# Patient Record
Sex: FEMALE | Race: WHITE | NOT HISPANIC OR LATINO | Employment: OTHER | URBAN - METROPOLITAN AREA
[De-identification: names, ages, dates, MRNs, and addresses within clinical notes are randomized per-mention and may not be internally consistent; named-entity substitution may affect disease eponyms.]

---

## 2018-10-02 ENCOUNTER — APPOINTMENT (EMERGENCY)
Dept: RADIOLOGY | Facility: HOSPITAL | Age: 63
End: 2018-10-02
Payer: COMMERCIAL

## 2018-10-02 ENCOUNTER — HOSPITAL ENCOUNTER (EMERGENCY)
Facility: HOSPITAL | Age: 63
Discharge: HOME | End: 2018-10-02
Attending: EMERGENCY MEDICINE | Admitting: EMERGENCY MEDICINE
Payer: COMMERCIAL

## 2018-10-02 VITALS
SYSTOLIC BLOOD PRESSURE: 115 MMHG | WEIGHT: 155 LBS | OXYGEN SATURATION: 98 % | HEIGHT: 64 IN | RESPIRATION RATE: 18 BRPM | HEART RATE: 75 BPM | DIASTOLIC BLOOD PRESSURE: 72 MMHG | BODY MASS INDEX: 26.46 KG/M2 | TEMPERATURE: 97.9 F

## 2018-10-02 DIAGNOSIS — Z77.120 MOLD EXPOSURE: ICD-10-CM

## 2018-10-02 DIAGNOSIS — R06.00 DYSPNEA: Primary | ICD-10-CM

## 2018-10-02 LAB
ATRIAL RATE: 76 BPM
P AXIS: 62 DEGREES
PR INTERVAL: 144 MS
QRS AXIS: 75 DEGREES
QRSD INTERVAL: 62 MS
QT INTERVAL: 382 MS
QTC INTERVAL: 429 MS
T WAVE AXIS: 59 DEGREES
VENTRICULAR RATE: 76 BPM

## 2018-10-02 PROCEDURE — 93010 ELECTROCARDIOGRAM REPORT: CPT | Performed by: INTERNAL MEDICINE

## 2018-10-02 PROCEDURE — 94640 AIRWAY INHALATION TREATMENT: CPT

## 2018-10-02 PROCEDURE — 99285 EMERGENCY DEPT VISIT HI MDM: CPT

## 2018-10-02 PROCEDURE — 71046 X-RAY EXAM CHEST 2 VIEWS: CPT

## 2018-10-02 PROCEDURE — 93005 ELECTROCARDIOGRAM TRACING: CPT

## 2018-10-02 RX ORDER — AMITRIPTYLINE HYDROCHLORIDE 10 MG/1
10 TABLET, FILM COATED ORAL
COMMUNITY

## 2018-10-02 RX ORDER — DULOXETIN HYDROCHLORIDE 60 MG/1
60 CAPSULE, DELAYED RELEASE ORAL
COMMUNITY

## 2018-10-02 RX ORDER — IPRATROPIUM BROMIDE AND ALBUTEROL SULFATE 2.5; .5 MG/3ML; MG/3ML
3 SOLUTION RESPIRATORY (INHALATION)
Status: DISCONTINUED | OUTPATIENT
Start: 2018-10-02 | End: 2018-10-02 | Stop reason: HOSPADM

## 2018-10-02 RX ORDER — ATORVASTATIN CALCIUM 20 MG/1
20 TABLET, FILM COATED ORAL DAILY
COMMUNITY

## 2018-10-02 RX ADMIN — IPRATROPIUM BROMIDE AND ALBUTEROL SULFATE 3 ML: .5; 3 SOLUTION RESPIRATORY (INHALATION) at 16:44

## 2018-10-02 NOTE — DISCHARGE INSTRUCTIONS
Dyspnea   WHAT YOU NEED TO KNOW:   Dyspnea is breathing difficulty or discomfort  You may have labored, painful, or shallow breathing  You may feel breathless or short of breath  Dyspnea can occur during rest or with activity  You may have dyspnea for a short time, or it might become chronic  Dyspnea is often a symptom of a disease or condition  DISCHARGE INSTRUCTIONS:   Return to the emergency department if:   · Your signs and symptoms are the same or worse within 24 hours of treatment  · You have shaking chills or a fever over 102°F      · You have new pain, pressure, or tightness in your chest      · You have a new or worse cough or wheezing, or you cough up blood  · You feel like you cannot get enough air  · The skin over your ribs or on your neck sinks in when you breathe  · You have a severe headache with vomiting and abdominal pain  · You feel confused or dizzy  Contact your healthcare provider or specialist if:   · You have questions or concerns about your condition or care  Medicines:   · Medicines  may be used to treat the cause of your dyspnea  Medicines may reduce swelling in your airway or decrease extra fluid from around your heart or lungs  Other medicines may be used to decrease anxiety and help you feel calm and relaxed  · Take your medicine as directed  Contact your healthcare provider if you think your medicine is not helping or if you have side effects  Tell him or her if you are allergic to any medicine  Keep a list of the medicines, vitamins, and herbs you take  Include the amounts, and when and why you take them  Bring the list or the pill bottles to follow-up visits  Carry your medicine list with you in case of an emergency  Manage long-term dyspnea:   · Create an action plan  You and your healthcare provider can work together to create a plan for how to handle episodes of dyspnea   The plan can include daily activities, treatment changes, and what to do if you have severe breathing problems  · Lean forward on your elbows when you sit  This helps your lungs expand and may make it easier to breathe  · Use pursed-lip breathing any time you feel short of breath  Breathe in through your nose and then slowly breathe out through your mouth with your lips slightly puckered  It should take you twice as long to breathe out as it did to breathe in  · Do not smoke  Nicotine and other chemicals in cigarettes and cigars can cause lung damage and make it harder to breathe  Ask your healthcare provider for information if you currently smoke and need help to quit  E-cigarettes or smokeless tobacco still contain nicotine  Talk to your healthcare provider before you use these products  · Reach or maintain a healthy weight  Your healthcare provider can help you create a safe weight loss plan if you are overweight  · Exercise as directed  Exercise can help your lungs work more easily  Exercise can also help you lose weight if needed  Try to get at least 30 minutes of exercise most days of the week  Your healthcare provider can help you create an exercise plan that is safe for you  Follow up with your healthcare provider or specialist as directed:  Write down your questions so you remember to ask them during your visits  © 2017 [facility] Information is for End User's use only and may not be sold, redistributed or otherwise used for commercial purposes  All illustrations and images included in CareNotes® are the copyrighted property of [employer]  or Emil Franco  The above information is an  only  It is not intended as medical advice for individual conditions or treatments  Talk to your doctor, nurse or pharmacist before following any medical regimen to see if it is safe and effective for you

## 2018-10-02 NOTE — ED PROVIDER NOTES
History  Chief Complaint   Patient presents with    Shortness of Breath     Patient c/o feeling short of breath and multiple other complaints, including headache, sores in her mouth, chest congestion, after finding out her house has mold in it  CC dyspnea but pt reports her primary concern is mold exposure and oral ulcerations  Has been in house w mold x several months  Sores in mouth x 1 week  Improved w eating tums  Worsened with eating food  No difficulty swallowing  No voice change  No odynophagia  Dyspnea is stable, chronic, ongoing, likely related to continued smoking and COPD  No new or worsening sxs  No CP  No syncope or hemoptysis  No cough  No recent travel  Prior to Admission Medications   Prescriptions Last Dose Informant Patient Reported? Taking? DULoxetine (CYMBALTA) 60 mg delayed release capsule 10/1/2018 at Unknown time  Yes Yes   Sig: Take 60 mg by mouth daily at bedtime   amitriptyline (ELAVIL) 10 mg tablet 10/1/2018 at Unknown time  Yes Yes   Sig: Take 10 mg by mouth daily at bedtime   atorvastatin (LIPITOR) 20 mg tablet 10/1/2018 at Unknown time  Yes Yes   Sig: Take 20 mg by mouth daily      Facility-Administered Medications: None       Past Medical History:   Diagnosis Date    Fibromyalgia     Heart murmur     Lumbosacral disc herniation     RSD (reflex sympathetic dystrophy)        Past Surgical History:   Procedure Laterality Date    PARTIAL HYSTERECTOMY         History reviewed  No pertinent family history  I have reviewed and agree with the history as documented  Social History   Substance Use Topics    Smoking status: Current Every Day Smoker    Smokeless tobacco: Never Used    Alcohol use No        Review of Systems   Constitutional: Negative for chills, fatigue and fever  HENT: Positive for mouth sores and sore throat  Negative for drooling, facial swelling, trouble swallowing and voice change  Respiratory: Positive for shortness of breath   Negative for apnea, cough, choking, chest tightness, wheezing and stridor  Cardiovascular: Negative  Gastrointestinal: Negative  Endocrine: Negative  Genitourinary: Negative  Musculoskeletal: Negative  Neurological: Negative  Hematological: Negative  Physical Exam  Physical Exam   Constitutional: She is oriented to person, place, and time  She appears well-developed and well-nourished  No distress  HENT:   Head: Normocephalic and atraumatic  Right Ear: External ear normal    Left Ear: External ear normal    Mouth/Throat: Oropharynx is clear and moist  No oropharyngeal exudate  Eyes: Pupils are equal, round, and reactive to light  Conjunctivae and EOM are normal    Neck: Normal range of motion  Neck supple  No JVD present  Cardiovascular: Normal rate and regular rhythm  Pulmonary/Chest: Effort normal  No stridor  No respiratory distress  She has wheezes  She has no rales  She exhibits no tenderness  Abdominal: Soft  Musculoskeletal: Normal range of motion  She exhibits no edema, tenderness or deformity  Neurological: She is alert and oriented to person, place, and time  No cranial nerve deficit or sensory deficit  She exhibits normal muscle tone  Coordination normal    Skin: Skin is warm and dry  Capillary refill takes less than 2 seconds  She is not diaphoretic  Nursing note and vitals reviewed        Vital Signs  ED Triage Vitals   Temperature Pulse Respirations Blood Pressure SpO2   10/02/18 1543 10/02/18 1539 10/02/18 1539 10/02/18 1539 10/02/18 1539   97 9 °F (36 6 °C) 83 20 115/72 100 %      Temp Source Heart Rate Source Patient Position - Orthostatic VS BP Location FiO2 (%)   10/02/18 1543 10/02/18 1539 10/02/18 1539 10/02/18 1539 --   Oral Monitor Sitting Right arm       Pain Score       --                  Vitals:    10/02/18 1539 10/02/18 1645   BP: 115/72    Pulse: 83 75   Patient Position - Orthostatic VS: Sitting        Visual Acuity      ED Medications  Medications ipratropium-albuterol (DUO-NEB) 0 5-2 5 mg/3 mL inhalation solution 3 mL (3 mL Nebulization Given 10/2/18 1644)       Diagnostic Studies  Results Reviewed     None                 XR chest 2 views   ED Interpretation by Iris Poe MD (10/02 1651)   C/w underlying COPD, no acute process or PTX      Final Result by Cynthia Chilel MD (10/02 1656)      COPD  No acute cardiopulmonary disease  Workstation performed: [id number]                    Procedures  ECG 12 Lead Documentation  Date/Time: 10/2/2018 4:26 PM  Performed by: Jett Brantley  Authorized by: Jett Brantley     Indications / Diagnosis:  Dyspnea  ECG reviewed by me, the ED Provider: yes    Patient location:  ED  Previous ECG:     Previous ECG:  Unavailable  Interpretation:     Interpretation: normal    Rate:     ECG rate:  76    ECG rate assessment: normal    Rhythm:     Rhythm: sinus rhythm             Phone Contacts  ED Phone Contact    ED Course                               MDM  Number of Diagnoses or Management Options  Dyspnea:   Mold exposure:   Diagnosis management comments: Well appearing pt who speaks full sentences without any dyspnea or increased wob  Mild wheezing likely related to ongoing smoking, no rales  Clinically do not appreciate any significant acute cardiopulmonary process and feel d/c home is safe and appropriate          Amount and/or Complexity of Data Reviewed  Tests in the radiology section of CPT®: reviewed and ordered  Tests in the medicine section of CPT®: reviewed and ordered      CritCare Time    Disposition  Final diagnoses:   Dyspnea   Mold exposure     Time reflects when diagnosis was documented in both MDM as applicable and the Disposition within this note     Time User Action Codes Description Comment    10/2/2018  4:59 PM Tomma Moon Add [R06 00] Dyspnea     10/2/2018  4:59 PM Tomma Moon Add [P95 091] Alfredo 22 exposure       ED Disposition     ED Disposition Condition Comment    Discharge  Janis Snyder discharge to home/self care  Condition at discharge: Stable        Follow-up Information    None         Discharge Medication List as of 10/2/2018  5:01 PM      CONTINUE these medications which have NOT CHANGED    Details   amitriptyline (ELAVIL) 10 mg tablet Take 10 mg by mouth daily at bedtime, Historical Med      atorvastatin (LIPITOR) 20 mg tablet Take 20 mg by mouth daily, Historical Med      DULoxetine (CYMBALTA) 60 mg delayed release capsule Take 60 mg by mouth daily at bedtime, Historical Med           No discharge procedures on file      ED Provider  Electronically Signed by           Ever Roe MD  10/02/18 5821

## 2020-06-11 ENCOUNTER — HOSPITAL ENCOUNTER (EMERGENCY)
Facility: HOSPITAL | Age: 65
Discharge: HOME | End: 2020-06-11
Attending: EMERGENCY MEDICINE | Admitting: EMERGENCY MEDICINE
Payer: COMMERCIAL

## 2020-06-11 ENCOUNTER — APPOINTMENT (EMERGENCY)
Dept: CT IMAGING | Facility: HOSPITAL | Age: 65
End: 2020-06-11
Payer: COMMERCIAL

## 2020-06-11 VITALS
BODY MASS INDEX: 28.15 KG/M2 | HEART RATE: 78 BPM | OXYGEN SATURATION: 94 % | WEIGHT: 164.9 LBS | TEMPERATURE: 98.6 F | HEIGHT: 64 IN | DIASTOLIC BLOOD PRESSURE: 77 MMHG | SYSTOLIC BLOOD PRESSURE: 141 MMHG | RESPIRATION RATE: 18 BRPM

## 2020-06-11 DIAGNOSIS — K76.0 FATTY LIVER: ICD-10-CM

## 2020-06-11 DIAGNOSIS — R10.9 ABDOMINAL PAIN: Primary | ICD-10-CM

## 2020-06-11 LAB
ALBUMIN SERPL BCP-MCNC: 3.6 G/DL (ref 3.5–5)
ALP SERPL-CCNC: 75 U/L (ref 46–116)
ALT SERPL W P-5'-P-CCNC: 135 U/L (ref 12–78)
ANION GAP SERPL CALCULATED.3IONS-SCNC: 9 MMOL/L (ref 4–13)
AST SERPL W P-5'-P-CCNC: 68 U/L (ref 5–45)
BASOPHILS # BLD AUTO: 0.05 THOUSANDS/ΜL (ref 0–0.1)
BASOPHILS NFR BLD AUTO: 1 % (ref 0–1)
BILIRUB DIRECT SERPL-MCNC: 0.12 MG/DL (ref 0–0.2)
BILIRUB SERPL-MCNC: 0.3 MG/DL (ref 0.2–1)
BILIRUB UR QL STRIP: NEGATIVE
BUN SERPL-MCNC: 11 MG/DL (ref 5–25)
CALCIUM SERPL-MCNC: 8.8 MG/DL (ref 8.3–10.1)
CHLORIDE SERPL-SCNC: 105 MMOL/L (ref 100–108)
CLARITY UR: CLEAR
CO2 SERPL-SCNC: 25 MMOL/L (ref 21–32)
COLOR UR: YELLOW
CREAT SERPL-MCNC: 0.73 MG/DL (ref 0.6–1.3)
EOSINOPHIL # BLD AUTO: 0.14 THOUSAND/ΜL (ref 0–0.61)
EOSINOPHIL NFR BLD AUTO: 2 % (ref 0–6)
ERYTHROCYTE [DISTWIDTH] IN BLOOD BY AUTOMATED COUNT: 12.5 % (ref 11.6–15.1)
GFR SERPL CREATININE-BSD FRML MDRD: 88 ML/MIN/1.73SQ M
GLUCOSE SERPL-MCNC: 95 MG/DL (ref 65–140)
GLUCOSE UR STRIP-MCNC: NEGATIVE MG/DL
HCT VFR BLD AUTO: 41.1 % (ref 34.8–46.1)
HGB BLD-MCNC: 13.4 G/DL (ref 11.5–15.4)
HGB UR QL STRIP.AUTO: NEGATIVE
IMM GRANULOCYTES # BLD AUTO: 0.04 THOUSAND/UL (ref 0–0.2)
IMM GRANULOCYTES NFR BLD AUTO: 0 % (ref 0–2)
INR PPP: 1.01 (ref 0.84–1.19)
KETONES UR STRIP-MCNC: ABNORMAL MG/DL
LEUKOCYTE ESTERASE UR QL STRIP: NEGATIVE
LIPASE SERPL-CCNC: 123 U/L (ref 73–393)
LYMPHOCYTES # BLD AUTO: 2.38 THOUSANDS/ΜL (ref 0.6–4.47)
LYMPHOCYTES NFR BLD AUTO: 26 % (ref 14–44)
MCH RBC QN AUTO: 32.5 PG (ref 26.8–34.3)
MCHC RBC AUTO-ENTMCNC: 32.6 G/DL (ref 31.4–37.4)
MCV RBC AUTO: 100 FL (ref 82–98)
MONOCYTES # BLD AUTO: 0.57 THOUSAND/ΜL (ref 0.17–1.22)
MONOCYTES NFR BLD AUTO: 6 % (ref 4–12)
NEUTROPHILS # BLD AUTO: 6.05 THOUSANDS/ΜL (ref 1.85–7.62)
NEUTS SEG NFR BLD AUTO: 65 % (ref 43–75)
NITRITE UR QL STRIP: NEGATIVE
NRBC BLD AUTO-RTO: 0 /100 WBCS
PH UR STRIP.AUTO: 6 [PH]
PLATELET # BLD AUTO: 207 THOUSANDS/UL (ref 149–390)
PMV BLD AUTO: 10.6 FL (ref 8.9–12.7)
POTASSIUM SERPL-SCNC: 3.9 MMOL/L (ref 3.5–5.3)
PROT SERPL-MCNC: 7.2 G/DL (ref 6.4–8.2)
PROT UR STRIP-MCNC: NEGATIVE MG/DL
PROTHROMBIN TIME: 13.3 SECONDS (ref 11.6–14.5)
RBC # BLD AUTO: 4.12 MILLION/UL (ref 3.81–5.12)
SODIUM SERPL-SCNC: 139 MMOL/L (ref 136–145)
SP GR UR STRIP.AUTO: 1.02 (ref 1–1.03)
TROPONIN I SERPL-MCNC: <0.02 NG/ML
UROBILINOGEN UR QL STRIP.AUTO: 0.2 E.U./DL
WBC # BLD AUTO: 9.23 THOUSAND/UL (ref 4.31–10.16)

## 2020-06-11 PROCEDURE — 80048 BASIC METABOLIC PNL TOTAL CA: CPT | Performed by: EMERGENCY MEDICINE

## 2020-06-11 PROCEDURE — 84484 ASSAY OF TROPONIN QUANT: CPT | Performed by: EMERGENCY MEDICINE

## 2020-06-11 PROCEDURE — 74177 CT ABD & PELVIS W/CONTRAST: CPT

## 2020-06-11 PROCEDURE — 85610 PROTHROMBIN TIME: CPT | Performed by: EMERGENCY MEDICINE

## 2020-06-11 PROCEDURE — 80076 HEPATIC FUNCTION PANEL: CPT | Performed by: EMERGENCY MEDICINE

## 2020-06-11 PROCEDURE — 36415 COLL VENOUS BLD VENIPUNCTURE: CPT | Performed by: EMERGENCY MEDICINE

## 2020-06-11 PROCEDURE — 83690 ASSAY OF LIPASE: CPT | Performed by: EMERGENCY MEDICINE

## 2020-06-11 PROCEDURE — 99284 EMERGENCY DEPT VISIT MOD MDM: CPT

## 2020-06-11 PROCEDURE — 81003 URINALYSIS AUTO W/O SCOPE: CPT | Performed by: EMERGENCY MEDICINE

## 2020-06-11 PROCEDURE — 93005 ELECTROCARDIOGRAM TRACING: CPT

## 2020-06-11 PROCEDURE — 99284 EMERGENCY DEPT VISIT MOD MDM: CPT | Performed by: EMERGENCY MEDICINE

## 2020-06-11 PROCEDURE — 85025 COMPLETE CBC W/AUTO DIFF WBC: CPT | Performed by: EMERGENCY MEDICINE

## 2020-06-11 RX ADMIN — IOHEXOL 100 ML: 350 INJECTION, SOLUTION INTRAVENOUS at 22:07

## 2020-06-12 LAB
ATRIAL RATE: 92 BPM
P AXIS: 51 DEGREES
PR INTERVAL: 128 MS
QRS AXIS: 57 DEGREES
QRSD INTERVAL: 72 MS
QT INTERVAL: 346 MS
QTC INTERVAL: 427 MS
T WAVE AXIS: 53 DEGREES
VENTRICULAR RATE: 92 BPM

## 2020-06-12 PROCEDURE — 93010 ELECTROCARDIOGRAM REPORT: CPT | Performed by: INTERNAL MEDICINE

## 2022-11-10 ENCOUNTER — OFFICE VISIT (OUTPATIENT)
Dept: DERMATOLOGY | Facility: CLINIC | Age: 67
End: 2022-11-10

## 2022-11-10 VITALS — BODY MASS INDEX: 26.46 KG/M2 | HEIGHT: 64 IN | TEMPERATURE: 96.9 F | WEIGHT: 155 LBS

## 2022-11-10 DIAGNOSIS — L40.9 PSORIASIS: Primary | ICD-10-CM

## 2022-11-10 RX ORDER — AMITRIPTYLINE HYDROCHLORIDE 25 MG/1
25 TABLET, FILM COATED ORAL EVERY EVENING
COMMUNITY
Start: 2022-09-22

## 2022-11-10 RX ORDER — LORATADINE AND PSEUDOEPHEDRINE SULFATE 10; 240 MG/1; MG/1
1 TABLET, EXTENDED RELEASE ORAL DAILY
COMMUNITY
Start: 2022-10-24 | End: 2023-10-24

## 2022-11-10 RX ORDER — TRIAMCINOLONE ACETONIDE 1 MG/G
CREAM TOPICAL 2 TIMES DAILY
Qty: 453.6 G | Refills: 2 | Status: SHIPPED | OUTPATIENT
Start: 2022-11-10

## 2022-11-10 RX ORDER — TRIAMCINOLONE ACETONIDE 1 MG/G
1 CREAM TOPICAL 2 TIMES DAILY
COMMUNITY
Start: 2022-08-04 | End: 2023-08-04

## 2022-11-10 RX ORDER — FLUTICASONE PROPIONATE AND SALMETEROL 100; 50 UG/1; UG/1
1 POWDER RESPIRATORY (INHALATION) 2 TIMES DAILY
COMMUNITY

## 2022-11-10 RX ORDER — CALCIPOTRIENE 50 UG/G
CREAM TOPICAL 2 TIMES DAILY
Qty: 120 G | Refills: 2 | Status: SHIPPED | OUTPATIENT
Start: 2022-11-10

## 2022-11-10 NOTE — PATIENT INSTRUCTIONS
PSORIASIS    Assessment and Plan:  Based on a thorough discussion of this condition and the management approach to it (including a comprehensive discussion of the known risks, side effects and potential benefits of treatment), the patient (family) agrees to implement the following specific plan:  Discussed treatment with [facility]  Informed patient that this medication would require a prior authorization through her insurance  Also, that the major side effect is nausea  It was also discussed that this medication would be on going and there is not a definite time frame  of when you would be finished with this medication  We will order this medication and work on an authorization from [facility]  Triamcinolone 0 1% Cream: Apply topically twice a day to affected areas Monday through Fridays  Calcipotriene 0 005% Cream: Apply topically twice a day to affected areas on Saturday and Sunday only  Follow up in 5 months  Psoriasis is a chronic inflammatory condition that causes the body to make new skin cells in days rather than weeks  As these cells pile up on the surface of the skin, you may see thick, scaly patches of thickened skin  Psoriasis affects 2-4% of males and females  It can start at any age including childhood, with peaks of onset at 15-25 years and 50-60 years  It tends to persist lifelong, fluctuating in extent and severity  It is particularly common in Caucasians but may affect people of any race  About one-third of patients with psoriasis have family members with psoriasis  Psoriasis is multifactorial  It is classified as an immune-mediated inflammatory disease (IMID)  Genetic factors are important and influence the type of psoriasis and response to treatment  What are the signs and symptoms of psoriasis? There are many different types of psoriasis that each have present uniquely   The types of psoriasis include:    Plaque psoriasis: About 80% to 90% of people who have psoriasis develop this type  When plaque psoriasis appears, you may see:  Plaque psoriasis usually presents with symmetrically distributed, red, scaly plaques with well-defined edges  The scale is typically silvery white, except in skin folds where the plaques often appear shiny and they may have a moist peeling surface  The most common sites are scalp, elbows and knees, but any part of the skin can be involved  The plaques are usually very persistent without treatment  Itch is mostly mild but may be severe in some patients, leading to scratching and lichenification (thickened leathery skin with increased skin markings)  Painful skin cracks or fissures may occur  When psoriatic plaques clear up, they may leave brown or pale marks that can be expected to fade over several months  Guttate psoriasis: When someone gets this type of psoriasis, you often see tiny bumps appear on the skin quite suddenly  The bumps tend to cover much of the torso, legs, and arms  Sometimes, the bumps also develop on the face, scalp, and ears  No matter where they appear, the bumps tend to be:   Small and scaly  [place]-colored to pink  Temporary, clearing in a few weeks or months without treatment  When guttate psoriasis clears, it may never return  Why this happens is still a bit of a mystery  Guttate psoriasis tends to develop in children and young adults who've had an infection, such as strep throat  It's possible that when the infection clears so does guttate psoriasis  It's also possible to have:  Guttate psoriasis for life  See the guttate psoriasis clear and plaque psoriasis develop later in life  Plaque psoriasis when you develop guttate psoriasis  There's no way to predict what will happen after the first flare-up of guttate psoriasis clears  Inverse psoriasis: This type of psoriasis develops in areas where skin touches skin, such as the armpits, genitals, and crease of the buttocks   Where the inverse psoriasis appears, you're likely to notice:  Smooth, red patches of skin that look raw  Little, if any, silvery-white coating  Sore or painful skin  Other names for this type of psoriasis are intertriginous psoriasis or flexural psoriasis  Pustular psoriasis: This type of psoriasis causes pus-filled bumps that usually appear only on the feet and hands  While the pus-filled bumps may look like an infection, the skin is not infected  The bumps don't contain bacteria or anything else that could cause an infection  Where pustular psoriasis appears, you tend to notice:  Red, swollen skin that is dotted with pus-filled bumps  Extremely sore or painful skin  Brown dots (and sometimes scale) appear as the pus-filled bumps dry  Pustular psoriasis can make just about any activity that requires your hands or feet, such as typing or walking, unbearably painful  Pustular psoriasis (generalized): Serious and life-threatening, this rare type of psoriasis causes pus-filled bumps to develop on much of the skin  Also called von Zumbusch psoriasis, a flare-up causes this sequence of events:  Skin on most of the body suddenly turns dry, red, and tender  Within hours, pus-filled bumps cover most of the skin  Often within a day, the pus-filled bumps break open and pools of pus leak onto the skin  As the pus dries (usually within 24 to 48 hours), the skin dries out and peels (as shown in this picture)  When the dried skin peels off, you see a smooth, glazed surface  In a few days or weeks, you may see a new crop of pus-filled bumps covering most of the skin, as the cycle repeats itself  Anyone with pustular psoriasis also feels very sick, and may develop a fever, headache, muscle weakness, and other symptoms  Medical care is often necessary to save the person's life  Erythrodermic psoriasis: Serious and life-threatening, this type of psoriasis requires immediate medical care   When someone develops erythrodermic psoriasis, you may notice:  Skin on most of the body looks burnt  Chills, fever, and the person looks extremely ill  Muscle weakness, a rapid pulse, and severe itch  The person may also be unable to keep warm, so hypothermia can set in quickly  Most people who develop this type of psoriasis already have another type of psoriasis  Before developing erythrodermic psoriasis, they often notice that their psoriasis is worsening or not improving with treatment  If you notice either of these happening, see a board-certified dermatologist     Nails    Nail psoriasis: With any type of psoriasis, you may see changes to your fingernails or toenails  About half of the people who have plaque psoriasis see signs of psoriasis on their fingernails at some point2  When psoriasis affects the nails, you may notice:  Tiny dents in your nails (called “nail pits”)  White, yellow, or brown discoloration under one or more nails  Crumbling, rough nails  A nail lifting up so that it's no longer attached  Buildup of skin cells beneath one or more nails, which lifts up the nail  Treatment and proper nail care can help you control nail psoriasis  Psoriatic arthritis: If you have psoriasis, it's important to pay attention to your joints  Some people who have psoriasis develop a type of arthritis called psoriatic arthritis  This is more likely to occur if you have severe psoriasis  Most people notice psoriasis on their skin years before they develop psoriatic arthritis  It's also possible to get psoriatic arthritis before psoriasis, but this is less common  When psoriatic arthritis develops, the signs can be subtle  At first, you may notice:  A swollen and tender joint, especially in a finger or toe  Heel pain  Swelling on the back of your leg, just above your heel  Stiffness in the morning that fades during the day  Like psoriasis, psoriatic arthritis cannot be cured  Treatment can prevent psoriatic arthritis from worsening, which is important   Allowed to progress, psoriatic arthritis can become disabling  Diagnosis and treatment of psoriasis   Psoriasis is usually diagnosed by clinical features, and skin biopsy if necessary  It is important to decrease factors that aggravate psoriasis  These include treating streptococcal infections, minimizing skin injuries, avoiding sun exposure if it exacerbates psoriasis, smoking, alcohol usage, decreasing stress, and maintaining an optimal body weight  Certain medications such as lithium, beta blockers, antimalarials, and NSAIDs have also been implicated  Suddenly stopping oral steroids or strong topical steroids can cause rebound disease  There are many categories of psoriasis treatments available  Topical therapy  Mild psoriasis is generally treated with topical agents alone  Which treatment is selected may depend on body site, extent and severity of psoriasis  Emollients  Coal tar preparations  Dithranol  Salicylic acid  Vitamin D analogue (calcipotriol)  Topical corticosteroids  Calcineurin inhibitor (tacrolimus, pimecrolimus)  Phototherapy  Most psoriasis centres offer phototherapy with ultraviolet (UV) radiation, often in combination with topical or systemic agents  Types of phototherapy include  Narrowband UVB  Broadband UVB  Photochemotherapy (PUVA)  Targeted phototherapy  Systemic therapy  Moderate to severe psoriasis warrants treatment with a systemic agent and/or phototherapy  The most common treatments are:  Methotrexate  Ciclosporin  Acitretin  Other medicines occasionally used for psoriasis include:  Mycophenolate  Apremilast  Hydroxyurea  Azathioprine  6-mercaptopurine  Systemic corticosteroids are best avoided due to a risk of severe withdrawal flare of psoriasis and adverse effects  Biologics or targeted therapies are reserved for conventional treatment-resistant severe psoriasis, mainly because of expense, as side effects compare favorably with other systemic agents   These include:  Anti-tumour necrosis factor-alpha antagonists (anti-TNF?) infliximab, adalimumab and etanercept  The interleukin (IL)-12/23 antagonist ustekinumab  IL-17 antagonists such as secukinumab  Many other monoclonal antibodies are under investigation in the treatment of psoriasis

## 2022-11-10 NOTE — PROGRESS NOTES
[facility] Note     Patient Name: Thor Krabbe  Encounter Date: 11/10/2022     Have you been cared for by a [place] [id number] Dermatologist in the last 3 years and, if so, which description applies to you? NO  I am considered a "new" patient and must complete all patient intake questions  I am FEMALE/of child-bearing potential     REVIEW OF SYSTEMS:  Have you recently had or currently have any of the following? · Recent fever or chills? No  · Any non-healing wound? No  · Are you pregnant or planning to become pregnant? No  · Are you currently or planning to be nursing or breast feeding? No   PAST MEDICAL HISTORY:  Have you personally ever had or currently have any of the following? If "YES," then please provide more detail  · Skin cancer (such as Melanoma, Basal Cell Carcinoma, Squamous Cell Carcinoma? No  · Tuberculosis, HIV/AIDS, Hepatitis B or C: No  · Systemic Immunosuppression such as Diabetes, Biologic or Immunotherapy, Chemotherapy, Organ Transplantation, Bone Marrow Transplantation No  · Radiation Treatment No   FAMILY HISTORY:  Any "first degree relatives" (parent, brother, sister, or child) with the following? • Skin Cancer, Pancreatic or Other Cancer? YES, possibly skin cancer  PATIENT EXPERIENCE:    • Do you want the Dermatologist to perform a COMPLETE skin exam today including a clinical examination under the "bra and underwear" areas? Yes   • If necessary, do we have your permission to call and leave a detailed message on your Preferred Phone number that includes your specific medical information?   Yes      Allergies   Allergen Reactions   • Prednisone Palpitations      Current Outpatient Medications:   •  amitriptyline (ELAVIL) 25 mg tablet, Take 10 mg by mouth daily at bedtime, Disp: , Rfl:   •  atorvastatin (LIPITOR) 20 mg tablet, Take 20 mg by mouth daily, Disp: , Rfl:   •  DULoxetine (CYMBALTA) 30 mg delayed release capsule, Take 60 mg by mouth daily at bedtime, Disp: , Rfl:   Claritin D daily  Advair Inhaler          • Whom besides the patient is providing clinical information about today's encounter?   o NO ADDITIONAL HISTORIAN (patient alone provided history)    Physical Exam and Assessment/Plan by Diagnosis:    PSORIASIS    Physical Exam:  • Anatomic Location Affected:  Ears, hands, arms, legs  • Morphological Description:  Scaly pink plaques  • Severity: moderate  • Body Percent Affected: 6%  • Pertinent Positives: Patient reports knee joint pain  • Pertinent Negatives: Additional History of Present Condition:  Diagnosed about 45 years ago  Patient has tried several things in the past some being Methotrexate (which was helpful but raised her ), triamcinolone 0 1% cream, phototherapy  Patient had a skin biopsy at some point that was positive for Lupus but re biopsied which was negative  Patient has seen a rheumatologist which said she may be a candidate for [facility]  Currently on scalp, ears, arms, knees down  Patient is also diagnosed with Fibromyalgia, RSD, FRAX osteoporosis  Patient reports a high level of stress in her life  Assessment and Plan:  Based on a thorough discussion of this condition and the management approach to it (including a comprehensive discussion of the known risks, side effects and potential benefits of treatment), the patient (family) agrees to implement the following specific plan:  • Discussed treatment with [facility]  Informed patient that this medication would require a prior authorization through her insurance  Also, that the major side effect is nausea  It was also discussed that this medication would be on going and there is not a definite time frame  of when you would be finished with this medication  We will order this medication and work on an authorization from [employer]  • Triamcinolone 0 1% Cream: Apply topically twice a day to affected areas Monday through Fridays    • Calcipotriene 0 005% Cream: Apply topically twice a day to affected areas on Saturday and Sunday only  • Follow up in 5 months  Psoriasis is a chronic inflammatory condition that causes the body to make new skin cells in days rather than weeks  As these cells pile up on the surface of the skin, you may see thick, scaly patches of thickened skin  Psoriasis affects 2-4% of males and females  It can start at any age including childhood, with peaks of onset at 15-25 years and 50-60 years  It tends to persist lifelong, fluctuating in extent and severity  It is particularly common in Caucasians but may affect people of any race  About one-third of patients with psoriasis have family members with psoriasis  Psoriasis is multifactorial  It is classified as an immune-mediated inflammatory disease (IMID)  Genetic factors are important and influence the type of psoriasis and response to treatment  What are the signs and symptoms of psoriasis? There are many different types of psoriasis that each have present uniquely  The types of psoriasis include:    Plaque psoriasis: About 80% to 90% of people who have psoriasis develop this type  When plaque psoriasis appears, you may see:  Plaque psoriasis usually presents with symmetrically distributed, red, scaly plaques with well-defined edges  The scale is typically silvery white, except in skin folds where the plaques often appear shiny and they may have a moist peeling surface  The most common sites are scalp, elbows and knees, but any part of the skin can be involved  The plaques are usually very persistent without treatment  Itch is mostly mild but may be severe in some patients, leading to scratching and lichenification (thickened leathery skin with increased skin markings)  Painful skin cracks or fissures may occur  When psoriatic plaques clear up, they may leave brown or pale marks that can be expected to fade over several months      Guttate psoriasis: When someone gets this type of psoriasis, you often see tiny bumps appear on the skin quite suddenly  The bumps tend to cover much of the torso, legs, and arms  Sometimes, the bumps also develop on the face, scalp, and ears  No matter where they appear, the bumps tend to be:   • Small and scaly  • [place]-colored to pink  • Temporary, clearing in a few weeks or months without treatment  When guttate psoriasis clears, it may never return  Why this happens is still a bit of a mystery  Guttate psoriasis tends to develop in children and young adults who've had an infection, such as strep throat  It's possible that when the infection clears so does guttate psoriasis  It's also possible to have:  • Guttate psoriasis for life  • See the guttate psoriasis clear and plaque psoriasis develop later in life  • Plaque psoriasis when you develop guttate psoriasis  There's no way to predict what will happen after the first flare-up of guttate psoriasis clears  Inverse psoriasis: This type of psoriasis develops in areas where skin touches skin, such as the armpits, genitals, and crease of the buttocks  Where the inverse psoriasis appears, you're likely to notice:  • Smooth, red patches of skin that look raw  • Little, if any, silvery-white coating  • Sore or painful skin  Other names for this type of psoriasis are intertriginous psoriasis or flexural psoriasis  Pustular psoriasis: This type of psoriasis causes pus-filled bumps that usually appear only on the feet and hands  While the pus-filled bumps may look like an infection, the skin is not infected  The bumps don't contain bacteria or anything else that could cause an infection    Where pustular psoriasis appears, you tend to notice:  • Red, swollen skin that is dotted with pus-filled bumps  • Extremely sore or painful skin  • Brown dots (and sometimes scale) appear as the pus-filled bumps dry  Pustular psoriasis can make just about any activity that requires your hands or feet, such as typing or walking, unbearably painful  Pustular psoriasis (generalized): Serious and life-threatening, this rare type of psoriasis causes pus-filled bumps to develop on much of the skin  Also called von Zumbusch psoriasis, a flare-up causes this sequence of events:  1  Skin on most of the body suddenly turns dry, red, and tender  2  Within hours, pus-filled bumps cover most of the skin  3  Often within a day, the pus-filled bumps break open and pools of pus leak onto the skin  4  As the pus dries (usually within 24 to 48 hours), the skin dries out and peels (as shown in this picture)  5  When the dried skin peels off, you see a smooth, glazed surface  6  In a few days or weeks, you may see a new crop of pus-filled bumps covering most of the skin, as the cycle repeats itself  Anyone with pustular psoriasis also feels very sick, and may develop a fever, headache, muscle weakness, and other symptoms  Medical care is often necessary to save the person's life  Erythrodermic psoriasis: Serious and life-threatening, this type of psoriasis requires immediate medical care  When someone develops erythrodermic psoriasis, you may notice:  • Skin on most of the body looks burnt  • Chills, fever, and the person looks extremely ill  • Muscle weakness, a rapid pulse, and severe itch  The person may also be unable to keep warm, so hypothermia can set in quickly  Most people who develop this type of psoriasis already have another type of psoriasis  Before developing erythrodermic psoriasis, they often notice that their psoriasis is worsening or not improving with treatment  If you notice either of these happening, see a board-certified dermatologist     Nails    Nail psoriasis: With any type of psoriasis, you may see changes to your fingernails or toenails  About half of the people who have plaque psoriasis see signs of psoriasis on their fingernails at some point2    When psoriasis affects the nails, you may notice:  • Tiny dents in your nails (called “nail pits”)  • White, yellow, or brown discoloration under one or more nails  • Crumbling, rough nails  • A nail lifting up so that it's no longer attached  • Buildup of skin cells beneath one or more nails, which lifts up the nail  Treatment and proper nail care can help you control nail psoriasis  Psoriatic arthritis: If you have psoriasis, it's important to pay attention to your joints  Some people who have psoriasis develop a type of arthritis called psoriatic arthritis  This is more likely to occur if you have severe psoriasis  Most people notice psoriasis on their skin years before they develop psoriatic arthritis  It's also possible to get psoriatic arthritis before psoriasis, but this is less common  When psoriatic arthritis develops, the signs can be subtle  At first, you may notice:  • A swollen and tender joint, especially in a finger or toe  • Heel pain  • Swelling on the back of your leg, just above your heel  • Stiffness in the morning that fades during the day  Like psoriasis, psoriatic arthritis cannot be cured  Treatment can prevent psoriatic arthritis from worsening, which is important  Allowed to progress, psoriatic arthritis can become disabling  Diagnosis and treatment of psoriasis   Psoriasis is usually diagnosed by clinical features, and skin biopsy if necessary  It is important to decrease factors that aggravate psoriasis  These include treating streptococcal infections, minimizing skin injuries, avoiding sun exposure if it exacerbates psoriasis, smoking, alcohol usage, decreasing stress, and maintaining an optimal body weight  Certain medications such as lithium, beta blockers, antimalarials, and NSAIDs have also been implicated  Suddenly stopping oral steroids or strong topical steroids can cause rebound disease  There are many categories of psoriasis treatments available  Topical therapy  Mild psoriasis is generally treated with topical agents alone   Which treatment is selected may depend on body site, extent and severity of psoriasis  • Emollients  • Coal tar preparations  • Dithranol  • Salicylic acid  • Vitamin D analogue (calcipotriol)  • Topical corticosteroids  • Calcineurin inhibitor (tacrolimus, pimecrolimus)  Phototherapy  Most psoriasis centres offer phototherapy with ultraviolet (UV) radiation, often in combination with topical or systemic agents  Types of phototherapy include  • Narrowband UVB  • Broadband UVB  • Photochemotherapy (PUVA)  • Targeted phototherapy  Systemic therapy  Moderate to severe psoriasis warrants treatment with a systemic agent and/or phototherapy  The most common treatments are:  • Methotrexate  • Ciclosporin  • Acitretin  Other medicines occasionally used for psoriasis include:  • Mycophenolate  • Apremilast  • Hydroxyurea  • Azathioprine  • 6-mercaptopurine  Systemic corticosteroids are best avoided due to a risk of severe withdrawal flare of psoriasis and adverse effects  Biologics or targeted therapies are reserved for conventional treatment-resistant severe psoriasis, mainly because of expense, as side effects compare favorably with other systemic agents  These include:  • Anti-tumour necrosis factor-alpha antagonists (anti-TNF?) infliximab, adalimumab and etanercept  • The interleukin (IL)-12/23 antagonist ustekinumab  • IL-17 antagonists such as secukinumab  Many other monoclonal antibodies are under investigation in the treatment of psoriasis                        Scribe Attestation    I,:  Anthony Johnson am acting as a scribe while in the presence of the attending physician :       I,:  eN Bernstein MD personally performed the services described in this documentation    as scribed in my presence : Patient/Caregiver provided printed discharge information.

## 2022-11-10 NOTE — Clinical Note
Please do a PA for [facility]  Patient has tried Methotrexate, Triamcinolone, light therapy, Calcipotriene  Diagnosed with Psoriasis 45 years ago  Also diagnosed with RSD, Fibromyalgia    Thank you

## 2022-12-07 ENCOUNTER — TELEPHONE (OUTPATIENT)
Dept: DERMATOLOGY | Facility: CLINIC | Age: 67
End: 2022-12-07

## 2022-12-07 NOTE — TELEPHONE ENCOUNTER
Prior authorization forms were filled out for [facility] and faxed to insurance along with recent clinical notes and demographics and marked as urgent  Forms were scanned into patients chart

## 2023-06-13 ENCOUNTER — APPOINTMENT (EMERGENCY)
Dept: CT IMAGING | Facility: HOSPITAL | Age: 68
End: 2023-06-13
Payer: COMMERCIAL

## 2023-06-13 ENCOUNTER — HOSPITAL ENCOUNTER (EMERGENCY)
Facility: HOSPITAL | Age: 68
Discharge: HOME | End: 2023-06-13
Attending: EMERGENCY MEDICINE
Payer: COMMERCIAL

## 2023-06-13 VITALS
DIASTOLIC BLOOD PRESSURE: 79 MMHG | TEMPERATURE: 98.3 F | HEART RATE: 86 BPM | SYSTOLIC BLOOD PRESSURE: 137 MMHG | HEIGHT: 64 IN | BODY MASS INDEX: 25.78 KG/M2 | WEIGHT: 151 LBS | RESPIRATION RATE: 18 BRPM | OXYGEN SATURATION: 98 %

## 2023-06-13 DIAGNOSIS — R91.8 MASS OF MIDDLE LOBE OF RIGHT LUNG: Primary | ICD-10-CM

## 2023-06-13 LAB
2HR DELTA HS TROPONIN: 0 NG/L
ALBUMIN SERPL BCP-MCNC: 4.1 G/DL (ref 3.5–5)
ALP SERPL-CCNC: 63 U/L (ref 34–104)
ALT SERPL W P-5'-P-CCNC: 58 U/L (ref 7–52)
ANION GAP SERPL CALCULATED.3IONS-SCNC: 6 MMOL/L (ref 4–13)
AST SERPL W P-5'-P-CCNC: 34 U/L (ref 13–39)
ATRIAL RATE: 83 BPM
BASOPHILS # BLD AUTO: 0.08 THOUSANDS/ÂΜL (ref 0–0.1)
BASOPHILS NFR BLD AUTO: 1 % (ref 0–1)
BILIRUB SERPL-MCNC: 0.45 MG/DL (ref 0.2–1)
BUN SERPL-MCNC: 7 MG/DL (ref 5–25)
CALCIUM SERPL-MCNC: 9.3 MG/DL (ref 8.4–10.2)
CARDIAC TROPONIN I PNL SERPL HS: 3 NG/L
CARDIAC TROPONIN I PNL SERPL HS: 3 NG/L
CHLORIDE SERPL-SCNC: 103 MMOL/L (ref 96–108)
CO2 SERPL-SCNC: 27 MMOL/L (ref 21–32)
CREAT SERPL-MCNC: 0.51 MG/DL (ref 0.6–1.3)
EOSINOPHIL # BLD AUTO: 1.39 THOUSAND/ÂΜL (ref 0–0.61)
EOSINOPHIL NFR BLD AUTO: 11 % (ref 0–6)
ERYTHROCYTE [DISTWIDTH] IN BLOOD BY AUTOMATED COUNT: 12.6 % (ref 11.6–15.1)
GFR SERPL CREATININE-BSD FRML MDRD: 100 ML/MIN/1.73SQ M
GLUCOSE SERPL-MCNC: 109 MG/DL (ref 65–140)
HCT VFR BLD AUTO: 44.6 % (ref 34.8–46.1)
HGB BLD-MCNC: 14.8 G/DL (ref 11.5–15.4)
IMM GRANULOCYTES # BLD AUTO: 0.07 THOUSAND/UL (ref 0–0.2)
IMM GRANULOCYTES NFR BLD AUTO: 1 % (ref 0–2)
LYMPHOCYTES # BLD AUTO: 2.21 THOUSANDS/ÂΜL (ref 0.6–4.47)
LYMPHOCYTES NFR BLD AUTO: 17 % (ref 14–44)
MCH RBC QN AUTO: 32.5 PG (ref 26.8–34.3)
MCHC RBC AUTO-ENTMCNC: 33.2 G/DL (ref 31.4–37.4)
MCV RBC AUTO: 98 FL (ref 82–98)
MONOCYTES # BLD AUTO: 0.86 THOUSAND/ÂΜL (ref 0.17–1.22)
MONOCYTES NFR BLD AUTO: 7 % (ref 4–12)
NEUTROPHILS # BLD AUTO: 8.6 THOUSANDS/ÂΜL (ref 1.85–7.62)
NEUTS SEG NFR BLD AUTO: 63 % (ref 43–75)
NRBC BLD AUTO-RTO: 0 /100 WBCS
P AXIS: 53 DEGREES
PLATELET # BLD AUTO: 265 THOUSANDS/UL (ref 149–390)
PMV BLD AUTO: 9.6 FL (ref 8.9–12.7)
POTASSIUM SERPL-SCNC: 4.1 MMOL/L (ref 3.5–5.3)
PR INTERVAL: 140 MS
PROT SERPL-MCNC: 7.9 G/DL (ref 6.4–8.4)
QRS AXIS: 73 DEGREES
QRSD INTERVAL: 70 MS
QT INTERVAL: 374 MS
QTC INTERVAL: 439 MS
RBC # BLD AUTO: 4.56 MILLION/UL (ref 3.81–5.12)
SODIUM SERPL-SCNC: 136 MMOL/L (ref 135–147)
T WAVE AXIS: 60 DEGREES
VENTRICULAR RATE: 83 BPM
WBC # BLD AUTO: 13.21 THOUSAND/UL (ref 4.31–10.16)

## 2023-06-13 PROCEDURE — 85025 COMPLETE CBC W/AUTO DIFF WBC: CPT | Performed by: EMERGENCY MEDICINE

## 2023-06-13 PROCEDURE — 71275 CT ANGIOGRAPHY CHEST: CPT

## 2023-06-13 PROCEDURE — 93005 ELECTROCARDIOGRAM TRACING: CPT

## 2023-06-13 PROCEDURE — 84484 ASSAY OF TROPONIN QUANT: CPT | Performed by: EMERGENCY MEDICINE

## 2023-06-13 PROCEDURE — 36415 COLL VENOUS BLD VENIPUNCTURE: CPT

## 2023-06-13 PROCEDURE — 93010 ELECTROCARDIOGRAM REPORT: CPT | Performed by: INTERNAL MEDICINE

## 2023-06-13 PROCEDURE — 80053 COMPREHEN METABOLIC PANEL: CPT | Performed by: EMERGENCY MEDICINE

## 2023-06-13 RX ADMIN — IOHEXOL 90 ML: 350 INJECTION, SOLUTION INTRAVENOUS at 16:16

## 2023-06-13 NOTE — ED PROVIDER NOTES
"Pt Name: Pascual Ross  MRN: [id number]  [place] 1955  Age/Sex: 78 y o  female  Date of evaluation: 6/13/2023  PCP: Hellen Dahl, [address] [place] [address]    Chief Complaint   Patient presents with   • Back Pain     Pt c/o upper back pain and feeling sob today; has not been feeling for past few days with increased nasal and chest congestion  Pt was sent over from urgent care for abnormal chest XR   • Shortness of Breath         HPI and MDM    78 y o  female presenting with upper back pain and shortness of breath  For the last several weeks, patient stated she has been having worsening allergies especially last week when the air index quality was poor due to smoke  States she has had nasal and chest congestion, dry cough, postnasal drip  No fevers  No known sick contacts  Today went to urgent care, had a chest x-ray done that was concerning for right upper lobe opacity, therefore sent to the emergency department  She does smoke tobacco, states she has a history of COPD  Does not wear any oxygen at baseline  She also mentions rhinorrhea  Per my independent interpretation of EKG, normal sinus rhythm heart of 83, narrow QRS, normal axis, no was reassuring, no STEMI  ED Course as of 06/13/23 2147   Tue Jun 13, 2023   1624 I reviewed chest x-ray interpretation on care everywhere - \"Right upper lobe opacity measuring 7 5 cm  Differential diagnosis includes pneumonitis versus malignancy\"  I discussed CT scan results with the radiologist, they are reported as below  Blood work with minimal white blood cell and AST elevation, otherwise reassuring  Patient updated with results  She is not hypoxic, not in acute distress, airways intact, no hemoptysis  I provided stat referral to pulmonology and heme-onc  Also advised PCP follow-up  I discussed return precautions, I answered all of her questions, patient verbalized understanding        Medications   iohexol (OMNIPAQUE) 350 MG/ML injection " (SINGLE-DOSE) 90 mL (90 mL Intravenous Given 6/13/23 1616)         Past Medical and Surgical History    Past Medical History:   Diagnosis Date   • Fibromyalgia    • Heart murmur    • Lumbosacral disc herniation    • Psoriasis    • RSD (reflex sympathetic dystrophy)        Past Surgical History:   Procedure Laterality Date   • PARTIAL HYSTERECTOMY         History reviewed  No pertinent family history  Social History     Tobacco Use   • Smoking status: Every Day   • Smokeless tobacco: Never   Vaping Use   • Vaping Use: Never used   Substance Use Topics   • Alcohol use: No   • Drug use: No           Allergies    Allergies   Allergen Reactions   • Prednisone Palpitations       Home Medications    Prior to Admission medications    Medication Sig Start Date End Date Taking? Authorizing Provider   amitriptyline (ELAVIL) 10 mg tablet Take 10 mg by mouth daily at bedtime  Patient not taking: Reported on 11/10/2022    Historical Provider, MD   amitriptyline (ELAVIL) 25 mg tablet Take 25 mg by mouth every evening 9/22/22   Historical Provider, MD   Apremilast (Otezla) 10 & 20 & 30 MG TBPK Oral: Initial: 10 mg in the morning on day 1  Titrate upward by additional 10 mg per day on days 2 to 5 as follows: Day 2: 10 mg twice daily; Day 3: 10 mg in the morning and 20 mg in the evening; Day 4: 20 mg twice daily; Day 5: 20 mg in the morning and 30 mg in the evening   Maintenance dose: 30 mg twice daily starting on day 6  12/19/22   Monika Mclain MD   Apremilast Anahi Ortiz) 30 MG TABS Take 1 tablet by mouth 2 (two) times a day 12/19/22   Monika Mclain MD   atorvastatin (LIPITOR) 20 mg tablet Take 20 mg by mouth daily    Historical Provider, MD   calcipotriene (DOVONEX) 0 005 % cream Apply topically 2 (two) times a day To affected areas on Saturday and Sunday only 11/10/22   Monika Mclain MD   DULoxetine (CYMBALTA) 60 mg delayed release capsule Take 60 mg by mouth daily at bedtime    Historical Provider, MD   Fluticasone-Salmeterol (Advair) 100-50 mcg/dose inhaler Inhale 1 puff 2 (two) times a day Rinse mouth after use  Historical Provider, MD   loratadine-pseudoephedrine (Claritin-D 24 Hour)  mg per 24 hr tablet Take 1 tablet by mouth daily 10/24/22 10/24/23  Historical Provider, MD   triamcinolone (KENALOG) 0 1 % cream Apply 1 application topically 2 (two) times a day  Patient not taking: Reported on 11/10/2022 8/4/22 8/4/23  Historical Provider, MD   triamcinolone (KENALOG) 0 1 % cream Apply topically 2 (two) times a day Affected areas Monday through Friday only 11/10/22   Raad De La Cruz MD           Physical Exam      ED Triage Vitals [06/13/23 1436]   Temperature Pulse Respirations Blood Pressure SpO2   98 3 °F (36 8 °C) 98 18 161/74 96 %      Temp Source Heart Rate Source Patient Position - Orthostatic VS BP Location FiO2 (%)   Tympanic Monitor Sitting Left arm --      Pain Score       --               Physical Exam  Constitutional:       General: She is not in acute distress  Appearance: She is not ill-appearing  HENT:      Head: Normocephalic and atraumatic  Nose: Congestion present  Mouth/Throat:      Mouth: Mucous membranes are moist    Eyes:      Extraocular Movements: Extraocular movements intact  Pupils: Pupils are equal, round, and reactive to light  Cardiovascular:      Rate and Rhythm: Normal rate and regular rhythm  Pulmonary:      Effort: No respiratory distress  Breath sounds: Normal breath sounds  No wheezing  Abdominal:      General: There is no distension  Tenderness: There is no abdominal tenderness  Musculoskeletal:         General: No swelling or deformity  Normal range of motion  Cervical back: Normal range of motion and neck supple  Skin:     General: Skin is warm  Findings: No erythema  Neurological:      Mental Status: She is alert and oriented to person, place, and time  Mental status is at baseline                Diagnostic Results      Labs:    Results Reviewed     Procedure Component Value Units Date/Time    HS Troponin I 2hr [[id number]]  (Normal) Collected: 06/13/23 1737    Lab Status: Final result Specimen: Blood from Arm, Right Updated: 06/13/23 1807     hs TnI 2hr 3 ng/L      Delta 2hr hsTnI 0 ng/L     HS Troponin 0hr (reflex protocol) [[id number]]  (Normal) Collected: 06/13/23 1539    Lab Status: Final result Specimen: Blood from Arm, Left Updated: 06/13/23 1609     hs TnI 0hr 3 ng/L     Comprehensive metabolic panel [[id number]]  (Abnormal) Collected: 06/13/23 1539    Lab Status: Final result Specimen: Blood from Arm, Left Updated: 06/13/23 1600     Sodium 136 mmol/L      Potassium 4 1 mmol/L      Chloride 103 mmol/L      CO2 27 mmol/L      ANION GAP 6 mmol/L      BUN 7 mg/dL      Creatinine 0 51 mg/dL      Glucose 109 mg/dL      Calcium 9 3 mg/dL      AST 34 U/L      ALT 58 U/L      Alkaline Phosphatase 63 U/L      Total Protein 7 9 g/dL      Albumin 4 1 g/dL      Total Bilirubin 0 45 mg/dL      eGFR 100 ml/min/1 73sq m     Narrative:      [place] guidelines for Chronic Kidney Disease (CKD):   •  Stage 1 with normal or high GFR (GFR > 90 mL/min/1 73 square meters)  •  Stage 2 Mild CKD (GFR = 60-89 mL/min/1 73 square meters)  •  Stage 3A Moderate CKD (GFR = 45-59 mL/min/1 73 square meters)  •  Stage 3B Moderate CKD (GFR = 30-44 mL/min/1 73 square meters)  •  Stage 4 Severe CKD (GFR = 15-29 mL/min/1 73 square meters)  •  Stage 5 End Stage CKD (GFR <15 mL/min/1 73 square meters)  Note: GFR calculation is accurate only with a steady state creatinine    CBC and differential [[id number]]  (Abnormal) Collected: 06/13/23 1539    Lab Status: Final result Specimen: Blood from Arm, Left Updated: 06/13/23 1544     WBC 13 21 Thousand/uL      RBC 4 56 Million/uL      Hemoglobin 14 8 g/dL      Hematocrit 44 6 %      MCV 98 fL      MCH 32 5 pg      MCHC 33 2 g/dL      RDW 12 6 %      MPV 9 6 fL      Platelets 710 Thousands/uL      nRBC 0 /100 WBCs Neutrophils Relative 63 %      Immat GRANS % 1 %      Lymphocytes Relative 17 %      Monocytes Relative 7 %      Eosinophils Relative 11 %      Basophils Relative 1 %      Neutrophils Absolute 8 60 Thousands/µL      Immature Grans Absolute 0 07 Thousand/uL      Lymphocytes Absolute 2 21 Thousands/µL      Monocytes Absolute 0 86 Thousand/µL      Eosinophils Absolute 1 39 Thousand/µL      Basophils Absolute 0 08 Thousands/µL           All labs reviewed and utilized in the medical decision making process    Radiology:    CTA ED chest PE study   Final Result      No pulmonary embolism  6 x 7 x 5 cm mass in the middle lobe extending centrally to the right hilum, and abuts the adjacent pleura along the fissures and laterally, possibly pleural invasion  Findings are highly suspicious for bronchogenic malignancy  Mildly enlarged right hilar and mediastinal lymph nodes, suspicious for malignant michela involvement  Recommend further evaluation with nonemergent outpatient FDG PET CT for staging  I personally discussed this study with Alia Gardner on 6/13/2023 5:14 PM             Workstation performed: [id number]             All radiology studies independently viewed by me and interpreted by the radiologist     Procedure    Procedures        FINAL IMPRESSION    Final diagnoses: Mass of middle lobe of right lung         DISPOSITION    Time reflects when diagnosis was documented in both MDM as applicable and the Disposition within this note     Time User Action Codes Description Comment    6/13/2023  5:48 PM Silvia Olivarez Add [R91 8] Mass of middle lobe of right lung       ED Disposition     ED Disposition   Discharge    Condition   Stable    Date/Time   Tue Jun 13, 2023  5:51 PM    Comment   Radha Cross discharge to home/self care                 Follow-up Information     Follow up With Specialties Details Why Contact Info Additional [facility] Call in 1 day  [address] [facility] [address] [address] [phone] [facility] [place] [facility] [place], [address], [facility] [place], [place], [address] [facility] Pulmonology Call in 1 day  [facility] [address]  Denis Dickson [place], [place], [address]            PATIENT REFERRED TO:    [facility] [place] [facility] [place]  [address] [facility] [address] [phone]  Call in 1 day      [address]  [facility] [address] [phone]  Call in 1 day        DISCHARGE MEDICATIONS:    Discharge Medication List as of 6/13/2023  5:51 PM      CONTINUE these medications which have NOT CHANGED    Details   !! amitriptyline (ELAVIL) 10 mg tablet Take 10 mg by mouth daily at bedtime, Historical Med      !! amitriptyline (ELAVIL) 25 mg tablet Take 25 mg by mouth every evening, Starting Thu 9/22/2022, Historical Med      Apremilast (Otezla) 10 & 20 & 30 MG TBPK Oral: Initial: 10 mg in the morning on day 1  Titrate upward by additional 10 mg per day on days 2 to 5 as follows: Day 2: 10 mg twice daily; Day 3: 10 mg in the morning and 20 mg in the evening; Day 4: 20 mg twice daily; Day 5: 20 mg in the morning and  30 mg in the evening   Maintenance dose: 30 mg twice daily starting on day 6 , Normal      Apremilast (Otezla) 30 MG TABS Take 1 tablet by mouth 2 (two) times a day, Starting Mon 12/19/2022, Normal      atorvastatin (LIPITOR) 20 mg tablet Take 20 mg by mouth daily, Historical Med      calcipotriene (DOVONEX) 0 005 % cream Apply topically 2 (two) times a day To affected areas on Saturday and Sunday only, Starting Thu 11/10/2022, Normal      DULoxetine (CYMBALTA) 60 mg delayed release capsule Take 60 mg by mouth daily at bedtime, Historical Med      Fluticasone-Salmeterol (Advair) 100-50 mcg/dose inhaler Inhale 1 puff 2 (two) times a day Rinse mouth after use , Historical Med      loratadine-pseudoephedrine (Claritin-D 24 Hour)  mg per 24 hr tablet Take 1 tablet by mouth daily, Starting Mon 10/24/2022, Until Tue 10/24/2023, Historical Med      !! triamcinolone (KENALOG) 0 1 % cream Apply 1 application topically 2 (two) times a day, Starting Thu 8/4/2022, Until Fri 8/4/2023, Historical Med      !! triamcinolone (KENALOG) 0 1 % cream Apply topically 2 (two) times a day Affected areas Monday through Friday only, Starting Thu 11/10/2022, Normal       !! - Potential duplicate medications found  Please discuss with provider  Chelita Carbajal DO        This note was partially completed using voice recognition technology, and was scanned for gross errors; however some errors may still exist  Please contact the author with any questions or requests for clarification        Chelita Carbajal DO  06/13/23 5254

## 2023-06-14 ENCOUNTER — TELEPHONE (OUTPATIENT)
Dept: HEMATOLOGY ONCOLOGY | Facility: CLINIC | Age: 68
End: 2023-06-14

## 2023-06-14 NOTE — TELEPHONE ENCOUNTER
I called Marielos Coker in response to a referral that was received for patient to establish care with Medical Oncology  Outreach was made to schedule a consultation     I left a voicemail explaining the reason for my call and advised patient to call [facility] at [phone]  Another attempt will be made to contact patient

## 2023-06-14 NOTE — TELEPHONE ENCOUNTER
Patient returned call and RN directed patient to [facility] number ([phone]) to schedule consultation OV   Patient will call Thursday 6/15 AM

## 2023-06-20 ENCOUNTER — TELEPHONE (OUTPATIENT)
Dept: HEMATOLOGY ONCOLOGY | Facility: CLINIC | Age: 68
End: 2023-06-20

## 2023-06-20 NOTE — TELEPHONE ENCOUNTER
Appointment Schedule   Who are you speaking with? Patient   If it is not the patient, are they listed on an active communication consent form? N/A   Which provider is the appointment scheduled with? Dr Jessica Kennedy   At which location is the appointment scheduled for? [facility]   When is the appointment scheduled? Please list date and time 06/23/23 9:30AM   What is the reason for this appointment? Consult   Did patient voice understanding of the details of this appointment? Yes   Was the no show policy reviewed with patient?  Yes

## 2023-06-21 ENCOUNTER — CONSULT (OUTPATIENT)
Age: 68
End: 2023-06-21
Payer: COMMERCIAL

## 2023-06-21 VITALS
HEART RATE: 87 BPM | DIASTOLIC BLOOD PRESSURE: 70 MMHG | OXYGEN SATURATION: 97 % | BODY MASS INDEX: 25.54 KG/M2 | HEIGHT: 64 IN | TEMPERATURE: 97.9 F | WEIGHT: 149.6 LBS | SYSTOLIC BLOOD PRESSURE: 114 MMHG

## 2023-06-21 DIAGNOSIS — J43.9 PULMONARY EMPHYSEMA, UNSPECIFIED EMPHYSEMA TYPE (HCC): ICD-10-CM

## 2023-06-21 DIAGNOSIS — F17.200 CURRENT EVERY DAY SMOKER: ICD-10-CM

## 2023-06-21 DIAGNOSIS — R91.8 MASS OF MIDDLE LOBE OF RIGHT LUNG: Primary | ICD-10-CM

## 2023-06-21 PROCEDURE — 99204 OFFICE O/P NEW MOD 45 MIN: CPT | Performed by: INTERNAL MEDICINE

## 2023-06-21 RX ORDER — PROPRANOLOL HYDROCHLORIDE 20 MG/5ML
SOLUTION ORAL
COMMUNITY
Start: 2023-03-15

## 2023-06-21 RX ORDER — DULOXETIN HYDROCHLORIDE 30 MG/1
CAPSULE, DELAYED RELEASE ORAL
COMMUNITY
Start: 2021-10-15

## 2023-06-21 NOTE — PROGRESS NOTES
"Assessment/Plan:     Diagnoses and all orders for this visit:    Mass of middle lobe of right lung  -     Ambulatory Referral to Pulmonology  -     NM PET CT skull base to mid thigh; Future  -     Complete PFT with post bronchodilator; Future    Pulmonary emphysema, unspecified emphysema type ([facility] [address] )    Current every day smoker    Other orders  -     DULoxetine (CYMBALTA) 30 mg delayed release capsule  -     propranolol (INDERAL) 20 mg/5 mL solution          Plan for follow up:  Reviewed CT of the chest report and images with the patient with a right middle lobe lung mass 6 x 7 cm, and the mass extending into the right hilum  Also with mediastinal lymphadenopathy right hilar lymph node and subcarinal lymph node and a right lower paratracheal precarinal lymph node  Findings are highly suspicious for malignancy  Discussed with the patient regarding PET scan and a complete PFT attempt and  Also for an tissue biopsy with EBUS was discussed  Also message has been sent for scheduling for EBUS  She is currently on Advair for the past few months that she was placed for the cough she states its been working discussed with the patient to continue with the Advair 1 puff twice daily she does not have a rescue MDI currently she states  Smoking cessation trying to cut down on her own  We will follow-up on 4  weeks with the above testing results   No follow-ups on file  All questions are answered to the patient's satisfaction and understanding  She verbalizes understanding  She is encouraged to call with any further questions or concerns  Portions of the record may have been created with voice recognition software  Occasional wrong word or \"sound a like\" substitutions may have occurred due to the inherent limitations of voice recognition software  Read the chart carefully and recognize, using context, where substitutions have occurred  a    Electronically Signed by Greg Regalado, " MD    ______________________________________________________________________    Chief Complaint:   Chief Complaint   Patient presents with   • Cough   • Abnormal Imaging Result        Patient ID: Levada Dancer is a 78 y o  y o  female has a past medical history of Fibromyalgia, Heart murmur, Lumbosacral disc herniation, Psoriasis, and RSD (reflex sympathetic dystrophy)  6/21/2023  Patient presents today for initial visit  Levada Dancer is a very pleasant 60-year-old lady with approximately 47-pack-year smoking history still actively smoking about a pack a day trying to cut down on her own  Here for evaluation for an abnormal imaging  She has history of hypertension, and history of seasonal allergies and she is currently pretty functional, no limitation of her daily current activities  She is currently not working taking care of her relative who is currently sick, 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 with no history of any exposure to chemicals or fumes  States in the past 6 months she has had some mild cough she thought was possibly secondary to the allergies no significant chest pain no fevers she states  With the recent [place] fog that happened in the first week of June she had some sinus issues congestion postnasal drip and cough for which she went into the urgent care chest x-ray was done and sent into  further evaluation with a CAT scan  She was given antibiotics and, and currently she states her symptoms of congestion have completely subsided she does not have any cough currently even the fatigue has currently subsided she states  Here for follow-up with the abnormal imaging  Family history of mother with pulmonary sarcoidosis      Occupational/Exposure history: currently not working   Travel history: none  Review of Systems   Constitutional: Negative  HENT: Negative  Eyes: Negative  Respiratory: Negative  Cardiovascular: Negative  Gastrointestinal: Negative  Endocrine: Negative  Genitourinary: Negative  Musculoskeletal: Negative  Allergic/Immunologic: Negative  Neurological: Negative  Hematological: Negative  Psychiatric/Behavioral: Negative  Social history: She reports that she has been smoking cigarettes  She started smoking about 47 years ago  She has a 47 00 pack-year smoking history  She has never used smokeless tobacco  She reports that she does not drink alcohol and does not use drugs  Past surgical history:   Past Surgical History:   Procedure Laterality Date   • PARTIAL HYSTERECTOMY       Family history: History reviewed  No pertinent family history  Immunization History   Administered Date(s) Administered   • COVID-19 PFIZER VACCINE 0 3 ML IM 04/27/2021, 05/18/2021     Current Outpatient Medications   Medication Sig Dispense Refill   • amitriptyline (ELAVIL) 25 mg tablet Take 25 mg by mouth every evening     • atorvastatin (LIPITOR) 20 mg tablet Take 20 mg by mouth daily     • calcipotriene (DOVONEX) 0 005 % cream Apply topically 2 (two) times a day To affected areas on Saturday and Sunday only 120 g 2   • DULoxetine (CYMBALTA) 30 mg delayed release capsule      • Fluticasone-Salmeterol (Advair) 100-50 mcg/dose inhaler Inhale 1 puff 2 (two) times a day Rinse mouth after use  • propranolol (INDERAL) 20 mg/5 mL solution      • triamcinolone (KENALOG) 0 1 % cream Apply topically 2 (two) times a day Affected areas Monday through Friday only 453 6 g 2   • amitriptyline (ELAVIL) 10 mg tablet Take 10 mg by mouth daily at bedtime (Patient not taking: Reported on 11/10/2022)     • Apremilast (Otezla) 10 & 20 & 30 MG TBPK Oral: Initial: 10 mg in the morning on day 1  Titrate upward by additional 10 mg per day on days 2 to 5 as follows: Day 2: 10 mg twice daily; Day 3: 10 mg in the morning and 20 mg in the evening; Day 4: 20 mg twice daily; Day 5: 20 mg in the morning and 30 mg in the evening   Maintenance dose: 30 mg twice daily starting on day 6  55 each 0 "  • Apremilast (Otezla) 30 MG TABS Take 1 tablet by mouth 2 (two) times a day 60 tablet 11   • DULoxetine (CYMBALTA) 60 mg delayed release capsule Take 60 mg by mouth daily at bedtime     • loratadine-pseudoephedrine (Claritin-D 24 Hour)  mg per 24 hr tablet Take 1 tablet by mouth daily     • triamcinolone (KENALOG) 0 1 % cream Apply 1 application topically 2 (two) times a day (Patient not taking: Reported on 11/10/2022)       No current facility-administered medications for this visit  Allergies: Prednisone    Objective:  Vitals:    06/21/23 0800   BP: 114/70   Pulse: 87   Temp: 97 9 °F (36 6 °C)   SpO2: 97%   Weight: 67 9 kg (149 lb 9 6 oz)   Height: 5' 4\" (1 626 m)   Oxygen Therapy  SpO2: 97 %    Wt Readings from Last 3 Encounters:   06/21/23 67 9 kg (149 lb 9 6 oz)   06/13/23 68 5 kg (151 lb)   11/10/22 70 3 kg (155 lb)     Body mass index is 25 68 kg/m²  Physical Exam  Vitals and nursing note reviewed  Constitutional:       Appearance: She is well-developed  HENT:      Head: Normocephalic and atraumatic  Eyes:      Conjunctiva/sclera: Conjunctivae normal       Pupils: Pupils are equal, round, and reactive to light  Neck:      Thyroid: No thyromegaly  Vascular: No JVD  Cardiovascular:      Rate and Rhythm: Normal rate and regular rhythm  Heart sounds: Normal heart sounds  No murmur heard  No friction rub  No gallop  Pulmonary:      Effort: Pulmonary effort is normal  No respiratory distress  Breath sounds: Normal breath sounds  No wheezing or rales  Chest:      Chest wall: No tenderness  Musculoskeletal:         General: No tenderness or deformity  Normal range of motion  Cervical back: Normal range of motion and neck supple  Lymphadenopathy:      Cervical: No cervical adenopathy  Skin:     General: Skin is warm and dry  Neurological:      Mental Status: She is alert and oriented to person, place, and time             Diagnostics:  I have personally " reviewed pertinent films in PACS  ESS: Total score: 3  CTA ED chest PE study    Result Date: 6/13/2023  Narrative: CTA - CHEST WITH IV CONTRAST - PULMONARY ANGIOGRAM INDICATION:   RUL opacity on cxr  COMPARISON: Chest radiograph from 10/2/2018  Abdominal CT from 6/11/2020 TECHNIQUE: CTA examination of the chest was performed using angiographic technique according to a protocol specifically tailored to evaluate for pulmonary embolism  Multiplanar 2D reformatted images were created from the source data  In addition, coronal 3D MIP postprocessing was performed on the acquisition scanner  Radiation dose length product (DLP) for this visit:  250 mGy-cm   This examination, like all CT scans performed in the [facility], was performed utilizing techniques to minimize radiation dose exposure, including the use of iterative reconstruction and automated exposure control  IV Contrast:  90 mL of iohexol (OMNIPAQUE) FINDINGS: PULMONARY ARTERIAL TREE:  No pulmonary embolus is seen  LUNGS: 6 x 7 x 5 cm (AP, TRV, CC) mass in the middle lobe that abuts and splays the major and minor fissures (sagittal image 134; series 7, image 110)  The mass extends centrally into the right hilum  Additionally, the mass extends peripherally and abuts  the right lateral pleura  Mild upper lung zone predominant emphysema  Biapical pleural-parenchymal scarring  Punctate calcified granulomas are present  PLEURA: Minimal pleural thickening adjacent to the right middle lobe mass as described  HEART/GREAT VESSELS: Normal heart size  Mild coronary artery calcification  No thoracic aortic aneurysm  MEDIASTINUM AND ARDEN: 1 0 x 1 4 cm right hilar lymph node (series 2, image 89)  1 3 x 2 0 cm subcarinal lymph node (series 2, image 93)  1 2 x 1 9 cm right lower paratracheal/precarinal lymph node (series 2, 80)  The remainder of the mediastinal lymph nodes measure less than 1 cm in short axis and are nonspecific   CHEST WALL AND LOWER NECK: Unremarkable  VISUALIZED STRUCTURES IN THE UPPER ABDOMEN: Diffuse hepatic steatosis  Atherosclerotic changes of the aorta and branching vessels  OSSEOUS STRUCTURES:  No acute fracture or destructive osseous lesion  Impression: No pulmonary embolism  6 x 7 x 5 cm mass in the middle lobe extending centrally to the right hilum, and abuts the adjacent pleura along the fissures and laterally, possibly pleural invasion  Findings are highly suspicious for bronchogenic malignancy  Mildly enlarged right hilar and mediastinal lymph nodes, suspicious for malignant michela involvement  Recommend further evaluation with nonemergent outpatient FDG PET CT for staging  I personally discussed this study with Genaro Garay on 6/13/2023 5:14 PM  Workstation performed: [id number]     XR CHEST 2 VIEWS (PA AND LAT)    Result Date: 6/13/2023  Narrative: History: Cough  COMPARISON: 5/15/2018  Frontal and lateral views of the chest  No pleural effusion  No evidence of pneumothorax there is a right upper lobe opacity measuring 7 5 cm  Impression: IMPRESSION: Right upper lobe opacity measuring 7 5 cm  Differential diagnosis includes pneumonitis versus malignancy  Clinical correlation follow-up to resolution is recommended   [id number]

## 2023-06-21 NOTE — H&P (VIEW-ONLY)
Assessment/Plan:     Diagnoses and all orders for this visit:    Mass of middle lobe of right lung  -     Ambulatory Referral to Pulmonology  -     NM PET CT skull base to mid thigh; Future  -     Complete PFT with post bronchodilator; Future    Pulmonary emphysema, unspecified emphysema type ([address])    Current every day smoker    Other orders  -     DULoxetine (CYMBALTA) 30 mg delayed release capsule  -     propranolol (INDERAL) 20 mg/5 mL solution          Plan for follow up:  Reviewed CT of the chest report and images with the patient with a right middle lobe lung mass 6 x 7 cm, and the mass extending into the right hilum. Also with mediastinal lymphadenopathy right hilar lymph node and subcarinal lymph node and a right lower paratracheal precarinal lymph node. Findings are highly suspicious for malignancy  Discussed with the patient regarding PET scan and a complete PFT attempt and  Also for an tissue biopsy with EBUS was discussed  Also message has been sent for scheduling for EBUS  She is currently on Advair for the past few months that she was placed for the cough she states its been working discussed with the patient to continue with the Advair 1 puff twice daily she does not have a rescue MDI currently she states. Smoking cessation trying to cut down on her own  We will follow-up on 4  weeks with the above testing results   No follow-ups on file. All questions are answered to the patient's satisfaction and understanding. She verbalizes understanding. She is encouraged to call with any further questions or concerns. Portions of the record may have been created with voice recognition software. Occasional wrong word or "sound a like" substitutions may have occurred due to the inherent limitations of voice recognition software. Read the chart carefully and recognize, using context, where substitutions have occurred. a    Electronically Signed by Elliott Gallegos MD    ______________________________________________________________________    Chief Complaint:   Chief Complaint   Patient presents with   • Cough   • Abnormal Imaging Result        Patient ID: Yandel Fung is a 78 y.o. y.o. female has a past medical history of Fibromyalgia, Heart murmur, Lumbosacral disc herniation, Psoriasis, and RSD (reflex sympathetic dystrophy). 6/21/2023  Patient presents today for initial visit. Yandel Fung is a very pleasant 60-year-old lady with approximately 47-pack-year smoking history still actively smoking about a pack a day trying to cut down on her own. Here for evaluation for an abnormal imaging. She has history of hypertension, and history of seasonal allergies and she is currently pretty functional, no limitation of her daily current activities. 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 States in the past 6 months she has had some mild cough she thought was possibly secondary to the allergies no significant chest pain no fevers she states. With the recent [place] fog that happened in the first week of June she had some sinus issues congestion postnasal drip and cough for which she went into the urgent care chest x-ray was done and sent into  further evaluation with a CAT scan. She was given antibiotics and, and currently she states her symptoms of congestion have completely subsided she does not have any cough currently even the fatigue has currently subsided she states. Here for follow-up with the abnormal imaging. Family history of mother with pulmonary sarcoidosis      Occupational/Exposure history: currently not working   Travel history: none  Review of Systems   Constitutional: Negative. HENT: Negative. Eyes: Negative. Respiratory: Negative. Cardiovascular: Negative. Gastrointestinal: Negative. Endocrine: Negative. Genitourinary: Negative. Musculoskeletal: Negative. Allergic/Immunologic: Negative. Neurological: Negative. Hematological: Negative. Psychiatric/Behavioral: Negative. Social history: She reports that she has been smoking cigarettes. She started smoking about 47 years ago. She has a 47.00 pack-year smoking history. She has never used smokeless tobacco. She reports that she does not drink alcohol and does not use drugs. Past surgical history:   Past Surgical History:   Procedure Laterality Date   • PARTIAL HYSTERECTOMY       Family history: History reviewed. No pertinent family history. Immunization History   Administered Date(s) Administered   • COVID-19 PFIZER VACCINE 0.3 ML IM 04/27/2021, 05/18/2021     Current Outpatient Medications   Medication Sig Dispense Refill   • amitriptyline (ELAVIL) 25 mg tablet Take 25 mg by mouth every evening     • atorvastatin (LIPITOR) 20 mg tablet Take 20 mg by mouth daily     • calcipotriene (DOVONEX) 0.005 % cream Apply topically 2 (two) times a day To affected areas on Saturday and Sunday only 120 g 2   • DULoxetine (CYMBALTA) 30 mg delayed release capsule      • Fluticasone-Salmeterol (Advair) 100-50 mcg/dose inhaler Inhale 1 puff 2 (two) times a day Rinse mouth after use. • propranolol (INDERAL) 20 mg/5 mL solution      • triamcinolone (KENALOG) 0.1 % cream Apply topically 2 (two) times a day Affected areas Monday through Friday only 453.6 g 2   • amitriptyline (ELAVIL) 10 mg tablet Take 10 mg by mouth daily at bedtime (Patient not taking: Reported on 11/10/2022)     • Apremilast (Otezla) 10 & 20 & 30 MG TBPK Oral: Initial: 10 mg in the morning on day 1. Titrate upward by additional 10 mg per day on days 2 to 5 as follows: Day 2: 10 mg twice daily; Day 3: 10 mg in the morning and 20 mg in the evening; Day 4: 20 mg twice daily; Day 5: 20 mg in the morning and 30 mg in the evening.  Maintenance dose: 30 mg twice daily starting on day 6. 55 each 0 • Apremilast (Otezla) 30 MG TABS Take 1 tablet by mouth 2 (two) times a day 60 tablet 11   • DULoxetine (CYMBALTA) 60 mg delayed release capsule Take 60 mg by mouth daily at bedtime     • loratadine-pseudoephedrine (Claritin-D 24 Hour)  mg per 24 hr tablet Take 1 tablet by mouth daily     • triamcinolone (KENALOG) 0.1 % cream Apply 1 application topically 2 (two) times a day (Patient not taking: Reported on 11/10/2022)       No current facility-administered medications for this visit. Allergies: Prednisone    Objective:  Vitals:    06/21/23 0800   BP: 114/70   Pulse: 87   Temp: 97.9 °F (36.6 °C)   SpO2: 97%   Weight: 67.9 kg (149 lb 9.6 oz)   Height: 5' 4" (1.626 m)   Oxygen Therapy  SpO2: 97 %  . Wt Readings from Last 3 Encounters:   06/21/23 67.9 kg (149 lb 9.6 oz)   06/13/23 68.5 kg (151 lb)   11/10/22 70.3 kg (155 lb)     Body mass index is 25.68 kg/m². Physical Exam  Vitals and nursing note reviewed. Constitutional:       Appearance: She is well-developed. HENT:      Head: Normocephalic and atraumatic. Eyes:      Conjunctiva/sclera: Conjunctivae normal.      Pupils: Pupils are equal, round, and reactive to light. Neck:      Thyroid: No thyromegaly. Vascular: No JVD. Cardiovascular:      Rate and Rhythm: Normal rate and regular rhythm. Heart sounds: Normal heart sounds. No murmur heard. No friction rub. No gallop. Pulmonary:      Effort: Pulmonary effort is normal. No respiratory distress. Breath sounds: Normal breath sounds. No wheezing or rales. Chest:      Chest wall: No tenderness. Musculoskeletal:         General: No tenderness or deformity. Normal range of motion. Cervical back: Normal range of motion and neck supple. Lymphadenopathy:      Cervical: No cervical adenopathy. Skin:     General: Skin is warm and dry. Neurological:      Mental Status: She is alert and oriented to person, place, and time.            Diagnostics:  I have personally reviewed pertinent films in PACS  ESS: Total score: 3  CTA ED chest PE study    Result Date: 6/13/2023  Narrative: CTA - CHEST WITH IV CONTRAST - PULMONARY ANGIOGRAM INDICATION:   RUL opacity on cxr. COMPARISON: Chest radiograph from 10/2/2018. Abdominal CT from 6/11/2020 TECHNIQUE: CTA examination of the chest was performed using angiographic technique according to a protocol specifically tailored to evaluate for pulmonary embolism. Multiplanar 2D reformatted images were created from the source data. In addition, coronal 3D MIP postprocessing was performed on the acquisition scanner. Radiation dose length product (DLP) for this visit:  250 mGy-cm . This examination, like all CT scans performed in the [facility], was performed utilizing techniques to minimize radiation dose exposure, including the use of iterative reconstruction and automated exposure control. IV Contrast:  90 mL of iohexol (OMNIPAQUE) FINDINGS: PULMONARY ARTERIAL TREE:  No pulmonary embolus is seen. LUNGS: 6 x 7 x 5 cm (AP, TRV, CC) mass in the middle lobe that abuts and splays the major and minor fissures (sagittal image 134; series 7, image 110). The mass extends centrally into the right hilum. Additionally, the mass extends peripherally and abuts  the right lateral pleura. Mild upper lung zone predominant emphysema. Biapical pleural-parenchymal scarring. Punctate calcified granulomas are present. PLEURA: Minimal pleural thickening adjacent to the right middle lobe mass as described. HEART/GREAT VESSELS: Normal heart size. Mild coronary artery calcification. No thoracic aortic aneurysm. MEDIASTINUM AND ARDEN: 1.0 x 1.4 cm right hilar lymph node (series 2, image 89). 1.3 x 2.0 cm subcarinal lymph node (series 2, image 93). 1.2 x 1.9 cm right lower paratracheal/precarinal lymph node (series 2, 80). The remainder of the mediastinal lymph nodes measure less than 1 cm in short axis and are nonspecific.  CHEST WALL AND LOWER NECK: Unremarkable. VISUALIZED STRUCTURES IN THE UPPER ABDOMEN: Diffuse hepatic steatosis. Atherosclerotic changes of the aorta and branching vessels. OSSEOUS STRUCTURES:  No acute fracture or destructive osseous lesion. Impression: No pulmonary embolism. 6 x 7 x 5 cm mass in the middle lobe extending centrally to the right hilum, and abuts the adjacent pleura along the fissures and laterally, possibly pleural invasion. Findings are highly suspicious for bronchogenic malignancy. Mildly enlarged right hilar and mediastinal lymph nodes, suspicious for malignant michela involvement. Recommend further evaluation with nonemergent outpatient FDG PET CT for staging. I personally discussed this study with Alexsander Romero on 6/13/2023 5:14 PM. Workstation performed: [id number]     XR CHEST 2 VIEWS (PA AND LAT)    Result Date: 6/13/2023  Narrative: History: Cough. COMPARISON: 5/15/2018. Frontal and lateral views of the chest. No pleural effusion. No evidence of pneumothorax there is a right upper lobe opacity measuring 7.5 cm. Impression: IMPRESSION: Right upper lobe opacity measuring 7.5 cm. Differential diagnosis includes pneumonitis versus malignancy. Clinical correlation follow-up to resolution is recommended.  [id number]

## 2023-06-22 ENCOUNTER — TELEPHONE (OUTPATIENT)
Dept: PULMONOLOGY | Facility: CLINIC | Age: 68
End: 2023-06-22

## 2023-06-22 NOTE — TELEPHONE ENCOUNTER
----- Message from Jose Rubio MD sent at 6/21/2023  3:44 PM EDT -----  No need to wait for the PET scan ,   Thanks  James Funes   ----- Message -----  From: Sylvie Tse  Sent: 6/21/2023   2:36 PM EDT  To: Jose Rubio MD; Galilea Chow MD    Sure I do see the patient has a PET scan scheduled for July 12   Do you want it before or after the PET>   ----- Message -----  From: Jose Rubio MD  Sent: 6/21/2023   5:78 AM EDT  To: Sylvie Tse; MD Bradley Dorado u please schedule this patient for EBUS , at the earliest next available ,   I reviewed the images and discussed with Dr Josephine Barrientos in the office today  I am cc him as well   Thanks  James Funes

## 2023-06-22 NOTE — TELEPHONE ENCOUNTER
Dr Mindi Lombard will be performing a EBUS on Julius Powellne on 07/05/2023     LOCATION: [facility]    ANTICOAGULATION INSTRUCTIONS: none    OTHER MEDICATION INSTRUCTIONS: none    LABS: (CBC/PT/PTT in last 90 days): 06/13/2023 CBC no recent PT/PTT  (If no, labs ordered / to be done at least one day prior to procedure)    LAST OFFICE NOTE/H&P within 30 days of procedure: 06/21/2023    INSTRUCTIONS GIVEN: spoke to patient about procedure  She is to be NPO after mid-night the night prior and will need a ride home  She will get a call the night before going over the arrival time  I have mailed the patient instructions home as well       Rupert Garces

## 2023-06-23 ENCOUNTER — PATIENT OUTREACH (OUTPATIENT)
Dept: OTHER | Facility: CLINIC | Age: 68
End: 2023-06-23

## 2023-06-23 ENCOUNTER — OFFICE VISIT (OUTPATIENT)
Dept: CARDIAC SURGERY | Facility: CLINIC | Age: 68
End: 2023-06-23
Payer: COMMERCIAL

## 2023-06-23 ENCOUNTER — DOCUMENTATION (OUTPATIENT)
Dept: HEMATOLOGY ONCOLOGY | Facility: CLINIC | Age: 68
End: 2023-06-23

## 2023-06-23 ENCOUNTER — DOCUMENTATION (OUTPATIENT)
Dept: CARDIAC SURGERY | Facility: CLINIC | Age: 68
End: 2023-06-23

## 2023-06-23 VITALS
OXYGEN SATURATION: 98 % | WEIGHT: 149.47 LBS | TEMPERATURE: 97.7 F | HEART RATE: 96 BPM | RESPIRATION RATE: 16 BRPM | SYSTOLIC BLOOD PRESSURE: 140 MMHG | HEIGHT: 64 IN | BODY MASS INDEX: 25.52 KG/M2 | DIASTOLIC BLOOD PRESSURE: 78 MMHG

## 2023-06-23 DIAGNOSIS — R91.8 MASS OF MIDDLE LOBE OF RIGHT LUNG: ICD-10-CM

## 2023-06-23 DIAGNOSIS — Z72.0 TOBACCO ABUSE: Primary | ICD-10-CM

## 2023-06-23 DIAGNOSIS — J43.9 PULMONARY EMPHYSEMA, UNSPECIFIED EMPHYSEMA TYPE (HCC): ICD-10-CM

## 2023-06-23 DIAGNOSIS — R59.0 MEDIASTINAL ADENOPATHY: ICD-10-CM

## 2023-06-23 PROCEDURE — 99205 OFFICE O/P NEW HI 60 MIN: CPT | Performed by: THORACIC SURGERY (CARDIOTHORACIC VASCULAR SURGERY)

## 2023-06-23 RX ORDER — TOBRAMYCIN 3 MG/ML
1-2 SOLUTION/ DROPS OPHTHALMIC 3 TIMES DAILY
COMMUNITY
Start: 2023-06-13 | End: 2023-06-23

## 2023-06-23 RX ORDER — TOBRAMYCIN 3 MG/ML
SOLUTION/ DROPS OPHTHALMIC
COMMUNITY
Start: 2023-06-14

## 2023-06-23 RX ORDER — SACCHAROMYCES BOULARDII 250 MG
250 CAPSULE ORAL 2 TIMES DAILY
COMMUNITY

## 2023-06-23 NOTE — PROGRESS NOTES
I received a text from Rachel Dennis that he can move patient's PET/CT up to 6/29/23 @ 11:30  I notified patient on appointment time, place and location

## 2023-06-23 NOTE — PROGRESS NOTES
Thoracic Consult  Assessment/Plan:    Mass of middle lobe of right lung  Ms Alfaro has a 6 x 7 x 5cm right middle lobe lung mass with mediastinal and hilar adenopathy  Her imaging was reviewed personally by myself in PACS  This mass is concerning for malignancy, but tissue sampling would be required for definitive diagnosis  She has been evaluated by pulmonology and is scheduled for an EBUS on 7/5/2023 for tissue sampling as well as PET-CT for staging on 7/12/2023, medical oncology consult on 7/12/2023 and PFTs on 6/30/2023  She should proceed with these plans, but will adjust so that she has PET-CT is completed prior to her visit  We will discuss her case on our multidisciplinary tumor board after her EBUS and PET-CT are complete for coordination of treatment plan  She has been reducing her smoking and is interested in quitting, we will place a referral to the smoking cessation program  We will not schedule follow-up with our office this time as she has scheduled appointment with medical oncology to review her pathology and PET-CT results  We are happy to see her again should she need further tissue sampling or if there are any other concerns  All questions were answered and she is in agreement with this plan  Diagnoses and all orders for this visit:    Tobacco abuse  -     Ambulatory Referral to Smoking Cessation Program; Future    Pulmonary emphysema, unspecified emphysema type ([address] )    Mass of middle lobe of right lung    Mediastinal adenopathy    Other orders  -     saccharomyces boulardii (FLORASTOR) 250 mg capsule; Take 250 mg by mouth 2 (two) times a day  -     tobramycin (TOBREX) 0 3 % SOLN; INSTILL ONE TO TWO DROPS IN EACH EYE THREE TIMES A DAY FOR 10 DAYS  -     tobramycin (TOBREX) 0 3 % SOLN; Apply 1-2 drops to eye Three times a day          Thoracic History   Diagnosis: Lung mass    Procedures/Surgeries:    Pathology:    Adjuvant Therapy:       Subjective:    Patient ID: Héctor Elizondo is a 78 y o  female  ECOG 0    HPI   Ms  Zhanna Valencia is a 78year old female with a PMH current tobacoo abuse >1pk/day but recent decreased to 3/4pk/day, about 50pk  yr history, reflex sympathetic dystrophy, HLD, psoriasis who presents for evaluation of a lung mass with mediastinal and hilar adenopathy  CTA chest 6/13/2023 shows a 6 x 7 x 5cm mass in the middle lobe that abuts and splays the major and minor dissur, extends centrally into the right hilum and extends peipherally and bauts the right lateral pleura  A 1 0 x 1 4 cm right hilar lymph node, 1 3 x 2 cm subcarinal lymph node, 1 2 x 1 9cm right loer paratracheal /precarinal lymph node  She was evaluated by pulmonology on 6/22/2023 and has EBUS scheduled 7/5/2023  PFTs scheduled on 6/30/2023  PET-CT scheduled 7/12/2023     On discussion she has had a hacking cough for the last few weeks with productive mucous, some blood-tinged sputum, no shortness of breath, no unintentional weight loss, she has afternoon chills no fevers       The following portions of the patient's history were reviewed and updated as appropriate: allergies, current medications, past family history, past medical history, past social history, past surgical history and problem list     Past Medical History:   Diagnosis Date   • Fibromyalgia    • Heart murmur    • Lumbosacral disc herniation    • Psoriasis    • Psoriasis    • RSD (reflex sympathetic dystrophy)       Past Surgical History:   Procedure Laterality Date   • PARTIAL HYSTERECTOMY        Family History   Problem Relation Age of Onset   • Sarcoidosis Mother       Social History     Socioeconomic History   • Marital status:      Spouse name: Not on file   • Number of children: Not on file   • Years of education: Not on file   • Highest education level: Not on file   Occupational History   • Not on file   Tobacco Use   • Smoking status: Every Day     Packs/day: 1 00     Years: 47 00     Total pack years: 47 00     Types: Cigarettes     Start date: 1976   • Smokeless tobacco: Never   Vaping Use   • Vaping Use: Never used   Substance and Sexual Activity   • Alcohol use: No   • Drug use: No   • Sexual activity: Not on file   Other Topics Concern   • Not on file   Social History Narrative   • Not on file     Social Determinants of Health     Financial Resource Strain: Not on file   Food Insecurity: Not on file   Transportation Needs: Not on file   Physical Activity: Not on file   Stress: Not on file   Social Connections: Not on file   Intimate Partner Violence: Not on file   Housing Stability: Not on file      Review of Systems   Constitutional: Negative for chills, diaphoresis and unexpected weight change  HENT: Negative  Eyes: Negative  Respiratory: Positive for cough  Negative for shortness of breath and wheezing  Cardiovascular: Negative for chest pain and leg swelling  Gastrointestinal: Negative for abdominal pain, diarrhea and nausea  Endocrine: Negative  Genitourinary: Negative  Musculoskeletal: Negative  Skin: Negative  Allergic/Immunologic: Negative  Neurological: Negative  Hematological: Negative for adenopathy  Does not bruise/bleed easily  Psychiatric/Behavioral: Negative  All other systems reviewed and are negative  Objective:   Physical Exam  Vitals reviewed  Constitutional:       General: She is not in acute distress  Appearance: Normal appearance  She is not ill-appearing  HENT:      Head: Normocephalic  Nose: Nose normal       Mouth/Throat:      Mouth: Mucous membranes are moist    Eyes:      Pupils: Pupils are equal, round, and reactive to light  Cardiovascular:      Rate and Rhythm: Normal rate and regular rhythm  Heart sounds: Murmur heard  Pulmonary:      Effort: Pulmonary effort is normal       Breath sounds: Normal breath sounds  No wheezing, rhonchi or rales  Abdominal:      Palpations: Abdomen is soft  Musculoskeletal:         General: No swelling   Normal range "of motion  Lymphadenopathy:      Cervical: No cervical adenopathy  Skin:     General: Skin is warm  Neurological:      General: No focal deficit present  Mental Status: She is alert and oriented to person, place, and time  Psychiatric:         Mood and Affect: Mood normal      /78 (BP Location: Left arm, Patient Position: Sitting, Cuff Size: Standard)   Pulse 96   Temp 97 7 °F (36 5 °C) (Temporal)   Resp 16   Ht 5' 4\" (1 626 m)   Wt 67 8 kg (149 lb 7 6 oz)   SpO2 98%   BMI 25 66 kg/m²     No Chest XR results available for this patient  No CT Chest results available for this patient  No CT Chest,Abdomen,Pelvis results available for this patient  No NM PET CT results available for this patient  No Barium Swallow results available for this patient      "

## 2023-06-23 NOTE — PROGRESS NOTES
In-basket message received from Melody Cockayne, RN to add patient to thoracic [facility] on 7/10/2023  Chart reviewed and prep started  Pending Path from 7/5 and PET CT from 6/29  I will follow up on results

## 2023-06-23 NOTE — ASSESSMENT & PLAN NOTE
Ms Georgi Hicks has a 6 x 7 x 5cm right middle lobe lung mass with mediastinal and hilar adenopathy  Her imaging was reviewed personally by myself in PACS  This mass is concerning for malignancy, but tissue sampling would be required for definitive diagnosis  She has been evaluated by pulmonology and is scheduled for an EBUS on 7/5/2023 for tissue sampling as well as PET-CT for staging on 7/12/2023, medical oncology consult on 7/12/2023 and PFTs on 6/30/2023  She should proceed with these plans, but will adjust so that she has PET-CT is completed prior to her visit  We will discuss her case on our multidisciplinary tumor board after her EBUS and PET-CT are complete for coordination of treatment plan  She has been reducing her smoking and is interested in quitting, we will place a referral to the smoking cessation program  We will not schedule follow-up with our office this time as she has scheduled appointment with medical oncology to review her pathology and PET-CT results  We are happy to see her again should she need further tissue sampling or if there are any other concerns  All questions were answered and she is in agreement with this plan

## 2023-06-23 NOTE — PROGRESS NOTES
I met with Steven Jacobson at her visit with Dr Donn Rivera today  I introduced myself and explained my role to her  Questions answered, support provided  Will help facilitate sooner PET/CT appointment so the results will be available for medical oncology appointment

## 2023-06-28 ENCOUNTER — PATIENT OUTREACH (OUTPATIENT)
Dept: OTHER | Facility: CLINIC | Age: 68
End: 2023-06-28

## 2023-06-29 ENCOUNTER — HOSPITAL ENCOUNTER (OUTPATIENT)
Dept: RADIOLOGY | Age: 68
Discharge: HOME | End: 2023-06-29
Payer: COMMERCIAL

## 2023-06-29 DIAGNOSIS — D38.1 NEOPLASM OF UNCERTAIN BEHAVIOR OF RIGHT MIDDLE LOBE OF LUNG: ICD-10-CM

## 2023-06-29 DIAGNOSIS — R91.8 MASS OF MIDDLE LOBE OF RIGHT LUNG: ICD-10-CM

## 2023-06-29 LAB — GLUCOSE SERPL-MCNC: 136 MG/DL (ref 65–140)

## 2023-06-29 PROCEDURE — 82948 REAGENT STRIP/BLOOD GLUCOSE: CPT

## 2023-06-30 ENCOUNTER — HOSPITAL ENCOUNTER (OUTPATIENT)
Dept: PULMONOLOGY | Facility: HOSPITAL | Age: 68
End: 2023-06-30
Attending: INTERNAL MEDICINE
Payer: COMMERCIAL

## 2023-06-30 DIAGNOSIS — R91.8 MASS OF MIDDLE LOBE OF RIGHT LUNG: ICD-10-CM

## 2023-06-30 PROCEDURE — 94060 EVALUATION OF WHEEZING: CPT | Performed by: INTERNAL MEDICINE

## 2023-06-30 PROCEDURE — 94726 PLETHYSMOGRAPHY LUNG VOLUMES: CPT | Performed by: INTERNAL MEDICINE

## 2023-06-30 PROCEDURE — 94726 PLETHYSMOGRAPHY LUNG VOLUMES: CPT

## 2023-06-30 PROCEDURE — 94729 DIFFUSING CAPACITY: CPT | Performed by: INTERNAL MEDICINE

## 2023-06-30 PROCEDURE — 94760 N-INVAS EAR/PLS OXIMETRY 1: CPT

## 2023-06-30 PROCEDURE — 94729 DIFFUSING CAPACITY: CPT

## 2023-06-30 PROCEDURE — 94060 EVALUATION OF WHEEZING: CPT

## 2023-06-30 RX ORDER — ALBUTEROL SULFATE 2.5 MG/3ML
2.5 SOLUTION RESPIRATORY (INHALATION) ONCE
Status: COMPLETED | OUTPATIENT
Start: 2023-06-30 | End: 2023-06-30

## 2023-06-30 RX ADMIN — ALBUTEROL SULFATE 2.5 MG: 2.5 SOLUTION RESPIRATORY (INHALATION) at 14:03

## 2023-07-03 ENCOUNTER — HOSPITAL ENCOUNTER (OUTPATIENT)
Dept: RADIOLOGY | Age: 68
Discharge: HOME | End: 2023-07-03
Payer: COMMERCIAL

## 2023-07-03 LAB — GLUCOSE SERPL-MCNC: 109 MG/DL (ref 65–140)

## 2023-07-03 PROCEDURE — A9552 F18 FDG: HCPCS

## 2023-07-03 PROCEDURE — 78815 PET IMAGE W/CT SKULL-THIGH: CPT

## 2023-07-03 PROCEDURE — 82948 REAGENT STRIP/BLOOD GLUCOSE: CPT

## 2023-07-03 PROCEDURE — G1004 CDSM NDSC: HCPCS

## 2023-07-03 NOTE — LETTER
[place] [address] [address]  [address] [address]      July 6, 2023    MRN: [id number]     Phone: [phone]     Dear Ms. Abdiaziz Valdivia recently had a(n) Nuclear Medicine performed on 7/3/2023 at  [facility] that was requested by Jason Hodge MD. The study was reviewed by a radiologist, which is a physician who specializes in medical imaging. The radiologist issued a report describing his or her findings. In that report there was a finding that the radiologist felt warranted further discussion with your health care provider and that discussion would be beneficial to you. The results were sent to Jason Hodge MD on 07/03/2023  4:41 PM. We recommend that you contact Jason Hodge MD at [phone] or set up an appointment to discuss the results of the imaging test. If you have already heard from Jason Hodge MD regarding the results of your study, you can disregard this letter. This letter is not meant to alarm you, but intended to encourage you to follow-up on your results with the provider that sent you for the imaging study. In addition, we have enclosed answers to frequently asked questions by other patients who have also received a letter to review results with their health care provider (see page two). Thank you for choosing [facility] for your medical imaging needs. FREQUENTLY ASKED QUESTIONS    Why am I receiving this letter? [facility] requires us to notify patients who have findings on imaging exams that may require more testing or follow-up with a health professional within the next 3 months. How serious is the finding on the imaging test?  This letter is sent to all patients who may need follow-up or more testing within the next 3 months. Receiving this letter does not necessarily mean you have a life-threatening imaging finding or disease. Recommendations in the radiologist’s imaging report are general in nature and it is up to your healthcare provider to say whether those recommendations make sense for your situation. You are strongly encouraged to talk to your health care provider about the results and ask whether additional steps need to be taken. Where can I get a copy of the final report for my recent radiology exam?  To get a full copy of the report you can access your records online at [email or web address] or please contact [facility] Medical Records Department at [phone] Monday through Friday between 8 am and 6 pm.         What do I need to do now? Please contact your health care provider who requested the imaging study to discuss what further actions (if any) are needed. You may have already heard from (your ordering provider) in regard to this test in which case you can disregard this letter. NOTICE IN ACCORDANCE WITH THE [place] STATE “PATIENT TEST RESULT INFORMATION ACT OF 2018”    You are receiving this notice as a result of a determination by your diagnostic imaging service that further discussions of your test results are warranted and would be beneficial to you. The complete results of your test or tests have been or will be sent to the health care practitioner that ordered the test or tests. It is recommended that you contact your health care practitioner to discuss your results as soon as possible.

## 2023-07-05 ENCOUNTER — DOCUMENTATION (OUTPATIENT)
Dept: HEMATOLOGY ONCOLOGY | Facility: CLINIC | Age: 68
End: 2023-07-05

## 2023-07-05 ENCOUNTER — ANESTHESIA EVENT (OUTPATIENT)
Dept: GASTROENTEROLOGY | Facility: HOSPITAL | Age: 68
End: 2023-07-05

## 2023-07-05 ENCOUNTER — ANESTHESIA (OUTPATIENT)
Dept: GASTROENTEROLOGY | Facility: HOSPITAL | Age: 68
End: 2023-07-05

## 2023-07-05 ENCOUNTER — HOSPITAL ENCOUNTER (OUTPATIENT)
Dept: GASTROENTEROLOGY | Facility: HOSPITAL | Age: 68
Setting detail: OUTPATIENT SURGERY
Discharge: HOME | End: 2023-07-05
Attending: INTERNAL MEDICINE
Payer: COMMERCIAL

## 2023-07-05 VITALS
SYSTOLIC BLOOD PRESSURE: 92 MMHG | BODY MASS INDEX: 25.44 KG/M2 | OXYGEN SATURATION: 95 % | WEIGHT: 149 LBS | DIASTOLIC BLOOD PRESSURE: 54 MMHG | HEART RATE: 83 BPM | RESPIRATION RATE: 14 BRPM | TEMPERATURE: 97 F | HEIGHT: 64 IN

## 2023-07-05 DIAGNOSIS — R91.8 MASS OF MIDDLE LOBE OF RIGHT LUNG: ICD-10-CM

## 2023-07-05 PROCEDURE — 88185 FLOWCYTOMETRY/TC ADD-ON: CPT

## 2023-07-05 PROCEDURE — 88173 CYTOPATH EVAL FNA REPORT: CPT | Performed by: STUDENT IN AN ORGANIZED HEALTH CARE EDUCATION/TRAINING PROGRAM

## 2023-07-05 PROCEDURE — 88341 IMHCHEM/IMCYTCHM EA ADD ANTB: CPT | Performed by: PATHOLOGY

## 2023-07-05 PROCEDURE — 88305 TISSUE EXAM BY PATHOLOGIST: CPT | Performed by: STUDENT IN AN ORGANIZED HEALTH CARE EDUCATION/TRAINING PROGRAM

## 2023-07-05 PROCEDURE — 88305 TISSUE EXAM BY PATHOLOGIST: CPT | Performed by: PATHOLOGY

## 2023-07-05 PROCEDURE — 88112 CYTOPATH CELL ENHANCE TECH: CPT | Performed by: PATHOLOGY

## 2023-07-05 PROCEDURE — 31625 BRONCHOSCOPY W/BIOPSY(S): CPT | Performed by: INTERNAL MEDICINE

## 2023-07-05 PROCEDURE — 31623 DX BRONCHOSCOPE/BRUSH: CPT | Performed by: INTERNAL MEDICINE

## 2023-07-05 PROCEDURE — 88184 FLOWCYTOMETRY/ TC 1 MARKER: CPT | Performed by: INTERNAL MEDICINE

## 2023-07-05 PROCEDURE — 88172 CYTP DX EVAL FNA 1ST EA SITE: CPT | Performed by: STUDENT IN AN ORGANIZED HEALTH CARE EDUCATION/TRAINING PROGRAM

## 2023-07-05 PROCEDURE — 31652 BRONCH EBUS SAMPLNG 1/2 NODE: CPT | Performed by: INTERNAL MEDICINE

## 2023-07-05 PROCEDURE — 88342 IMHCHEM/IMCYTCHM 1ST ANTB: CPT | Performed by: PATHOLOGY

## 2023-07-05 RX ORDER — MIDAZOLAM HYDROCHLORIDE 2 MG/2ML
INJECTION, SOLUTION INTRAMUSCULAR; INTRAVENOUS AS NEEDED
Status: DISCONTINUED | OUTPATIENT
Start: 2023-07-05 | End: 2023-07-05

## 2023-07-05 RX ORDER — FENTANYL CITRATE 50 UG/ML
INJECTION, SOLUTION INTRAMUSCULAR; INTRAVENOUS AS NEEDED
Status: DISCONTINUED | OUTPATIENT
Start: 2023-07-05 | End: 2023-07-05

## 2023-07-05 RX ORDER — PROPOFOL 10 MG/ML
INJECTION, EMULSION INTRAVENOUS CONTINUOUS PRN
Status: DISCONTINUED | OUTPATIENT
Start: 2023-07-05 | End: 2023-07-05

## 2023-07-05 RX ORDER — ALBUTEROL SULFATE 2.5 MG/3ML
SOLUTION RESPIRATORY (INHALATION) AS NEEDED
Status: COMPLETED | OUTPATIENT
Start: 2023-07-05 | End: 2023-07-05

## 2023-07-05 RX ORDER — KETAMINE HCL IN NACL, ISO-OSM 100MG/10ML
SYRINGE (ML) INJECTION AS NEEDED
Status: DISCONTINUED | OUTPATIENT
Start: 2023-07-05 | End: 2023-07-05

## 2023-07-05 RX ORDER — SUCCINYLCHOLINE/SOD CL,ISO/PF 100 MG/5ML
SYRINGE (ML) INTRAVENOUS AS NEEDED
Status: DISCONTINUED | OUTPATIENT
Start: 2023-07-05 | End: 2023-07-05

## 2023-07-05 RX ORDER — LIDOCAINE HYDROCHLORIDE 20 MG/ML
INJECTION, SOLUTION EPIDURAL; INFILTRATION; INTRACAUDAL; PERINEURAL AS NEEDED
Status: DISCONTINUED | OUTPATIENT
Start: 2023-07-05 | End: 2023-07-05

## 2023-07-05 RX ORDER — ONDANSETRON 2 MG/ML
INJECTION INTRAMUSCULAR; INTRAVENOUS AS NEEDED
Status: DISCONTINUED | OUTPATIENT
Start: 2023-07-05 | End: 2023-07-05

## 2023-07-05 RX ORDER — SODIUM CHLORIDE, SODIUM LACTATE, POTASSIUM CHLORIDE, CALCIUM CHLORIDE 600; 310; 30; 20 MG/100ML; MG/100ML; MG/100ML; MG/100ML
INJECTION, SOLUTION INTRAVENOUS CONTINUOUS PRN
Status: DISCONTINUED | OUTPATIENT
Start: 2023-07-05 | End: 2023-07-05

## 2023-07-05 RX ORDER — PROPOFOL 10 MG/ML
INJECTION, EMULSION INTRAVENOUS AS NEEDED
Status: DISCONTINUED | OUTPATIENT
Start: 2023-07-05 | End: 2023-07-05

## 2023-07-05 RX ORDER — FENTANYL CITRATE/PF 50 MCG/ML
50 SYRINGE (ML) INJECTION
Status: DISCONTINUED | OUTPATIENT
Start: 2023-07-05 | End: 2023-07-05 | Stop reason: HOSPADM

## 2023-07-05 RX ORDER — ROCURONIUM BROMIDE 10 MG/ML
INJECTION, SOLUTION INTRAVENOUS AS NEEDED
Status: DISCONTINUED | OUTPATIENT
Start: 2023-07-05 | End: 2023-07-05

## 2023-07-05 RX ADMIN — ROCURONIUM BROMIDE 20 MG: 10 INJECTION, SOLUTION INTRAVENOUS at 10:38

## 2023-07-05 RX ADMIN — ROCURONIUM BROMIDE 20 MG: 10 INJECTION, SOLUTION INTRAVENOUS at 11:14

## 2023-07-05 RX ADMIN — PROPOFOL 100 MG: 10 INJECTION, EMULSION INTRAVENOUS at 10:30

## 2023-07-05 RX ADMIN — LIDOCAINE HYDROCHLORIDE 100 MG: 20 INJECTION, SOLUTION EPIDURAL; INFILTRATION; INTRACAUDAL at 10:12

## 2023-07-05 RX ADMIN — SUGAMMADEX 200 MG: 100 INJECTION, SOLUTION INTRAVENOUS at 11:29

## 2023-07-05 RX ADMIN — Medication 20 MG: at 10:12

## 2023-07-05 RX ADMIN — MIDAZOLAM 2 MG: 1 INJECTION INTRAMUSCULAR; INTRAVENOUS at 10:07

## 2023-07-05 RX ADMIN — SODIUM CHLORIDE, SODIUM LACTATE, POTASSIUM CHLORIDE, AND CALCIUM CHLORIDE: .6; .31; .03; .02 INJECTION, SOLUTION INTRAVENOUS at 10:15

## 2023-07-05 RX ADMIN — ONDANSETRON 4 MG: 2 INJECTION INTRAMUSCULAR; INTRAVENOUS at 10:21

## 2023-07-05 RX ADMIN — SODIUM CHLORIDE, SODIUM LACTATE, POTASSIUM CHLORIDE, AND CALCIUM CHLORIDE: .6; .31; .03; .02 INJECTION, SOLUTION INTRAVENOUS at 10:02

## 2023-07-05 RX ADMIN — PROPOFOL 120 MCG/KG/MIN: 10 INJECTION, EMULSION INTRAVENOUS at 10:13

## 2023-07-05 RX ADMIN — ALBUTEROL SULFATE 2.5 MG: 2.5 SOLUTION RESPIRATORY (INHALATION) at 10:29

## 2023-07-05 RX ADMIN — PHENYLEPHRINE HYDROCHLORIDE 50 MCG/MIN: 10 INJECTION INTRAVENOUS at 10:47

## 2023-07-05 RX ADMIN — Medication 30 MG: at 10:30

## 2023-07-05 RX ADMIN — FENTANYL CITRATE 75 MCG: 50 INJECTION INTRAMUSCULAR; INTRAVENOUS at 10:30

## 2023-07-05 RX ADMIN — Medication 100 MG: at 10:30

## 2023-07-05 RX ADMIN — PROPOFOL 150 MG: 10 INJECTION, EMULSION INTRAVENOUS at 10:12

## 2023-07-05 RX ADMIN — FENTANYL CITRATE 25 MCG: 50 INJECTION INTRAMUSCULAR; INTRAVENOUS at 10:12

## 2023-07-05 NOTE — ANESTHESIA PREPROCEDURE EVALUATION
Procedure:  ENDOBRONCHIAL ULTRASOUND (EBUS)    Relevant Problems   PULMONARY   (+) Pulmonary emphysema, unspecified emphysema type (HCC)      Other   (+) Mediastinal adenopathy        Physical Exam    Airway    Mallampati score: III  TM Distance: >3 FB  Neck ROM: full     Dental   lower dentures,     Cardiovascular  Cardiovascular exam normal    Pulmonary  Pulmonary exam normal     Other Findings    Emphysema appears compensated  Has been feeling more tired lately but otherwise no changes in health  Long smoking history, currently 3/4 pack/day    Interpretation:     Patient had a full lung function testing with spirometry lung volumes and DLCO. Patient gave a good effort. Results made data standards for acceptability and repeatability. The flow-volume curve is normal  Spirometry demonstrates mild obstructive airflow limitation with no appreciable response to the bronchodilator  The lung volumes are mildly decreased  The DLCO is moderately decreased. Normal airway resistance        Maldonado Mendez MD   [facility]                     This result has an attachment that is not available. •  Left Ventricle: Systolic function is normal with an ejection fraction   of 60-65%. There is grade I (mild) diastolic dysfunction. •  Aortic Valve: The aortic valve is trileaflet. There is mild sclerosis. Anesthesia Plan  ASA Score- 3     Anesthesia Type- general with ASA Monitors. Additional Monitors:   Airway Plan: LMA. Plan Factors-Exercise tolerance (METS): >4 METS. Chart reviewed. EKG reviewed. Existing labs reviewed. Patient summary reviewed. Patient is a current smoker. Patient smoked on day of surgery. Induction- intravenous. Postoperative Plan- Plan for postoperative opioid use. Informed Consent- Anesthetic plan and risks discussed with patient. I personally reviewed this patient with the CRNA.  Discussed and agreed on the Anesthesia Plan with the CRNA. Cesia Mark

## 2023-07-05 NOTE — ANESTHESIA POSTPROCEDURE EVALUATION
Post-Op Assessment Note    CV Status:  Stable  Pain Score: 0    Pain management: adequate     Mental Status:  Alert and awake   Hydration Status:  Euvolemic   PONV Controlled:  Controlled   Airway Patency:  Patent      Post Op Vitals Reviewed: Yes      Staff: Anesthesiologist, CRNA         No notable events documented.     /53 (07/05/23 1143)    Temp     Pulse (!) 106 (07/05/23 1143)   Resp 12 (07/05/23 1143)    SpO2 91 % (07/05/23 1143)

## 2023-07-05 NOTE — PROGRESS NOTES
Chart reviewed in preparation of Thoracic Tumor Conference presentation by Dr. Vicki Chávez on 7/10/23. She presents Urgent Care and eventually to ED  with shortness of breath, right sided back pain, and mild hemoptysis. CTA Chest 6 x 7 x 5 cm mass in the middle lobe extending centrally to the right hilum. 7/3/23 PET CT shows large FDG avid right mid lung mass, SUV max of 25. Mass measures on the order of 7-8 cm in size based on CT. Mass extends to the right hilar region and abuts the lateral pleural margin. There is central photopenia suggesting necrosis. Mild FDG uptake in mediastinal lymph nodes. Cluster of small precarinal lymph nodes on the right demonstrates SUV max of 3.4. Subcarinal lymph node demonstrates SUV max of 3.8. She underwent and EBUS on 7/5/23.

## 2023-07-05 NOTE — INTERVAL H&P NOTE
H&P reviewed. After examining the patient I find no changes in the patients condition since the H&P had been written. Vitals:    23 0839   BP: 149/70   Pulse: 96   Resp: 16   Temp: (!) 97.2 °F (36.2 °C)   SpO2: 97%     GEN WDWN woman in bed, conversant, nontoxic  HEENT MMM, no thrush, no oral lesions  Neck No accessory muscle use, JVP not elevated  CV reg, single s1/2, no m/r  Pulm clear, no wheeze, no rales, no rhonchi  ABD +BS soft NTND, no rebound  EXT warm, brisk cap refill, no edema    CT/PET 7/3 - images personally reviewed - FDG avid RML lung mass with FDG avid 4R and level 7LNs.       Assessment  RML mass with mediastinal adenopathy c/f malignancy  Mild COPD  Nicotine Dependence    PLAN  Bronchoscopy with EBUS guided tissue sampling, if MARIA ELENA negative plan biopsies of RML mass  Informed consent obtained, discussed risks, complications and alternatives, all questions answered  Needs tobacco cessation  Continue Gabo Gonzalez, DO

## 2023-07-06 ENCOUNTER — TELEPHONE (OUTPATIENT)
Dept: PULMONOLOGY | Facility: CLINIC | Age: 68
End: 2023-07-06

## 2023-07-06 NOTE — TELEPHONE ENCOUNTER
I called and spoke to the patient, she just again states she thinks it might be just muscle soreness, discussed given the procedure with multiple passes risk for pneumothorax as well as mediastinitis after the procedure she states she does not have anybody to take her to the ER currently if there is any worsening symptoms she would go into the ER she states.

## 2023-07-06 NOTE — TELEPHONE ENCOUNTER
Pt calling in C/O pain from shoulders down and it gets worse when she coughs. She feels it is muscular and she wants to know if she should be concerned. She stated this all started after ebus yesterday.

## 2023-07-06 NOTE — TELEPHONE ENCOUNTER
Spoke with patient. She is having soreness around her ribs which is worse with coughing. No shortness of breath, no fevers. Discussed with her going to the ER to be sure no pneumothorax, etc. She does not wish to go to the ER, will keep an eye on symptoms. I told her if any shortness of breath, fever or increased pain to go to the ER.

## 2023-07-07 PROCEDURE — 88172 CYTP DX EVAL FNA 1ST EA SITE: CPT | Performed by: STUDENT IN AN ORGANIZED HEALTH CARE EDUCATION/TRAINING PROGRAM

## 2023-07-07 PROCEDURE — 88342 IMHCHEM/IMCYTCHM 1ST ANTB: CPT | Performed by: PATHOLOGY

## 2023-07-07 PROCEDURE — 88305 TISSUE EXAM BY PATHOLOGIST: CPT | Performed by: STUDENT IN AN ORGANIZED HEALTH CARE EDUCATION/TRAINING PROGRAM

## 2023-07-07 PROCEDURE — 88341 IMHCHEM/IMCYTCHM EA ADD ANTB: CPT | Performed by: PATHOLOGY

## 2023-07-07 PROCEDURE — 88305 TISSUE EXAM BY PATHOLOGIST: CPT | Performed by: PATHOLOGY

## 2023-07-07 PROCEDURE — 88173 CYTOPATH EVAL FNA REPORT: CPT | Performed by: STUDENT IN AN ORGANIZED HEALTH CARE EDUCATION/TRAINING PROGRAM

## 2023-07-10 ENCOUNTER — DOCUMENTATION (OUTPATIENT)
Dept: HEMATOLOGY ONCOLOGY | Facility: CLINIC | Age: 68
End: 2023-07-10

## 2023-07-10 LAB — SCAN RESULT: NORMAL

## 2023-07-10 NOTE — PROGRESS NOTES
THORACIC ONCOLOGY MULTIDISCIPLINARY CASE REVIEW    DATE:  7/10/2023    PRESENTING DOCTOR:  Allie Lees PA-C    DIAGNOSIS:  Right Lung Mass  STAGING: N/A    Unknown Leeann is a 78 y.o. female who was presented at the Thoracic Oncology Multidisciplinary Tumor Conference today. She presents Urgent Care and eventually to ED  with shortness of breath, right sided back pain, and mild hemoptysis. CTA Chest 6 x 7 x 5 cm mass in the middle lobe extending centrally to the right hilum. 7/3/23 PET CT shows large FDG avid right mid lung mass, SUV max of 25. Mass measures on the order of 7-8 cm in size based on CT. Mass extends to the right hilar region and abuts the lateral pleural margin. There is central photopenia suggesting necrosis. Mild FDG uptake in mediastinal lymph nodes. Cluster of small precarinal lymph nodes on the right demonstrates SUV max of 3.4. Subcarinal lymph node demonstrates SUV max of 3.8. She underwent and EBUS on 7/5/23 pathology reveals portions of bronchial wall with mild chronic inflammation. No dysplasia or neoplasia identified.       PHYSICIAN RECOMMENDED PLAN: Navigational bronchoscopy with EBUS. Imaging reviewed:   7/3/23 PET CT- Large FDG avid right mid lung mass, SUV max of 25. Mass measures on the order of 7-8 cm in size based on CT. Mass extends to the right hilar region and abuts the lateral pleural margin. There is central photopenia suggesting necrosis. Mild FDG uptake in mediastinal lymph nodes. Cluster of small precarinal lymph nodes on the right demonstrates SUV max of 3.4. Subcarinal lymph node demonstrates SUV max of 3.8. Mild patchy radiotracer uptake at the head of the pancreas. There does appear to be a cystic lesion at the pancreatic head/neck with adjacent coarse calcification. Cystic focus measures up to 2.1 cm, new from prior CT.   Questionable small focus of FDG uptake in the left lobe of the liver, metastasis is not excluded.      6/13/23 CTA ed chest pe study Pathology reviewed:   7/5/23 -  Lung, right middle lobe, biopsy:     - Portions of bronchial wall with mild chronic inflammation.     - No dysplasia or neoplasia identified. Lymph node Subcarinal level 7, Lymph node, 4R - Negative for malignancy. PFT's reviewed: No    Future imaging: No    Referrals: None    Clinical Trials Reviewed:  No       Team agreed to plan. NCCN guidelines were readily available for review at this discussion    The final treatment plan will be left to the discretion of the patient and the treating physician. DISCLAIMERS:  TO THE TREATING PHYSICIAN:  This conference is a meeting of clinicians from various specialty areas who evaluate and discuss patients for whom a multidisciplinary treatment approach is being considered. Please note that the above opinion was a consensus of the conference attendees and is intended only to assist in quality care of the discussed patient. The responsibility for follow up on the input given during the conference, along with any final decisions regarding plan of care, is that of the patient and the patient's provider. Accordingly, appointments have only been recommended based on this information and have NOT been scheduled unless otherwise noted. TO THE PATIENT:  This summary is a brief record of major aspects of your cancer treatment. You may choose to share a copy with any of your doctors or nurses. However, this is not a detailed or comprehensive record of your care.

## 2023-07-11 ENCOUNTER — TELEPHONE (OUTPATIENT)
Dept: HEMATOLOGY ONCOLOGY | Facility: CLINIC | Age: 68
End: 2023-07-11

## 2023-07-11 ENCOUNTER — PREP FOR PROCEDURE (OUTPATIENT)
Dept: PULMONOLOGY | Facility: CLINIC | Age: 68
End: 2023-07-11

## 2023-07-11 ENCOUNTER — TELEPHONE (OUTPATIENT)
Dept: PULMONOLOGY | Facility: CLINIC | Age: 68
End: 2023-07-11

## 2023-07-11 DIAGNOSIS — R91.8 MASS OF MIDDLE LOBE OF RIGHT LUNG: Primary | ICD-10-CM

## 2023-07-11 PROCEDURE — 88305 TISSUE EXAM BY PATHOLOGIST: CPT | Performed by: PATHOLOGY

## 2023-07-11 PROCEDURE — 88112 CYTOPATH CELL ENHANCE TECH: CPT | Performed by: PATHOLOGY

## 2023-07-11 RX ORDER — ALBUTEROL SULFATE 2.5 MG/3ML
2.5 SOLUTION RESPIRATORY (INHALATION) ONCE
OUTPATIENT
Start: 2023-07-11

## 2023-07-11 NOTE — TELEPHONE ENCOUNTER
Appointment Change  Cancel, Reschedule, Change to Virtual      Who are you speaking with? Patient   If it is not the patient, are they listed on an active communication consent form? N/A   Which provider is the appointment scheduled with? Espinoza Nelson PA-C   When is the appointment scheduled? Please list date and time  7/12/23 11am   At which location is the appointment scheduled to take place? [facility]   Was the appointment rescheduled or changed from an in person visit to a virtual visit? If so, please list the details of the change. 7/31/23 1120 [address]   What is the reason for the appointment change? Pt is having procedure 7/24; pt was told by Dr. Molina Safe to reschedule   Was [facility] transport scheduled for this visit? N/A   Does [facility] transport need to be scheduled for the new visit (if applicable) N/A   Does the patient need an infusion appointment rescheduled? N/A   Does the patient have an infusion appointment scheduled? If so, when? No   Is the patient undergoing chemotherapy? N/A   Was the no-show policy reviewed for appointments being changed with less then 24 hours of notice?  N/A

## 2023-07-12 NOTE — TELEPHONE ENCOUNTER
Dr. Donn Ponce will be performing a Nicolas Bronch  on Gia Garcia on 07/24/2023     LOCATION: [facility]    ANTICOAGULATION INSTRUCTIONS: none    OTHER MEDICATION INSTRUCTIONS: none    LABS: (CBC/PT/PTT in last 90 days):cbc 06/13/2023 no recent pt/ptt     (If no, labs ordered / to be done at least one day prior to procedure)    LAST OFFICE NOTE/H&P within 30 days of procedure: 06/21/2023    INSTRUCTIONS GIVEN: Patient is aware of the procedure for 07/24. She was advised to be NPO after mid-night the night prior and will need a ride home. Patient will get a call the night before going over the arrival time.       Moira Matute

## 2023-07-22 NOTE — PROGRESS NOTES
Hematology/Oncology Outpatient Office Note    Date of Service: 2023    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 Barriers to accomplishing this: peripheral neuropathy    Patient's Capacity to Self Care:  Patient is able to self care      ASSESSMENT & PLAN      Diagnosis ICD-10-CM Associated Orders   1. Mass of middle lobe of right lung  R91.8 Ambulatory Referral to Hematology / Oncology            This is a 80 y.o. c PMHx notable for chest palpitation s/p propranolol, Heart murmur, fibromyalgia, psoriasis, RSD, peripheral neuropathy being seen in consultation for RML NSCLC    Tumor discussion on 2023 with recommendation for neoadjuvant systemic therapy followed by potential surgery    Discussion of decision making  • Oncology history updated, accordingly, during this visit  • Goals of care/patient communication  o I discussed with the patient the clinical course leading up to their cancer diagnosis. I reviewed relevant office notes, imaging reports and pathology result as well.  o I told the patient that this is a case of potentially curable disease and what this means. We discussed that the goal of anti-cancer therapy is to provide best quality of life, extend overall survival, and progression free survival as shown in clinical trials.  We also discussed that there might be a point when the cancer will no longer respond to this anti-neoplastic therapy.   o I explained the risks/benefits of the proposed cancer therapy: Nivo+ Cisplatin-alimta and after discussion including understanding risks of possible life-threatening complications and therapy-related malignancy development, informed consent for blood products and treatment has been signed and obtained. • TNM/Staging At Diagnosis  o [id number]  Cancer Staging   IIIA  • Disease Features/Tumor Markers/Genetics  o Tumor Marker: n/a  o Notable Path Features:   o 7/24/2023 Bronch: Lung, right middle lobe, biopsy:  Non-small cell carcinoma consistent with a pulmonary adenocarcinoma  • Treatment:Cisplatin 75mg/m2, Alimta 500mg/m2, Nivo 360mg   • Other Supportive care:   • Treatment Team Members  o Surgeon: Dr. Eileen Montoya: Dr. Zarina Foley  • Labs  • Diagnostics  7/3/2023 PET/CT:  Large FDG avid right lung mass in keeping with malignancy (SUV max of 25. Mass measures on the order of 7-8 cm). Mild FDG uptake in scattered mediastinal lymph nodes which may represent metastasis. 1.3 cm subcarinal LN. Mild patchy radiotracer uptake at the head of the pancreas. There does appear to be a cystic lesion at the pancreatic head/neck with adjacent coarse calcification. Cystic focus measures up to 2.1 cm, new from prior CT. An underlying pancreatic lesion   should be excluded. Recommend further characterization with dedicated MRI of the pancreas with and without IV contrast for further evaluation. Questionable small focus of FDG uptake in the left lobe of the liver, metastasis is not excluded. This could be also assessed with MRI  7/26/2023 MRI Brain w/wo c: Normal examination    Discussion of decision making    I personally reviewed the following lab results, the image studies, pathology, other specialty/physicians consult notes and recommendations, and outside medical records. I had a lengthy discussion with the patient and shared the work-up findings. We discussed the diagnosis and management plan as below.  I spent 62 minutes reviewing the records (labs, clinician notes, outside records, medical history, ordering medicine/tests/procedures, interpreting the imaging/labs previously done) and coordination of care as well as direct time with the patient today, of which greater than 50% of the time was spent in counseling and coordination of care with the patient/family. · Plan/Labs  · Zofran 8mg q8 prn nausea/vomiting to be sent home  · IR referral port  · F/u thoracic oncology  · Cisplatin 75mg/m2, Alimta 500mg/m2, Nivo 360mg x 3 cycles, Vit B12 supportive Care IM plan  · Restaging PET/CT after C2 to be ordered to take place after C3  · EMLA  · [facility] NGS will be followed up on which was sent off of the 7/24/2023 RML lung biopsy  · NM Bone scan 8/8/2023 to work-up shoulder pain and rule out mets  Nicotine cessation counseling provided      Follow Up: q3 weeks while on systemic therapy    All questions were answered to the patient's satisfaction during this encounter. The patient knows the contact information for our office and knows to reach out for any relevant concerns related to this encounter. They are to call for any temperature 100.4 or higher, new symptoms including but not restricted to shaking chills, decreased appetite, nausea, vomiting, diarrhea, increased fatigue, shortness of breath or chest pain, confusion, and not feeling the strength to come to the clinic. For all other listed problems and medical diagnosis in their chart - they are managed by PCP and/or other specialists, which the patient acknowledges. Thank you very much for your consultation and making us a part of this patient's care. We are continuing to follow closely with you. Please do not hesitate to reach out to me with any additional questions or concerns. Home Leal MD  Hematology & Medical Oncology Staff Physician             Disclaimer: This document was prepared using [employer] technology.  If a word or phrase is confusing, or does not make sense, this is likely due to recognition error which was not discovered during this clinician's review. If you believe an error has occurred, please contact me through [address] [facility] line for jamie? cation. ONCOLOGY HISTORY OF PRESENT ILLNESS        Oncology History    No history exists. 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 I have also reviewed allergies and medications for this patient. Review of Systems    Baseline weight: 150-160 lbs    Denies F/C, N/V, weight loss, SOB, CP, LH, HA, gen weakness, melena, hematuria, hematochezia, falls, diarrhea, or constipation       A 10-point review of system was performed, pertinent positive and negative were detailed as above. Otherwise, the 10-point review of system was negative.       Past Medical History:   Diagnosis Date   • Fibromyalgia    • Heart murmur    • Lumbosacral disc herniation    • Psoriasis    • Psoriasis    • RSD (reflex sympathetic dystrophy)        Past Surgical History:   Procedure Laterality Date   • PARTIAL HYSTERECTOMY         Family History   Problem Relation Age of Onset   • Sarcoidosis Mother        Social History     Socioeconomic History   • Marital status:      Spouse name: Not on file   • Number of children: Not on file   • Years of education: Not on file   • Highest education level: Not on file   Occupational History   • Not on file   Tobacco Use   • Smoking status: Every Day     Packs/day: 1.00     Years: 47.00     Total pack years: 47.00     Types: Cigarettes Start date: 1976   • Smokeless tobacco: Never   Vaping Use   • Vaping Use: Never used   Substance and Sexual Activity   • Alcohol use: No   • Drug use: No   • Sexual activity: Not on file   Other Topics Concern   • Not on file   Social History Narrative   • Not on file     Social Determinants of Health     Financial Resource Strain: Not on file   Food Insecurity: Not on file   Transportation Needs: Not on file   Physical Activity: Not on file   Stress: Not on file   Social Connections: Not on file   Intimate Partner Violence: Not on file   Housing Stability: Not on file       Allergies   Allergen Reactions   • Prednisone Palpitations       Current Outpatient Medications   Medication Sig Dispense Refill   • amitriptyline (ELAVIL) 25 mg tablet Take 25 mg by mouth every evening     • atorvastatin (LIPITOR) 20 mg tablet Take 20 mg by mouth daily     • calcipotriene (DOVONEX) 0.005 % cream Apply topically 2 (two) times a day To affected areas on Saturday and Sunday only 120 g 2   • DULoxetine (CYMBALTA) 30 mg delayed release capsule      • Fluticasone-Salmeterol (Advair) 100-50 mcg/dose inhaler Inhale 1 puff 2 (two) times a day Rinse mouth after use. • propranolol (INDERAL) 20 mg/5 mL solution      • triamcinolone (KENALOG) 0.1 % cream Apply topically 2 (two) times a day Affected areas Monday through Friday only 453.6 g 2   • Apremilast (Otezla) 10 & 20 & 30 MG TBPK Oral: Initial: 10 mg in the morning on day 1. Titrate upward by additional 10 mg per day on days 2 to 5 as follows: Day 2: 10 mg twice daily; Day 3: 10 mg in the morning and 20 mg in the evening; Day 4: 20 mg twice daily; Day 5: 20 mg in the morning and 30 mg in the evening. Maintenance dose: 30 mg twice daily starting on day 6.  (Patient not taking: Reported on 7/31/2023) 55 each 0   • loratadine-pseudoephedrine (Claritin-D 24 Hour)  mg per 24 hr tablet Take 1 tablet by mouth daily (Patient not taking: Reported on 7/31/2023)     • saccharomyces boulardii (FLORASTOR) 250 mg capsule Take 250 mg by mouth 2 (two) times a day (Patient not taking: Reported on 7/31/2023)     • tobramycin (TOBREX) 0.3 % SOLN INSTILL ONE TO TWO DROPS IN [facility] EYE THREE TIMES A DAY FOR 10 DAYS (Patient not taking: Reported on 7/31/2023)       No current facility-administered medications for this visit. (Not in a hospital admission)      Objective:     24 Hour Vitals Assessment:     Vitals:    07/31/23 1112   BP: 126/70   Pulse: 97   Resp: 18   Temp: 97.8 °F (36.6 °C)   SpO2: 97%       PHYSICIAN EXAM:    General: Appearance: alert, cooperative, no distress. HEENT: Normocephalic, atraumatic. No scleral icterus. conjunctivae clear. EOMI. Chest: No tenderness to palpation. No open wound noted. Lungs: Diminished b/l, otherwise clear to auscultation bilaterally, Respirations unlabored. Cardiac: Regular rate, +S1and S2  Abdomen: Soft, non-tender, non-distended. Bowel sounds are normal.   Extremities:  No edema, cyanosis, clubbing. Inferior to R glenoid (TTP)  Skin: Skin color, turgor are normal. No rashes. Lymphatics: no palpable supra-cervical, axillary, or inguinal adenopathy  Neurologic: Awake, Alert, and oriented, no gross focal deficits noted b/l. DATA REVIEW:    Pathology Result:    Final Diagnosis   Date Value Ref Range Status   07/24/2023   Final    A.-B. Lung, Right Middle Lobe Bronchial Brushing (Thin-prep and smear preparations):   Conclusive evidence of malignancy. 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 Satisfactory for evaluation. C.-D.  Lung, Right Middle Lobe Bronchoalveolar Lavage (Thin-prep and cell block preparations):   Conclusive evidence of malignancy. 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 Satisfactory for evaluation. E.-F.  Lymph Node, level 7 (Thin-prep and smear preparations): Atypical cellular changes seen. Rare atypical cells. Lymphocytes.   Few bronchial cells and pulmonary macrophages. Satisfactory for evaluation. G.-H. Lymph Node, 10R (Thin-prep and smear preparations):   Negative for malignancy. Lymphocytes. Aggregates of neutrophils. Satisfactory for evaluation. 07/24/2023   Final    A.-B. Lung, Right Middle Lobe Bronchial Brushing (Thin-prep and smear preparations):   Conclusive evidence of malignancy. 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 Satisfactory for evaluation. C.-D.  Lung, Right Middle Lobe Bronchoalveolar Lavage (Thin-prep and cell block preparations):   Conclusive evidence of malignancy. 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 Satisfactory for evaluation. E.-F.  Lymph Node, level 7 (Thin-prep and smear preparations): Atypical cellular changes seen. Rare atypical cells. Lymphocytes. Few bronchial cells and pulmonary macrophages. Satisfactory for evaluation. G.-H. Lymph Node, 10R (Thin-prep and smear preparations):   Negative for malignancy. Lymphocytes. Aggregates of neutrophils. Satisfactory for evaluation. 07/24/2023   Final    A. 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 07/05/2023   Final    A. Lung, right middle lobe, biopsy:     - Portions of bronchial wall with mild chronic inflammation.     - No dysplasia or neoplasia identified. 07/05/2023   Final    A-B-C. Lymph Node, Subcarinal level 7 (ThinPrep, smear and cell block preparations)  Negative for malignancy, see note  Benign bronchial cells. Abundant lymphocytes and macrophages. Satisfactory for evaluation. D-E-F. Lymph Node, 4R (ThinPrep, smear and cell block preparations)  Negative for malignancy. Benign bronchial cells. Lymphocytes and macrophages. Satisfactory for evaluation. 07/05/2023   Final    A-B.  Lung, Right Middle Lobe Bronchial Washing (ThinPrep and cell block preparations):  Negative for malignancy. Scant benign bronchial cells. Neutrophils, lymphocytes and pulmonary macrophages. Satisfactory for evaluation. D. Lung, Right Middle Lobe Bronchial Brushing, :  Atypical cellular changes seen. Rare atypical epithelial cells in a background of benign bronchial cells. Satisfactory for evaluation. Image Results:   Image result are reviewed and documented in Hematology/Oncology history    XR chest portable  Narrative: CHEST    INDICATION:   s/p isaac bronch. COMPARISON: CT thorax and PET/CT June 2023, July 2023    EXAM PERFORMED/VIEWS:  XR CHEST PORTABLE    FINDINGS:    Heart size is normal.  Right hilar adenopathy is suspected. 6 x 7 cm right middle lobe mass is seen extending to the right hilum, highly suspicious for neoplasm. No pneumothorax or pleural effusion. Osseous structures appear within normal limits for patient age. Impression: 7 cm right middle lobe mass highly suspicious for neoplasm. Workstation performed: [id number]  XR chest 1 view  Narrative: C-ARM -    INDICATION: R91.8: Other nonspecific abnormal finding of lung field. Procedure guidance. COMPARISON: Chest x-ray October 2018, CT thorax June 2023    TECHNIQUE:    FLUOROSCOPY TIME:   149.1 seconds    4 FLUOROSCOPIC IMAGES    FINDINGS:    Fluoroscopic guidance provided for navigational bronchoscopy procedure guidance. Osseous and soft tissue detail limited by technique. Impression: Fluoroscopic guidance provided for procedure guidance. Please refer to the separate procedure notes for additional details. Workstation performed: [id number]  MRI brain w wo contrast  Narrative: MRI BRAIN WITH AND WITHOUT CONTRAST    INDICATION: C34.91: Malignant neoplasm of unspecified part of right bronchus or lung. COMPARISON:  None. TECHNIQUE:  Multiplanar, multisequence imaging of the brain was performed before and after gadolinium administration.     IV Contrast:  7 mL of Gadobutrol injection (SINGLE-DOSE)    IMAGE QUALITY:   Diagnostic. FINDINGS:    BRAIN PARENCHYMA:  There is no discrete mass, mass effect or midline shift. There is no intracranial hemorrhage. Normal posterior fossa. Diffusion imaging is unremarkable. There are no white matter changes in the cerebral hemispheres. Postcontrast imaging of the brain demonstrates no abnormal enhancement. VENTRICLES:  Normal for the patient's age. SELLA AND PITUITARY GLAND:  Normal.    ORBITS:  Normal.    PARANASAL SINUSES:  Normal.    VASCULATURE:  Evaluation of the major intracranial vasculature demonstrates appropriate flow voids. CALVARIUM AND SKULL BASE:  Normal.    EXTRACRANIAL SOFT TISSUES:  Normal.  Impression: Normal examination. Workstation performed: [id number]      LABS:  Lab data are reviewed and documented in HemOnc history. Lab Results   Component Value Date    HGB 14.8 06/13/2023    HCT 44.6 06/13/2023    MCV 98 06/13/2023     06/13/2023    WBC 13.21 (H) 06/13/2023    NRBC 0 06/13/2023     Lab Results   Component Value Date    K 4.1 06/13/2023     06/13/2023    CO2 27 06/13/2023    BUN 7 06/13/2023    CREATININE 0.51 (L) 06/13/2023    CALCIUM 9.3 06/13/2023    AST 34 06/13/2023    ALT 58 (H) 06/13/2023    ALKPHOS 63 06/13/2023    EGFR 100 06/13/2023       No results found for: "IRON", "TIBC", "FERRITIN"    No results found for: "Eliud Learn"    No results for input(s): "WBC", "CREAT", "PLT" in the last 72 hours.     By:  Dedrick Escobedo MD, 7/31/2023, 11:56 AM

## 2023-07-22 NOTE — H&P (VIEW-ONLY)
Hematology/Oncology Outpatient Office Note    Date of Service: 2023    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 Barriers to accomplishing this: peripheral neuropathy    Patient's Capacity to Self Care:  Patient is able to self care      ASSESSMENT & PLAN      Diagnosis ICD-10-CM Associated Orders   1. Mass of middle lobe of right lung  R91.8 Ambulatory Referral to Hematology / Oncology            This is a 80 y.o. c PMHx notable for chest palpitation s/p propranolol, Heart murmur, fibromyalgia, psoriasis, RSD, peripheral neuropathy being seen in consultation for RML NSCLC    Tumor discussion on 2023 with recommendation for neoadjuvant systemic therapy followed by potential surgery    Discussion of decision making  • Oncology history updated, accordingly, during this visit  • Goals of care/patient communication  o I discussed with the patient the clinical course leading up to their cancer diagnosis. I reviewed relevant office notes, imaging reports and pathology result as well.  o I told the patient that this is a case of potentially curable disease and what this means. We discussed that the goal of anti-cancer therapy is to provide best quality of life, extend overall survival, and progression free survival as shown in clinical trials.  We also discussed that there might be a point when the cancer will no longer respond to this anti-neoplastic therapy.   o I explained the risks/benefits of the proposed cancer therapy: Nivo+ Cisplatin-alimta and after discussion including understanding risks of possible life-threatening complications and therapy-related malignancy development, informed consent for blood products and treatment has been signed and obtained. • TNM/Staging At Diagnosis  o [id number]  Cancer Staging   IIIA  • Disease Features/Tumor Markers/Genetics  o Tumor Marker: n/a  o Notable Path Features:   o 7/24/2023 Bronch: Lung, right middle lobe, biopsy:  Non-small cell carcinoma consistent with a pulmonary adenocarcinoma  • Treatment:Cisplatin 75mg/m2, Alimta 500mg/m2, Nivo 360mg   • Other Supportive care:   • Treatment Team Members  o Surgeon: Dr. Miesha Frank: Dr. Lima Zaldivar  • Labs  • Diagnostics  7/3/2023 PET/CT:  Large FDG avid right lung mass in keeping with malignancy (SUV max of 25. Mass measures on the order of 7-8 cm). Mild FDG uptake in scattered mediastinal lymph nodes which may represent metastasis. 1.3 cm subcarinal LN. Mild patchy radiotracer uptake at the head of the pancreas. There does appear to be a cystic lesion at the pancreatic head/neck with adjacent coarse calcification. Cystic focus measures up to 2.1 cm, new from prior CT. An underlying pancreatic lesion   should be excluded. Recommend further characterization with dedicated MRI of the pancreas with and without IV contrast for further evaluation. Questionable small focus of FDG uptake in the left lobe of the liver, metastasis is not excluded. This could be also assessed with MRI  7/26/2023 MRI Brain w/wo c: Normal examination    Discussion of decision making    I personally reviewed the following lab results, the image studies, pathology, other specialty/physicians consult notes and recommendations, and outside medical records. I had a lengthy discussion with the patient and shared the work-up findings. We discussed the diagnosis and management plan as below.  I spent 62 minutes reviewing the records (labs, clinician notes, outside records, medical history, ordering medicine/tests/procedures, interpreting the imaging/labs previously done) and coordination of care as well as direct time with the patient today, of which greater than 50% of the time was spent in counseling and coordination of care with the patient/family. · Plan/Labs  · Zofran 8mg q8 prn nausea/vomiting to be sent home  · IR referral port  · F/u thoracic oncology  · Cisplatin 75mg/m2, Alimta 500mg/m2, Nivo 360mg x 3 cycles, Vit B12 supportive Care IM plan  · Restaging PET/CT after C2 to be ordered to take place after C3  · EMLA  · [facility] NGS will be followed up on which was sent off of the 7/24/2023 RML lung biopsy  · NM Bone scan 8/8/2023 to work-up shoulder pain and rule out mets  Nicotine cessation counseling provided      Follow Up: q3 weeks while on systemic therapy    All questions were answered to the patient's satisfaction during this encounter. The patient knows the contact information for our office and knows to reach out for any relevant concerns related to this encounter. They are to call for any temperature 100.4 or higher, new symptoms including but not restricted to shaking chills, decreased appetite, nausea, vomiting, diarrhea, increased fatigue, shortness of breath or chest pain, confusion, and not feeling the strength to come to the clinic. For all other listed problems and medical diagnosis in their chart - they are managed by PCP and/or other specialists, which the patient acknowledges. Thank you very much for your consultation and making us a part of this patient's care. We are continuing to follow closely with you. Please do not hesitate to reach out to me with any additional questions or concerns. Home Rouse MD  Hematology & Medical Oncology Staff Physician             Disclaimer: This document was prepared using [employer] technology.  If a word or phrase is confusing, or does not make sense, this is likely due to recognition error which was not discovered during this clinician's review. If you believe an error has occurred, please contact me through [address] [facility] line for jamie? cation. ONCOLOGY HISTORY OF PRESENT ILLNESS        Oncology History    No history exists. 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 I have also reviewed allergies and medications for this patient. Review of Systems    Baseline weight: 150-160 lbs    Denies F/C, N/V, weight loss, SOB, CP, LH, HA, gen weakness, melena, hematuria, hematochezia, falls, diarrhea, or constipation       A 10-point review of system was performed, pertinent positive and negative were detailed as above. Otherwise, the 10-point review of system was negative.       Past Medical History:   Diagnosis Date   • Fibromyalgia    • Heart murmur    • Lumbosacral disc herniation    • Psoriasis    • Psoriasis    • RSD (reflex sympathetic dystrophy)        Past Surgical History:   Procedure Laterality Date   • PARTIAL HYSTERECTOMY         Family History   Problem Relation Age of Onset   • Sarcoidosis Mother        Social History     Socioeconomic History   • Marital status:      Spouse name: Not on file   • Number of children: Not on file   • Years of education: Not on file   • Highest education level: Not on file   Occupational History   • Not on file   Tobacco Use   • Smoking status: Every Day     Packs/day: 1.00     Years: 47.00     Total pack years: 47.00     Types: Cigarettes Start date: 1976   • Smokeless tobacco: Never   Vaping Use   • Vaping Use: Never used   Substance and Sexual Activity   • Alcohol use: No   • Drug use: No   • Sexual activity: Not on file   Other Topics Concern   • Not on file   Social History Narrative   • Not on file     Social Determinants of Health     Financial Resource Strain: Not on file   Food Insecurity: Not on file   Transportation Needs: Not on file   Physical Activity: Not on file   Stress: Not on file   Social Connections: Not on file   Intimate Partner Violence: Not on file   Housing Stability: Not on file       Allergies   Allergen Reactions   • Prednisone Palpitations       Current Outpatient Medications   Medication Sig Dispense Refill   • amitriptyline (ELAVIL) 25 mg tablet Take 25 mg by mouth every evening     • atorvastatin (LIPITOR) 20 mg tablet Take 20 mg by mouth daily     • calcipotriene (DOVONEX) 0.005 % cream Apply topically 2 (two) times a day To affected areas on Saturday and Sunday only 120 g 2   • DULoxetine (CYMBALTA) 30 mg delayed release capsule      • Fluticasone-Salmeterol (Advair) 100-50 mcg/dose inhaler Inhale 1 puff 2 (two) times a day Rinse mouth after use. • propranolol (INDERAL) 20 mg/5 mL solution      • triamcinolone (KENALOG) 0.1 % cream Apply topically 2 (two) times a day Affected areas Monday through Friday only 453.6 g 2   • Apremilast (Otezla) 10 & 20 & 30 MG TBPK Oral: Initial: 10 mg in the morning on day 1. Titrate upward by additional 10 mg per day on days 2 to 5 as follows: Day 2: 10 mg twice daily; Day 3: 10 mg in the morning and 20 mg in the evening; Day 4: 20 mg twice daily; Day 5: 20 mg in the morning and 30 mg in the evening. Maintenance dose: 30 mg twice daily starting on day 6.  (Patient not taking: Reported on 7/31/2023) 55 each 0   • loratadine-pseudoephedrine (Claritin-D 24 Hour)  mg per 24 hr tablet Take 1 tablet by mouth daily (Patient not taking: Reported on 7/31/2023)     • saccharomyces boulardii (FLORASTOR) 250 mg capsule Take 250 mg by mouth 2 (two) times a day (Patient not taking: Reported on 7/31/2023)     • tobramycin (TOBREX) 0.3 % SOLN INSTILL ONE TO TWO DROPS IN [facility] EYE THREE TIMES A DAY FOR 10 DAYS (Patient not taking: Reported on 7/31/2023)       No current facility-administered medications for this visit. (Not in a hospital admission)      Objective:     24 Hour Vitals Assessment:     Vitals:    07/31/23 1112   BP: 126/70   Pulse: 97   Resp: 18   Temp: 97.8 °F (36.6 °C)   SpO2: 97%       PHYSICIAN EXAM:    General: Appearance: alert, cooperative, no distress. HEENT: Normocephalic, atraumatic. No scleral icterus. conjunctivae clear. EOMI. Chest: No tenderness to palpation. No open wound noted. Lungs: Diminished b/l, otherwise clear to auscultation bilaterally, Respirations unlabored. Cardiac: Regular rate, +S1and S2  Abdomen: Soft, non-tender, non-distended. Bowel sounds are normal.   Extremities:  No edema, cyanosis, clubbing. Inferior to R glenoid (TTP)  Skin: Skin color, turgor are normal. No rashes. Lymphatics: no palpable supra-cervical, axillary, or inguinal adenopathy  Neurologic: Awake, Alert, and oriented, no gross focal deficits noted b/l. DATA REVIEW:    Pathology Result:    Final Diagnosis   Date Value Ref Range Status   07/24/2023   Final    A.-B. Lung, Right Middle Lobe Bronchial Brushing (Thin-prep and smear preparations):   Conclusive evidence of malignancy. 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 Satisfactory for evaluation. C.-D.  Lung, Right Middle Lobe Bronchoalveolar Lavage (Thin-prep and cell block preparations):   Conclusive evidence of malignancy. 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 Satisfactory for evaluation. E.-F.  Lymph Node, level 7 (Thin-prep and smear preparations): Atypical cellular changes seen. Rare atypical cells. Lymphocytes.   Few bronchial cells and pulmonary macrophages. Satisfactory for evaluation. G.-H. Lymph Node, 10R (Thin-prep and smear preparations):   Negative for malignancy. Lymphocytes. Aggregates of neutrophils. Satisfactory for evaluation. 07/24/2023   Final    A.-B. Lung, Right Middle Lobe Bronchial Brushing (Thin-prep and smear preparations):   Conclusive evidence of malignancy. 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 Satisfactory for evaluation. C.-D.  Lung, Right Middle Lobe Bronchoalveolar Lavage (Thin-prep and cell block preparations):   Conclusive evidence of malignancy. 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 Satisfactory for evaluation. E.-F.  Lymph Node, level 7 (Thin-prep and smear preparations): Atypical cellular changes seen. Rare atypical cells. Lymphocytes. Few bronchial cells and pulmonary macrophages. Satisfactory for evaluation. G.-H. Lymph Node, 10R (Thin-prep and smear preparations):   Negative for malignancy. Lymphocytes. Aggregates of neutrophils. Satisfactory for evaluation. 07/24/2023   Final    A. 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 07/05/2023   Final    A. Lung, right middle lobe, biopsy:     - Portions of bronchial wall with mild chronic inflammation.     - No dysplasia or neoplasia identified. 07/05/2023   Final    A-B-C. Lymph Node, Subcarinal level 7 (ThinPrep, smear and cell block preparations)  Negative for malignancy, see note  Benign bronchial cells. Abundant lymphocytes and macrophages. Satisfactory for evaluation. D-E-F. Lymph Node, 4R (ThinPrep, smear and cell block preparations)  Negative for malignancy. Benign bronchial cells. Lymphocytes and macrophages. Satisfactory for evaluation. 07/05/2023   Final    A-B.  Lung, Right Middle Lobe Bronchial Washing (ThinPrep and cell block preparations):  Negative for malignancy. Scant benign bronchial cells. Neutrophils, lymphocytes and pulmonary macrophages. Satisfactory for evaluation. D. Lung, Right Middle Lobe Bronchial Brushing, :  Atypical cellular changes seen. Rare atypical epithelial cells in a background of benign bronchial cells. Satisfactory for evaluation. Image Results:   Image result are reviewed and documented in Hematology/Oncology history    XR chest portable  Narrative: CHEST    INDICATION:   s/p isaac bronch. COMPARISON: CT thorax and PET/CT June 2023, July 2023    EXAM PERFORMED/VIEWS:  XR CHEST PORTABLE    FINDINGS:    Heart size is normal.  Right hilar adenopathy is suspected. 6 x 7 cm right middle lobe mass is seen extending to the right hilum, highly suspicious for neoplasm. No pneumothorax or pleural effusion. Osseous structures appear within normal limits for patient age. Impression: 7 cm right middle lobe mass highly suspicious for neoplasm. Workstation performed: [id number]  XR chest 1 view  Narrative: C-ARM -    INDICATION: R91.8: Other nonspecific abnormal finding of lung field. Procedure guidance. COMPARISON: Chest x-ray October 2018, CT thorax June 2023    TECHNIQUE:    FLUOROSCOPY TIME:   149.1 seconds    4 FLUOROSCOPIC IMAGES    FINDINGS:    Fluoroscopic guidance provided for navigational bronchoscopy procedure guidance. Osseous and soft tissue detail limited by technique. Impression: Fluoroscopic guidance provided for procedure guidance. Please refer to the separate procedure notes for additional details. Workstation performed: [id number]  MRI brain w wo contrast  Narrative: MRI BRAIN WITH AND WITHOUT CONTRAST    INDICATION: C34.91: Malignant neoplasm of unspecified part of right bronchus or lung. COMPARISON:  None. TECHNIQUE:  Multiplanar, multisequence imaging of the brain was performed before and after gadolinium administration.     IV Contrast:  7 mL of Gadobutrol injection (SINGLE-DOSE)    IMAGE QUALITY:   Diagnostic. FINDINGS:    BRAIN PARENCHYMA:  There is no discrete mass, mass effect or midline shift. There is no intracranial hemorrhage. Normal posterior fossa. Diffusion imaging is unremarkable. There are no white matter changes in the cerebral hemispheres. Postcontrast imaging of the brain demonstrates no abnormal enhancement. VENTRICLES:  Normal for the patient's age. SELLA AND PITUITARY GLAND:  Normal.    ORBITS:  Normal.    PARANASAL SINUSES:  Normal.    VASCULATURE:  Evaluation of the major intracranial vasculature demonstrates appropriate flow voids. CALVARIUM AND SKULL BASE:  Normal.    EXTRACRANIAL SOFT TISSUES:  Normal.  Impression: Normal examination. Workstation performed: [id number]      LABS:  Lab data are reviewed and documented in HemOnc history. Lab Results   Component Value Date    HGB 14.8 06/13/2023    HCT 44.6 06/13/2023    MCV 98 06/13/2023     06/13/2023    WBC 13.21 (H) 06/13/2023    NRBC 0 06/13/2023     Lab Results   Component Value Date    K 4.1 06/13/2023     06/13/2023    CO2 27 06/13/2023    BUN 7 06/13/2023    CREATININE 0.51 (L) 06/13/2023    CALCIUM 9.3 06/13/2023    AST 34 06/13/2023    ALT 58 (H) 06/13/2023    ALKPHOS 63 06/13/2023    EGFR 100 06/13/2023       No results found for: "IRON", "TIBC", "FERRITIN"    No results found for: "Eliud Learn"    No results for input(s): "WBC", "CREAT", "PLT" in the last 72 hours.     By:  Dedrick Escobedo MD, 7/31/2023, 11:56 AM

## 2023-07-24 ENCOUNTER — ANESTHESIA EVENT (OUTPATIENT)
Dept: PERIOP | Facility: HOSPITAL | Age: 68
End: 2023-07-24

## 2023-07-24 ENCOUNTER — HOSPITAL ENCOUNTER (OUTPATIENT)
Dept: RADIOLOGY | Facility: HOSPITAL | Age: 68
Discharge: HOME | End: 2023-07-24
Attending: INTERNAL MEDICINE
Payer: COMMERCIAL

## 2023-07-24 ENCOUNTER — HOSPITAL ENCOUNTER (OUTPATIENT)
Dept: PERIOP | Facility: HOSPITAL | Age: 68
Setting detail: OUTPATIENT SURGERY
Discharge: HOME | End: 2023-07-24
Attending: INTERNAL MEDICINE
Payer: COMMERCIAL

## 2023-07-24 ENCOUNTER — HOSPITAL ENCOUNTER (OUTPATIENT)
Dept: RADIOLOGY | Facility: HOSPITAL | Age: 68
Setting detail: OUTPATIENT SURGERY
Discharge: HOME | End: 2023-07-24
Payer: COMMERCIAL

## 2023-07-24 ENCOUNTER — ANESTHESIA (OUTPATIENT)
Dept: PERIOP | Facility: HOSPITAL | Age: 68
End: 2023-07-24

## 2023-07-24 VITALS
DIASTOLIC BLOOD PRESSURE: 51 MMHG | SYSTOLIC BLOOD PRESSURE: 103 MMHG | HEIGHT: 64 IN | OXYGEN SATURATION: 95 % | TEMPERATURE: 97 F | BODY MASS INDEX: 25.1 KG/M2 | HEART RATE: 80 BPM | WEIGHT: 147 LBS | RESPIRATION RATE: 18 BRPM

## 2023-07-24 DIAGNOSIS — R91.8 MASS OF MIDDLE LOBE OF RIGHT LUNG: ICD-10-CM

## 2023-07-24 PROCEDURE — 88305 TISSUE EXAM BY PATHOLOGIST: CPT | Performed by: PATHOLOGY

## 2023-07-24 PROCEDURE — 88172 CYTP DX EVAL FNA 1ST EA SITE: CPT | Performed by: PATHOLOGY

## 2023-07-24 PROCEDURE — 88342 IMHCHEM/IMCYTCHM 1ST ANTB: CPT | Performed by: PATHOLOGY

## 2023-07-24 PROCEDURE — 31623 DX BRONCHOSCOPE/BRUSH: CPT | Performed by: INTERNAL MEDICINE

## 2023-07-24 PROCEDURE — 88333 PATH CONSLTJ SURG CYTO XM 1: CPT | Performed by: PATHOLOGY

## 2023-07-24 PROCEDURE — 71045 X-RAY EXAM CHEST 1 VIEW: CPT

## 2023-07-24 PROCEDURE — 31652 BRONCH EBUS SAMPLNG 1/2 NODE: CPT | Performed by: INTERNAL MEDICINE

## 2023-07-24 PROCEDURE — 31628 BRONCHOSCOPY/LUNG BX EACH: CPT | Performed by: INTERNAL MEDICINE

## 2023-07-24 PROCEDURE — 88112 CYTOPATH CELL ENHANCE TECH: CPT | Performed by: PATHOLOGY

## 2023-07-24 PROCEDURE — 31624 DX BRONCHOSCOPE/LAVAGE: CPT | Performed by: INTERNAL MEDICINE

## 2023-07-24 PROCEDURE — 31627 NAVIGATIONAL BRONCHOSCOPY: CPT | Performed by: INTERNAL MEDICINE

## 2023-07-24 PROCEDURE — 88173 CYTOPATH EVAL FNA REPORT: CPT | Performed by: PATHOLOGY

## 2023-07-24 PROCEDURE — 88341 IMHCHEM/IMCYTCHM EA ADD ANTB: CPT | Performed by: PATHOLOGY

## 2023-07-24 RX ORDER — HYDROMORPHONE HCL IN WATER/PF 6 MG/30 ML
0.2 PATIENT CONTROLLED ANALGESIA SYRINGE INTRAVENOUS
Status: DISCONTINUED | OUTPATIENT
Start: 2023-07-24 | End: 2023-07-24 | Stop reason: HOSPADM

## 2023-07-24 RX ORDER — SODIUM CHLORIDE, SODIUM LACTATE, POTASSIUM CHLORIDE, CALCIUM CHLORIDE 600; 310; 30; 20 MG/100ML; MG/100ML; MG/100ML; MG/100ML
INJECTION, SOLUTION INTRAVENOUS CONTINUOUS PRN
Status: DISCONTINUED | OUTPATIENT
Start: 2023-07-24 | End: 2023-07-24

## 2023-07-24 RX ORDER — LIDOCAINE HYDROCHLORIDE 20 MG/ML
INJECTION, SOLUTION EPIDURAL; INFILTRATION; INTRACAUDAL; PERINEURAL AS NEEDED
Status: DISCONTINUED | OUTPATIENT
Start: 2023-07-24 | End: 2023-07-24

## 2023-07-24 RX ORDER — MIDAZOLAM HYDROCHLORIDE 2 MG/2ML
INJECTION, SOLUTION INTRAMUSCULAR; INTRAVENOUS AS NEEDED
Status: DISCONTINUED | OUTPATIENT
Start: 2023-07-24 | End: 2023-07-24

## 2023-07-24 RX ORDER — PROPOFOL 10 MG/ML
INJECTION, EMULSION INTRAVENOUS AS NEEDED
Status: DISCONTINUED | OUTPATIENT
Start: 2023-07-24 | End: 2023-07-24

## 2023-07-24 RX ORDER — ROCURONIUM BROMIDE 10 MG/ML
INJECTION, SOLUTION INTRAVENOUS AS NEEDED
Status: DISCONTINUED | OUTPATIENT
Start: 2023-07-24 | End: 2023-07-24

## 2023-07-24 RX ORDER — ONDANSETRON 2 MG/ML
INJECTION INTRAMUSCULAR; INTRAVENOUS AS NEEDED
Status: DISCONTINUED | OUTPATIENT
Start: 2023-07-24 | End: 2023-07-24

## 2023-07-24 RX ORDER — DEXAMETHASONE SODIUM PHOSPHATE 10 MG/ML
INJECTION, SOLUTION INTRAMUSCULAR; INTRAVENOUS AS NEEDED
Status: DISCONTINUED | OUTPATIENT
Start: 2023-07-24 | End: 2023-07-24

## 2023-07-24 RX ORDER — ALBUTEROL SULFATE 2.5 MG/3ML
2.5 SOLUTION RESPIRATORY (INHALATION) ONCE
Status: COMPLETED | OUTPATIENT
Start: 2023-07-24 | End: 2023-07-24

## 2023-07-24 RX ORDER — PROPOFOL 10 MG/ML
INJECTION, EMULSION INTRAVENOUS CONTINUOUS PRN
Status: DISCONTINUED | OUTPATIENT
Start: 2023-07-24 | End: 2023-07-24

## 2023-07-24 RX ORDER — FENTANYL CITRATE 50 UG/ML
INJECTION, SOLUTION INTRAMUSCULAR; INTRAVENOUS AS NEEDED
Status: DISCONTINUED | OUTPATIENT
Start: 2023-07-24 | End: 2023-07-24

## 2023-07-24 RX ORDER — FENTANYL CITRATE/PF 50 MCG/ML
25 SYRINGE (ML) INJECTION
Status: DISCONTINUED | OUTPATIENT
Start: 2023-07-24 | End: 2023-07-24 | Stop reason: HOSPADM

## 2023-07-24 RX ORDER — ONDANSETRON 2 MG/ML
4 INJECTION INTRAMUSCULAR; INTRAVENOUS ONCE AS NEEDED
Status: DISCONTINUED | OUTPATIENT
Start: 2023-07-24 | End: 2023-07-24 | Stop reason: HOSPADM

## 2023-07-24 RX ORDER — SODIUM CHLORIDE, SODIUM LACTATE, POTASSIUM CHLORIDE, CALCIUM CHLORIDE 600; 310; 30; 20 MG/100ML; MG/100ML; MG/100ML; MG/100ML
50 INJECTION, SOLUTION INTRAVENOUS CONTINUOUS
Status: DISCONTINUED | OUTPATIENT
Start: 2023-07-24 | End: 2023-07-28 | Stop reason: HOSPADM

## 2023-07-24 RX ADMIN — FENTANYL CITRATE 50 MCG: 50 INJECTION INTRAMUSCULAR; INTRAVENOUS at 07:43

## 2023-07-24 RX ADMIN — ALBUTEROL SULFATE 2.5 MG: 2.5 SOLUTION RESPIRATORY (INHALATION) at 06:36

## 2023-07-24 RX ADMIN — PROPOFOL 140 MG: 10 INJECTION, EMULSION INTRAVENOUS at 07:45

## 2023-07-24 RX ADMIN — ROCURONIUM BROMIDE 50 MG: 10 INJECTION, SOLUTION INTRAVENOUS at 07:46

## 2023-07-24 RX ADMIN — ROCURONIUM BROMIDE 10 MG: 10 INJECTION, SOLUTION INTRAVENOUS at 08:54

## 2023-07-24 RX ADMIN — SUGAMMADEX 200 MG: 100 INJECTION, SOLUTION INTRAVENOUS at 09:15

## 2023-07-24 RX ADMIN — FENTANYL CITRATE 50 MCG: 50 INJECTION INTRAMUSCULAR; INTRAVENOUS at 08:36

## 2023-07-24 RX ADMIN — PROPOFOL 100 MCG/KG/MIN: 10 INJECTION, EMULSION INTRAVENOUS at 07:49

## 2023-07-24 RX ADMIN — LIDOCAINE HYDROCHLORIDE 100 MG: 20 INJECTION, SOLUTION EPIDURAL; INFILTRATION; INTRACAUDAL; PERINEURAL at 07:45

## 2023-07-24 RX ADMIN — ROCURONIUM BROMIDE 10 MG: 10 INJECTION, SOLUTION INTRAVENOUS at 08:21

## 2023-07-24 RX ADMIN — SUGAMMADEX 100 MG: 100 INJECTION, SOLUTION INTRAVENOUS at 09:17

## 2023-07-24 RX ADMIN — MIDAZOLAM 2 MG: 1 INJECTION INTRAMUSCULAR; INTRAVENOUS at 07:30

## 2023-07-24 RX ADMIN — SODIUM CHLORIDE, SODIUM LACTATE, POTASSIUM CHLORIDE, AND CALCIUM CHLORIDE: .6; .31; .03; .02 INJECTION, SOLUTION INTRAVENOUS at 07:05

## 2023-07-24 RX ADMIN — SODIUM CHLORIDE, SODIUM LACTATE, POTASSIUM CHLORIDE, AND CALCIUM CHLORIDE: .6; .31; .03; .02 INJECTION, SOLUTION INTRAVENOUS at 09:27

## 2023-07-24 RX ADMIN — DEXAMETHASONE SODIUM PHOSPHATE 10 MG: 10 INJECTION, SOLUTION INTRAMUSCULAR; INTRAVENOUS at 07:56

## 2023-07-24 RX ADMIN — ONDANSETRON 4 MG: 2 INJECTION INTRAMUSCULAR; INTRAVENOUS at 08:30

## 2023-07-24 NOTE — H&P
H&P Exam - Timmothy Baumgarten 80 y.o. female MRN: [id number]    Unit/Bed#:  Encounter: [id number]      Assessment/Plan     Assessment:  Right middle lobe lung mass with mediastinal adenopathy    Plan:  Proceed with bronchoscopy for airway survey, navigational robotic- assisted bronchoscopy with fine-needle aspiration, biopsy, brushing and bronchoalveolar lavage and endobronchial ultrasound. Patient understands the risks and benefits. Specifically, we discussed there is a 3% risk of pneumothorax and 1% risk of significant bleeding related to the procedure. Additional potential risks include COPD exacerbation, atelectasis and pneumonia. They understand that diagnostic yield is generally 80-85%. In the event of a nondiagnostic procedure, patient understands additional testing and follow-up may be necessary. Patient verbalizes understanding and consent has been signed. We will plan to perform postprocedure chest x-ray prior to discharge. History of Present Illness   HPI:  Timmothy Baumgarten is a 80 y.o. female who presents with right middle lobe lung mass. Patient has COPD and longstanding smoking history. Presents with persistent cough and episodes of hemoptysis. Underwent endobronchial ultrasound which was negative for malignancy. There was some concern for abnormality in the right middle lobe mucosa, however brushing and washings were negative for malignancy. She presents today for additional tissue sampling. She denies shortness of breath. She reports last seeing blood mixed in with her sputum approximately 2 days ago. Her weight is overall stable. She complains of abdominal bloating.     PCP: Lencho Cuevas DO    Review of Systems otherwise negative    Historical Information   Past Medical History:   Diagnosis Date   • Fibromyalgia    • Heart murmur    • Lumbosacral disc herniation    • Psoriasis    • Psoriasis    • RSD (reflex sympathetic dystrophy)      Past Surgical History:   Procedure Laterality Date • PARTIAL HYSTERECTOMY       Social History   Social History     Substance and Sexual Activity   Alcohol Use No     Social History     Substance and Sexual Activity   Drug Use No     Social History     Tobacco Use   Smoking Status Every Day   • Packs/day: 1.00   • Years: 47.00   • Total pack years: 47.00   • Types: Cigarettes   • Start date: 1976   Smokeless Tobacco Never     E-Cigarette/Vaping   • E-Cigarette Use Never User       E-Cigarette/Vaping Substances     Family History: non-contributory    Meds/Allergies   all medications and allergies reviewed  Allergies   Allergen Reactions   • Prednisone Palpitations       Objective   Vitals: Blood pressure 135/76, pulse 82, temperature (!) 97.4 °F (36.3 °C), temperature source Temporal, resp. rate 20, height 5' 4" (1.626 m), weight 66.7 kg (147 lb), SpO2 96 %. No intake or output data in the 24 hours ending 07/24/23 0715    Invasive Devices     None                 Physical Exam  Constitutional:       General: She is not in acute distress. HENT:      Head: Normocephalic. Mouth/Throat:      Pharynx: No oropharyngeal exudate. Eyes:      General: No scleral icterus. Neck:      Vascular: No JVD. Cardiovascular:      Rate and Rhythm: Normal rate and regular rhythm. Pulmonary:      Breath sounds: No wheezing, rhonchi or rales. Comments: Decreased on the right  Abdominal:      General: There is distension. Palpations: Abdomen is soft. Tenderness: There is no abdominal tenderness. Musculoskeletal:         General: No swelling. Cervical back: Neck supple. Lymphadenopathy:      Cervical: No cervical adenopathy. Skin:     General: Skin is warm and dry. Neurological:      Mental Status: She is alert and oriented to person, place, and time. Psychiatric:         Mood and Affect: Mood normal.       Lab Results: I have personally reviewed pertinent reports. Imaging: I have personally reviewed pertinent reports.    and I have personally reviewed pertinent films in PACS  EKG, Pathology, and Other Studies: I have personally reviewed pertinent reports.

## 2023-07-24 NOTE — ANESTHESIA POSTPROCEDURE EVALUATION
Post-Op Assessment Note    CV Status:  Stable    Pain management: adequate     Mental Status:  Alert and awake   Hydration Status:  Euvolemic   PONV Controlled:  Controlled   Airway Patency:  Patent      Post Op Vitals Reviewed: Yes      Staff: CRNA         No notable events documented.     BP   92/67   Temp 96.8   Pulse 88   Resp 17   SpO2 98

## 2023-07-25 ENCOUNTER — PATIENT OUTREACH (OUTPATIENT)
Dept: HEMATOLOGY ONCOLOGY | Facility: CLINIC | Age: 68
End: 2023-07-25

## 2023-07-25 ENCOUNTER — DOCUMENTATION (OUTPATIENT)
Dept: HEMATOLOGY ONCOLOGY | Facility: CLINIC | Age: 68
End: 2023-07-25

## 2023-07-25 DIAGNOSIS — C34.91 NSCLC OF RIGHT LUNG (HCC): Primary | ICD-10-CM

## 2023-07-25 NOTE — PROGRESS NOTES
Called patient for initial outreach from nurse navigator. Introduced myself and my role. Reviewed upcoming appointments (MRI brain and Med/Onc). Assessed barriers of care. Patient reports pain in right shoulder blade which varies. States she has had this pain for a few months, it is how she discovered the mass in her lung. She does not like to take pain medication, she just uses heating pad. Report cough with hemoptysis since Navigation Bronchoscopy yesterday, not as bad as it had been. States she is feeling much better than she did after EBUS. She reports she has a decreased appetite which she has had for years. But she was able to eat yesterday and today. She believes it is due to stress. 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 Her sister has Schizophrenic effective disorder. She has been taking care of her sister for the past 20 years. Patient has very supportive daughter and a grandson who is a nurse. Patient reports smoking history of 1 ppd for past 40 years. She has a referral to smoking cessation which was placed the end of June and did receive a phone call but did not know who was calling therefore she did not  the call. When she listen to the message she realized it was someone from Smoking cessation, there was only one session left for July. She will call to schedule something for August. States she is trying to decrease smoking but it is difficult. Patient does drive and denies any transportation issues will notify ONN if this changes. OSW referral placed. Information provided on [address] along with my contact information and OCC information via [employer] message. Patient is aware she can reach out with any questions. General assessment complete. Patient appreciative of call.

## 2023-07-25 NOTE — PROGRESS NOTES
Teressa returned my call. We were able to schedule pt tomorrow 7/26/23 at [facility] at [address]. I have notified RU Hoang, she will call and notify pt. To add, message sent to Thoracic Tumor Board to have her presented hopefully on 7/31. Otherwise, I will outreach pt post Med/onc.

## 2023-07-25 NOTE — PROGRESS NOTES
In-basket message received from Dr. Deneen Sheridan to add patient to thoracic [address] 7/31/2023. Chart reviewed and prep started. Pending pathology results from 7/24. I will follow up.

## 2023-07-25 NOTE — PROGRESS NOTES
Received staff message from Dr Jesse Seay post Nicolas brouch to place orders( Med/Onc and MRI Brain). Orders were placed. Pt already has a scheduled med/onc appt with Dr Erna Pepper on 7/31. MRI of brain was placed as well and was able to call into central scheduling. Pt lives near [address] [place] [address]. The idea is to have her schedule at [facility]. Per [address] [facility] is booked for the week and no one at [address] [place] [address] picks up call to attempt to schedule MRI sometime this week. I have given my contact to f/u with me once they return [facility] calls.

## 2023-07-26 ENCOUNTER — HOSPITAL ENCOUNTER (OUTPATIENT)
Dept: MRI IMAGING | Facility: HOSPITAL | Age: 68
Discharge: HOME | End: 2023-07-26
Attending: INTERNAL MEDICINE
Payer: COMMERCIAL

## 2023-07-26 DIAGNOSIS — C34.91 NSCLC OF RIGHT LUNG (HCC): ICD-10-CM

## 2023-07-26 PROCEDURE — A9585 GADOBUTROL INJECTION: HCPCS | Performed by: INTERNAL MEDICINE

## 2023-07-26 PROCEDURE — 70553 MRI BRAIN STEM W/O & W/DYE: CPT

## 2023-07-26 PROCEDURE — G1004 CDSM NDSC: HCPCS

## 2023-07-26 RX ADMIN — GADOBUTROL 7 ML: 604.72 INJECTION INTRAVENOUS at 09:00

## 2023-07-27 ENCOUNTER — PATIENT OUTREACH (OUTPATIENT)
Dept: CASE MANAGEMENT | Facility: HOSPITAL | Age: 68
End: 2023-07-27

## 2023-07-27 PROCEDURE — 88112 CYTOPATH CELL ENHANCE TECH: CPT | Performed by: PATHOLOGY

## 2023-07-27 PROCEDURE — 88333 PATH CONSLTJ SURG CYTO XM 1: CPT | Performed by: PATHOLOGY

## 2023-07-27 PROCEDURE — 88305 TISSUE EXAM BY PATHOLOGIST: CPT | Performed by: PATHOLOGY

## 2023-07-27 PROCEDURE — 88173 CYTOPATH EVAL FNA REPORT: CPT | Performed by: PATHOLOGY

## 2023-07-27 PROCEDURE — 88341 IMHCHEM/IMCYTCHM EA ADD ANTB: CPT | Performed by: PATHOLOGY

## 2023-07-27 PROCEDURE — 88342 IMHCHEM/IMCYTCHM 1ST ANTB: CPT | Performed by: PATHOLOGY

## 2023-07-27 PROCEDURE — 88172 CYTP DX EVAL FNA 1ST EA SITE: CPT | Performed by: PATHOLOGY

## 2023-07-31 ENCOUNTER — DOCUMENTATION (OUTPATIENT)
Dept: HEMATOLOGY ONCOLOGY | Facility: CLINIC | Age: 68
End: 2023-07-31

## 2023-07-31 ENCOUNTER — CONSULT (OUTPATIENT)
Dept: HEMATOLOGY ONCOLOGY | Facility: CLINIC | Age: 68
End: 2023-07-31
Payer: COMMERCIAL

## 2023-07-31 VITALS
TEMPERATURE: 97.8 F | HEART RATE: 97 BPM | RESPIRATION RATE: 18 BRPM | DIASTOLIC BLOOD PRESSURE: 70 MMHG | OXYGEN SATURATION: 97 % | SYSTOLIC BLOOD PRESSURE: 126 MMHG | WEIGHT: 149 LBS | BODY MASS INDEX: 25.44 KG/M2 | HEIGHT: 64 IN

## 2023-07-31 DIAGNOSIS — R91.8 MASS OF MIDDLE LOBE OF RIGHT LUNG: Primary | ICD-10-CM

## 2023-07-31 DIAGNOSIS — C34.91 ADENOCARCINOMA OF RIGHT LUNG (HCC): ICD-10-CM

## 2023-07-31 DIAGNOSIS — S49.91XA INJURY OF RIGHT GLENOID LABRUM: ICD-10-CM

## 2023-07-31 PROCEDURE — 99205 OFFICE O/P NEW HI 60 MIN: CPT | Performed by: INTERNAL MEDICINE

## 2023-07-31 RX ORDER — FOLIC ACID 1 MG/1
1 TABLET ORAL DAILY
Qty: 90 TABLET | Refills: 2 | Status: SHIPPED | OUTPATIENT
Start: 2023-07-31

## 2023-07-31 RX ORDER — SODIUM CHLORIDE 9 MG/ML
20 INJECTION, SOLUTION INTRAVENOUS ONCE
Status: CANCELLED | OUTPATIENT
Start: 2023-08-17

## 2023-07-31 RX ORDER — CYANOCOBALAMIN 1000 UG/ML
1000 INJECTION, SOLUTION INTRAMUSCULAR; SUBCUTANEOUS ONCE
Status: CANCELLED | OUTPATIENT
Start: 2023-08-10 | End: 2023-08-03

## 2023-07-31 NOTE — PROGRESS NOTES
Per Dr. Cherylene Shutter patient to be scheduled for Cisplatin, Nivolumab and Pemetrexed. RN will call patient on 8/1/23.

## 2023-07-31 NOTE — PROGRESS NOTES
THORACIC ONCOLOGY MULTIDISCIPLINARY CASE REVIEW    DATE:  2023    PRESENTING DOCTOR:  Dr. Orellana Console      DIAGNOSIS:  NSCLC~adenocarcinoma    STAGING:  Stage IIIA    Unknown Leeann is a 80 y.o. female who was presented at the Thoracic Oncology Multidisciplinary Tumor Conference today. She presented to Urgent Care and eventually to ED  with shortness of breath, right sided back pain, and mild hemoptysis. CTA Chest 6 x 7 x 5 cm mass in the middle lobe extending centrally to the right hilum.  7/3/23 PET/CT demonstrated a FDG avid right mid lung mass. Mild FDG uptake in mediastinal lymph nodes. Cluster of small precarinal lymph nodes on the right demonstrates SUV max of 3.4. Subcarinal lymph node demonstrates SUV max of 3.8. She underwent and EBUS on 23 with pathology revealing portions of bronchial wall with mild chronic inflammation. No dysplasia or neoplasia identified. She then underwent navigational bronchoscopy on 23 with pathology positive for NSCLC~adenocarcinoma. PHYSICIAN RECOMMENDED PLAN:  Patient is a potential surgical candidate. Induction chemotherapy followed by re-imaging. RT not needed at this time. Imaging reviewed:  7/3/23 PET/CT-Large FDG avid right lung mass in keeping with malignancy. Mild FDG uptake in scattered mediastinal lymph nodes which may represent metastasis. Mild patchy radiotracer uptake at the head of the pancreas. Questionable small focus of FDG uptake in the left lobe of the liver. Mildly increased overall osseous uptake. 23 CTA ed chest PE     Pathology reviewed:   23 RML-non-small cell carcinoma consistent with a pulmonary adenocarcinoma. Level 7 LN negative for malignancy. PFT's reviewed:  No     Future imagin23 whole body bone scan     Referrals:  None needed at this time  8/10/23 follow-up with Dr. Roxanne Muniz       Team agreed to plan.   NCCN guidelines were readily available for review at this discussion    The final treatment plan will be left to the discretion of the patient and the treating physician. DISCLAIMERS:  TO THE TREATING PHYSICIAN:  This conference is a meeting of clinicians from various specialty areas who evaluate and discuss patients for whom a multidisciplinary treatment approach is being considered. Please note that the above opinion was a consensus of the conference attendees and is intended only to assist in quality care of the discussed patient. The responsibility for follow up on the input given during the conference, along with any final decisions regarding plan of care, is that of the patient and the patient's provider. Accordingly, appointments have only been recommended based on this information and have NOT been scheduled unless otherwise noted. TO THE PATIENT:  This summary is a brief record of major aspects of your cancer treatment. You may choose to share a copy with any of your doctors or nurses. However, this is not a detailed or comprehensive record of your care.

## 2023-07-31 NOTE — PROGRESS NOTES
Reviewed [facility] [employer] printouts for Carboplatin and paclitaxel. Patient and patient grandson stated will review others at home. Reviewed office handouts with RN Teams number and [phone] number. Reviewed the two treatment plans Dr. Florencio Mcgregor is recommending depending on surgery. Patient and patient grandsonEloy Best appreciative of visit. Consent obtained on 7/31/23. Copy given to patient and uploaded into patient chart on 7/31/23.

## 2023-08-01 ENCOUNTER — TELEPHONE (OUTPATIENT)
Dept: HEMATOLOGY ONCOLOGY | Facility: CLINIC | Age: 68
End: 2023-08-01

## 2023-08-01 NOTE — TELEPHONE ENCOUNTER
Spoke to to Cecilia Arzate from [employer], waiting for approval for port to be placed by Miky Ayala then will call patient with appointment detailed. Per Jerzy Conrad patient can start treatment without port. Cecilia Arzate from IR will call patient with date and time of port.

## 2023-08-01 NOTE — TELEPHONE ENCOUNTER
While we try to accommodate patient requests, our priority is to schedule treatment according to Doctor's orders and site availability. Does the Provider use the intake sheet or checkout note? What would be a preferred day of the week that would work best for your infusion appointment? Any day   Do you prefer mornings or afternoons for your appointments? Mornings   Are there any days or dates that do not work for your schedule, including any upcoming vacations? N/A  We are going to try our best to schedule you at the infusion center closest to your home. In the event that we are unable to what would be your next preferred infusion site or sites? 1. [facility] infusion   2. [facility] infusion     Do you have transportation to take you to all of your appointments?  Yes  Would you like the infusion center to draw labs from your port? (disregard if patient doesn't have a port or need labs for infusion appointment) Yes

## 2023-08-01 NOTE — TELEPHONE ENCOUNTER
Please schedule patient for treatment. Patient prefers [facility]. Call out to patient and reviewed [employer] printouts for Opdivo, Alimita, and Cisplatin. Reviewed possible side effects and administration recommendations. Reviewed lab recommendations. Reviewed folic acid and vitamin b12 recommendations. Patient stated she will have Port placed, but can continue treatment without the port. Reviewed office handouts with RN teams number. Offered Social work referral to assist with resources. Patient declined at this time.

## 2023-08-01 NOTE — PROGRESS NOTES
Hematology/Oncology Outpatient Office Note    Date of Service: 8/10/2023    [facility] [place]  [phone]    Reason for Consultation:   No chief complaint on file. Cancer Stage at diagnosis: IIIA    Referral Physician: No ref. 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 Barriers to accomplishing this: peripheral neuropathy    Patient's Capacity to Self Care:  Patient is able to self care      ASSESSMENT & PLAN      Diagnosis ICD-10-CM Associated Orders   1. Adenocarcinoma of right lung [facility])  C34.91             This is a 80 y.o. c PMHx notable for chest palpitation s/p propranolol, Heart murmur, fibromyalgia, psoriasis, RSD, peripheral neuropathy being seen in consultation for RML NSCLC    Tumor discussion on 2023 with recommendation for neoadjuvant systemic therapy followed by potential surgery    Discussion of decision making  • Oncology history updated, accordingly, during this visit  • Goals of care/patient communication  o I discussed with the patient the clinical course leading up to their cancer diagnosis. I reviewed relevant office notes, imaging reports and pathology result as well.  o I told the patient that this is a case of potentially curable disease and what this means. We discussed that the goal of anti-cancer therapy is to provide best quality of life, extend overall survival, and progression free survival as shown in clinical trials.  We also discussed that there might be a point when the cancer will no longer respond to this anti-neoplastic therapy.   o I explained the risks/benefits of the proposed cancer therapy: Nivo+ Cisplatin-alimta and after discussion including understanding risks of possible life-threatening complications and therapy-related malignancy development, informed consent for blood products and treatment has been signed and obtained. • TNM/Staging At Diagnosis  o [id number]  Cancer Staging   IIIA  • Disease Features/Tumor Markers/Genetics  o Tumor Marker: n/a  o Notable Path Features:   o 7/24/2023 Bronch: Lung, right middle lobe, biopsy:  Non-small cell carcinoma consistent with a pulmonary adenocarcinoma  • Treatment:Cisplatin 75mg/m2, Alimta 500mg/m2, Nivo 360mg   • Other Supportive care: Zofran EMLA  • Treatment Team Members  o Surgeon: Dr. Fernandez Nap: Dr. Nadia Diaz  • Labs  • Diagnostics  7/3/2023 PET/CT:  Large FDG avid right lung mass in keeping with malignancy (SUV max of 25. Mass measures on the order of 7-8 cm). Mild FDG uptake in scattered mediastinal lymph nodes which may represent metastasis. 1.3 cm subcarinal LN. Mild patchy radiotracer uptake at the head of the pancreas. There does appear to be a cystic lesion at the pancreatic head/neck with adjacent coarse calcification. Cystic focus measures up to 2.1 cm, new from prior CT. An underlying pancreatic lesion   should be excluded. Recommend further characterization with dedicated MRI of the pancreas with and without IV contrast for further evaluation. Questionable small focus of FDG uptake in the left lobe of the liver, metastasis is not excluded. This could be also assessed with MRI  7/26/2023 MRI Brain w/wo c: Normal examination  8/8/2023: NM Bone scan: No scintigraphic evidence of osseous metastasis, with particular attention to the right shoulder. Degenerative changes as described, including facet arthropathy in the right posterior cervical spine. Discussion of decision making    I personally reviewed the following lab results, the image studies, pathology, other specialty/physicians consult notes and recommendations, and outside medical records.  I had a lengthy discussion with the patient and shared the work-up findings. We discussed the diagnosis and management plan as below. I spent 41  minutes reviewing the records (labs, clinician notes, outside records, medical history, ordering medicine/tests/procedures, interpreting the imaging/labs previously done) and coordination of care as well as direct time with the patient today, of which greater than 50% of the time was spent in counseling and coordination of care with the patient/family. · Plan/Labs  · Zofran 8mg q8 prn nausea/vomiting to be sent home  · F/u thoracic oncology  · C1 today of Cisplatin 75mg/m2, Alimta 500mg/m2, Nivo 360mg x 3 cycles, Vit B12 supportive Care IM plan, delay until end of this month due to dental surgery needed for protruded bone/nerve   · Restaging PET/CT after C2 to be ordered to take place after C3  · CARIS NGS will be followed up on which was sent off of the 7/24/2023 [facility] lung biopsy        Follow Up: q3 weeks while on systemic therapy    All questions were answered to the patient's satisfaction during this encounter. The patient knows the contact information for our office and knows to reach out for any relevant concerns related to this encounter. They are to call for any temperature 100.4 or higher, new symptoms including but not restricted to shaking chills, decreased appetite, nausea, vomiting, diarrhea, increased fatigue, shortness of breath or chest pain, confusion, and not feeling the strength to come to the clinic. For all other listed problems and medical diagnosis in their chart - they are managed by PCP and/or other specialists, which the patient acknowledges. Thank you very much for your consultation and making us a part of this patient's care. We are continuing to follow closely with you. Please do not hesitate to reach out to me with any additional questions or concerns. Home Raines MD  Hematology & Medical Oncology Staff Physician Disclaimer: This document was prepared using [employer] technology. If a word or phrase is confusing, or does not make sense, this is likely due to recognition error which was not discovered during this clinician's review. If you believe an error has occurred, please contact me through [facility] line for jamie? cation.       ONCOLOGY HISTORY OF PRESENT ILLNESS        Oncology History   Adenocarcinoma of right lung ([address])   6/23/2023 Initial Diagnosis    Adenocarcinoma of right lung ([address])     7/24/2023 -  Cancer Staged    Staging form: Lung, AJCC 8th Edition  - Clinical stage from 7/24/2023: Stage IIIA (cT4, cN1, cM0) - Signed by Anayeli Doty MD on 7/31/2023  Stage prefix: Initial diagnosis       8/31/2023 -  Chemotherapy    cyanocobalamin, 1,000 mcg, Intramuscular, Once, 1 of 1 cycle  alteplase (CATHFLO), 2 mg, Intracatheter, Every 1 Minute as needed, 0 of 3 cycles  CISplatin (PLATINOL) with mannitol IVPB, 75 mg/m2 = 129.8 mg (100 % of original dose 75 mg/m2), Intravenous, Once, 0 of 3 cycles  Dose modification: 50 mg/m2 (original dose 75 mg/m2, Cycle 1, Reason: Anticipated Tolerance), 75 mg/m2 (original dose 75 mg/m2, Cycle 1, Reason: Dose modified as per discussion with consulting physician)  fosaprepitant (EMEND) IVPB, 150 mg, Intravenous, Once, 0 of 3 cycles  nivolumab (OPDIVO) IVPB, 360 mg, Intravenous, Once, 0 of 3 cycles  pemetrexed (ALIMTA) chemo infusion, 500 mg/m2 = 865 mg, Intravenous, Once, 0 of 3 cycles           SUBJECTIVE  (INTERVAL HISTORY)      Clotting History None   Bleeding History None   Cancer History RML NSCLC   Family Cancer History Sister (cancer)   H/O Blood/Plt Transfusion None   Tobacco/etoh/drug abuse 1 PPD x 40 years, trying to quit, no etoh abuse or rec drug use           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 I have also reviewed allergies and medications for this patient. Review of Systems    Baseline weight: 150-160 lbs    Denies F/C, N/V, weight loss, SOB, CP, LH, HA, gen weakness, melena, hematuria, hematochezia, falls, diarrhea, or constipation       A 10-point review of system was performed, pertinent positive and negative were detailed as above. Otherwise, the 10-point review of system was negative.       Past Medical History:   Diagnosis Date   • Fibromyalgia    • Heart murmur    • Lumbosacral disc herniation    • Psoriasis    • Psoriasis    • RSD (reflex sympathetic dystrophy)        Past Surgical History:   Procedure Laterality Date   • IR PORT PLACEMENT  8/4/2023   • PARTIAL HYSTERECTOMY         Family History   Problem Relation Age of Onset   • Sarcoidosis Mother        Social History     Socioeconomic History   • Marital status:      Spouse name: Not on file   • Number of children: Not on file   • Years of education: Not on file   • Highest education level: Not on file   Occupational History   • Not on file   Tobacco Use   • Smoking status: Every Day     Packs/day: 1.00     Years: 47.00     Total pack years: 47.00     Types: Cigarettes     Start date: 1976   • Smokeless tobacco: Never   Vaping Use   • Vaping Use: Never used   Substance and Sexual Activity   • Alcohol use: Yes     Comment: daily   • Drug use: No   • Sexual activity: Not on file   Other Topics Concern   • Not on file   Social History Narrative   • Not on file     Social Determinants of Health     Financial Resource Strain: Not on file   Food Insecurity: Not on file   Transportation Needs: Not on file   Physical Activity: Not on file   Stress: Not on file   Social Connections: Not on file   Intimate Partner Violence: Not on file   Housing Stability: Not on file       Allergies   Allergen Reactions   • Prednisone Palpitations       Current Outpatient Medications   Medication Sig Dispense Refill   • atorvastatin (LIPITOR) 20 mg tablet Take 20 mg by mouth daily     • calcipotriene (DOVONEX) 0.005 % cream Apply topically 2 (two) times a day To affected areas on Saturday and Sunday only 120 g 2   • DULoxetine (CYMBALTA) 30 mg delayed release capsule      • Fluticasone-Salmeterol (Advair) 100-50 mcg/dose inhaler Inhale 1 puff 2 (two) times a day Rinse mouth after use. • folic acid ( Folic Acid) 1 mg tablet Take 1 tablet (1 mg total) by mouth daily 90 tablet 2   • lidocaine-prilocaine (EMLA) cream Apply topically as needed for mild pain 30 g 0   • ondansetron (ZOFRAN) 8 mg tablet Take 1 tablet (8 mg total) by mouth every 8 (eight) hours as needed for nausea or vomiting 20 tablet 0   • propranolol (INDERAL) 20 mg/5 mL solution      • triamcinolone (KENALOG) 0.1 % cream Apply topically 2 (two) times a day Affected areas Monday through Friday only 453.6 g 2   • saccharomyces boulardii (FLORASTOR) 250 mg capsule Take 250 mg by mouth 2 (two) times a day (Patient not taking: Reported on 7/31/2023)       No current facility-administered medications for this visit. (Not in a hospital admission)      Objective:     24 Hour Vitals Assessment:     Vitals:    08/10/23 1326   BP: 136/84   Pulse: 89   Resp: 16   Temp: 98 °F (36.7 °C)   SpO2: 97%       PHYSICIAN EXAM:    General: Appearance: alert, cooperative, no distress. HEENT: Normocephalic, atraumatic. No scleral icterus. conjunctivae clear. EOMI. Chest: No tenderness to palpation. No open wound noted. Lungs: Diminished b/l, otherwise clear to auscultation bilaterally, Respirations unlabored. Cardiac: Regular rate, +S1and S2  Abdomen: Soft, non-tender, non-distended. Bowel sounds are normal.   Extremities:  No edema, cyanosis, clubbing. Inferior to R glenoid (TTP)  Skin: Skin color, turgor are normal. No rashes. Lymphatics: no palpable supra-cervical, axillary, or inguinal adenopathy  Neurologic: Awake, Alert, and oriented, no gross focal deficits noted b/l. DATA REVIEW:    Pathology Result:    Final Diagnosis   Date Value Ref Range Status   07/24/2023   Final    A.-B. Lung, Right Middle Lobe Bronchial Brushing (Thin-prep and smear preparations):   Conclusive evidence of malignancy. 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 Satisfactory for evaluation. C.-D.  Lung, Right Middle Lobe Bronchoalveolar Lavage (Thin-prep and cell block preparations):   Conclusive evidence of malignancy. 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 Satisfactory for evaluation. E.-F.  Lymph Node, level 7 (Thin-prep and smear preparations): Atypical cellular changes seen. Rare atypical cells. Lymphocytes. Few bronchial cells and pulmonary macrophages. Satisfactory for evaluation. G.-H. Lymph Node, 10R (Thin-prep and smear preparations):   Negative for malignancy. Lymphocytes. Aggregates of neutrophils. Satisfactory for evaluation. 07/24/2023   Final    A.-B. Lung, Right Middle Lobe Bronchial Brushing (Thin-prep and smear preparations):   Conclusive evidence of malignancy. 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 Satisfactory for evaluation. C.-D.  Lung, Right Middle Lobe Bronchoalveolar Lavage (Thin-prep and cell block preparations):   Conclusive evidence of malignancy. 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 Satisfactory for evaluation. E.-F.  Lymph Node, level 7 (Thin-prep and smear preparations): Atypical cellular changes seen. Rare atypical cells. Lymphocytes. Few bronchial cells and pulmonary macrophages. Satisfactory for evaluation. G.-H. Lymph Node, 10R (Thin-prep and smear preparations):   Negative for malignancy. Lymphocytes. Aggregates of neutrophils. Satisfactory for evaluation. 07/24/2023   Final    A.   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 07/05/2023   Final    A. Lung, right middle lobe, biopsy:     - Portions of bronchial wall with mild chronic inflammation.     - No dysplasia or neoplasia identified. 07/05/2023   Final    A-B-C. Lymph Node, Subcarinal level 7 (ThinPrep, smear and cell block preparations)  Negative for malignancy, see note  Benign bronchial cells. Abundant lymphocytes and macrophages. Satisfactory for evaluation. D-E-F. Lymph Node, 4R (ThinPrep, smear and cell block preparations)  Negative for malignancy. Benign bronchial cells. Lymphocytes and macrophages. Satisfactory for evaluation. 07/05/2023   Final    A-B. Lung, Right Middle Lobe Bronchial Washing (ThinPrep and cell block preparations):  Negative for malignancy. Scant benign bronchial cells. Neutrophils, lymphocytes and pulmonary macrophages. Satisfactory for evaluation. D. Lung, Right Middle Lobe Bronchial Brushing, :  Atypical cellular changes seen. Rare atypical epithelial cells in a background of benign bronchial cells. Satisfactory for evaluation. Image Results:   Image result are reviewed and documented in Hematology/Oncology history    NM bone scan whole body  Narrative: BONE SCAN  WHOLE BODY    INDICATION: C34.91: Malignant neoplasm of unspecified part of right bronchus or lung  S49. 91XA: Unspecified injury of right shoulder and upper arm, initial encounter. Right shoulder blade pain for 2 months    PREVIOUS FILM CORRELATION:    PET/CT July 3, 2023, MRI brain July 26, 2023, CT of the chest June 13, 2023    TECHNIQUE:   This study was performed following the intravenous administration of 23.7 mCi Tc-99m labeled MDP. Delayed, anterior and posterior whole body images were acquired, 2-3 hours after radiopharmaceutical administration.     FINDINGS:    Increased uptake corresponding to the right posterior cervical spine, compatible with focal degenerative facet arthropathy, which also demonstrated increased uptake on the recent PET/CT. Mild degenerative changes are also present within the shoulders,   knees, and ankles. There is no scintigraphic evidence of osseous metastasis. Linear uptake in the lateral aspect of both tibias may represent hypertrophic osteoarthropathy. Impression: 1. No scintigraphic evidence of osseous metastasis, with particular attention to the right shoulder. 2. Degenerative changes as described, including facet arthropathy in the right posterior cervical spine. 3. Linear uptake along the lateral aspect of both tibias may represent hypertrophic osteoarthropathy. Workstation performed: [id number]      LABS:  Lab data are reviewed and documented in HemOnc history. Lab Results   Component Value Date    HGB 14.8 06/13/2023    HCT 44.6 06/13/2023    MCV 98 06/13/2023     06/13/2023    WBC 13.21 (H) 06/13/2023    NRBC 0 06/13/2023     Lab Results   Component Value Date    K 4.1 06/13/2023     06/13/2023    CO2 27 06/13/2023    BUN 7 06/13/2023    CREATININE 0.51 (L) 06/13/2023    CALCIUM 9.3 06/13/2023    AST 34 06/13/2023    ALT 58 (H) 06/13/2023    ALKPHOS 63 06/13/2023    EGFR 100 06/13/2023       No results found for: "IRON", "TIBC", "FERRITIN"    No results found for: "[place]"    No results for input(s): "WBC", "CREAT", "PLT" in the last 72 hours.     By:  Carmen Cuevas MD, 8/10/2023, 1:40 PM

## 2023-08-01 NOTE — TELEPHONE ENCOUNTER
Patient called me back while I was on the phone with IR, called patient back no answer lvm to return my call.

## 2023-08-02 ENCOUNTER — TELEPHONE (OUTPATIENT)
Dept: RADIOLOGY | Facility: HOSPITAL | Age: 68
End: 2023-08-02

## 2023-08-02 RX ORDER — CEFAZOLIN SODIUM 1 G/50ML
1000 SOLUTION INTRAVENOUS ONCE
Status: CANCELLED | OUTPATIENT
Start: 2023-08-02 | End: 2023-08-02

## 2023-08-02 RX ORDER — SODIUM CHLORIDE 9 MG/ML
75 INJECTION, SOLUTION INTRAVENOUS CONTINUOUS
Status: CANCELLED | OUTPATIENT
Start: 2023-08-02

## 2023-08-02 NOTE — PRE-PROCEDURE INSTRUCTIONS
Pre-procedure Instructions for Interventional Radiology  [address] [place] [address], [address] [place] [address]  Interventional Radiology [phone]    You are scheduled for a/an [facility] Placement. On Friday 8/4/23. Your arrival time is 0730. Our Interventional Radiology nurse will notify you a few days before your procedure with the exact arrival time and location to arrive. To prepare for your procedure:  1. Please arrange for someone to drive you home after the procedure and stay with you until the next morning if you are instructed to do so. This is typically for patients receiving some type of sedative or anesthetic for the procedure. 2. DO NOT EAT OR DRINK ANYTHING after midnight on the evening before your procedure including candy & gum.  3. ONLY SIPS OF WATER with your medications are allowed on the morning of your procedure. 4. TAKE ALL OF YOUR REGULAR MEDICATIONS THE MORNING OF YOUR PROCEDURE with sips of water! We may call you to stop some of your blood sugar, blood pressure and blood thinning medications depending on the procedure. Please take all of these medications unless we instruct you to stop them. 5. If you have an allergy to x-ray dye, please contact Interventional Radiology for an x-ray dye preparation which usually consists of an oral steroid and Benadryl. The day of your procedure:  1. Bring a list of the medications you take at home. 2. Bring medications you take for breathing problems (such as inhalers), medications for chest pain, or both. 3. Bring a case for your glasses or contacts. 4. Bring you insurance card and a form of photo ID.  5. Please leave all valuables such as credit cards and jewelry at home. 6. Report to the registration desk in the main lobby at the [address] [place] [address] Way. The  will direct you to the waiting room. 7. While your procedure is being performed, your family may wait in the waiting room.  If they need to leave, they may provide a number to be called following the procedure. 8. Be prepared to stay overnight just in case. Sometimes procedures will indicate the need for further observation or treatment. 9. If you are scheduled for a follow-up visit with the Interventional Radiologist after your procedure, you will be called with a date and time.     Special Instructions (Medications to alter or stop taking before your procedure etc.):

## 2023-08-03 ENCOUNTER — PATIENT OUTREACH (OUTPATIENT)
Dept: HEMATOLOGY ONCOLOGY | Facility: CLINIC | Age: 68
End: 2023-08-03

## 2023-08-03 DIAGNOSIS — R11.2 CHEMOTHERAPY INDUCED NAUSEA AND VOMITING: ICD-10-CM

## 2023-08-03 DIAGNOSIS — T45.1X5A CHEMOTHERAPY INDUCED NAUSEA AND VOMITING: ICD-10-CM

## 2023-08-03 DIAGNOSIS — Z95.828 PORT-A-CATH IN PLACE: Primary | ICD-10-CM

## 2023-08-03 RX ORDER — LIDOCAINE AND PRILOCAINE 25; 25 MG/G; MG/G
CREAM TOPICAL AS NEEDED
Qty: 30 G | Refills: 0 | Status: SHIPPED | OUTPATIENT
Start: 2023-08-03

## 2023-08-03 RX ORDER — ONDANSETRON HYDROCHLORIDE 8 MG/1
8 TABLET, FILM COATED ORAL EVERY 8 HOURS PRN
Qty: 20 TABLET | Refills: 0 | Status: SHIPPED | OUTPATIENT
Start: 2023-08-03

## 2023-08-03 NOTE — PROGRESS NOTES
Initial Outreach Attempt x1: Attempted to reach patient today to introduce myself, re-assess for any barriers to care and offer any supportive services needed. Left VM in detail to give me a callback at my direct number [phone]. Otherwise, I will re-attempt to outreach at a later time/ date.

## 2023-08-03 NOTE — TELEPHONE ENCOUNTER
"David Boudreaux, this is Rocco [place]. My birthday is [birth year]. Just calling you about something. The man patrica spoke to yesterday mentioned concerning the port. He had said that I should ask for a numbing ointment. And so I'm calling you to see what that is and maybe if I'm going to need it, can [facility], that would be great. So anyway, my phone is [phone]. Thank you. Carlyle."      Returned call to patient.  Patient asking for EMLA cream to be sent to her pharmacy

## 2023-08-04 ENCOUNTER — HOSPITAL ENCOUNTER (OUTPATIENT)
Dept: NON INVASIVE DIAGNOSTICS | Facility: HOSPITAL | Age: 68
Discharge: HOME | End: 2023-08-04
Attending: INTERNAL MEDICINE
Payer: COMMERCIAL

## 2023-08-04 VITALS
TEMPERATURE: 97.2 F | SYSTOLIC BLOOD PRESSURE: 121 MMHG | BODY MASS INDEX: 25.18 KG/M2 | DIASTOLIC BLOOD PRESSURE: 56 MMHG | WEIGHT: 147.49 LBS | HEIGHT: 64 IN | HEART RATE: 88 BPM | RESPIRATION RATE: 29 BRPM | OXYGEN SATURATION: 95 %

## 2023-08-04 DIAGNOSIS — C34.91 ADENOCARCINOMA OF RIGHT LUNG (HCC): ICD-10-CM

## 2023-08-04 PROCEDURE — 77001 FLUOROGUIDE FOR VEIN DEVICE: CPT

## 2023-08-04 PROCEDURE — 99153 MOD SED SAME PHYS/QHP EA: CPT

## 2023-08-04 PROCEDURE — 36561 INSERT TUNNELED CV CATH: CPT

## 2023-08-04 PROCEDURE — 99152 MOD SED SAME PHYS/QHP 5/>YRS: CPT

## 2023-08-04 PROCEDURE — 36561 INSERT TUNNELED CV CATH: CPT | Performed by: RADIOLOGY

## 2023-08-04 PROCEDURE — 76937 US GUIDE VASCULAR ACCESS: CPT

## 2023-08-04 PROCEDURE — C1788 PORT, INDWELLING, IMP: HCPCS

## 2023-08-04 PROCEDURE — 77001 FLUOROGUIDE FOR VEIN DEVICE: CPT | Performed by: RADIOLOGY

## 2023-08-04 PROCEDURE — 76937 US GUIDE VASCULAR ACCESS: CPT | Performed by: RADIOLOGY

## 2023-08-04 PROCEDURE — 99152 MOD SED SAME PHYS/QHP 5/>YRS: CPT | Performed by: RADIOLOGY

## 2023-08-04 RX ORDER — FENTANYL CITRATE 50 UG/ML
INJECTION, SOLUTION INTRAMUSCULAR; INTRAVENOUS AS NEEDED
Status: DISCONTINUED | OUTPATIENT
Start: 2023-08-04 | End: 2023-08-05 | Stop reason: HOSPADM

## 2023-08-04 RX ORDER — CEFAZOLIN SODIUM 1 G/50ML
1000 SOLUTION INTRAVENOUS ONCE
Status: COMPLETED | OUTPATIENT
Start: 2023-08-04 | End: 2023-08-04

## 2023-08-04 RX ORDER — MIDAZOLAM HYDROCHLORIDE 2 MG/2ML
INJECTION, SOLUTION INTRAMUSCULAR; INTRAVENOUS AS NEEDED
Status: DISCONTINUED | OUTPATIENT
Start: 2023-08-04 | End: 2023-08-05 | Stop reason: HOSPADM

## 2023-08-04 RX ORDER — SODIUM CHLORIDE 9 MG/ML
75 INJECTION, SOLUTION INTRAVENOUS CONTINUOUS
Status: DISCONTINUED | OUTPATIENT
Start: 2023-08-04 | End: 2023-08-05 | Stop reason: HOSPADM

## 2023-08-04 RX ADMIN — CEFAZOLIN SODIUM 1000 MG: 1 SOLUTION INTRAVENOUS at 08:27

## 2023-08-04 RX ADMIN — FENTANYL CITRATE 25 MCG: 50 INJECTION, SOLUTION INTRAMUSCULAR; INTRAVENOUS at 09:25

## 2023-08-04 RX ADMIN — FENTANYL CITRATE 50 MCG: 50 INJECTION, SOLUTION INTRAMUSCULAR; INTRAVENOUS at 09:14

## 2023-08-04 RX ADMIN — Medication 3 ML: at 09:15

## 2023-08-04 RX ADMIN — FENTANYL CITRATE 25 MCG: 50 INJECTION, SOLUTION INTRAMUSCULAR; INTRAVENOUS at 09:19

## 2023-08-04 RX ADMIN — MIDAZOLAM HYDROCHLORIDE 0.5 MG: 1 INJECTION, SOLUTION INTRAMUSCULAR; INTRAVENOUS at 09:25

## 2023-08-04 RX ADMIN — MIDAZOLAM HYDROCHLORIDE 0.5 MG: 1 INJECTION, SOLUTION INTRAMUSCULAR; INTRAVENOUS at 09:19

## 2023-08-04 RX ADMIN — MIDAZOLAM HYDROCHLORIDE 1 MG: 1 INJECTION, SOLUTION INTRAMUSCULAR; INTRAVENOUS at 09:14

## 2023-08-04 NOTE — INTERVAL H&P NOTE
Patient arrived to IR for port placement    The procedure and risks were discussed with the patient. All questions were answered. Informed consent was obtained. H & P reviewed after examining the patient and I find no changes in the patient condition since the H & P has been written. /56   Pulse 90   Temp (!) 97.2 °F (36.2 °C) (Temporal)   Resp 16   Ht 5' 4" (1.626 m)   Wt 66.9 kg (147 lb 7.8 oz)   SpO2 97%   BMI 25.32 kg/m²     Patient re-evaluated.  Accept as history and physical.    Mayuri Yoo, DO/August 4, 2023/8:44 AM

## 2023-08-08 ENCOUNTER — HOSPITAL ENCOUNTER (OUTPATIENT)
Dept: NUCLEAR MEDICINE | Facility: HOSPITAL | Age: 68
Discharge: HOME | End: 2023-08-08
Attending: INTERNAL MEDICINE
Payer: COMMERCIAL

## 2023-08-08 ENCOUNTER — TELEPHONE (OUTPATIENT)
Dept: HEMATOLOGY ONCOLOGY | Facility: CLINIC | Age: 68
End: 2023-08-08

## 2023-08-08 ENCOUNTER — TELEPHONE (OUTPATIENT)
Dept: INFUSION CENTER | Facility: CLINIC | Age: 68
End: 2023-08-08

## 2023-08-08 DIAGNOSIS — C34.91 ADENOCARCINOMA OF RIGHT LUNG (HCC): ICD-10-CM

## 2023-08-08 DIAGNOSIS — S49.91XA INJURY OF RIGHT GLENOID LABRUM: ICD-10-CM

## 2023-08-08 DIAGNOSIS — C34.91 ADENOCARCINOMA OF RIGHT LUNG (HCC): Primary | ICD-10-CM

## 2023-08-08 PROBLEM — Z95.828 PORT-A-CATH IN PLACE: Status: ACTIVE | Noted: 2023-08-08

## 2023-08-08 PROCEDURE — A9503 TC99M MEDRONATE: HCPCS

## 2023-08-08 PROCEDURE — G1004 CDSM NDSC: HCPCS

## 2023-08-08 PROCEDURE — 78306 BONE IMAGING WHOLE BODY: CPT

## 2023-08-08 NOTE — TELEPHONE ENCOUNTER
Please defer treatment by 2 weeks from 8/10/23 due to dental procedure needed. * please update patient tomorrow 8/9/23. *    Good morning. My name is Rolando Mcmanus. I'm calling from [facility] in reference to patient Sheridan Espinoza. If you could give us a call back, we just need some information and to know that it's OK that we perform a procedure on the patient. She is scheduled this coming Friday. If you can give us a call back [phone]. Thank you    Call out to dentist, reviewed Dr. Bruce Chi is recommending that antibiotic management is through their office and that patient treatment to be deferred 2 weeks. Patient is recommended to have a follow up appointment with dentist prior to any start of treatment. Call out to patient and reviewed above. Fax number given to Rolando Mcmanus at dentist.    Patient aware.

## 2023-08-08 NOTE — TELEPHONE ENCOUNTER
LVM new patient phone call made. Reviewed appointment date and time. Call back number given for further questions.

## 2023-08-08 NOTE — TELEPHONE ENCOUNTER
Yvette Kaur, this is Natalie Shanks. My phone number is [phone]. I'm calling about two things. One is that today I go get the bone scan done and I do have the numbing ointment. The man I spoke to last week told me Put that on beforehand and then add a piece of Saran wrap on top of it. I just want to verify that with you. Maybe I misunderstood. And the other thing is that I have to get a little bit of dental work done. And I my dentist last night we talked and he wanted to talk to the doctor to make sure it was OK because of my circumstances right now. So I can give him, I guess I can give him the main number. Or should I give him your number? I don't know. I'm in and out today. Today's the day of the bone scan. So when you a minute, if you have a minute, soon. If not, I can probably the dentist, their phone number for at the [facility]. I don't know what else to do. Alright, hopefully I'll talk to you soon. Alright. Thank you. Carlyle Frias- dentist. Patient stated she will give Dentist number.

## 2023-08-09 ENCOUNTER — PATIENT OUTREACH (OUTPATIENT)
Dept: CASE MANAGEMENT | Facility: HOSPITAL | Age: 68
End: 2023-08-09

## 2023-08-09 NOTE — PROGRESS NOTES
Biopsychosocial and Barriers Assessment    Cancer Diagnosis: Adenocarcinoma  Home/Cell Phone: [phone]  Emergency Contact: Hi Medel (daughter)  Marital Status:    Interpretation concerns, speaks another language, preferred language: English  Cultural concerns: none reported   Ability to read or write:     Caregiver/Support: Daughter- Hi Medel and two grandchildren. Grandson lives in [place] and Granddaughter lives in [address] [facility] and daughter. Son recently moved out of the home, doesn't have himself together so isn't much of a support  Child/Elder care: Patient cares for her older sister, Yuni Cavanaughant who has been dx with Schizoaffective d/o. Housing: owns home  Home Setup: 1-level  Lives With: sister  Daily Living Activities: independent   Durable Medical Equipment: none  Ambulation: independent     Preferred Pharmacy: [facility]  High co-pays with insurance: no difficulty    High co-pays with medication coverage: no difficulty   No medication coverage: no difficulty     Primary Care Provider: Dr. Divya Sanchez  Hx of [address]: none  Hx of Short term rehab:  none  Mental Health Hx: none  Substance Abuse Hx: Patient reported that she does smoke and she drinks 1 bottle of wine a night. Patient reported that she started drinking about 3.5 years ago to help her cope with sister. Patient isn't involved in counseling but has thought about it. Patient verbalizes understanding that using cigarettes and wine for coping isn't good for her. Employment: hasn't been working since she was injured.  Status/Location: none  Ability to pay bills: denies difficulty   POA/LW/AD: Does not have completed however was open to W sending her two copies (1 for her and 1 for sister) of [address] Representative and Advanced Medical Directive Declaration  Transportation Plan/Concerns: denies current difficulties stating that she has been able to work it out but it's difficult because her grandchildren work. LSW placed application for [employer] in mail for her to complete and bring to upcoming appointments for submission      What do you know about your Cancer Diagnosis    What has your doctor told you about your cancer diagnosis: Patient reported that she had a bone scan yesterday and is waiting for results    What has your doctor told you about your cancer treatment: Patient reported that her treatment had to be pushed back due to dental work that needs to be completed. What specific concerns do you have about your diagnosis and treatment:  Patient is anxious to get started with her treatment because she doesn't want it to continue to grow. Have you been made aware of any hair loss associated with treatment:    Additional Comments:  LSW completed assessment and DT. Patient self-rated 7/10. Patient identified the following concerns; pain in shoulder (uses heating pad), disruption of sleep (due to pain in shoulder and site of port), substance use (drinks 1 bottle wine a night and smokes), changes in eating (patient reported that she eats because she is hungry but doesn't enjoy, mostly because of mouth pain), worry/anxiety, sadness/depression, loss of interest or enjoyment (she is a creative person, sketching, painting, wood working), fear, loneliness (moved out to area shortly before [facility] and never developed new [place] of friend), anger, relationship concerns (caretaker for sister which is a stress. She recently asked sister in [place] to take her sister that lives with her so she could have a break to process her own situation and her sister started screaming at her), and stressed regarding caring for her sister. Patient reported that due to her sister's schizoaffective d/o, she has various triggers which makes it difficult to care for her. Her sister does see a psychiatrist in Dr. Viktor Rodríguez office and she recently re-engaged her in counseling.  [facility] provided [place], [place], [facility] [address] to see what other services her sister maybe eligible for so that it can possibly offset patient's stress. Patient was sent information for [address] for support groups but also for groups with a creative focus. Patient was encouraged to bring her sketch book/music/adult coloring pages/yarn to [facility] center so she can work on things while she is there. 2 copies of . Luke's ACP document mailed. [facility] for [facility] sent    Patient was encouraged to reach out for assistance if needed. LSW will transfer case to OSW in [place] for follow up.

## 2023-08-10 ENCOUNTER — HOSPITAL ENCOUNTER (OUTPATIENT)
Dept: INFUSION CENTER | Facility: CLINIC | Age: 68
Discharge: HOME | End: 2023-08-10

## 2023-08-10 ENCOUNTER — OFFICE VISIT (OUTPATIENT)
Dept: HEMATOLOGY ONCOLOGY | Facility: CLINIC | Age: 68
End: 2023-08-10
Payer: COMMERCIAL

## 2023-08-10 ENCOUNTER — TELEPHONE (OUTPATIENT)
Dept: HEMATOLOGY ONCOLOGY | Facility: CLINIC | Age: 68
End: 2023-08-10

## 2023-08-10 VITALS
WEIGHT: 147 LBS | TEMPERATURE: 98 F | OXYGEN SATURATION: 97 % | HEART RATE: 89 BPM | RESPIRATION RATE: 16 BRPM | SYSTOLIC BLOOD PRESSURE: 136 MMHG | BODY MASS INDEX: 25.1 KG/M2 | DIASTOLIC BLOOD PRESSURE: 84 MMHG | HEIGHT: 64 IN

## 2023-08-10 DIAGNOSIS — C34.91 ADENOCARCINOMA OF RIGHT LUNG (HCC): Primary | Chronic | ICD-10-CM

## 2023-08-10 PROCEDURE — 99215 OFFICE O/P EST HI 40 MIN: CPT | Performed by: INTERNAL MEDICINE

## 2023-08-14 ENCOUNTER — TELEPHONE (OUTPATIENT)
Dept: HEMATOLOGY ONCOLOGY | Facility: CLINIC | Age: 68
End: 2023-08-14

## 2023-08-17 RX ORDER — CYANOCOBALAMIN 1000 UG/ML
1000 INJECTION, SOLUTION INTRAMUSCULAR; SUBCUTANEOUS ONCE
Status: CANCELLED | OUTPATIENT
Start: 2023-08-24 | End: 2023-08-24

## 2023-08-18 ENCOUNTER — LAB REQUISITION (OUTPATIENT)
Dept: LAB | Facility: HOSPITAL | Age: 68
End: 2023-08-18

## 2023-08-18 DIAGNOSIS — C34.91 MALIGNANT NEOPLASM OF UNSPECIFIED PART OF RIGHT BRONCHUS OR LUNG (HCC): ICD-10-CM

## 2023-08-18 LAB — SCAN RESULT: NORMAL

## 2023-08-23 ENCOUNTER — PATIENT OUTREACH (OUTPATIENT)
Dept: HEMATOLOGY ONCOLOGY | Facility: CLINIC | Age: 68
End: 2023-08-23

## 2023-08-23 NOTE — PROGRESS NOTES
Patient calling regarding results new results in chart - Caris testing. Advised I would have Rush Chris from office give her a call regarding these results. Patient states she has been trying to get up and move around more. Has been working on art projects. We discussed upcoming appointments. What to expect on first day of chemotherapy. Reviewed Cancer Support Community and programs they offer. ONN provided emotional support. Discuss bills and that she was charged over $500 when she went to an office visit. Advised I would send message to Financial advocacy team to reach out to see if they can assist as she also received letter stating caris testing was not covered by insurance. Patient appreciative of call. Advised I will follow up week of 9/5/23, verbalized understanding. Routing to Patricia Marrero RN to call patient regarding Caris results.

## 2023-08-23 NOTE — PROGRESS NOTES
Call out to patient and reviewed that yes, Oscaris results were obtained. Per Dr. Tamanna Michaels treatment will be the same. Patient expressed some increase in fatigue, patient having some right sided back pain recommended to reach out to pcp. Reviewed that some of the symptoms can be cancer related.

## 2023-08-24 ENCOUNTER — HOSPITAL ENCOUNTER (OUTPATIENT)
Dept: INFUSION CENTER | Facility: CLINIC | Age: 68
Discharge: HOME | End: 2023-08-24
Payer: COMMERCIAL

## 2023-08-24 DIAGNOSIS — C34.91 ADENOCARCINOMA OF RIGHT LUNG (HCC): Primary | ICD-10-CM

## 2023-08-24 PROCEDURE — 96372 THER/PROPH/DIAG INJ SC/IM: CPT

## 2023-08-24 RX ORDER — CYANOCOBALAMIN 1000 UG/ML
1000 INJECTION, SOLUTION INTRAMUSCULAR; SUBCUTANEOUS ONCE
Status: COMPLETED | OUTPATIENT
Start: 2023-08-24 | End: 2023-08-24

## 2023-08-24 RX ADMIN — CYANOCOBALAMIN 1000 MCG: 1000 INJECTION, SOLUTION INTRAMUSCULAR; SUBCUTANEOUS at 09:52

## 2023-08-24 NOTE — PROGRESS NOTES
Pt presents for B12 injection. Pt offers no complaints. Injection placed in right arm, tolerated well. AVS declined. Next appointment reviewed.

## 2023-08-25 DIAGNOSIS — C34.91 ADENOCARCINOMA OF RIGHT LUNG (HCC): Primary | ICD-10-CM

## 2023-08-25 RX ORDER — SODIUM CHLORIDE 9 MG/ML
20 INJECTION, SOLUTION INTRAVENOUS ONCE
Status: CANCELLED | OUTPATIENT
Start: 2023-08-31

## 2023-08-30 ENCOUNTER — HOSPITAL ENCOUNTER (OUTPATIENT)
Dept: INFUSION CENTER | Facility: CLINIC | Age: 68
Discharge: HOME | End: 2023-08-30
Payer: COMMERCIAL

## 2023-08-30 DIAGNOSIS — C34.91 ADENOCARCINOMA OF RIGHT LUNG (HCC): Primary | ICD-10-CM

## 2023-08-30 DIAGNOSIS — Z95.828 PORT-A-CATH IN PLACE: ICD-10-CM

## 2023-08-30 LAB
ALBUMIN SERPL BCP-MCNC: 3.7 G/DL (ref 3.5–5)
ALP SERPL-CCNC: 78 U/L (ref 34–104)
ALT SERPL W P-5'-P-CCNC: 39 U/L (ref 7–52)
ANION GAP SERPL CALCULATED.3IONS-SCNC: 5 MMOL/L
AST SERPL W P-5'-P-CCNC: 23 U/L (ref 13–39)
BASOPHILS # BLD AUTO: 0.1 THOUSANDS/ÂΜL (ref 0–0.1)
BASOPHILS NFR BLD AUTO: 1 % (ref 0–1)
BILIRUB SERPL-MCNC: 0.46 MG/DL (ref 0.2–1)
BUN SERPL-MCNC: 7 MG/DL (ref 5–25)
CALCIUM SERPL-MCNC: 9.1 MG/DL (ref 8.4–10.2)
CHLORIDE SERPL-SCNC: 104 MMOL/L (ref 96–108)
CO2 SERPL-SCNC: 27 MMOL/L (ref 21–32)
CREAT SERPL-MCNC: 0.58 MG/DL (ref 0.6–1.3)
EOSINOPHIL # BLD AUTO: 1.54 THOUSAND/ÂΜL (ref 0–0.61)
EOSINOPHIL NFR BLD AUTO: 11 % (ref 0–6)
ERYTHROCYTE [DISTWIDTH] IN BLOOD BY AUTOMATED COUNT: 12.4 % (ref 11.6–15.1)
GFR SERPL CREATININE-BSD FRML MDRD: 95 ML/MIN/1.73SQ M
GLUCOSE SERPL-MCNC: 143 MG/DL (ref 65–140)
HCT VFR BLD AUTO: 40.5 % (ref 34.8–46.1)
HGB BLD-MCNC: 13.2 G/DL (ref 11.5–15.4)
IMM GRANULOCYTES # BLD AUTO: 0.08 THOUSAND/UL (ref 0–0.2)
IMM GRANULOCYTES NFR BLD AUTO: 1 % (ref 0–2)
LYMPHOCYTES # BLD AUTO: 1.81 THOUSANDS/ÂΜL (ref 0.6–4.47)
LYMPHOCYTES NFR BLD AUTO: 13 % (ref 14–44)
MAGNESIUM SERPL-MCNC: 2 MG/DL (ref 1.9–2.7)
MCH RBC QN AUTO: 31.4 PG (ref 26.8–34.3)
MCHC RBC AUTO-ENTMCNC: 32.6 G/DL (ref 31.4–37.4)
MCV RBC AUTO: 96 FL (ref 82–98)
MONOCYTES # BLD AUTO: 1.04 THOUSAND/ÂΜL (ref 0.17–1.22)
MONOCYTES NFR BLD AUTO: 7 % (ref 4–12)
NEUTROPHILS # BLD AUTO: 9.59 THOUSANDS/ÂΜL (ref 1.85–7.62)
NEUTS SEG NFR BLD AUTO: 67 % (ref 43–75)
NRBC BLD AUTO-RTO: 0 /100 WBCS
PLATELET # BLD AUTO: 312 THOUSANDS/UL (ref 149–390)
PMV BLD AUTO: 9.9 FL (ref 8.9–12.7)
POTASSIUM SERPL-SCNC: 4.1 MMOL/L (ref 3.5–5.3)
PROT SERPL-MCNC: 7.5 G/DL (ref 6.4–8.4)
RBC # BLD AUTO: 4.21 MILLION/UL (ref 3.81–5.12)
SODIUM SERPL-SCNC: 136 MMOL/L (ref 135–147)
TSH SERPL DL<=0.05 MIU/L-ACNC: 1.58 UIU/ML (ref 0.45–4.5)
WBC # BLD AUTO: 14.16 THOUSAND/UL (ref 4.31–10.16)

## 2023-08-30 PROCEDURE — 84443 ASSAY THYROID STIM HORMONE: CPT | Performed by: INTERNAL MEDICINE

## 2023-08-30 PROCEDURE — 85025 COMPLETE CBC W/AUTO DIFF WBC: CPT | Performed by: INTERNAL MEDICINE

## 2023-08-30 PROCEDURE — 83735 ASSAY OF MAGNESIUM: CPT | Performed by: INTERNAL MEDICINE

## 2023-08-30 PROCEDURE — 80053 COMPREHEN METABOLIC PANEL: CPT | Performed by: INTERNAL MEDICINE

## 2023-08-30 NOTE — PROGRESS NOTES
Pt to clinic for port flush and lab draw via port, offers no complaints today, tolerated procedure without complications, aware of next appointment, port de-accessed, avs declined.

## 2023-08-31 ENCOUNTER — HOSPITAL ENCOUNTER (OUTPATIENT)
Dept: INFUSION CENTER | Facility: CLINIC | Age: 68
Discharge: HOME | End: 2023-08-31
Payer: COMMERCIAL

## 2023-08-31 VITALS
HEIGHT: 64 IN | SYSTOLIC BLOOD PRESSURE: 132 MMHG | RESPIRATION RATE: 17 BRPM | TEMPERATURE: 97.2 F | DIASTOLIC BLOOD PRESSURE: 65 MMHG | BODY MASS INDEX: 25.47 KG/M2 | HEART RATE: 81 BPM | WEIGHT: 149.2 LBS

## 2023-08-31 DIAGNOSIS — C34.91 ADENOCARCINOMA OF RIGHT LUNG (HCC): Primary | ICD-10-CM

## 2023-08-31 PROCEDURE — 96411 CHEMO IV PUSH ADDL DRUG: CPT

## 2023-08-31 PROCEDURE — 96415 CHEMO IV INFUSION ADDL HR: CPT

## 2023-08-31 PROCEDURE — 96367 TX/PROPH/DG ADDL SEQ IV INF: CPT

## 2023-08-31 PROCEDURE — 96417 CHEMO IV INFUS EACH ADDL SEQ: CPT

## 2023-08-31 PROCEDURE — 96413 CHEMO IV INFUSION 1 HR: CPT

## 2023-08-31 PROCEDURE — 96361 HYDRATE IV INFUSION ADD-ON: CPT

## 2023-08-31 RX ORDER — SODIUM CHLORIDE 9 MG/ML
20 INJECTION, SOLUTION INTRAVENOUS ONCE
Status: COMPLETED | OUTPATIENT
Start: 2023-08-31 | End: 2023-08-31

## 2023-08-31 RX ADMIN — FOSAPREPITANT 150 MG: 150 INJECTION, POWDER, LYOPHILIZED, FOR SOLUTION INTRAVENOUS at 09:44

## 2023-08-31 RX ADMIN — SODIUM CHLORIDE 1000 ML: 0.9 INJECTION, SOLUTION INTRAVENOUS at 13:57

## 2023-08-31 RX ADMIN — SODIUM CHLORIDE 20 ML/HR: 0.9 INJECTION, SOLUTION INTRAVENOUS at 08:50

## 2023-08-31 RX ADMIN — PEMETREXED DISODIUM 865 MG: 500 INJECTION, POWDER, LYOPHILIZED, FOR SOLUTION INTRAVENOUS at 11:41

## 2023-08-31 RX ADMIN — SODIUM CHLORIDE 360 MG: 9 INJECTION, SOLUTION INTRAVENOUS at 10:49

## 2023-08-31 RX ADMIN — CISPLATIN 129.8 MG: 1 INJECTION, SOLUTION INTRAVENOUS at 11:56

## 2023-08-31 RX ADMIN — DEXAMETHASONE SODIUM PHOSPHATE: 10 INJECTION, SOLUTION INTRAMUSCULAR; INTRAVENOUS at 09:22

## 2023-08-31 RX ADMIN — SODIUM CHLORIDE 1000 ML: 0.9 INJECTION, SOLUTION INTRAVENOUS at 08:51

## 2023-08-31 NOTE — PROGRESS NOTES
Pt to clinic for chemotherapy. Pt offers no complaints today, denies abx use and denies s/s of infection. Infection center department and medication information given and reviewed with pt. Tolerated infusions without complications. Pt aware of next appointment. Pt port accessed, flushed, and de-accessed with positive blood returned. AVS was offered, pt declined. Pt ambulated out of clinic safely.

## 2023-09-07 ENCOUNTER — PATIENT OUTREACH (OUTPATIENT)
Dept: CASE MANAGEMENT | Facility: HOSPITAL | Age: 68
End: 2023-09-07

## 2023-09-07 ENCOUNTER — PATIENT OUTREACH (OUTPATIENT)
Dept: HEMATOLOGY ONCOLOGY | Facility: CLINIC | Age: 68
End: 2023-09-07

## 2023-09-07 ENCOUNTER — TELEPHONE (OUTPATIENT)
Dept: SURGICAL ONCOLOGY | Facility: CLINIC | Age: 68
End: 2023-09-07

## 2023-09-07 DIAGNOSIS — T45.1X5A CHEMOTHERAPY INDUCED NAUSEA AND VOMITING: Primary | ICD-10-CM

## 2023-09-07 DIAGNOSIS — R11.2 CHEMOTHERAPY INDUCED NAUSEA AND VOMITING: Primary | ICD-10-CM

## 2023-09-07 DIAGNOSIS — C34.91 ADENOCARCINOMA OF RIGHT LUNG (HCC): Chronic | ICD-10-CM

## 2023-09-07 DIAGNOSIS — C34.91 NSCLC OF RIGHT LUNG (HCC): Primary | ICD-10-CM

## 2023-09-07 RX ORDER — OLANZAPINE 5 MG/1
5 TABLET ORAL
Qty: 7 TABLET | Refills: 0 | Status: SHIPPED | OUTPATIENT
Start: 2023-09-07 | End: 2023-09-14

## 2023-09-07 RX ORDER — ONDANSETRON 8 MG/1
8 TABLET, ORALLY DISINTEGRATING ORAL EVERY 8 HOURS PRN
Qty: 20 TABLET | Refills: 1 | Status: SHIPPED | OUTPATIENT
Start: 2023-09-07

## 2023-09-07 NOTE — TELEPHONE ENCOUNTER
Oncology Finance Advocacy Intake and Intervention  Oncology Finance Counselor/Advocate placed call to patient. This writer informed patient that this writer is here to assist patient with billing questions, financial assistance, payment/payment plans, quotes, copayment assistance, insurance optimization, and insurance navigation. This writer conducted a thorough benefit review of copayment, deductible, and out of pocket cost. This information is documented below and has been reviewed with patient. Copayment:  Deductible:  Out of Pocket Cost:$5,500.00 Remaining $4,680.00  Insurance optimization (Limited benefit vs self-pay):N/A  Patient assistance status:Patient outreach  Free Drug Applications:NO  Interventions:  Received patient calls message in Epic regarding the patient concerns on bills she received from [facility]. I placed a call to the patient and introduced myself and I saw she had a balance from dates of service 07/05/23 Ultrasound, 08/04/23 Port Placement, and 08/08/23 Nuclear Medicine totaling $390.00 . She was advised to only pay her co-pay by the staff and that a financial group will reach out to her for the rest so she thought she was okay bills wise. I suggested that a request will be sent to The [address] [facility] Counselors to send her an application for possible assistance. I will send an e mail out today to put that in the mail to her. That should come within 7-10 business days and their contact number is [phone] if she has any questions on filling out the application. If the patient has any other questions or concerns that I can address my contact information will be here on MyChart. Adding myself to the Care Team.         Information above was review thoroughly with patient and patient was advise of possible assistance programs/interventions. If any question arise patient can contact this writer at below information. This information was given to patient at time of contact. Selma Shepard  Phone:[phone]  Email: [email or web address]

## 2023-09-07 NOTE — PROGRESS NOTES
MSW s/w pt by phone this morning to introduce myself and see how she is doing after her first chemo. DT and assessment were previously done by [employer]. Pt tells me that she has had a long hard week since having her first chemo and describes severe nausea and fatigue as her worst symptoms. She says that she feels like she lost a full day to just sleeping in bed because she was that exhausted. She notes that the smell of most foods makes her stomach turn and there are a few things that she can eat without difficulty, but they are few and far between. Pt cares for her sister Alicia Lawton, who has mental illness, and pt's son has moved in to stay with them and help care for them both at this point because pt was unable to do so. She says that this arrangement is working out well for the time being. She talked about ways to help her sister become more independent around the house, and shared that she does take the [place] pony out to do short errands sometimes, depending on the day that she is having. She says that she otherwise spends her day reading or watching TV in the evenings. Pt spoke at length about her family, she shared that they have had several deaths in the recent past, including 3 people in 3 months last year. She has a sister in [place] who is also going through cancer treatment, and they speak about twice a week which she finds helpful and supportive in her own cancer journey. Pt has a sister in [place], however they are not speaking after pt asked her to take Alicia Lawton while she was going through chemo and her sister refused. She feels that this was a slap in the face and she is unsure that their relationship will ever recover. She does have a brother in [place] as well who is involved and supportive with them both, however he is obviously not local to help with the day to day.   They lost another brother last year and this led pt to track down their mother, who left them as young children, to settle his estate. She found that she had passed away in 2002 but had moved to [place], remarried, and had other children. Pt notes that she has been thinking more and more what will happen to Kesha Costa if she is no longer able to care for her or if she passes away. She says that she is having these conversations with her brother and sister, and Kesha Costa moving there is a possibility. It is very likely though that they would have to put her into a correction, and pt is hoping to avoid that due to past experiences that were unfortunate. These conversations are ongoing and I encouraged her to keep talking to them about a plan and get it in writing once they are in agreement, so that there are no issues with guardianship or placement in the future. At this time, pt seems to benefit from the time spent talking and is in agreement with me checking in with her periodically. I will let her care team know about her side effect struggles and see if any changes can be made to help her through her next treatment. She was very appreciative of the time spent talking and denies any needs at this time.

## 2023-09-07 NOTE — TELEPHONE ENCOUNTER
Call out to patient and reviewed Dr. Abdulkadir Canas recommendations. Patient stated having some constipation, patient is trying diet recommendations. Patient has been taking zofran with no relief. Reviewed recommendations of new medications as well.

## 2023-09-07 NOTE — TELEPHONE ENCOUNTER
Let's change Zofran to ODT 8mg version   Zyprexa 5mg HS on chemo week   Fatigue is to be expected on this chemo

## 2023-09-11 NOTE — PROGRESS NOTES
Telemedicine consent    Patient: Tanvir Crow  Provider: Claudean Holding, MD  Provider located at [address] [place] [phone]    The patient was identified by name and date of birth. Tanvir Crow was informed that this is a telemedicine visit and that the visit is being conducted through Telephone. My office door was closed. No one else was in the room. She acknowledged consent and understanding of privacy and security of the video platform. The patient has agreed to participate and understands they can discontinue the visit at any time. Patient is aware this is a billable service. Hematology/Oncology Outpatient Office Note    Date of Service: 2023    [facility]AT[facility]  [phone]    Reason for Consultation:   No chief complaint on file. Cancer Stage at diagnosis: IIIA    Referral Physician: No ref. 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 Barriers to accomplishing this: peripheral neuropathy    Patient's Capacity to Self Care:  Patient is able to self care      ASSESSMENT & PLAN      Diagnosis ICD-10-CM Associated Orders   1.  Adenocarcinoma of right lung [facility])  C34.91             This is a 80 y.o. c PMHx notable for chest palpitation s/p propranolol, Heart murmur, fibromyalgia, psoriasis, RSD, peripheral neuropathy being seen in consultation for RML NSCLC    Tumor discussion on 2023 with recommendation for neoadjuvant systemic therapy followed by potential surgery    Discussion of decision making  • Oncology history updated, accordingly, during this visit  • Goals of care/patient communication  o I discussed with the patient the clinical course leading up to their cancer diagnosis. I reviewed relevant office notes, imaging reports and pathology result as well.  o I told the patient that this is a case of potentially curable disease and what this means. We discussed that the goal of anti-cancer therapy is to provide best quality of life, extend overall survival, and progression free survival as shown in clinical trials. We also discussed that there might be a point when the cancer will no longer respond to this anti-neoplastic therapy.   o I explained the risks/benefits of the proposed cancer therapy: Nivo+ Cisplatin-alimta and after discussion including understanding risks of possible life-threatening complications and therapy-related malignancy development, informed consent for blood products and treatment has been signed and obtained. • TNM/Staging At Diagnosis  o [id number]  Cancer Staging   IIIA  • Disease Features/Tumor Markers/Genetics  o Tumor Marker: n/a  o Notable Path Features:   o 7/24/2023 Bronch: Lung, right middle lobe, biopsy:  Non-small cell carcinoma consistent with a pulmonary adenocarcinoma  o CARIS: PDL1 TPS 98%, KRAS G12C mutated (sotorasib)  • Treatment:Cisplatin 75mg/m2, Alimta 500mg/m2, Nivo 360mg   • Other Supportive care: Zofran EMLA, Zyprexa  • Treatment Team Members  o Surgeon: Dr. Briggs Liter: Dr. Ham Signs  • Labs  • Diagnostics  7/3/2023 PET/CT:  Large FDG avid right lung mass in keeping with malignancy (SUV max of 25. Mass measures on the order of 7-8 cm). Mild FDG uptake in scattered mediastinal lymph nodes which may represent metastasis. 1.3 cm subcarinal LN. Mild patchy radiotracer uptake at the head of the pancreas. There does appear to be a cystic lesion at the pancreatic head/neck with adjacent coarse calcification. Cystic focus measures up to 2.1 cm, new from prior CT.  An underlying pancreatic lesion   should be excluded. Recommend further characterization with dedicated MRI of the pancreas with and without IV contrast for further evaluation. Questionable small focus of FDG uptake in the left lobe of the liver, metastasis is not excluded. This could be also assessed with MRI  7/26/2023 MRI Brain w/wo c: Normal examination  8/8/2023: NM Bone scan: No scintigraphic evidence of osseous metastasis, with particular attention to the right shoulder. Degenerative changes as described, including facet arthropathy in the right posterior cervical spine. Discussion of decision making    I personally reviewed the following lab results, the image studies, pathology, other specialty/physicians consult notes and recommendations, and outside medical records. I had a lengthy discussion with the patient and shared the work-up findings. We discussed the diagnosis and management plan as below. I spent 41 minutes reviewing the records (labs, clinician notes, outside records, medical history, ordering medicine/tests/procedures, interpreting the imaging/labs previously done) and coordination of care as well as direct time with the patient today, of which greater than 50% of the time was spent in counseling and coordination of care with the patient/family. · Plan/Labs  · F/u thoracic oncology  · Zyprexa 5mg HS bedtime for nausea on the week of treatment which is helping a lot  · C2 today of Cisplatin 75mg/m2, Alimta 500mg/m2, Nivo 360mg x 3 cycles, Vit B12 supportive Care IM plan, delay until end of this month due to dental surgery needed for protruded bone/nerve   · Restaging PET/CT after C2 to be ordered to take place after C3          Follow Up: q3 weeks while on systemic therapy scheduled thru 10/12/2023    All questions were answered to the patient's satisfaction during this encounter. The patient knows the contact information for our office and knows to reach out for any relevant concerns related to this encounter.  They are to call for any temperature 100.4 or higher, new symptoms including but not restricted to shaking chills, decreased appetite, nausea, vomiting, diarrhea, increased fatigue, shortness of breath or chest pain, confusion, and not feeling the strength to come to the clinic. For all other listed problems and medical diagnosis in their chart - they are managed by PCP and/or other specialists, which the patient acknowledges. Thank you very much for your consultation and making us a part of this patient's care. We are continuing to follow closely with you. Please do not hesitate to reach out to me with any additional questions or concerns. Home Funez MD  Hematology & Medical Oncology Staff Physician             Disclaimer: This document was prepared using [employer] technology. If a word or phrase is confusing, or does not make sense, this is likely due to recognition error which was not discovered during this clinician's review. If you believe an error has occurred, please contact me through [facility] line for jamie? cation.       ONCOLOGY HISTORY OF PRESENT ILLNESS        Oncology History   Adenocarcinoma of right lung ([address])   6/23/2023 Initial Diagnosis    Adenocarcinoma of right lung ([address])     7/24/2023 -  Cancer Staged    Staging form: Lung, AJCC 8th Edition  - Clinical stage from 7/24/2023: Stage IIIA (cT4, cN1, cM0) - Signed by Pako Cleveland MD on 7/31/2023  Stage prefix: Initial diagnosis       8/31/2023 -  Chemotherapy    cyanocobalamin, 1,000 mcg, Intramuscular, Once, 1 of 1 cycle  Administration: 1,000 mcg (8/24/2023)  alteplase (CATHFLO), 2 mg, Intracatheter, Every 1 Minute as needed, 2 of 3 cycles  CISplatin (PLATINOL) with mannitol IVPB, 75 mg/m2 = 129.8 mg (100 % of original dose 75 mg/m2), Intravenous, Once, 2 of 3 cycles  Dose modification: 50 mg/m2 (original dose 75 mg/m2, Cycle 1, Reason: Anticipated Tolerance), 75 mg/m2 (original dose 75 mg/m2, Cycle 1, Reason: Dose modified as per discussion with consulting physician)  Administration: 129.8 mg (8/31/2023)  fosaprepitant (EMEND) IVPB, 150 mg, Intravenous, Once, 2 of 3 cycles  Administration: 150 mg (8/31/2023)  nivolumab (OPDIVO) IVPB, 360 mg, Intravenous, Once, 2 of 3 cycles  Administration: 360 mg (8/31/2023)  pemetrexed (ALIMTA) chemo infusion, 500 mg/m2 = 865 mg, Intravenous, Once, 2 of 3 cycles  Administration: 865 mg (8/31/2023)           SUBJECTIVE  (INTERVAL HISTORY)      Clotting History None   Bleeding History None   Cancer History RML NSCLC   Family Cancer History Sister (cancer)   H/O Blood/Plt Transfusion None   Tobacco/etoh/drug abuse 1 PPD x 40 years, trying to quit, no etoh abuse or rec drug use           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 She hasn't needed Zofran during those days. I have reviewed the relevant past medical, surgical, social and family history. I have also reviewed allergies and medications for this patient. Review of Systems    Baseline weight: 150-160 lbs    Denies F/C, N/V, weight loss, SOB, CP, LH, HA, gen weakness, melena, hematuria, hematochezia, falls, diarrhea, or constipation       A 10-point review of system was performed, pertinent positive and negative were detailed as above. Otherwise, the 10-point review of system was negative.       Past Medical History:   Diagnosis Date   • Fibromyalgia    • Heart murmur    • Lumbosacral disc herniation    • Lung cancer (HCC)    • Psoriasis    • Psoriasis    • RSD (reflex sympathetic dystrophy)        Past Surgical History:   Procedure Laterality Date   • IR PORT PLACEMENT  8/4/2023   • PARTIAL HYSTERECTOMY         Family History   Problem Relation Age of Onset   • Sarcoidosis Mother        Social History     Socioeconomic History   • Marital status:      Spouse name: Not on file   • Number of children: Not on file   • Years of education: Not on file   • Highest education level: Not on file   Occupational History   • Not on file   Tobacco Use   • Smoking status: Every Day     Packs/day: 0.50     Years: 47.00     Total pack years: 23.50     Types: Cigarettes     Start date: 1976   • Smokeless tobacco: Never   Vaping Use   • Vaping Use: Never used   Substance and Sexual Activity   • Alcohol use: Yes     Alcohol/week: 1.0 standard drink of alcohol     Types: 1 Glasses of wine per week     Comment: daily   • Drug use: No   • Sexual activity: Not on file   Other Topics Concern   • Not on file   Social History Narrative   • Not on file     Social Determinants of Health     Financial Resource Strain: Not on file   Food Insecurity: Not on file   Transportation Needs: Not on file   Physical Activity: Not on file   Stress: Not on file   Social Connections: Not on file   Intimate Partner Violence: Not on file   Housing Stability: Not on file       Allergies   Allergen Reactions   • Prednisone Palpitations       Current Outpatient Medications   Medication Sig Dispense Refill   • atorvastatin (LIPITOR) 20 mg tablet Take 20 mg by mouth daily     • calcipotriene (DOVONEX) 0.005 % cream Apply topically 2 (two) times a day To affected areas on Saturday and Sunday only 120 g 2   • DULoxetine (CYMBALTA) 30 mg delayed release capsule      • Fluticasone-Salmeterol (Advair) 100-50 mcg/dose inhaler Inhale 1 puff 2 (two) times a day Rinse mouth after use.      • folic acid (KP Folic Acid) 1 mg tablet Take 1 tablet (1 mg total) by mouth daily 90 tablet 2   • lidocaine-prilocaine (EMLA) cream Apply topically as needed for mild pain 30 g 0   • Magnesium Oxide 400 MG CAPS Take 1 tablet (400 mg total) by mouth 2 (two) times a day for 7 days 14 tablet 0   • OLANZapine (ZyPREXA) 5 mg tablet Take 1 tablet (5 mg total) by mouth daily at bedtime for 7 days During treatment week (Patient not taking: Reported on 9/13/2023) 7 tablet 0   • ondansetron (ZOFRAN) 8 mg tablet Take 1 tablet (8 mg total) by mouth every 8 (eight) hours as needed for nausea or vomiting 20 tablet 0   • ondansetron (ZOFRAN-ODT) 8 mg disintegrating tablet Take 1 tablet (8 mg total) by mouth every 8 (eight) hours as needed for nausea or vomiting 20 tablet 1   • propranolol (INDERAL) 20 mg/5 mL solution      • saccharomyces boulardii (FLORASTOR) 250 mg capsule Take 250 mg by mouth 2 (two) times a day (Patient not taking: Reported on 7/31/2023)     • triamcinolone (KENALOG) 0.1 % cream Apply topically 2 (two) times a day Affected areas Monday through Friday only 453.6 g 2     No current facility-administered medications for this visit. Facility-Administered Medications Ordered in Other Visits   Medication Dose Route Frequency Provider Last Rate Last Admin   • alteplase (CATHFLO) injection 2 mg  2 mg Intracatheter Q1MIN PRN Hermilo Fabian MD       • alteplase (CATHFLO) injection 2 mg  2 mg Intracatheter Q1MIN PRN Hermilo Fabian MD       • Famotidine (PF) (PEPCID) 20 mg/2 mL injection **ADS Override Pull**            • sodium chloride 0.9 % bolus 1,000 mL  1,000 mL Intravenous Once Hermilo Fabian .7 mL/hr at 09/21/23 1413 1,000 mL at 09/21/23 1413       (Not in a hospital admission)      Objective:     24 Hour Vitals Assessment:     There were no vitals filed for this visit. Below not updated due to televisit    PHYSICIAN EXAM:    General: Appearance: alert, cooperative, no distress. HEENT: Normocephalic, atraumatic. No scleral icterus. conjunctivae clear. EOMI. Chest: No tenderness to palpation. No open wound noted. Lungs: Diminished b/l, otherwise clear to auscultation bilaterally, Respirations unlabored. Cardiac: Regular rate, +S1and S2  Abdomen: Soft, non-tender, non-distended. Bowel sounds are normal.   Extremities:  No edema, cyanosis, clubbing. Inferior to R glenoid (TTP)  Skin: Skin color, turgor are normal. No rashes.   Lymphatics: no palpable supra-cervical, axillary, or inguinal adenopathy  Neurologic: Awake, Alert, and oriented, no gross focal deficits noted b/l. DATA REVIEW:    Pathology Result:    Final Diagnosis   Date Value Ref Range Status   07/24/2023   Final    A.-B. Lung, Right Middle Lobe Bronchial Brushing (Thin-prep and smear preparations):   Conclusive evidence of malignancy. 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 Satisfactory for evaluation. C.-D.  Lung, Right Middle Lobe Bronchoalveolar Lavage (Thin-prep and cell block preparations):   Conclusive evidence of malignancy. 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 Satisfactory for evaluation. E.-F.  Lymph Node, level 7 (Thin-prep and smear preparations): Atypical cellular changes seen. Rare atypical cells. Lymphocytes. Few bronchial cells and pulmonary macrophages. Satisfactory for evaluation. G.-H. Lymph Node, 10R (Thin-prep and smear preparations):   Negative for malignancy. Lymphocytes. Aggregates of neutrophils. Satisfactory for evaluation. 07/24/2023   Final    A.-B. Lung, Right Middle Lobe Bronchial Brushing (Thin-prep and smear preparations):   Conclusive evidence of malignancy. 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 Satisfactory for evaluation. C.-D.  Lung, Right Middle Lobe Bronchoalveolar Lavage (Thin-prep and cell block preparations):   Conclusive evidence of malignancy. 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 Satisfactory for evaluation. E.-F.  Lymph Node, level 7 (Thin-prep and smear preparations): Atypical cellular changes seen. Rare atypical cells. Lymphocytes. Few bronchial cells and pulmonary macrophages. Satisfactory for evaluation. G.-H. Lymph Node, 10R (Thin-prep and smear preparations):   Negative for malignancy. Lymphocytes. Aggregates of neutrophils.     Satisfactory for evaluation. 07/24/2023   Final    A. 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 07/05/2023   Final    A. Lung, right middle lobe, biopsy:     - Portions of bronchial wall with mild chronic inflammation.     - No dysplasia or neoplasia identified. 07/05/2023   Final    A-B-C. Lymph Node, Subcarinal level 7 (ThinPrep, smear and cell block preparations)  Negative for malignancy, see note  Benign bronchial cells. Abundant lymphocytes and macrophages. Satisfactory for evaluation. D-E-F. Lymph Node, 4R (ThinPrep, smear and cell block preparations)  Negative for malignancy. Benign bronchial cells. Lymphocytes and macrophages. Satisfactory for evaluation. 07/05/2023   Final    A-B. Lung, Right Middle Lobe Bronchial Washing (ThinPrep and cell block preparations):  Negative for malignancy. Scant benign bronchial cells. Neutrophils, lymphocytes and pulmonary macrophages. Satisfactory for evaluation. D. Lung, Right Middle Lobe Bronchial Brushing, :  Atypical cellular changes seen. Rare atypical epithelial cells in a background of benign bronchial cells. Satisfactory for evaluation. Image Results:   Image result are reviewed and documented in Hematology/Oncology history    NM bone scan whole body  Narrative: BONE SCAN  WHOLE BODY    INDICATION: C34.91: Malignant neoplasm of unspecified part of right bronchus or lung  S49. 91XA: Unspecified injury of right shoulder and upper arm, initial encounter. Right shoulder blade pain for 2 months    PREVIOUS FILM CORRELATION:    PET/CT July 3, 2023, MRI brain July 26, 2023, CT of the chest June 13, 2023    TECHNIQUE:   This study was performed following the intravenous administration of 23.7 mCi Tc-99m labeled MDP. Delayed, anterior and posterior whole body images were acquired, 2-3 hours after radiopharmaceutical administration.     FINDINGS:    Increased uptake corresponding to the right posterior cervical spine, compatible with focal degenerative facet arthropathy, which also demonstrated increased uptake on the recent PET/CT. Mild degenerative changes are also present within the shoulders,   knees, and ankles. There is no scintigraphic evidence of osseous metastasis. Linear uptake in the lateral aspect of both tibias may represent hypertrophic osteoarthropathy. Impression: 1. No scintigraphic evidence of osseous metastasis, with particular attention to the right shoulder. 2. Degenerative changes as described, including facet arthropathy in the right posterior cervical spine. 3. Linear uptake along the lateral aspect of both tibias may represent hypertrophic osteoarthropathy. Workstation performed: [id number]      LABS:  Lab data are reviewed and documented in HemOnc history.        Lab Results   Component Value Date    HGB 12.9 09/20/2023    HCT 38.3 09/20/2023    MCV 96 09/20/2023     (H) 09/20/2023    WBC 9.14 09/20/2023    NRBC 0 09/20/2023     Lab Results   Component Value Date    K 3.8 09/20/2023     09/20/2023    CO2 28 09/20/2023    BUN 5 09/20/2023    CREATININE 0.58 (L) 09/20/2023    CALCIUM 9.6 09/20/2023    AST 16 09/20/2023    ALT 27 09/20/2023    ALKPHOS 78 09/20/2023    EGFR 95 09/20/2023       No results found for: "IRON", "TIBC", "FERRITIN"    No results found for: "[id number]"    Recent Labs     09/20/23  1040   WBC 9.14   *       By:  Antonio Venegas MD, 9/21/2023, 3:10 PM

## 2023-09-13 ENCOUNTER — OFFICE VISIT (OUTPATIENT)
Dept: PALLIATIVE MEDICINE | Facility: CLINIC | Age: 68
End: 2023-09-13
Payer: COMMERCIAL

## 2023-09-13 VITALS
DIASTOLIC BLOOD PRESSURE: 58 MMHG | BODY MASS INDEX: 25.47 KG/M2 | OXYGEN SATURATION: 97 % | HEART RATE: 87 BPM | TEMPERATURE: 97.2 F | WEIGHT: 149.2 LBS | HEIGHT: 64 IN | RESPIRATION RATE: 14 BRPM | SYSTOLIC BLOOD PRESSURE: 124 MMHG

## 2023-09-13 DIAGNOSIS — Z51.5 PALLIATIVE CARE ENCOUNTER: ICD-10-CM

## 2023-09-13 DIAGNOSIS — Z71.89 GOALS OF CARE, COUNSELING/DISCUSSION: ICD-10-CM

## 2023-09-13 DIAGNOSIS — C34.91 ADENOCARCINOMA OF RIGHT LUNG (HCC): Primary | Chronic | ICD-10-CM

## 2023-09-13 DIAGNOSIS — C34.91 NSCLC OF RIGHT LUNG (HCC): ICD-10-CM

## 2023-09-13 PROCEDURE — 99204 OFFICE O/P NEW MOD 45 MIN: CPT | Performed by: NURSE PRACTITIONER

## 2023-09-13 NOTE — PROGRESS NOTES
Outpatient Consultation - Palliative and Supportive Care   Gerardo Grade 80 y.o. female [id number]    Patient Active Problem List   Diagnosis   • Pulmonary emphysema, unspecified emphysema type ([address])   • Adenocarcinoma of right lung ([address])   • Mediastinal adenopathy   • Port-A-Cath in place         Assessment & Plan  Problem List Items Addressed This Visit        Respiratory    Adenocarcinoma of right lung ([address]) - Primary (Chronic)   Other Visit Diagnoses     Palliative care encounter        Goals of care, counseling/discussion            · Adenocarcinoma of right lung- continue to follow with Oncology for management and treatment  · Palliative care encounter- introduced Palliative and Supportive Care to patient  · Goals of care, counseling and discussion- ongoing goals of care discussions. Continue full cares with no limitation to treatment at this time  · Psychosocial support- supportive listening provided    Medications adjusted this encounter:  Requested Prescriptions      No prescriptions requested or ordered in this encounter     No orders of the defined types were placed in this encounter. There are no discontinued medications. PPS: [address] was seen today for symptoms and planning cares related to above illnesses. Above orders were sent electronically, or provided in hardcopy in clinic. I have reviewed the patient's controlled substance dispensing history in the Prescription Drug Monitoring Program in compliance with the [facility] regulations before prescribing any controlled substances. We appreciate the referral, and wish for her to continue to follow with us. If there are questions or concerns, please contact us through our clinic/answering service 24 hours a day, seven days a week.     NAHUN Mo  [facility] Palliative and Supportive Care  [phone]        Visit Information    Accompanied By: No one    Source of History: Self    History Limitations: None Contacts:Contact Information:  Name: Edilberto Agustin, Relationship: daughter, Cell Number: [phone]    Chief Complaint  No chief complaint on file. History of Present Illness      Lydia Kiser is a 80 y.o. female who presents as a referral from Dr. Gabo Zarco of Oncology for primary palliative diagnosis of adenocarcinoma of right lung. Consultation is requested for: symptom management, goal of care assessment and decisional support, emotional support in the setting of serious illness. Patient was seen in clinic, states she has occasional right shoulder and mid-upper back pain. She does not want opioids as she had a difficult time weaning from opioids in the past.  She will take Tylenol as needed. Positive for weakness, fatigue, shortness of breath, cough, productive for clear sputum. Nausea, but no vomiting. 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 Son has recently moved back home. She states she was drinking a bottle of wine per night for about 3 1/2 years, but has not been drinking for a few weeks now.       Pertinent Palliative Care Domains    Physical Symptoms:ambulates    Psychological Symptoms:moderate anxiety    Social Aspects: support system sister, daughter and son        Advance Directive and Living Will:    No  Power of :  No  POLST:  No    Historical Data  Past Medical History:   Diagnosis Date   • Fibromyalgia    • Heart murmur    • Lumbosacral disc herniation    • Psoriasis    • Psoriasis    • RSD (reflex sympathetic dystrophy)      Past Surgical History:   Procedure Laterality Date   • IR PORT PLACEMENT  8/4/2023   • PARTIAL HYSTERECTOMY       Social History     Socioeconomic History   • Marital status:      Spouse name: Not on file   • Number of children: Not on file   • Years of education: Not on file   • Highest education level: Not on file   Occupational History   • Not on file   Tobacco Use   • Smoking status: Every Day     Packs/day: 1.00     Years: 47.00     Total pack years: 47.00     Types: Cigarettes     Start date: 1976   • Smokeless tobacco: Never   Vaping Use   • Vaping Use: Never used   Substance and Sexual Activity   • Alcohol use: Yes     Comment: daily   • Drug use: No   • Sexual activity: Not on file   Other Topics Concern   • Not on file   Social History Narrative   • Not on file     Social Determinants of Health     Financial Resource Strain: Not on file   Food Insecurity: Not on file   Transportation Needs: Not on file   Physical Activity: Not on file   Stress: Not on file   Social Connections: Not on file   Intimate Partner Violence: Not on file   Housing Stability: Not on file     Family History   Problem Relation Age of Onset   • Sarcoidosis Mother      Allergies   Allergen Reactions   • Prednisone Palpitations     Current Outpatient Medications   Medication Sig Dispense Refill   • atorvastatin (LIPITOR) 20 mg tablet Take 20 mg by mouth daily     • calcipotriene (DOVONEX) 0.005 % cream Apply topically 2 (two) times a day To affected areas on Saturday and Sunday only 120 g 2   • DULoxetine (CYMBALTA) 30 mg delayed release capsule      • Fluticasone-Salmeterol (Advair) 100-50 mcg/dose inhaler Inhale 1 puff 2 (two) times a day Rinse mouth after use.      • folic acid (KP Folic Acid) 1 mg tablet Take 1 tablet (1 mg total) by mouth daily 90 tablet 2   • lidocaine-prilocaine (EMLA) cream Apply topically as needed for mild pain 30 g 0   • OLANZapine (ZyPREXA) 5 mg tablet Take 1 tablet (5 mg total) by mouth daily at bedtime for 7 days During treatment week 7 tablet 0   • ondansetron (ZOFRAN) 8 mg tablet Take 1 tablet (8 mg total) by mouth every 8 (eight) hours as needed for nausea or vomiting 20 tablet 0   • ondansetron (ZOFRAN-ODT) 8 mg disintegrating tablet Take 1 tablet (8 mg total) by mouth every 8 (eight) hours as needed for nausea or vomiting 20 tablet 1   • propranolol (INDERAL) 20 mg/5 mL solution      • saccharomyces boulardii (FLORASTOR) 250 mg capsule Take 250 mg by mouth 2 (two) times a day (Patient not taking: Reported on 7/31/2023)     • triamcinolone (KENALOG) 0.1 % cream Apply topically 2 (two) times a day Affected areas Monday through Friday only 453.6 g 2     No current facility-administered medications for this visit. Review of Systems   Constitutional: Positive for malaise/fatigue. Cardiovascular: Negative for chest pain. Respiratory: Positive for cough. Dry cough + productive cough for mostly clear sputum   Musculoskeletal:        Right shoulder pain and mid-upper back pain   Gastrointestinal: Positive for nausea. Negative for constipation, diarrhea and vomiting. Psychiatric/Behavioral: The patient does not have insomnia. Vital Signs  /58 (BP Location: Right arm, Patient Position: Sitting, Cuff Size: Standard)   Pulse 87   Temp (!) 97.2 °F (36.2 °C) (Tympanic)   Resp 14   Ht 5' 4" (1.626 m)   Wt 67.7 kg (149 lb 3.2 oz)   SpO2 97%   BMI 25.61 kg/m²     Physical Exam and Objective Data  Physical Exam  Vitals reviewed. Constitutional:       General: She is not in acute distress. Appearance: Normal appearance. She is not ill-appearing. Cardiovascular:      Rate and Rhythm: Normal rate. Pulmonary:      Effort: Pulmonary effort is normal.   Skin:     General: Skin is warm and dry. Neurological:      Mental Status: She is alert and oriented to person, place, and time. Psychiatric:         Mood and Affect: Mood normal.           Radiology and Laboratory:  I personally reviewed  the following results:   Study Result    Result Text   PET/CT SCAN     INDICATION: Lung mass.  Abnormal CT. D38.1: Neoplasm of uncertain behavior of trachea, bronchus and lung  R91.8: Other nonspecific abnormal finding of lung field     MODIFIER: PI     COMPARISON: CT chest 6/13/2023, CT abdomen pelvis 6/11/2020     CELL TYPE: N/A     TECHNIQUE:   8.7 mCi F-18-FDG administered IV. Multiplanar attenuation corrected and non attenuation corrected PET images are available for interpretation, and contiguous, low dose, axial CT sections were obtained from the skull base through the femurs. Intravenous contrast material was not utilized. This examination, like all CT scans performed in the [facility], was performed utilizing techniques to minimize radiation dose exposure, including the use of iterative reconstruction and   automated exposure control.     Fasting serum glucose: 109 mg/dl     FINDINGS:     VISUALIZED BRAIN:  No acute abnormalities are seen.     HEAD/NECK:  There is a physiologic distribution of FDG. No FDG avid cervical adenopathy is seen. CT images: Unremarkable.     CHEST:  Large FDG avid right mid lung mass, SUV max of 25. Mass measures on the order of 7-8 cm in size based on CT. Mass extends to the right hilar region and abuts the lateral pleural margin. There is central photopenia suggesting necrosis.     Mild FDG uptake in mediastinal lymph nodes. Cluster of small precarinal lymph nodes on the right demonstrates SUV max of 3.4. Subcarinal lymph node demonstrates SUV max of 3.8. This measures up to 1.3 cm short axis image 84 series 3.     No suspicious left hilar activity.     CT images: Moderate biapical pleural-parenchymal changes. Scattered blebs and emphysematous changes.     ABDOMEN:  Questionable small focus of FDG uptake in the left lobe of the liver medially, SV max of 3. See image 118 series 1200. No obvious findings on CT.     Mild patchy radiotracer uptake at the head of the pancreas, SUV max of 2.9. There does appear to be a cystic lesion at the pancreatic head/neck with adjacent coarse calcification.  Cystic focus measures up to 2.1 cm image 138 series 3.     CT images: Low-attenuation liver suggest fatty infiltration.     PELVIS:  No FDG avid soft tissue lesions are seen. CT images: Small fat-containing left inguinal hernia.     OSSEOUS STRUCTURES:  No FDG avid lesions are seen.     Mildly increased overall osseous uptake, nonspecific.     Scattered mild foci of FDG uptake in the spine corresponding to degenerative disc on CT. CT images: No significant findings.     IMPRESSION:     1. Large FDG avid right lung mass in keeping with malignancy. 2.  Mild FDG uptake in scattered mediastinal lymph nodes which may represent metastasis. 3.  Mild patchy radiotracer uptake at the head of the pancreas. There does appear to be a cystic lesion at the pancreatic head/neck with adjacent coarse calcification. Cystic focus measures up to 2.1 cm, new from prior CT. An underlying pancreatic lesion   should be excluded. Recommend further characterization with dedicated MRI of the pancreas with and without IV contrast for further evaluation. 4. Questionable small focus of FDG uptake in the left lobe of the liver, metastasis is not excluded. This could be also assessed with MRI. 5.  Mildly increased overall osseous uptake. This is nonspecific, question secondary to anemia or stress response.     The study was marked in [facility] for significant notification.     Workstation performed: [id number]     MRI brain w wo contrast  Status: Final result     PACS Images     Show images for MRI brain w wo contrast    MRI brain w wo contrast: Result Notes     Robb Lopez MD   7/26/2023 12:20 PM EDT       Called and discussed the MRI brain results with the patient, she does have a oncology follow-up on 7/31 which she will keep up that appointment            Study Result    Narrative & Impression   MRI BRAIN WITH AND WITHOUT CONTRAST     INDICATION: C34.91:  Malignant neoplasm of unspecified part of right bronchus or lung.     COMPARISON:  None.     TECHNIQUE:  Multiplanar, multisequence imaging of the brain was performed before and after gadolinium administration.        IV Contrast:  7 mL of Gadobutrol injection (SINGLE-DOSE)     IMAGE QUALITY:   Diagnostic.     FINDINGS:     BRAIN PARENCHYMA:  There is no discrete mass, mass effect or midline shift. There is no intracranial hemorrhage. Normal posterior fossa. Diffusion imaging is unremarkable.     There are no white matter changes in the cerebral hemispheres.     Postcontrast imaging of the brain demonstrates no abnormal enhancement.     VENTRICLES:  Normal for the patient's age.     SELLA AND PITUITARY GLAND:  Normal.     ORBITS:  Normal.     PARANASAL SINUSES:  Normal.     VASCULATURE:  Evaluation of the major intracranial vasculature demonstrates appropriate flow voids.     CALVARIUM AND SKULL BASE:  Normal.     EXTRACRANIAL SOFT TISSUES:  Normal.     IMPRESSION:     Normal examination.     Workstation performed: [id number]        NM bone scan whole body  Status: Final result     PACS Images     Show images for NM bone scan whole body  Study Result    Result Text   BONE SCAN  WHOLE BODY     INDICATION: C34.91: Malignant neoplasm of unspecified part of right bronchus or lung  S49. 91XA: Unspecified injury of right shoulder and upper arm, initial encounter. Right shoulder blade pain for 2 months     PREVIOUS FILM CORRELATION:    PET/CT July 3, 2023, MRI brain July 26, 2023, CT of the chest June 13, 2023     TECHNIQUE:   This study was performed following the intravenous administration of 23.7 mCi Tc-99m labeled MDP. Delayed, anterior and posterior whole body images were acquired, 2-3 hours after radiopharmaceutical administration.     FINDINGS:     Increased uptake corresponding to the right posterior cervical spine, compatible with focal degenerative facet arthropathy, which also demonstrated increased uptake on the recent PET/CT. Mild degenerative changes are also present within the shoulders,   knees, and ankles.  There is no scintigraphic evidence of osseous metastasis.     Linear uptake in the lateral aspect of both tibias may represent hypertrophic osteoarthropathy.     IMPRESSION:     1. No scintigraphic evidence of osseous metastasis, with particular attention to the right shoulder.     2. Degenerative changes as described, including facet arthropathy in the right posterior cervical spine.     3. Linear uptake along the lateral aspect of both tibias may represent hypertrophic osteoarthropathy.           Workstation performed: [id number]              60 minutes was spent face to face with Kaykay Bolanos with greater than 50% of the time spent in counseling or coordination of care including discussions of introduction to Palliative and Supportive Care and symptom management. All of the patient's questions were answered during this discussion.

## 2023-09-14 DIAGNOSIS — C34.91 ADENOCARCINOMA OF RIGHT LUNG (HCC): Primary | ICD-10-CM

## 2023-09-14 RX ORDER — SODIUM CHLORIDE 9 MG/ML
20 INJECTION, SOLUTION INTRAVENOUS ONCE
Status: CANCELLED | OUTPATIENT
Start: 2023-09-21

## 2023-09-20 ENCOUNTER — HOSPITAL ENCOUNTER (OUTPATIENT)
Dept: INFUSION CENTER | Facility: CLINIC | Age: 68
Discharge: HOME | End: 2023-09-20
Payer: COMMERCIAL

## 2023-09-20 DIAGNOSIS — Z95.828 PORT-A-CATH IN PLACE: ICD-10-CM

## 2023-09-20 DIAGNOSIS — C34.91 ADENOCARCINOMA OF RIGHT LUNG (HCC): Primary | ICD-10-CM

## 2023-09-20 LAB
ALBUMIN SERPL BCP-MCNC: 3.7 G/DL (ref 3.5–5)
ALP SERPL-CCNC: 78 U/L (ref 34–104)
ALT SERPL W P-5'-P-CCNC: 27 U/L (ref 7–52)
ANION GAP SERPL CALCULATED.3IONS-SCNC: 7 MMOL/L
AST SERPL W P-5'-P-CCNC: 16 U/L (ref 13–39)
BASOPHILS # BLD AUTO: 0.06 THOUSANDS/ÂΜL (ref 0–0.1)
BASOPHILS NFR BLD AUTO: 1 % (ref 0–1)
BILIRUB SERPL-MCNC: 0.23 MG/DL (ref 0.2–1)
BUN SERPL-MCNC: 5 MG/DL (ref 5–25)
CALCIUM SERPL-MCNC: 9.6 MG/DL (ref 8.4–10.2)
CHLORIDE SERPL-SCNC: 101 MMOL/L (ref 96–108)
CO2 SERPL-SCNC: 28 MMOL/L (ref 21–32)
CREAT SERPL-MCNC: 0.58 MG/DL (ref 0.6–1.3)
EOSINOPHIL # BLD AUTO: 0.46 THOUSAND/ÂΜL (ref 0–0.61)
EOSINOPHIL NFR BLD AUTO: 5 % (ref 0–6)
ERYTHROCYTE [DISTWIDTH] IN BLOOD BY AUTOMATED COUNT: 12.2 % (ref 11.6–15.1)
GFR SERPL CREATININE-BSD FRML MDRD: 95 ML/MIN/1.73SQ M
GLUCOSE SERPL-MCNC: 100 MG/DL (ref 65–140)
HCT VFR BLD AUTO: 38.3 % (ref 34.8–46.1)
HGB BLD-MCNC: 12.9 G/DL (ref 11.5–15.4)
IMM GRANULOCYTES # BLD AUTO: 0.06 THOUSAND/UL (ref 0–0.2)
IMM GRANULOCYTES NFR BLD AUTO: 1 % (ref 0–2)
LYMPHOCYTES # BLD AUTO: 1.85 THOUSANDS/ÂΜL (ref 0.6–4.47)
LYMPHOCYTES NFR BLD AUTO: 20 % (ref 14–44)
MAGNESIUM SERPL-MCNC: 1.8 MG/DL (ref 1.9–2.7)
MCH RBC QN AUTO: 32.4 PG (ref 26.8–34.3)
MCHC RBC AUTO-ENTMCNC: 33.7 G/DL (ref 31.4–37.4)
MCV RBC AUTO: 96 FL (ref 82–98)
MONOCYTES # BLD AUTO: 1.03 THOUSAND/ÂΜL (ref 0.17–1.22)
MONOCYTES NFR BLD AUTO: 11 % (ref 4–12)
NEUTROPHILS # BLD AUTO: 5.68 THOUSANDS/ÂΜL (ref 1.85–7.62)
NEUTS SEG NFR BLD AUTO: 62 % (ref 43–75)
NRBC BLD AUTO-RTO: 0 /100 WBCS
PLATELET # BLD AUTO: 552 THOUSANDS/UL (ref 149–390)
PMV BLD AUTO: 9.6 FL (ref 8.9–12.7)
POTASSIUM SERPL-SCNC: 3.8 MMOL/L (ref 3.5–5.3)
PROT SERPL-MCNC: 7.3 G/DL (ref 6.4–8.4)
RBC # BLD AUTO: 3.98 MILLION/UL (ref 3.81–5.12)
SODIUM SERPL-SCNC: 136 MMOL/L (ref 135–147)
TSH SERPL DL<=0.05 MIU/L-ACNC: 2.52 UIU/ML (ref 0.45–4.5)
WBC # BLD AUTO: 9.14 THOUSAND/UL (ref 4.31–10.16)

## 2023-09-20 PROCEDURE — 83735 ASSAY OF MAGNESIUM: CPT | Performed by: INTERNAL MEDICINE

## 2023-09-20 PROCEDURE — 85025 COMPLETE CBC W/AUTO DIFF WBC: CPT | Performed by: INTERNAL MEDICINE

## 2023-09-20 PROCEDURE — 80053 COMPREHEN METABOLIC PANEL: CPT | Performed by: INTERNAL MEDICINE

## 2023-09-20 PROCEDURE — 84443 ASSAY THYROID STIM HORMONE: CPT | Performed by: INTERNAL MEDICINE

## 2023-09-20 NOTE — PROGRESS NOTES
Pt presents for port a cath flush. Pt offers no complaints. Port accessed, blood return noted, labs drawn, flushed, saline locked and de-accessed without difficulty. AVS declined, Next appointment reviewed.

## 2023-09-21 ENCOUNTER — TELEMEDICINE (OUTPATIENT)
Dept: HEMATOLOGY ONCOLOGY | Facility: CLINIC | Age: 68
End: 2023-09-21
Payer: COMMERCIAL

## 2023-09-21 ENCOUNTER — HOSPITAL ENCOUNTER (OUTPATIENT)
Dept: INFUSION CENTER | Facility: CLINIC | Age: 68
Discharge: HOME | End: 2023-09-21
Payer: COMMERCIAL

## 2023-09-21 VITALS
WEIGHT: 148.4 LBS | DIASTOLIC BLOOD PRESSURE: 62 MMHG | HEART RATE: 87 BPM | BODY MASS INDEX: 25.33 KG/M2 | HEIGHT: 64 IN | TEMPERATURE: 97.2 F | SYSTOLIC BLOOD PRESSURE: 123 MMHG | RESPIRATION RATE: 18 BRPM

## 2023-09-21 DIAGNOSIS — C34.91 ADENOCARCINOMA OF RIGHT LUNG (HCC): ICD-10-CM

## 2023-09-21 DIAGNOSIS — C34.91 ADENOCARCINOMA OF RIGHT LUNG (HCC): Primary | Chronic | ICD-10-CM

## 2023-09-21 DIAGNOSIS — C34.91 ADENOCARCINOMA OF RIGHT LUNG (HCC): Primary | ICD-10-CM

## 2023-09-21 DIAGNOSIS — E83.42 HYPOMAGNESEMIA: Primary | ICD-10-CM

## 2023-09-21 PROCEDURE — 96413 CHEMO IV INFUSION 1 HR: CPT

## 2023-09-21 PROCEDURE — 96367 TX/PROPH/DG ADDL SEQ IV INF: CPT

## 2023-09-21 PROCEDURE — 96411 CHEMO IV PUSH ADDL DRUG: CPT

## 2023-09-21 PROCEDURE — 96415 CHEMO IV INFUSION ADDL HR: CPT

## 2023-09-21 PROCEDURE — 99443 PR PHYS/QHP TELEPHONE EVALUATION 21-30 MIN: CPT | Performed by: INTERNAL MEDICINE

## 2023-09-21 PROCEDURE — 96417 CHEMO IV INFUS EACH ADDL SEQ: CPT

## 2023-09-21 PROCEDURE — 96361 HYDRATE IV INFUSION ADD-ON: CPT

## 2023-09-21 RX ORDER — CALCIUM CARBONATE/VITAMIN D3 500-10/5ML
400 LIQUID (ML) ORAL 2 TIMES DAILY
Qty: 14 TABLET | Refills: 0 | Status: SHIPPED | OUTPATIENT
Start: 2023-09-21 | End: 2023-09-28

## 2023-09-21 RX ORDER — FAMOTIDINE 10 MG/ML
INJECTION, SOLUTION INTRAVENOUS
Status: DISCONTINUED
Start: 2023-09-21 | End: 2023-09-21 | Stop reason: WASHOUT

## 2023-09-21 RX ORDER — SODIUM CHLORIDE 9 MG/ML
20 INJECTION, SOLUTION INTRAVENOUS ONCE
Status: COMPLETED | OUTPATIENT
Start: 2023-09-21 | End: 2023-09-21

## 2023-09-21 RX ADMIN — DEXAMETHASONE SODIUM PHOSPHATE: 10 INJECTION, SOLUTION INTRAMUSCULAR; INTRAVENOUS at 09:16

## 2023-09-21 RX ADMIN — SODIUM CHLORIDE 1000 ML: 0.9 INJECTION, SOLUTION INTRAVENOUS at 14:13

## 2023-09-21 RX ADMIN — SODIUM CHLORIDE 20 ML/HR: 0.9 INJECTION, SOLUTION INTRAVENOUS at 11:02

## 2023-09-21 RX ADMIN — PEMETREXED DISODIUM 865 MG: 500 INJECTION, POWDER, LYOPHILIZED, FOR SOLUTION INTRAVENOUS at 11:45

## 2023-09-21 RX ADMIN — SODIUM CHLORIDE 360 MG: 9 INJECTION, SOLUTION INTRAVENOUS at 11:02

## 2023-09-21 RX ADMIN — CISPLATIN 129.8 MG: 1 INJECTION, SOLUTION INTRAVENOUS at 12:04

## 2023-09-21 RX ADMIN — FOSAPREPITANT 150 MG: 150 INJECTION, POWDER, LYOPHILIZED, FOR SOLUTION INTRAVENOUS at 09:47

## 2023-09-21 RX ADMIN — SODIUM CHLORIDE 1000 ML: 0.9 INJECTION, SOLUTION INTRAVENOUS at 09:16

## 2023-09-21 NOTE — PROGRESS NOTES
Pt to clinic for Opdivo, Alimta, and Cisplatin. Pt offers no complaints today. Tolerated infusion without complications. Aware of next appointment. AVS declined. Port de-accessed. After de-accessing port, a small speck was noticed at the end of her incision scar. The speck appeared to be a suture. No redness or warmth noted. Picture and description sent to office RN, Hoda Cummings. Pt educated on keeping her port site clean and to monitor the site for any s/s of infection.

## 2023-09-22 ENCOUNTER — TELEPHONE (OUTPATIENT)
Dept: HEMATOLOGY ONCOLOGY | Facility: CLINIC | Age: 68
End: 2023-09-22

## 2023-09-22 DIAGNOSIS — Z95.828 PORT-A-CATH IN PLACE: Primary | ICD-10-CM

## 2023-09-22 NOTE — TELEPHONE ENCOUNTER
Received, "David Marino, this is Leonyen Orf birthday on July 13th, 1955 and I'm calling. I think the people from the infusion center reached out to you or out to the office on the 1st floor like 4:00 yesterday and they didn't get a response back. Anyway, on what their question was that the girl that was doing my whole thing notice that one of the stitches right near the port never dissolved and that's why they were reaching out to you guys to see if you want them to do something different, do something to it or whatever. So they're just concerned about infection, why the stitch was still there, which is surprisingly took is supposed to be dissolved and dissolving stage. So you I thought that I should reach out to you. What they told me to do is just try not to get dirty in it, try to keep it very clean. And then they were going to reach out to you guys to see if you wanted something else done to it. I don't know if you want it removed, you just leave it and see if it's by next time it's gone. And they told me what to watch for. The redness, heat, all that kind of thing for infection. So that's why I'm leaving it right now. But I wanted to reach out to you, just to see if there was something else that you felt that I needed to do. Aside from that. And the other thing that pushes the virtual person this morning was I had a problem yesterday. I was walking weird when I left, but I just was from sitting in the chair so long and when I got home, my legs from my knee down were hard, just plain old. They couldn't stretch anymore. But around my ankles, what I really noticed and I think that's what started the whole thing. I've stayed up. I've been up for 22 hours straight and I stayed up, elevated them but the heating pad on to open up the veins a little bit pee like crazy. And at any rate it took until about midnight so it was like 7 hours for it to completely go away, but it is gone, which is good.  I didn't want to go to sleep because I was so afraid I'd sleep through, you know, usually sleep a lot after chemo. So right now I'm just like wired. I have to go back to sleep. But so that problem is taking care of. It's everything's OK. And the virtual thing you told me about, you know, what food should stay away from, which, frankly, I had morales this week and I had, you know, macaroni and bean soup the night before. That doesn't help, but I really blame it on the leather booties I had on and the liner sock, and I just think there wasn't enough regulations on there. So I'm good with it. I'm OK. Next time I'll elevate them more while I'm getting chemo and that'll probably help. Just passing it along. I said at present, anything that you think I need to change, you can even leave me a voicemail. No, just do what they told you to do. Know. That's fine too. I hope you have a good weekend. Take Care now. All my phone number would probably help. [phone], What is my phone number? [phone], alright, take care.  Byjulio."

## 2023-09-22 NOTE — TELEPHONE ENCOUNTER
Port check order placed and message sent to IR scheduling. Call out to patient to review. Reviewed importance of keeping area clean and to watch out for any s/s of infection. Patient had leather shoes and had some swelling above and below ankles that have now resolved. Patient stated that she had her legs in a dependent position during treatment and also had increase in salt over this week. Patient stated it has resolved. Patient stated she will call if it returns. No further questions or concerns at this time.

## 2023-09-26 ENCOUNTER — TELEPHONE (OUTPATIENT)
Dept: HEMATOLOGY ONCOLOGY | Facility: CLINIC | Age: 68
End: 2023-09-26

## 2023-09-26 NOTE — TELEPHONE ENCOUNTER
Santy De La Garza, This is Susanne Lorenzo, birthday 1955. I have an appointment at 9:15 for them to take a look at my port. I've been up all night with diarrhea and dry heaves. It's still not stopping. I was pushing the shower off to the last minute hoping that I could just get to this appointment, but it's still happening. I can't possibly go. I don't have a phone number for that place. They said it wasn't. The infusion center was in the main building, but I'm gonna try calling the infusion center too to cancel because I know you're supposed to give notice. But no, I need this stuff that's going to happen till now. OK? Anyway, I'm letting know and if you get this message, please call and cancel for me. Thank you.  Bye.

## 2023-09-28 ENCOUNTER — PATIENT OUTREACH (OUTPATIENT)
Dept: CASE MANAGEMENT | Facility: HOSPITAL | Age: 68
End: 2023-09-28

## 2023-10-02 ENCOUNTER — HOSPITAL ENCOUNTER (OUTPATIENT)
Dept: NON INVASIVE DIAGNOSTICS | Facility: HOSPITAL | Age: 68
Discharge: HOME | End: 2023-10-02
Attending: INTERNAL MEDICINE

## 2023-10-02 DIAGNOSIS — Z95.828 PORT-A-CATH IN PLACE: ICD-10-CM

## 2023-10-02 NOTE — PROGRESS NOTES
Telemedicine consent    Patient: Paula Kidd  Provider: Saba Chavez MD  Provider located at [address] [place] [id number]    The patient was identified by name and date of birth. Paula Kidd was informed that this is a telemedicine visit and that the visit is being conducted through Telephone. My office door was closed. No one else was in the room. She acknowledged consent and understanding of privacy and security of the video platform. The patient has agreed to participate and understands they can discontinue the visit at any time. Patient is aware this is a billable service. Hematology/Oncology Outpatient Office Note    Date of Service: 10/12/2023    [facility]  [phone]    Reason for Consultation:   No chief complaint on file. Cancer Stage at diagnosis: IIIA    Referral Physician: No ref. 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 Barriers to accomplishing this: peripheral neuropathy    Patient's Capacity to Self Care:  Patient is able to self care      ASSESSMENT & PLAN      Diagnosis ICD-10-CM Associated Orders   1.  Adenocarcinoma of right lung [facility])  C34.91 Ambulatory Referral to Thoracic Surgery            This is a 80 y.o. c PMHx notable for chest palpitation s/p propranolol, Heart murmur, fibromyalgia, psoriasis, RSD, peripheral neuropathy being seen in consultation for RML NSCLC    Tumor discussion on 2023 with recommendation for neoadjuvant systemic therapy followed by potential surgery    Discussion of decision making  Oncology history updated, accordingly, during this visit  Goals of care/patient communication  I discussed with the patient the clinical course leading up to their cancer diagnosis. I reviewed relevant office notes, imaging reports and pathology result as well. I told the patient that this is a case of potentially curable disease and what this means. We discussed that the goal of anti-cancer therapy is to provide best quality of life, extend overall survival, and progression free survival as shown in clinical trials. We also discussed that there might be a point when the cancer will no longer respond to this anti-neoplastic therapy. I explained the risks/benefits of the proposed cancer therapy: Nivo+ Cisplatin-alimta and after discussion including understanding risks of possible life-threatening complications and therapy-related malignancy development, informed consent for blood products and treatment has been signed and obtained. TNM/Staging At Diagnosis  [id number]  Cancer Staging   IIIA  Disease Features/Tumor Markers/Genetics  Tumor Marker: n/a  Notable Path Features:   7/24/2023 Bronch: Lung, right middle lobe, biopsy:  Non-small cell carcinoma consistent with a pulmonary adenocarcinoma  CARIS: PDL1 TPS 98%, KRAS G12C mutated (sotorasib)  Treatment:Cisplatin 75mg/m2, Alimta 500mg/m2, Nivo 360mg   Other Supportive care: Zofran EMLA, Zyprexa  Treatment Team Members  Surgeon: Dr. Mae Co: Dr. Modesta Gustafson  7/3/2023 PET/CT:  Large FDG avid right lung mass in keeping with malignancy (SUV max of 25. Mass measures on the order of 7-8 cm). Mild FDG uptake in scattered mediastinal lymph nodes which may represent metastasis. 1.3 cm subcarinal LN. Mild patchy radiotracer uptake at the head of the pancreas. There does appear to be a cystic lesion at the pancreatic head/neck with adjacent coarse calcification. Cystic focus measures up to 2.1 cm, new from prior CT. An underlying pancreatic lesion   should be excluded. Recommend further characterization with dedicated MRI of the pancreas with and without IV contrast for further evaluation. Questionable small focus of FDG uptake in the left lobe of the liver, metastasis is not excluded. This could be also assessed with MRI  7/26/2023 MRI Brain w/wo c: Normal examination  8/8/2023: NM Bone scan: No scintigraphic evidence of osseous metastasis, with particular attention to the right shoulder. Degenerative changes as described, including facet arthropathy in the right posterior cervical spine. Discussion of decision making    I personally reviewed the following lab results, the image studies, pathology, other specialty/physicians consult notes and recommendations, and outside medical records. I had a lengthy discussion with the patient and shared the work-up findings. We discussed the diagnosis and management plan as below. I spent 41 minutes reviewing the records (labs, clinician notes, outside records, medical history, ordering medicine/tests/procedures, interpreting the imaging/labs previously done) and coordination of care as well as direct time with the patient today, of which greater than 50% of the time was spent in counseling and coordination of care with the patient/family. Plan/Labs  F/u thoracic oncology (referral for her to be seen after the PET/CT)  Zyprexa 5mg HS bedtime for nausea on the week of treatment which is helping a lot  C3 today of Cisplatin 75mg/m2, Alimta 500mg/m2, Nivo 360mg x 3 cycles, Vit B12 supportive Care IM plan  Restaging PET/CT scheduled 10/18/2023          Follow Up: 3-4 weeks with [facility]    All questions were answered to the patient's satisfaction during this encounter. The patient knows the contact information for our office and knows to reach out for any relevant concerns related to this encounter.  They are to call for any temperature 100.4 or higher, new symptoms including but not restricted to shaking chills, decreased appetite, nausea, vomiting, diarrhea, increased fatigue, shortness of breath or chest pain, confusion, and not feeling the strength to come to the clinic. For all other listed problems and medical diagnosis in their chart - they are managed by PCP and/or other specialists, which the patient acknowledges. Thank you very much for your consultation and making us a part of this patient's care. We are continuing to follow closely with you. Please do not hesitate to reach out to me with any additional questions or concerns. Home Mandujano MD  Hematology & Medical Oncology Staff Physician             Disclaimer: This document was prepared using [employer] technology. If a word or phrase is confusing, or does not make sense, this is likely due to recognition error which was not discovered during this clinician's review. If you believe an error has occurred, please contact me through [address] [facility] line for jamie? cation.       ONCOLOGY HISTORY OF PRESENT ILLNESS        Oncology History   Adenocarcinoma of right lung ([address])   6/23/2023 Initial Diagnosis    Adenocarcinoma of right lung ([address])     7/24/2023 -  Cancer Staged    Staging form: Lung, AJCC 8th Edition  - Clinical stage from 7/24/2023: Stage IIIA (cT4, cN1, cM0) - Signed by Ramos Lamas MD on 7/31/2023  Stage prefix: Initial diagnosis       8/31/2023 -  Chemotherapy    cyanocobalamin, 1,000 mcg, Intramuscular, Once, 1 of 1 cycle  Administration: 1,000 mcg (8/24/2023)  alteplase (CATHFLO), 2 mg, Intracatheter, Every 1 Minute as needed, 3 of 3 cycles  CISplatin (PLATINOL) with mannitol IVPB, 75 mg/m2 = 129.8 mg (100 % of original dose 75 mg/m2), Intravenous, Once, 3 of 3 cycles  Dose modification: 50 mg/m2 (original dose 75 mg/m2, Cycle 1, Reason: Anticipated Tolerance), 75 mg/m2 (original dose 75 mg/m2, Cycle 1, Reason: Dose modified as per discussion with consulting physician)  Administration: 129.8 mg (8/31/2023), 129.8 mg (9/21/2023)  fosaprepitant (EMEND) IVPB, 150 mg, Intravenous, Once, 3 of 3 cycles  Administration: 150 mg (8/31/2023), 150 mg (9/21/2023)  nivolumab (OPDIVO) IVPB, 360 mg, Intravenous, Once, 3 of 3 cycles  Administration: 360 mg (8/31/2023), 360 mg (9/21/2023)  pemetrexed (ALIMTA) chemo infusion, 500 mg/m2 = 865 mg, Intravenous, Once, 3 of 3 cycles  Administration: 865 mg (8/31/2023), 865 mg (9/21/2023)           SUBJECTIVE  (INTERVAL HISTORY)      Clotting History None   Bleeding History None   Cancer History RML NSCLC   Family Cancer History Sister (cancer)   H/O Blood/Plt Transfusion None   Tobacco/etoh/drug abuse 1 PPD x 40 years, trying to quit, no etoh abuse or rec drug use           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 Weakness and fatigue week after treatment and then recovers. She hasn't needed Zofran during those days. I have reviewed the relevant past medical, surgical, social and family history. I have also reviewed allergies and medications for this patient. Review of Systems    Baseline weight: 150-160 lbs    Denies F/C, N/V, weight loss, SOB, CP, LH, HA, gen weakness, melena, hematuria, hematochezia, falls, diarrhea, or constipation       A 10-point review of system was performed, pertinent positive and negative were detailed as above. Otherwise, the 10-point review of system was negative.       Past Medical History:   Diagnosis Date    Fibromyalgia     Heart murmur     Lumbosacral disc herniation     Lung cancer (HCC)     Psoriasis     Psoriasis     RSD (reflex sympathetic dystrophy)        Past Surgical History:   Procedure Laterality Date    IR PORT PLACEMENT  8/4/2023    PARTIAL HYSTERECTOMY         Family History   Problem Relation Age of Onset    Sarcoidosis Mother        Social History     Socioeconomic History    Marital status:      Spouse name: Not on file    Number of children: Not on file    Years of education: Not on file    Highest education level: Not on file   Occupational History    Not on file   Tobacco Use    Smoking status: Every Day     Packs/day: 0.50     Years: 47.00     Total pack years: 23.50     Types: Cigarettes     Start date: 13    Smokeless tobacco: Never   Vaping Use    Vaping Use: Never used   Substance and Sexual Activity    Alcohol use: Yes     Alcohol/week: 1.0 standard drink of alcohol     Types: 1 Glasses of wine per week     Comment: daily    Drug use: No    Sexual activity: Not on file   Other Topics Concern    Not on file   Social History Narrative    Not on file     Social Determinants of Health     Financial Resource Strain: Not on file   Food Insecurity: Not on file   Transportation Needs: Not on file   Physical Activity: Not on file   Stress: Not on file   Social Connections: Not on file   Intimate Partner Violence: Not on file   Housing Stability: Not on file       Allergies   Allergen Reactions    Prednisone Palpitations       Current Outpatient Medications   Medication Sig Dispense Refill    OLANZapine (ZyPREXA) 5 mg tablet Take 1 tablet (5 mg total) by mouth daily at bedtime for 7 days During treatment week 7 tablet 0    atorvastatin (LIPITOR) 20 mg tablet Take 20 mg by mouth daily      calcipotriene (DOVONEX) 0.005 % cream Apply topically 2 (two) times a day To affected areas on Saturday and Sunday only 120 g 2    DULoxetine (CYMBALTA) 30 mg delayed release capsule       Fluticasone-Salmeterol (Advair) 100-50 mcg/dose inhaler Inhale 1 puff 2 (two) times a day Rinse mouth after use.       folic acid (KP Folic Acid) 1 mg tablet Take 1 tablet (1 mg total) by mouth daily 90 tablet 2    lidocaine-prilocaine (EMLA) cream Apply topically as needed for mild pain 30 g 0    ondansetron (ZOFRAN) 8 mg tablet Take 1 tablet (8 mg total) by mouth every 8 (eight) hours as needed for nausea or vomiting 20 tablet 0 ondansetron (ZOFRAN-ODT) 8 mg disintegrating tablet Take 1 tablet (8 mg total) by mouth every 8 (eight) hours as needed for nausea or vomiting 20 tablet 1    propranolol (INDERAL) 20 mg/5 mL solution       saccharomyces boulardii (FLORASTOR) 250 mg capsule Take 250 mg by mouth 2 (two) times a day (Patient not taking: Reported on 7/31/2023)      triamcinolone (KENALOG) 0.1 % cream Apply topically 2 (two) times a day Affected areas Monday through Friday only 453.6 g 2     No current facility-administered medications for this visit. Facility-Administered Medications Ordered in Other Visits   Medication Dose Route Frequency Provider Last Rate Last Admin    alteplase (CATHFLO) injection 2 mg  2 mg Intracatheter Q1MIN PRN Ai Boudreaux MD   2 mg at 10/11/23 1036    alteplase (CATHFLO) injection 2 mg  2 mg Intracatheter Q1MIN PRN Ai Boudreaux MD        magnesium sulfate 2 g/50 mL IVPB (premix) 2 g  2 g Intravenous Once Ai Boudreaux MD 50 mL/hr at 10/12/23 1409 2 g at 10/12/23 1409    sodium chloride 0.9 % bolus 1,000 mL  1,000 mL Intravenous Once Ai Boudreaux .7 mL/hr at 10/12/23 1409 1,000 mL at 10/12/23 1409       (Not in a hospital admission)      Objective:     24 Hour Vitals Assessment:     There were no vitals filed for this visit. Below not updated as this was a televisit      PHYSICIAN EXAM:    General: Appearance: alert, cooperative, no distress. HEENT: Normocephalic, atraumatic. No scleral icterus. conjunctivae clear. EOMI. Chest: No tenderness to palpation. No open wound noted. Lungs: Diminished b/l, otherwise clear to auscultation bilaterally, Respirations unlabored. Cardiac: Regular rate, +S1and S2  Abdomen: Soft, non-tender, non-distended. Bowel sounds are normal.   Extremities:  No edema, cyanosis, clubbing. Inferior to R glenoid (TTP)  Skin: Skin color, turgor are normal. No rashes.   Lymphatics: no palpable supra-cervical, axillary, or inguinal adenopathy  Neurologic: Awake, Alert, and oriented, no gross focal deficits noted b/l. DATA REVIEW:    Pathology Result:    Final Diagnosis   Date Value Ref Range Status   07/24/2023   Final    A.-B. Lung, Right Middle Lobe Bronchial Brushing (Thin-prep and smear preparations):   Conclusive evidence of malignancy. 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 Satisfactory for evaluation. C.-D.  Lung, Right Middle Lobe Bronchoalveolar Lavage (Thin-prep and cell block preparations):   Conclusive evidence of malignancy. 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 Satisfactory for evaluation. E.-F.  Lymph Node, level 7 (Thin-prep and smear preparations): Atypical cellular changes seen. Rare atypical cells. Lymphocytes. Few bronchial cells and pulmonary macrophages. Satisfactory for evaluation. G.-H. Lymph Node, 10R (Thin-prep and smear preparations):   Negative for malignancy. Lymphocytes. Aggregates of neutrophils. Satisfactory for evaluation. 07/24/2023   Final    A.-B. Lung, Right Middle Lobe Bronchial Brushing (Thin-prep and smear preparations):   Conclusive evidence of malignancy. 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 Satisfactory for evaluation. C.-D.  Lung, Right Middle Lobe Bronchoalveolar Lavage (Thin-prep and cell block preparations):   Conclusive evidence of malignancy. 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 Satisfactory for evaluation. E.-F.  Lymph Node, level 7 (Thin-prep and smear preparations): Atypical cellular changes seen. Rare atypical cells. Lymphocytes. Few bronchial cells and pulmonary macrophages. Satisfactory for evaluation. G.-H. Lymph Node, 10R (Thin-prep and smear preparations):   Negative for malignancy. Lymphocytes. Aggregates of neutrophils. Satisfactory for evaluation. 07/24/2023   Final    A. 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 07/05/2023   Final    A. Lung, right middle lobe, biopsy:     - Portions of bronchial wall with mild chronic inflammation.     - No dysplasia or neoplasia identified. 07/05/2023   Final    A-B-C. Lymph Node, Subcarinal level 7 (ThinPrep, smear and cell block preparations)  Negative for malignancy, see note  Benign bronchial cells. Abundant lymphocytes and macrophages. Satisfactory for evaluation. D-E-F. Lymph Node, 4R (ThinPrep, smear and cell block preparations)  Negative for malignancy. Benign bronchial cells. Lymphocytes and macrophages. Satisfactory for evaluation. 07/05/2023   Final    A-B. Lung, Right Middle Lobe Bronchial Washing (ThinPrep and cell block preparations):  Negative for malignancy. Scant benign bronchial cells. Neutrophils, lymphocytes and pulmonary macrophages. Satisfactory for evaluation. D. Lung, Right Middle Lobe Bronchial Brushing, :  Atypical cellular changes seen. Rare atypical epithelial cells in a background of benign bronchial cells. Satisfactory for evaluation. Image Results:   Image result are reviewed and documented in Hematology/Oncology history    NM bone scan whole body  Narrative: BONE SCAN  WHOLE BODY    INDICATION: C34.91: Malignant neoplasm of unspecified part of right bronchus or lung  S49. 91XA: Unspecified injury of right shoulder and upper arm, initial encounter. Right shoulder blade pain for 2 months    PREVIOUS FILM CORRELATION:    PET/CT July 3, 2023, MRI brain July 26, 2023, CT of the chest June 13, 2023    TECHNIQUE:   This study was performed following the intravenous administration of 23.7 mCi Tc-99m labeled MDP. Delayed, anterior and posterior whole body images were acquired, 2-3 hours after radiopharmaceutical administration.     FINDINGS:    Increased uptake corresponding to the right posterior cervical spine, compatible with focal degenerative facet arthropathy, which also demonstrated increased uptake on the recent PET/CT. Mild degenerative changes are also present within the shoulders,   knees, and ankles. There is no scintigraphic evidence of osseous metastasis. Linear uptake in the lateral aspect of both tibias may represent hypertrophic osteoarthropathy. Impression: 1. No scintigraphic evidence of osseous metastasis, with particular attention to the right shoulder. 2. Degenerative changes as described, including facet arthropathy in the right posterior cervical spine. 3. Linear uptake along the lateral aspect of both tibias may represent hypertrophic osteoarthropathy. Workstation performed: [id number]      LABS:  Lab data are reviewed and documented in HemOnc history.        Lab Results   Component Value Date    HGB 11.9 10/11/2023    HCT 35.7 10/11/2023    MCV 96 10/11/2023     (H) 10/11/2023    WBC 7.48 10/11/2023    NRBC 0 10/11/2023     Lab Results   Component Value Date    K 4.0 10/11/2023     10/11/2023    CO2 27 10/11/2023    BUN 11 10/11/2023    CREATININE 0.62 10/11/2023    CALCIUM 9.4 10/11/2023    AST 25 10/11/2023    ALT 41 10/11/2023    ALKPHOS 73 10/11/2023    EGFR 93 10/11/2023       No results found for: "IRON", "TIBC", "FERRITIN"    No results found for: "UQFLRUGR19"    Recent Labs     10/11/23  1144   WBC 7.48   *       By:  Lori Bennett MD, 10/12/2023, 2:59 PM

## 2023-10-02 NOTE — QUICK NOTE
Patient seen for port site check today. No pain, fever or redness. There does not appear to be any suture sticking out. Wound is well approximated and flat. No s/s infection or dehiscence. Please continue to monitor site and have low threshold to reach out to IR with any questions or concerns. OK to use port.

## 2023-10-05 ENCOUNTER — PATIENT OUTREACH (OUTPATIENT)
Dept: CASE MANAGEMENT | Facility: HOSPITAL | Age: 68
End: 2023-10-05

## 2023-10-05 DIAGNOSIS — C34.91 ADENOCARCINOMA OF RIGHT LUNG (HCC): Primary | ICD-10-CM

## 2023-10-05 RX ORDER — SODIUM CHLORIDE 9 MG/ML
20 INJECTION, SOLUTION INTRAVENOUS ONCE
Status: CANCELLED | OUTPATIENT
Start: 2023-10-12

## 2023-10-05 NOTE — PROGRESS NOTES
MSW s/w pt by phone this morning to follow up on our last call, pt was very happy for the call and says that she is having a few days in a row of feeling well and having energy, which makes her really happy. She spoke at length about how difficult it has been to have her sister here while she is sick and trying to recover from her chemo. She shared that her sister has chronic complaints of UTI, itching/burning etc and that it never seems to be fully resolved. She also noted that she is picking up on some of pt's symptoms, complaining of constipation when that has never been an issue for her in the past.  She talked about the lengths she has gone to with gynecology and OTC treatments to help her sister with these issues, and I suggested maybe instead seeing urology to see if this could be better managed by them. She also notes that her sister asks permission to do every little thing when it's not necessary, and this gets under her skin after awhile. She mentioned that she will ask permission to do laundry or make a sandwich, and then follows up to let pt know that she has started or completed these tasks. She says that this happens constantly throughout the day despite her reminders that she does not need permission or updates on these tasks. She feels that she gets no true break from being her caregiver and it has only become more and more apparent while she is dealing with her treatment side effects. We explored any possible other living arrangements, their closest sister has already said no and other family who are willing to help are out of state, so this is not a simple process to send her somewhere for a few weeks, as she cannot travel alone and would need medical and psych care while she is away. She does say that her sister has gone back to counseling, which she is happy about.   She also does not want her back in a facility as she was previously in a detention and they were not happy with the care there, which led to increased behaviors and issues from her sister. I did provide her the closest chapter for [facility] phone number and encouraged her to call and get involved in a support group for caregivers, which she very much agreed would be helpful to her. I also noted that she has been a caregiver for a long time and may have valuable advice to give to other people as well. She is also starting the process to have someone in place to take her sister in the event of her passing or becoming unable to care for her any longer, which I reassured her was the right thing to do and important to have in place. Ultimately pt continues to benefit from having someone to talk to and vent her feelings. She thanked me many times for spending so much time talking to her today. I encouraged her to reach out to [facility] and to also call me directly if she needs to talk. I will continue to check in with her and offer support, no other needs at this time.

## 2023-10-09 ENCOUNTER — OFFICE VISIT (OUTPATIENT)
Dept: PALLIATIVE MEDICINE | Facility: CLINIC | Age: 68
End: 2023-10-09
Payer: COMMERCIAL

## 2023-10-09 VITALS
WEIGHT: 147.6 LBS | OXYGEN SATURATION: 99 % | SYSTOLIC BLOOD PRESSURE: 118 MMHG | BODY MASS INDEX: 25.2 KG/M2 | HEIGHT: 64 IN | HEART RATE: 86 BPM | DIASTOLIC BLOOD PRESSURE: 68 MMHG | TEMPERATURE: 97.1 F | RESPIRATION RATE: 16 BRPM

## 2023-10-09 DIAGNOSIS — T45.1X5A CHEMOTHERAPY INDUCED NAUSEA AND VOMITING: ICD-10-CM

## 2023-10-09 DIAGNOSIS — R11.2 CHEMOTHERAPY INDUCED NAUSEA AND VOMITING: ICD-10-CM

## 2023-10-09 DIAGNOSIS — Z71.89 GOALS OF CARE, COUNSELING/DISCUSSION: ICD-10-CM

## 2023-10-09 DIAGNOSIS — Z51.5 PALLIATIVE CARE ENCOUNTER: ICD-10-CM

## 2023-10-09 DIAGNOSIS — C34.91 ADENOCARCINOMA OF RIGHT LUNG (HCC): Primary | Chronic | ICD-10-CM

## 2023-10-09 PROCEDURE — 99213 OFFICE O/P EST LOW 20 MIN: CPT | Performed by: NURSE PRACTITIONER

## 2023-10-09 RX ORDER — OLANZAPINE 5 MG/1
5 TABLET ORAL
Qty: 7 TABLET | Refills: 0 | Status: SHIPPED | OUTPATIENT
Start: 2023-10-09 | End: 2023-10-16

## 2023-10-09 NOTE — PROGRESS NOTES
Outpatient Follow-Up - Palliative and Supportive Care   Oumou Borges 80 y.o. female [id number]    Assessment & Plan  Problem List Items Addressed This Visit        Respiratory    Adenocarcinoma of right lung ([address]) - Primary (Chronic)   Other Visit Diagnoses     Palliative care encounter        Goals of care, counseling/discussion            · Adenocarcinoma of right lung- continue to follow with Oncology for management and treatment  · Palliative Care Encounter- ongoing support, symptom management  · Goals of Care-treatment focused with no limitations  · Psychosocial Support- supportive listening provided    Medications adjusted this encounter:  Requested Prescriptions      No prescriptions requested or ordered in this encounter     No orders of the defined types were placed in this encounter. There are no discontinued medications. Oumou Borges was seen today for symptoms and planning cares related to above illnesses. I have reviewed the patient's controlled substance dispensing history in the Prescription Drug Monitoring Program in compliance with the [facility] regulations before prescribing any controlled substances. They are invited to continue to follow with us. If there are questions or concerns, please contact us through our clinic/answering service 24 hours a day, seven days a week. NAHUN Little  [facility] Palliative and Supportive Care  [phone]      Visit Information    Accompanied By: No one    Source of History: Self    History Limitations: None      Chief Complaint  No chief complaint on file. History of Present Illness      Oumou Borges is a 80 y.o. female who presents in follow up of symptoms related to adenocarcinoma of right lung. Pertinent issues include: assessment of goals of care, emotional support. Patient presents to the office today, stating she is more stressed with her sister who lives with her.   She admits to smoking at least 10 cigarettes per day, usually more and at least 3 glasses of wine each night in her room. She states she has no pain today, occasional shoulder pain. Denies nausea or vomiting. She did have diarrhea with treatment, held Colace is improved. She follows with Oncology Nutrition, appetite is fair. She is scheduled for PET scan on 10/18/2023 with further recommendations pending results. Past medical, surgical, social, and family histories are reviewed and pertinent updates are made. Review of Systems   Constitutional: Positive for malaise/fatigue. Musculoskeletal:        Shoulder pain   Gastrointestinal: Negative for constipation, diarrhea, nausea and vomiting. Psychiatric/Behavioral: The patient is nervous/anxious. Vital Signs  /68 (BP Location: Left arm, Patient Position: Sitting, Cuff Size: Standard)   Pulse 86   Temp (!) 97.1 °F (36.2 °C) (Tympanic)   Resp 16   Ht 5' 4" (1.626 m)   Wt 67 kg (147 lb 9.6 oz)   SpO2 99%   BMI 25.34 kg/m²       Physical Exam and Objective Data  Physical Exam      Radiology and Laboratory:  I personally reviewed  the following results:   Component      Latest Ref Rng 9/20/2023   TSH 3RD GENERATON      0.450 - 4.500 uIU/mL 2.515    Magnesium      1.9 - 2.7 mg/dL 1.8 (L)       Legend:  (L) Low      30 minutes was spent face to face with Sandra Lesch with greater than 50% of the time spent in counseling or coordination of care including discussions of symptom management and supportive listening. All of the patient's or agent's questions were answered during this discussion.

## 2023-10-09 NOTE — TELEPHONE ENCOUNTER
Kimberley Hernandez, this is Marcdarío Jameljoann. My birthday is [birth year]. My phone number is [phone]. I just got back from palliative care and I reminded me that when I had my chemo treatment last time that Billie Massey had given me a prescription for something I had to take for seven days the week of treatment. I completely forgotten about it. To call you guys to see if you wanted me to do that again. My treatment is this Thursday, so if you wanted me to do it then I guess I should be doing it now Didn't get back to me and let me know if I'm supposed to get that or not. That would be great. Thank you very much. Take care. Hope you have a good night.  Bye.

## 2023-10-11 ENCOUNTER — HOSPITAL ENCOUNTER (OUTPATIENT)
Dept: INFUSION CENTER | Facility: CLINIC | Age: 68
Discharge: HOME | End: 2023-10-11
Payer: COMMERCIAL

## 2023-10-11 DIAGNOSIS — C34.91 ADENOCARCINOMA OF RIGHT LUNG (HCC): ICD-10-CM

## 2023-10-11 DIAGNOSIS — E83.42 HYPOMAGNESEMIA: Primary | ICD-10-CM

## 2023-10-11 DIAGNOSIS — Z95.828 PORT-A-CATH IN PLACE: Primary | ICD-10-CM

## 2023-10-11 LAB
ALBUMIN SERPL BCP-MCNC: 3.7 G/DL (ref 3.5–5)
ALP SERPL-CCNC: 73 U/L (ref 34–104)
ALT SERPL W P-5'-P-CCNC: 41 U/L (ref 7–52)
ANION GAP SERPL CALCULATED.3IONS-SCNC: 6 MMOL/L
AST SERPL W P-5'-P-CCNC: 25 U/L (ref 13–39)
BASOPHILS # BLD AUTO: 0.04 THOUSANDS/ÂΜL (ref 0–0.1)
BASOPHILS NFR BLD AUTO: 1 % (ref 0–1)
BILIRUB SERPL-MCNC: 0.24 MG/DL (ref 0.2–1)
BUN SERPL-MCNC: 11 MG/DL (ref 5–25)
CALCIUM SERPL-MCNC: 9.4 MG/DL (ref 8.4–10.2)
CHLORIDE SERPL-SCNC: 103 MMOL/L (ref 96–108)
CO2 SERPL-SCNC: 27 MMOL/L (ref 21–32)
CREAT SERPL-MCNC: 0.62 MG/DL (ref 0.6–1.3)
EOSINOPHIL # BLD AUTO: 0.22 THOUSAND/ÂΜL (ref 0–0.61)
EOSINOPHIL NFR BLD AUTO: 3 % (ref 0–6)
ERYTHROCYTE [DISTWIDTH] IN BLOOD BY AUTOMATED COUNT: 13 % (ref 11.6–15.1)
GFR SERPL CREATININE-BSD FRML MDRD: 93 ML/MIN/1.73SQ M
GLUCOSE SERPL-MCNC: 93 MG/DL (ref 65–140)
HCT VFR BLD AUTO: 35.7 % (ref 34.8–46.1)
HGB BLD-MCNC: 11.9 G/DL (ref 11.5–15.4)
IMM GRANULOCYTES # BLD AUTO: 0.08 THOUSAND/UL (ref 0–0.2)
IMM GRANULOCYTES NFR BLD AUTO: 1 % (ref 0–2)
LYMPHOCYTES # BLD AUTO: 2.02 THOUSANDS/ÂΜL (ref 0.6–4.47)
LYMPHOCYTES NFR BLD AUTO: 27 % (ref 14–44)
MAGNESIUM SERPL-MCNC: 1.5 MG/DL (ref 1.9–2.7)
MCH RBC QN AUTO: 31.9 PG (ref 26.8–34.3)
MCHC RBC AUTO-ENTMCNC: 33.3 G/DL (ref 31.4–37.4)
MCV RBC AUTO: 96 FL (ref 82–98)
MONOCYTES # BLD AUTO: 1.13 THOUSAND/ÂΜL (ref 0.17–1.22)
MONOCYTES NFR BLD AUTO: 15 % (ref 4–12)
NEUTROPHILS # BLD AUTO: 3.99 THOUSANDS/ÂΜL (ref 1.85–7.62)
NEUTS SEG NFR BLD AUTO: 53 % (ref 43–75)
NRBC BLD AUTO-RTO: 0 /100 WBCS
PLATELET # BLD AUTO: 407 THOUSANDS/UL (ref 149–390)
PMV BLD AUTO: 9.6 FL (ref 8.9–12.7)
POTASSIUM SERPL-SCNC: 4 MMOL/L (ref 3.5–5.3)
PROT SERPL-MCNC: 7.2 G/DL (ref 6.4–8.4)
RBC # BLD AUTO: 3.73 MILLION/UL (ref 3.81–5.12)
SODIUM SERPL-SCNC: 136 MMOL/L (ref 135–147)
TSH SERPL DL<=0.05 MIU/L-ACNC: 1.58 UIU/ML (ref 0.45–4.5)
WBC # BLD AUTO: 7.48 THOUSAND/UL (ref 4.31–10.16)

## 2023-10-11 PROCEDURE — 85025 COMPLETE CBC W/AUTO DIFF WBC: CPT | Performed by: INTERNAL MEDICINE

## 2023-10-11 PROCEDURE — 83735 ASSAY OF MAGNESIUM: CPT | Performed by: INTERNAL MEDICINE

## 2023-10-11 PROCEDURE — 36593 DECLOT VASCULAR DEVICE: CPT

## 2023-10-11 PROCEDURE — 80053 COMPREHEN METABOLIC PANEL: CPT | Performed by: INTERNAL MEDICINE

## 2023-10-11 PROCEDURE — 84443 ASSAY THYROID STIM HORMONE: CPT | Performed by: INTERNAL MEDICINE

## 2023-10-11 RX ORDER — MAGNESIUM SULFATE HEPTAHYDRATE 40 MG/ML
2 INJECTION, SOLUTION INTRAVENOUS ONCE
Status: CANCELLED
Start: 2023-10-12

## 2023-10-11 RX ADMIN — ALTEPLASE 2 MG: 2.2 INJECTION, POWDER, LYOPHILIZED, FOR SOLUTION INTRAVENOUS at 10:36

## 2023-10-11 NOTE — PROGRESS NOTES
Title: Addition of Magnesium 2GM pre and post hydration    Date patient scheduled: 10/12/23    Original medication ordered: n/a    New Medication ordered: Magnesium 4 GM total    Office RN notified patient? ? Call out to patient on 10/11/23    Is the patient scheduled within 24 hours? ? If yes, follow up with verbal telephone call. Yes, Call out to [facility] infusion, spoke with Wesley Prince RN. Office RN to route to [facility]. Infusion  pool routes to [facility]. Infusion tech to receive message, confirm scheduled treatment duration matches ordered treatment duration or adjust accordingly, and re-link appointment request orders. Infusion tech to notify pharmacy and finance.

## 2023-10-12 ENCOUNTER — TELEPHONE (OUTPATIENT)
Dept: HEMATOLOGY ONCOLOGY | Facility: CLINIC | Age: 68
End: 2023-10-12

## 2023-10-12 ENCOUNTER — TELEMEDICINE (OUTPATIENT)
Dept: HEMATOLOGY ONCOLOGY | Facility: CLINIC | Age: 68
End: 2023-10-12
Payer: COMMERCIAL

## 2023-10-12 ENCOUNTER — HOSPITAL ENCOUNTER (OUTPATIENT)
Dept: INFUSION CENTER | Facility: CLINIC | Age: 68
Discharge: HOME | End: 2023-10-12
Payer: COMMERCIAL

## 2023-10-12 VITALS
RESPIRATION RATE: 18 BRPM | WEIGHT: 147.4 LBS | HEIGHT: 64 IN | SYSTOLIC BLOOD PRESSURE: 142 MMHG | DIASTOLIC BLOOD PRESSURE: 66 MMHG | HEART RATE: 79 BPM | TEMPERATURE: 96.9 F | BODY MASS INDEX: 25.16 KG/M2

## 2023-10-12 DIAGNOSIS — E83.42 HYPOMAGNESEMIA: ICD-10-CM

## 2023-10-12 DIAGNOSIS — C34.91 ADENOCARCINOMA OF RIGHT LUNG (HCC): Primary | ICD-10-CM

## 2023-10-12 DIAGNOSIS — C34.91 ADENOCARCINOMA OF RIGHT LUNG (HCC): Primary | Chronic | ICD-10-CM

## 2023-10-12 PROCEDURE — 99443 PR PHYS/QHP TELEPHONE EVALUATION 21-30 MIN: CPT | Performed by: INTERNAL MEDICINE

## 2023-10-12 RX ORDER — MAGNESIUM SULFATE HEPTAHYDRATE 40 MG/ML
2 INJECTION, SOLUTION INTRAVENOUS ONCE
Status: COMPLETED | OUTPATIENT
Start: 2023-10-12 | End: 2023-10-12

## 2023-10-12 RX ORDER — SODIUM CHLORIDE 9 MG/ML
20 INJECTION, SOLUTION INTRAVENOUS ONCE
Status: COMPLETED | OUTPATIENT
Start: 2023-10-12 | End: 2023-10-12

## 2023-10-12 RX ADMIN — MAGNESIUM SULFATE HEPTAHYDRATE 2 G: 40 INJECTION, SOLUTION INTRAVENOUS at 14:09

## 2023-10-12 RX ADMIN — PEMETREXED DISODIUM 865 MG: 500 INJECTION, POWDER, LYOPHILIZED, FOR SOLUTION INTRAVENOUS at 11:50

## 2023-10-12 RX ADMIN — SODIUM CHLORIDE 20 ML/HR: 0.9 INJECTION, SOLUTION INTRAVENOUS at 10:14

## 2023-10-12 RX ADMIN — CISPLATIN 129.8 MG: 1 INJECTION, SOLUTION INTRAVENOUS at 12:05

## 2023-10-12 RX ADMIN — MAGNESIUM SULFATE HEPTAHYDRATE 2 G: 40 INJECTION, SOLUTION INTRAVENOUS at 08:34

## 2023-10-12 RX ADMIN — FOSAPREPITANT 150 MG: 150 INJECTION, POWDER, LYOPHILIZED, FOR SOLUTION INTRAVENOUS at 10:36

## 2023-10-12 RX ADMIN — SODIUM CHLORIDE 1000 ML: 0.9 INJECTION, SOLUTION INTRAVENOUS at 08:34

## 2023-10-12 RX ADMIN — SODIUM CHLORIDE 360 MG: 9 INJECTION, SOLUTION INTRAVENOUS at 11:15

## 2023-10-12 RX ADMIN — SODIUM CHLORIDE 1000 ML: 0.9 INJECTION, SOLUTION INTRAVENOUS at 14:09

## 2023-10-12 RX ADMIN — DEXAMETHASONE SODIUM PHOSPHATE: 10 INJECTION, SOLUTION INTRAMUSCULAR; INTRAVENOUS at 10:12

## 2023-10-12 NOTE — PROGRESS NOTES
Pt to clinic for Opdivo, Alimta, Cisplatin, and hydration. Pt offers no complaints today. Tolerated infusion without complications. Aware this is last appointment, pending PET scan results. AVS declined. Port de-accessed.

## 2023-10-12 NOTE — TELEPHONE ENCOUNTER
Called and spoke to patient to schedule 4 wk FU with Dr Carlton Bhandari from Dr Allie Sapp.  Scheduled patient for 11/15/2023 at 1:00pm

## 2023-10-13 ENCOUNTER — TELEPHONE (OUTPATIENT)
Dept: HEMATOLOGY ONCOLOGY | Facility: CLINIC | Age: 68
End: 2023-10-13

## 2023-10-13 NOTE — TELEPHONE ENCOUNTER
Returned call to patient and she is refusing to see Dr. Margaret Arriaga at this time. Patient would like to continue to see Dr. Monie Edmond and continue with Dr. Monie Edmond.  She had stated that she cannot go to [place] in the winter, but she would like to continue to see him in [facility]

## 2023-10-13 NOTE — TELEPHONE ENCOUNTER
Call out to patient and reviewed that is a week Dr. Kory Funez is rounding patient is rescheduled with Dr. Kory Funez 11/6/23 at 1000am at [address] [facility] office.

## 2023-10-13 NOTE — TELEPHONE ENCOUNTER
Received vm from patient, "Ligia Levin, this is Guillaume Bradshaw birthday is [birth year], Phone number is [phone]. I wanted to speak to you about the decision that I had to make yesterday concerning the doctor that I would continue to have if I stayed with [facility]. I don't think that was the right decision and I wanted to speak to you about that. If you have a few minutes, maybe you can give me a call. And I want to try to rectify this situation. I really do like Doctor Jad Keller. I don't want to switch to a different doctor if I can avoid that. So I would appreciate getting a call back. Thank you.  Carlyle."

## 2023-10-16 ENCOUNTER — TELEPHONE (OUTPATIENT)
Dept: HEMATOLOGY ONCOLOGY | Facility: CLINIC | Age: 68
End: 2023-10-16

## 2023-10-16 NOTE — TELEPHONE ENCOUNTER
Appointment Schedule   Who are you speaking with? Patient   If it is not the patient, are they listed on an active communication consent form? N/A   Which provider is the appointment scheduled with? Dr. Melodie Bruce   At which location is the appointment scheduled for? [facility]   When is the appointment scheduled? Please list date and time 10/25/23 1:00PM   What is the reason for this appointment? F/U after scan   Did patient voice understanding of the details of this appointment? Yes   Was the no show policy reviewed with patient? Yes       Referral/Message received via in-basket.

## 2023-10-18 ENCOUNTER — HOSPITAL ENCOUNTER (OUTPATIENT)
Dept: RADIOLOGY | Age: 68
Discharge: HOME | End: 2023-10-18
Payer: COMMERCIAL

## 2023-10-18 DIAGNOSIS — C34.91 ADENOCARCINOMA OF RIGHT LUNG (HCC): Chronic | ICD-10-CM

## 2023-10-18 LAB — GLUCOSE SERPL-MCNC: 137 MG/DL (ref 65–140)

## 2023-10-18 PROCEDURE — 78815 PET IMAGE W/CT SKULL-THIGH: CPT

## 2023-10-18 PROCEDURE — G1004 CDSM NDSC: HCPCS

## 2023-10-18 PROCEDURE — 82948 REAGENT STRIP/BLOOD GLUCOSE: CPT

## 2023-10-18 PROCEDURE — A9552 F18 FDG: HCPCS

## 2023-10-25 ENCOUNTER — OFFICE VISIT (OUTPATIENT)
Dept: CARDIAC SURGERY | Facility: CLINIC | Age: 68
End: 2023-10-25
Payer: COMMERCIAL

## 2023-10-25 ENCOUNTER — DOCUMENTATION (OUTPATIENT)
Dept: CARDIAC SURGERY | Facility: CLINIC | Age: 68
End: 2023-10-25

## 2023-10-25 ENCOUNTER — PREP FOR PROCEDURE (OUTPATIENT)
Dept: GASTROENTEROLOGY | Facility: MEDICAL CENTER | Age: 68
End: 2023-10-25

## 2023-10-25 VITALS
OXYGEN SATURATION: 96 % | WEIGHT: 148.37 LBS | HEIGHT: 64 IN | BODY MASS INDEX: 25.33 KG/M2 | TEMPERATURE: 98 F | HEART RATE: 92 BPM | DIASTOLIC BLOOD PRESSURE: 77 MMHG | RESPIRATION RATE: 14 BRPM | SYSTOLIC BLOOD PRESSURE: 132 MMHG

## 2023-10-25 DIAGNOSIS — K86.2 PANCREATIC CYST: Primary | ICD-10-CM

## 2023-10-25 DIAGNOSIS — C34.91 ADENOCARCINOMA OF RIGHT LUNG (HCC): Chronic | ICD-10-CM

## 2023-10-25 DIAGNOSIS — K86.9 PANCREATIC LESION: Primary | ICD-10-CM

## 2023-10-25 PROCEDURE — 99214 OFFICE O/P EST MOD 30 MIN: CPT | Performed by: THORACIC SURGERY (CARDIOTHORACIC VASCULAR SURGERY)

## 2023-10-25 RX ORDER — HEPARIN SODIUM 5000 [USP'U]/ML
5000 INJECTION, SOLUTION INTRAVENOUS; SUBCUTANEOUS
Status: CANCELLED | OUTPATIENT
Start: 2023-10-26 | End: 2023-10-27

## 2023-10-25 RX ORDER — CEFAZOLIN SODIUM 1 G/50ML
1000 SOLUTION INTRAVENOUS ONCE
Status: CANCELLED | OUTPATIENT
Start: 2023-10-25 | End: 2023-10-25

## 2023-10-25 RX ORDER — GABAPENTIN 300 MG/1
600 CAPSULE ORAL ONCE
Status: CANCELLED | OUTPATIENT
Start: 2023-10-25 | End: 2023-10-25

## 2023-10-25 RX ORDER — ACETAMINOPHEN 325 MG/1
975 TABLET ORAL ONCE
Status: CANCELLED | OUTPATIENT
Start: 2023-10-25 | End: 2023-10-25

## 2023-10-25 RX ORDER — CELECOXIB 200 MG/1
200 CAPSULE ORAL ONCE
Status: CANCELLED | OUTPATIENT
Start: 2023-10-25 | End: 2023-10-25

## 2023-10-25 NOTE — H&P
Referring physician : Dr. Estevan De La O    Diagnosis : Pancreatic cyst / Lung cancer     Discussed with patient : YES/NO: no    Symptoms : none - cyst found on PET scan. Labs :      Imaging : PET-CT scan reviewed    Prior endoscopy : None    Anticoagulation/ Antiplatelet therapy :  None    Assessment :   1. Pancreaitc cyst with enlargement 2.1cm - 2.9cm (uncinate/ head). Plan :   1.  MRI/ MRCP  2. EUS    Order placed : YES/NO: yes    Informed scheduling staff : YES/NO: yes     Discussed with GI lab : YES/NO: no

## 2023-10-25 NOTE — PROGRESS NOTES
Assessment/Plan:    Adenocarcinoma of right lung ([address])  We had a discussion with Audi Dumonthardt after personally reviewing the most recent PET scan, which revealed a response to the chemotherapy, by evidence of a decrease in the middle lobe lesion and no residual activity in the mediastinal nodes. However, there was a pancreatic lesion for which they are recommending a MRI. We discussed smoking cessation again, which she is in agreement with. Her options include a robotic assisted right middle lobectomy, possible thoracotomy. Her other option includes definitive chemo/radiation therapy. The procedure, possible risks, and post operative course were explained and all questions were answered. She would have to quit smoking 100% in order to do surgery. We will have her return after the MRI to make further recommendations for treatment. Diagnoses and all orders for this visit:    Pancreatic lesion  -     MRI abdomen w wo contrast; Future    Adenocarcinoma of right lung [facility])  -     Ambulatory Referral to Thoracic Surgery  -     Case request operating room: LOBECTOMY LUNG THORACOSCOPIC W/ ROBOTICS, BRONCHOSCOPY FLEXIBLE; Standing  -     Basic metabolic panel; Future  -     Type and screen; Future  -     CBC and Platelet; Future  -     APTT; Future  -     Protime-INR; Future  -     Case request operating room: LOBECTOMY LUNG THORACOSCOPIC W/ ROBOTICS, BRONCHOSCOPY FLEXIBLE    Other orders  -     Cancel: Diet NPO; Sips with meds; Standing  -     Cancel: Nursing communication Please give pre-op Carbohydrate drink to patient 2-4 hours prior to surgery; Standing  -     Cancel: Void on call to OR; Standing  -     Cancel: Insert peripheral IV;  Standing  -     Cancel: Place sequential compression device; Standing  -     Cancel: acetaminophen (TYLENOL) tablet 975 mg  -     Cancel: gabapentin (NEURONTIN) capsule 600 mg  -     Cancel: celecoxib (CeleBREX) capsule 200 mg  -     Cancel: heparin (porcine) subcutaneous injection 5,000 Units  -     Cancel: ceFAZolin (ANCEF) IVPB (premix in dextrose) 1,000 mg 50 mL          Thoracic History   Cancer Staging   Adenocarcinoma of right lung [facility])  Staging form: Lung, AJCC 8th Edition  - Clinical stage from 7/24/2023: Stage IIIA (cT4, cN1, cM0) - Signed by Hannah Ross MD on 7/31/2023  Stage prefix: Initial diagnosis    Oncology History   Adenocarcinoma of right lung ([address])   6/23/2023 Initial Diagnosis    Adenocarcinoma of right lung ([address])     7/24/2023 -  Cancer Staged    Staging form: Lung, AJCC 8th Edition  - Clinical stage from 7/24/2023: Stage IIIA (cT4, cN1, cM0) - Signed by Hannah Ross MD on 7/31/2023  Stage prefix: Initial diagnosis       8/31/2023 -  Chemotherapy    cyanocobalamin, 1,000 mcg, Intramuscular, Once, 1 of 1 cycle  Administration: 1,000 mcg (8/24/2023)  alteplase (CATHFLO), 2 mg, Intracatheter, Every 1 Minute as needed, 3 of 3 cycles  CISplatin (PLATINOL) with mannitol IVPB, 75 mg/m2 = 129.8 mg (100 % of original dose 75 mg/m2), Intravenous, Once, 3 of 3 cycles  Dose modification: 50 mg/m2 (original dose 75 mg/m2, Cycle 1, Reason: Anticipated Tolerance), 75 mg/m2 (original dose 75 mg/m2, Cycle 1, Reason: Dose modified as per discussion with consulting physician)  Administration: 129.8 mg (8/31/2023), 129.8 mg (9/21/2023), 129.8 mg (10/12/2023)  fosaprepitant (EMEND) IVPB, 150 mg, Intravenous, Once, 3 of 3 cycles  Administration: 150 mg (8/31/2023), 150 mg (9/21/2023), 150 mg (10/12/2023)  nivolumab (OPDIVO) IVPB, 360 mg, Intravenous, Once, 3 of 3 cycles  Administration: 360 mg (8/31/2023), 360 mg (9/21/2023), 360 mg (10/12/2023)  pemetrexed (ALIMTA) chemo infusion, 500 mg/m2 = 865 mg, Intravenous, Once, 3 of 3 cycles  Administration: 865 mg (8/31/2023), 865 mg (9/21/2023), 865 mg (10/12/2023)         Patient ID: Angel Martinez is a 80 y.o. female.     HPI Vivian Gaucher is a 81 yo female with a history of RSD, peripheral neuropathy fibromyalgia, psoriasis, and heart murmur, who we saw on 6/23/23 for stage IIIB lung cancer. We recommended chemo/immunotherapy. Her last infusion was 10/12/23. She had a restaging PET scan on 10/18/23. This revealed decreased FDG activity in the right middle lobe nodule. No residual activity within mediastinal nodes, overall findings suggest response to therapy. Enlarging cystic pancreatic lesion with mild uptake, cystic neoplasm should be excluded with a MRI of the pancreas w/ and w/o IV contrast.     On discussion, she has been nauseous, vomiting, fatigue, weakness, intermittent weight loss, intermittent headaches. She has now maintained her weight now. She denies cough, shortness of breath, hemoptysis, new bone pain. She is smoking currently 1 ppd. The following portions of the patient's history were reviewed and updated as appropriate: allergies, current medications, past family history, past medical history, past social history, past surgical history and problem list.      Past Medical History:   Diagnosis Date    Fibromyalgia     Heart murmur     Lumbosacral disc herniation     Lung cancer ([address])     Psoriasis     Psoriasis     RSD (reflex sympathetic dystrophy)      Past Surgical History:   Procedure Laterality Date    IR PORT PLACEMENT  8/4/2023    PARTIAL HYSTERECTOMY       Family History   Problem Relation Age of Onset    Sarcoidosis Mother      Social History     Socioeconomic History    Marital status:      Spouse name: Not on file    Number of children: Not on file    Years of education: Not on file    Highest education level: Not on file   Occupational History    Not on file   Tobacco Use    Smoking status: Every Day     Packs/day: 0.50     Years: 47.00     Total pack years: 23.50     Types: Cigarettes     Start date: 1976    Smokeless tobacco: Never   Vaping Use    Vaping Use: Never used   Substance and Sexual Activity    Alcohol use:  Yes     Alcohol/week: 1.0 standard drink of alcohol     Types: 1 Glasses of wine per week     Comment: daily    Drug use: No    Sexual activity: Not on file   Other Topics Concern    Not on file   Social History Narrative    Not on file     Social Determinants of Health     Financial Resource Strain: Not on file   Food Insecurity: Not on file   Transportation Needs: Not on file   Physical Activity: Not on file   Stress: Not on file   Social Connections: Not on file   Intimate Partner Violence: Not on file   Housing Stability: Not on file     Allergies   Allergen Reactions    Prednisone Palpitations     Current Outpatient Medications on File Prior to Visit   Medication Sig Dispense Refill    atorvastatin (LIPITOR) 20 mg tablet Take 20 mg by mouth daily      calcipotriene (DOVONEX) 0.005 % cream Apply topically 2 (two) times a day To affected areas on Saturday and Sunday only 120 g 2    DULoxetine (CYMBALTA) 30 mg delayed release capsule       Fluticasone-Salmeterol (Advair) 100-50 mcg/dose inhaler Inhale 1 puff 2 (two) times a day Rinse mouth after use.       folic acid (KP Folic Acid) 1 mg tablet Take 1 tablet (1 mg total) by mouth daily 90 tablet 2    lidocaine-prilocaine (EMLA) cream Apply topically as needed for mild pain 30 g 0    OLANZapine (ZyPREXA) 5 mg tablet Take 1 tablet (5 mg total) by mouth daily at bedtime for 7 days During treatment week 7 tablet 0    ondansetron (ZOFRAN) 8 mg tablet Take 1 tablet (8 mg total) by mouth every 8 (eight) hours as needed for nausea or vomiting 20 tablet 0    ondansetron (ZOFRAN-ODT) 8 mg disintegrating tablet Take 1 tablet (8 mg total) by mouth every 8 (eight) hours as needed for nausea or vomiting 20 tablet 1    propranolol (INDERAL) 20 mg/5 mL solution       triamcinolone (KENALOG) 0.1 % cream Apply topically 2 (two) times a day Affected areas Monday through Friday only 453.6 g 2    saccharomyces boulardii (FLORASTOR) 250 mg capsule Take 250 mg by mouth 2 (two) times a day (Patient not taking: Reported on 7/31/2023)       No current facility-administered medications on file prior to visit. Review of Systems   Constitutional:  Positive for fatigue and unexpected weight change (previous, now stable). Negative for activity change, appetite change, chills and fever. HENT:  Negative for postnasal drip, sore throat, trouble swallowing and voice change. Eyes:  Negative for visual disturbance. Respiratory:  Negative for cough, chest tightness, shortness of breath and wheezing. Cardiovascular:  Negative for chest pain and leg swelling. Gastrointestinal:  Positive for nausea and vomiting. Negative for abdominal pain, constipation and diarrhea. Musculoskeletal:  Negative for arthralgias and myalgias. Skin:  Negative for color change. Neurological:  Positive for weakness and headaches (interimttent headaches. ). Negative for dizziness, syncope and light-headedness. Hematological:  Negative for adenopathy. Psychiatric/Behavioral:  Negative for agitation. The patient is not nervous/anxious. Objective:   Physical Exam  Vitals reviewed. Constitutional:       General: She is not in acute distress. Appearance: Normal appearance. She is well-developed. She is not diaphoretic. HENT:      Head: Normocephalic and atraumatic. Nose: Nose normal.   Eyes:      General: No scleral icterus. Extraocular Movements: Extraocular movements intact. Neck:      Trachea: No tracheal deviation. Cardiovascular:      Rate and Rhythm: Normal rate and regular rhythm. Pulses: Normal pulses. Heart sounds: Normal heart sounds. No murmur heard. Pulmonary:      Effort: Pulmonary effort is normal. No respiratory distress. Breath sounds: Normal breath sounds. No wheezing. Abdominal:      General: Bowel sounds are normal. There is no distension. Palpations: Abdomen is soft. Musculoskeletal:         General: Normal range of motion. Cervical back: Normal range of motion and neck supple. Right lower leg: No edema.       Left lower leg: No edema.   Lymphadenopathy:      Cervical: No cervical adenopathy. Skin:     General: Skin is warm and dry. Neurological:      Mental Status: She is alert and oriented to person, place, and time. Cranial Nerves: No cranial nerve deficit. Psychiatric:         Mood and Affect: Mood normal.         Behavior: Behavior normal.         Thought Content: Thought content normal.     /77 (BP Location: Right arm, Patient Position: Sitting, Cuff Size: Standard)   Pulse 92   Temp 98 °F (36.7 °C) (Temporal)   Resp 14   Ht 5' 4" (1.626 m)   Wt 67.3 kg (148 lb 5.9 oz)   SpO2 96%   BMI 25.47 kg/m²     NM PET CT skull base to mid thigh    Result Date: 10/18/2023  Narrative PET/CT SCAN INDICATION: Restaging of lung cancer. C34.91: Malignant neoplasm of unspecified part of right bronchus or lung MODIFIER: PS COMPARISON: PET/CT 7/3/2023, bone scan 8/8/2023 CELL TYPE: Non-small cell carcinoma consistent with pulmonary adenocarcinoma, right middle lobe TECHNIQUE:   8.3 mCi F-18-FDG administered IV. Multiplanar attenuation corrected and non attenuation corrected PET images are available for interpretation, and contiguous, low dose, axial CT sections were obtained from the skull base through the femurs. Intravenous contrast material was not utilized. This examination, like all CT scans performed in the [facility], was performed utilizing techniques to minimize radiation dose exposure, including the use of iterative reconstruction and automated exposure control. Fasting serum glucose: 137 mg/dl FINDINGS: VISUALIZED BRAIN: No acute abnormalities are seen. HEAD/NECK: There is a physiologic distribution of FDG. No FDG avid cervical adenopathy is seen. CT images: Unremarkable. CHEST: Decreased radiotracer uptake at the right middle lobe lesion now SUV max of 3.9, previously 25. Activity is along the peripheral margin of the lesion. There is central photopenia which may be related to necrosis or fibrosis. Mass measures on the order of  4.4 cm in size, previously 7-8 cm. The mass abuts the right hilar region. Resolution of previously seen activity at the mediastinal lymph nodes. CT images: Right-sided Mediport line tip terminates at the SVC. Trace right pleural effusion is new. Biapical pleural-parenchymal changes. ABDOMEN: Previously questioned small focus of uptake at the left lobe of the liver medially is not seen on the current exam. Persistent mild patchy radiotracer uptake at the head of the pancreas, SUV max of 2.8, previously 2.9. Again underlying cystic lesion identified at the pancreatic head and neck measuring up to 2.9 cm in size, larger previously 2.1 cm. CT images: No additional significant findings. PELVIS: No FDG avid soft tissue lesions are seen. CT images: There is a small fat-containing left inguinal hernia. OSSEOUS STRUCTURES: No FDG avid lesions are seen. CT images: No significant findings. Impression 1. Overall findings suggest response to therapy. 2. Decreased radiotracer uptake at the smaller right middle lobe pulmonary lesion suggesting response to therapy with moderate FDG uptake remaining. 3. No residual activity at the previously seen mediastinal lymph nodes. 4. Previously questioned small focus of uptake at the left lobe of the liver medially is not seen on the current exam. 5.  Enlarging cystic lesion at the pancreatic head superiorly with mild patchy radiotracer uptake. Cystic neoplasm should be excluded. Again recommend follow-up with dedicated MRI of the pancreas with and without IV contrast if not previously performed.  Workstation performed: [id number]

## 2023-10-25 NOTE — PROGRESS NOTES
I met with Warren Schultz at her visit with Dr. Alee Montero today. She's familiar with me from prior visits. Questions answered, support provided.

## 2023-10-25 NOTE — ASSESSMENT & PLAN NOTE
We had a discussion with Geoffrey Tirado after personally reviewing the most recent PET scan, which revealed a response to the chemotherapy, by evidence of a decrease in the middle lobe lesion and no residual activity in the mediastinal nodes. However, there was a pancreatic lesion for which they are recommending a MRI. We discussed smoking cessation again, which she is in agreement with. Her options include a robotic assisted right middle lobectomy, possible thoracotomy. Her other option includes definitive chemo/radiation therapy. The procedure, possible risks, and post operative course were explained and all questions were answered. She would have to quit smoking 100% in order to do surgery. We will have her return after the MRI to make further recommendations for treatment.

## 2023-10-27 ENCOUNTER — PATIENT OUTREACH (OUTPATIENT)
Dept: CASE MANAGEMENT | Facility: HOSPITAL | Age: 68
End: 2023-10-27

## 2023-10-28 NOTE — PROGRESS NOTES
Telemedicine consent    Patient: Paula Kidd  Provider: Saba Chavez MD  Provider located at [address] [place] [id number]    The patient was identified by name and date of birth. Paula Kidd was informed that this is a telemedicine visit and that the visit is being conducted through Telephone. My office door was closed. No one else was in the room. She acknowledged consent and understanding of privacy and security of the video platform. The patient has agreed to participate and understands they can discontinue the visit at any time. Patient is aware this is a billable service. Hematology/Oncology Outpatient Office Note    Date of Service: 10/12/2023    [facility]ATOLOG[facility]  [phone]    Reason for Consultation:   No chief complaint on file. Cancer Stage at diagnosis: IIIA    Referral Physician: No ref. 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 Barriers to accomplishing this: peripheral neuropathy    Patient's Capacity to Self Care:  Patient is able to self care      ASSESSMENT & PLAN      Diagnosis ICD-10-CM Associated Orders   1.  Adenocarcinoma of right lung [facility])  C34.91 Ambulatory Referral to Thoracic Surgery            This is a 80 y.o. c PMHx notable for chest palpitation s/p propranolol, Heart murmur, fibromyalgia, psoriasis, RSD, peripheral neuropathy being seen in consultation for RML NSCLC    Tumor discussion on 2023 with recommendation for neoadjuvant systemic therapy followed by potential surgery    Discussion of decision making  Oncology history updated, accordingly, during this visit  Goals of care/patient communication  I discussed with the patient the clinical course leading up to their cancer diagnosis. I reviewed relevant office notes, imaging reports and pathology result as well. I told the patient that this is a case of potentially curable disease and what this means. We discussed that the goal of anti-cancer therapy is to provide best quality of life, extend overall survival, and progression free survival as shown in clinical trials. We also discussed that there might be a point when the cancer will no longer respond to this anti-neoplastic therapy. I explained the risks/benefits of the proposed cancer therapy: Nivo+ Cisplatin-alimta and after discussion including understanding risks of possible life-threatening complications and therapy-related malignancy development, informed consent for blood products and treatment has been signed and obtained. TNM/Staging At Diagnosis  [id number]  Cancer Staging   IIIA  Disease Features/Tumor Markers/Genetics  Tumor Marker: n/a  Notable Path Features:   7/24/2023 Bronch: Lung, right middle lobe, biopsy:  Non-small cell carcinoma consistent with a pulmonary adenocarcinoma  CARIS: PDL1 TPS 98%, KRAS G12C mutated (sotorasib)  Treatment:Cisplatin 75mg/m2, Alimta 500mg/m2, Nivo 360mg   Other Supportive care: Zofran EMLA, Zyprexa  Treatment Team Members  Surgeon: Dr. Melanie Sommers: Dr. Vance Portillo  7/3/2023 PET/CT:  Large FDG avid right lung mass in keeping with malignancy (SUV max of 25. Mass measures on the order of 7-8 cm). Mild FDG uptake in scattered mediastinal lymph nodes which may represent metastasis. 1.3 cm subcarinal LN. Mild patchy radiotracer uptake at the head of the pancreas. There does appear to be a cystic lesion at the pancreatic head/neck with adjacent coarse calcification. Cystic focus measures up to 2.1 cm, new from prior CT. An underlying pancreatic lesion   should be excluded. Recommend further characterization with dedicated MRI of the pancreas with and without IV contrast for further evaluation. Questionable small focus of FDG uptake in the left lobe of the liver, metastasis is not excluded. This could be also assessed with MRI  7/26/2023 MRI Brain w/wo c: Normal examination  8/8/2023: NM Bone scan: No scintigraphic evidence of osseous metastasis, with particular attention to the right shoulder. Degenerative changes as described, including facet arthropathy in the right posterior cervical spine. 10/18/2023 PET/CT: maybe near CR, partial response  Overall findings suggest response to therapy. Decreased radiotracer uptake at the smaller right middle lobe pulmonary lesion suggesting response to therapy with moderate FDG uptake remaining. Previously questioned small focus of uptake at the left lobe of the liver medially is not seen on the current exam  Decreased radiotracer uptake at the right middle lobe lesion now SUV max of 3.9, previously 25. Activity is along the peripheral margin of the lesion. There is central photopenia which may be related to necrosis or fibrosis. Mass measures on the order of 4.4 cm in size, previously 7-8 cm. The mass abuts the right hilar region. Resolution of previously seen activity at the mediastinal lymph nodes. Discussion of decision making    I personally reviewed the following lab results, the image studies, pathology, other specialty/physicians consult notes and recommendations, and outside medical records. I had a lengthy discussion with the patient and shared the work-up findings. We discussed the diagnosis and management plan as below.  I spent 41 minutes reviewing the records (labs, clinician notes, outside records, medical history, ordering medicine/tests/procedures, interpreting the imaging/labs previously done) and coordination of care as well as direct time with the patient today, of which greater than 50% of the time was spent in counseling and coordination of care with the patient/family. I discussed the risks of ongoing cigarette smoking and reviewed the benefits of quitting and risk of new lung primary, heart disease, stroke, among other risks and cancers. Reviewed options including support, quit date, medications, and family support. Patient voiced understanding and willingness to try to quit. [facility]. Greater than 3 minutes were needed for discussion of tobacco dependence and quitting counselling. Plan/Labs  F/u thoracic oncology for goal for surgery  MRI Abd to assess liver coming up tomorrow  Thoracic surgery mandating EGD to rule out pancreatic/biliary tract disease (second pancreatic cancer?)            Follow Up: wants to stay with me and will schedule appt with us to assess CRT versus adjuvant needs    All questions were answered to the patient's satisfaction during this encounter. The patient knows the contact information for our office and knows to reach out for any relevant concerns related to this encounter. They are to call for any temperature 100.4 or higher, new symptoms including but not restricted to shaking chills, decreased appetite, nausea, vomiting, diarrhea, increased fatigue, shortness of breath or chest pain, confusion, and not feeling the strength to come to the clinic. For all other listed problems and medical diagnosis in their chart - they are managed by PCP and/or other specialists, which the patient acknowledges. Thank you very much for your consultation and making us a part of this patient's care. We are continuing to follow closely with you. Please do not hesitate to reach out to me with any additional questions or concerns. Home Lord MD  Hematology & Medical Oncology Staff Physician             Disclaimer: This document was prepared using [employer] Flu[employer] Direct technology.  If a word or phrase is confusing, or does not make sense, this is likely due to recognition error which was not discovered during this clinician's review. If you believe an error has occurred, please contact me through [phone] line for jamie? cation.       ONCOLOGY HISTORY OF PRESENT ILLNESS        Oncology History   Adenocarcinoma of right lung ([address])   6/23/2023 Initial Diagnosis    Adenocarcinoma of right lung ([address])     7/24/2023 -  Cancer Staged    Staging form: Lung, AJCC 8th Edition  - Clinical stage from 7/24/2023: Stage IIIA (cT4, cN1, cM0) - Signed by Eneida Pederson MD on 7/31/2023  Stage prefix: Initial diagnosis       8/31/2023 -  Chemotherapy    cyanocobalamin, 1,000 mcg, Intramuscular, Once, 1 of 1 cycle  Administration: 1,000 mcg (8/24/2023)  alteplase (CATHFLO), 2 mg, Intracatheter, Every 1 Minute as needed, 3 of 3 cycles  CISplatin (PLATINOL) with mannitol IVPB, 75 mg/m2 = 129.8 mg (100 % of original dose 75 mg/m2), Intravenous, Once, 3 of 3 cycles  Dose modification: 50 mg/m2 (original dose 75 mg/m2, Cycle 1, Reason: Anticipated Tolerance), 75 mg/m2 (original dose 75 mg/m2, Cycle 1, Reason: Dose modified as per discussion with consulting physician)  Administration: 129.8 mg (8/31/2023), 129.8 mg (9/21/2023)  fosaprepitant (EMEND) IVPB, 150 mg, Intravenous, Once, 3 of 3 cycles  Administration: 150 mg (8/31/2023), 150 mg (9/21/2023)  nivolumab (OPDIVO) IVPB, 360 mg, Intravenous, Once, 3 of 3 cycles  Administration: 360 mg (8/31/2023), 360 mg (9/21/2023)  pemetrexed (ALIMTA) chemo infusion, 500 mg/m2 = 865 mg, Intravenous, Once, 3 of 3 cycles  Administration: 865 mg (8/31/2023), 865 mg (9/21/2023)           SUBJECTIVE  (INTERVAL HISTORY)      Clotting History None   Bleeding History None   Cancer History RML NSCLC   Family Cancer History Sister (cancer)   H/O Blood/Plt Transfusion None   Tobacco/etoh/drug abuse 1 PPD x 40 years, trying to quit, no etoh abuse or rec drug use           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 No acute issues, feeling better overall. She was down to 1/2 PPD several weeks ago. I have reviewed the relevant past medical, surgical, social and family history. I have also reviewed allergies and medications for this patient. Review of Systems    Baseline weight: 150-160 lbs    Denies F/C, N/V, weight loss, SOB, CP, LH, HA, gen weakness, melena, hematuria, hematochezia, falls, diarrhea, or constipation       A 10-point review of system was performed, pertinent positive and negative were detailed as above. Otherwise, the 10-point review of system was negative. Past Medical History:   Diagnosis Date    Fibromyalgia     Heart murmur     Lumbosacral disc herniation     Lung cancer (HCC)     Psoriasis     Psoriasis     RSD (reflex sympathetic dystrophy)        Past Surgical History:   Procedure Laterality Date    IR PORT PLACEMENT  8/4/2023    PARTIAL HYSTERECTOMY         Family History   Problem Relation Age of Onset    Sarcoidosis Mother        Social History     Socioeconomic History    Marital status:      Spouse name: Not on file    Number of children: Not on file    Years of education: Not on file    Highest education level: Not on file   Occupational History    Not on file   Tobacco Use    Smoking status: Every Day     Packs/day: 0.50     Years: 47.00     Total pack years: 23.50     Types: Cigarettes     Start date: 1976    Smokeless tobacco: Never   Vaping Use    Vaping Use: Never used   Substance and Sexual Activity    Alcohol use:  Yes     Alcohol/week: 1.0 standard drink of alcohol     Types: 1 Glasses of wine per week     Comment: daily    Drug use: No    Sexual activity: Not on file   Other Topics Concern    Not on file   Social History Narrative    Not on file     Social Determinants of Health Financial Resource Strain: Not on file   Food Insecurity: Not on file   Transportation Needs: Not on file   Physical Activity: Not on file   Stress: Not on file   Social Connections: Not on file   Intimate Partner Violence: Not on file   Housing Stability: Not on file       Allergies   Allergen Reactions    Prednisone Palpitations       Current Outpatient Medications   Medication Sig Dispense Refill    OLANZapine (ZyPREXA) 5 mg tablet Take 1 tablet (5 mg total) by mouth daily at bedtime for 7 days During treatment week 7 tablet 0    atorvastatin (LIPITOR) 20 mg tablet Take 20 mg by mouth daily      calcipotriene (DOVONEX) 0.005 % cream Apply topically 2 (two) times a day To affected areas on Saturday and Sunday only 120 g 2    DULoxetine (CYMBALTA) 30 mg delayed release capsule       Fluticasone-Salmeterol (Advair) 100-50 mcg/dose inhaler Inhale 1 puff 2 (two) times a day Rinse mouth after use. folic acid ( Folic Acid) 1 mg tablet Take 1 tablet (1 mg total) by mouth daily 90 tablet 2    lidocaine-prilocaine (EMLA) cream Apply topically as needed for mild pain 30 g 0    ondansetron (ZOFRAN) 8 mg tablet Take 1 tablet (8 mg total) by mouth every 8 (eight) hours as needed for nausea or vomiting 20 tablet 0    ondansetron (ZOFRAN-ODT) 8 mg disintegrating tablet Take 1 tablet (8 mg total) by mouth every 8 (eight) hours as needed for nausea or vomiting 20 tablet 1    propranolol (INDERAL) 20 mg/5 mL solution       saccharomyces boulardii (FLORASTOR) 250 mg capsule Take 250 mg by mouth 2 (two) times a day (Patient not taking: Reported on 7/31/2023)      triamcinolone (KENALOG) 0.1 % cream Apply topically 2 (two) times a day Affected areas Monday through Friday only 453.6 g 2     No current facility-administered medications for this visit.      Facility-Administered Medications Ordered in Other Visits   Medication Dose Route Frequency Provider Last Rate Last Admin    alteplase (CATHFLO) injection 2 mg  2 mg Intracatheter Q1MIN PRN Chandler Bentley MD   2 mg at 10/11/23 1036    alteplase (CATHFLO) injection 2 mg  2 mg Intracatheter Q1MIN PRN Chandler Bentley MD        magnesium sulfate 2 g/50 mL IVPB (premix) 2 g  2 g Intravenous Once Chandler Bentley MD 50 mL/hr at 10/12/23 1409 2 g at 10/12/23 1409    sodium chloride 0.9 % bolus 1,000 mL  1,000 mL Intravenous Once Chandler Bentley .7 mL/hr at 10/12/23 1409 1,000 mL at 10/12/23 1409       (Not in a hospital admission)      Objective:     24 Hour Vitals Assessment:     There were no vitals filed for this visit. Below not updated as this was a televisit      PHYSICIAN EXAM:    General: Appearance: alert, cooperative, no distress. HEENT: Normocephalic, atraumatic. No scleral icterus. conjunctivae clear. EOMI. Chest: No tenderness to palpation. No open wound noted. Lungs: Diminished b/l, otherwise clear to auscultation bilaterally, Respirations unlabored. Cardiac: Regular rate, +S1and S2  Abdomen: Soft, non-tender, non-distended. Bowel sounds are normal.   Extremities:  No edema, cyanosis, clubbing. Inferior to R glenoid (TTP)  Skin: Skin color, turgor are normal. No rashes. Lymphatics: no palpable supra-cervical, axillary, or inguinal adenopathy  Neurologic: Awake, Alert, and oriented, no gross focal deficits noted b/l. DATA REVIEW:    Pathology Result:    Final Diagnosis   Date Value Ref Range Status   07/24/2023   Final    A.-B. Lung, Right Middle Lobe Bronchial Brushing (Thin-prep and smear preparations):   Conclusive evidence of malignancy. 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 Satisfactory for evaluation. C.-D.  Lung, Right Middle Lobe Bronchoalveolar Lavage (Thin-prep and cell block preparations):   Conclusive evidence of malignancy. 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 Satisfactory for evaluation.      E.-F.  Lymph Node, level 7 (Thin-prep and smear preparations): Atypical cellular changes seen. Rare atypical cells. Lymphocytes. Few bronchial cells and pulmonary macrophages. Satisfactory for evaluation. G.-H. Lymph Node, 10R (Thin-prep and smear preparations):   Negative for malignancy. Lymphocytes. Aggregates of neutrophils. Satisfactory for evaluation. 07/24/2023   Final    A.-B. Lung, Right Middle Lobe Bronchial Brushing (Thin-prep and smear preparations):   Conclusive evidence of malignancy. 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 Satisfactory for evaluation. C.-D.  Lung, Right Middle Lobe Bronchoalveolar Lavage (Thin-prep and cell block preparations):   Conclusive evidence of malignancy. 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 Satisfactory for evaluation. E.-F.  Lymph Node, level 7 (Thin-prep and smear preparations): Atypical cellular changes seen. Rare atypical cells. Lymphocytes. Few bronchial cells and pulmonary macrophages. Satisfactory for evaluation. G.-H. Lymph Node, 10R (Thin-prep and smear preparations):   Negative for malignancy. Lymphocytes. Aggregates of neutrophils. Satisfactory for evaluation. 07/24/2023   Final    A. 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 07/05/2023   Final    A. Lung, right middle lobe, biopsy:     - Portions of bronchial wall with mild chronic inflammation.     - No dysplasia or neoplasia identified. 07/05/2023   Final    A-B-C. Lymph Node, Subcarinal level 7 (ThinPrep, smear and cell block preparations)  Negative for malignancy, see note  Benign bronchial cells. Abundant lymphocytes and macrophages. Satisfactory for evaluation. D-E-F. Lymph Node, 4R (ThinPrep, smear and cell block preparations)  Negative for malignancy. Benign bronchial cells. Lymphocytes and macrophages. Satisfactory for evaluation.        07/05/2023   Final A-B. Lung, Right Middle Lobe Bronchial Washing (ThinPrep and cell block preparations):  Negative for malignancy. Scant benign bronchial cells. Neutrophils, lymphocytes and pulmonary macrophages. Satisfactory for evaluation. D. Lung, Right Middle Lobe Bronchial Brushing, :  Atypical cellular changes seen. Rare atypical epithelial cells in a background of benign bronchial cells. Satisfactory for evaluation. Image Results:   Image result are reviewed and documented in Hematology/Oncology history    NM bone scan whole body  Narrative: BONE SCAN  WHOLE BODY    INDICATION: C34.91: Malignant neoplasm of unspecified part of right bronchus or lung  S49. 91XA: Unspecified injury of right shoulder and upper arm, initial encounter. Right shoulder blade pain for 2 months    PREVIOUS FILM CORRELATION:    PET/CT July 3, 2023, MRI brain July 26, 2023, CT of the chest June 13, 2023    TECHNIQUE:   This study was performed following the intravenous administration of 23.7 mCi Tc-99m labeled MDP. Delayed, anterior and posterior whole body images were acquired, 2-3 hours after radiopharmaceutical administration. FINDINGS:    Increased uptake corresponding to the right posterior cervical spine, compatible with focal degenerative facet arthropathy, which also demonstrated increased uptake on the recent PET/CT. Mild degenerative changes are also present within the shoulders,   knees, and ankles. There is no scintigraphic evidence of osseous metastasis. Linear uptake in the lateral aspect of both tibias may represent hypertrophic osteoarthropathy. Impression: 1. No scintigraphic evidence of osseous metastasis, with particular attention to the right shoulder. 2. Degenerative changes as described, including facet arthropathy in the right posterior cervical spine. 3. Linear uptake along the lateral aspect of both tibias may represent hypertrophic osteoarthropathy.     Workstation performed: [id number]      LABS:  Lab data are reviewed and documented in HemOnc history.        Lab Results   Component Value Date    HGB 11.9 10/11/2023    HCT 35.7 10/11/2023    MCV 96 10/11/2023     (H) 10/11/2023    WBC 7.48 10/11/2023    NRBC 0 10/11/2023     Lab Results   Component Value Date    K 4.0 10/11/2023     10/11/2023    CO2 27 10/11/2023    BUN 11 10/11/2023    CREATININE 0.62 10/11/2023    CALCIUM 9.4 10/11/2023    AST 25 10/11/2023    ALT 41 10/11/2023    ALKPHOS 73 10/11/2023    EGFR 93 10/11/2023       No results found for: "IRON", "TIBC", "FERRITIN"    No results found for: "ESJNLNMH33"    Recent Labs     10/11/23  1144   WBC 7.48   *       By:  Bhanu Smtih MD, 10/12/2023, 2:59 PM

## 2023-10-31 ENCOUNTER — TELEPHONE (OUTPATIENT)
Dept: GASTROENTEROLOGY | Facility: CLINIC | Age: 68
End: 2023-10-31

## 2023-10-31 ENCOUNTER — PATIENT OUTREACH (OUTPATIENT)
Dept: CASE MANAGEMENT | Facility: HOSPITAL | Age: 68
End: 2023-10-31

## 2023-10-31 NOTE — PROGRESS NOTES
Pt r/o to me this morning, we had a lengthy conversation about her recent visit with Dr. Paulette Daniel. Pt is very anxious about upcoming surgery, she shared a poor experience with a hysterectomy previously that has left her fearful of hospitals and surgery. She also talked about the overwhelm of having so many appointments and decisions to make in the next few weeks. She says that she has cut back on smoking but finds herself wanting to smoke based on the stress she is feeling. She says that she just wants to cry from the stress and overwhelm, and I encouraged her to cry and get that off of her chest as it will likely help her feel better. She agrees that she has so many things to coordinate that she's having trouble getting started with anyone of them. She does have a friend coming over today to help her clean and organize her room, which she is looking forward to having done. We spoke at length about who will care for her sister when she is recovering from surgery and at this point there are no good, clear options. Her sister in [place] has a  who is having heart surgery, her brother in [place] doesn't have room in his house for her, and her other sister lives in [place]. She is thinking maybe her brother can come stay with them here, or pt's daughter, grandchildren, and a friend can "piece it together" while she is recovering. I encouraged her to ask her sister in [place] to take her sister here for a month or two to help pt have that time to recover, they have done this in the past and pt says that this may possibly be an option but she has not yet talked to them about needing surgery. For today, pt is agreeable to reaching out to her siblings to let them know about her surgery and inquire who can help with their sister while she is recovering. I also stressed to her that she needs to continue working on quitting smoking, which she says that she does want to do.   I gave her the [place] Quit[place] number today and we talked about implementing a new habit in place of smoking instead. Pt notes that she has nicotine gum at home and would be willing to consider the patch as well. She notes that it's not actually the nicotine that she's craving but more the habit of having a smoke when she's stressed. We agreed that she will start working on both of these problems, and I reassured her that getting started in small pieces will lead to solutions sooner rather than later. We agreed to me checking in with her on Friday to see how she is doing and any progress she has been able to make. She thanked me several times for giving her someone to talk to and ideas on where to get started. I will continue to support and assist her as needed moving forward, no other needs noted at this time.

## 2023-10-31 NOTE — TELEPHONE ENCOUNTER
Spoke with patient and wants to hold off for now with scheduling. Has mri appointment on 11/7 and office visit with gastro on 11/14/23. I will follow up with patient after both appointments to see how we are to proceed.

## 2023-10-31 NOTE — TELEPHONE ENCOUNTER
----- Message from Clementina Phoenix, MD sent at 10/25/2023  6:36 PM EDT -----  Regarding: RE: Pt needing EUS  Hello. Sure we can take a look with an EUS. I have placed the order and our team will help schedule in the next couple of weeks. Thank you. Clementina Phoenix    ----- Message -----  From: Aron Morillo MD  Sent: 10/25/2023   4:06 PM EDT  To: Clementina Phoenix, MD  Subject: Pt needing EUS                                   Hi Randolph    Can we could look at this patient's chart and help me arrange for an EUS.   She is a 69-year-old female with a stage III lung cancer that underwent

## 2023-11-03 ENCOUNTER — PATIENT OUTREACH (OUTPATIENT)
Dept: CASE MANAGEMENT | Facility: HOSPITAL | Age: 68
End: 2023-11-03

## 2023-11-03 NOTE — PROGRESS NOTES
Call out to pt this afternoon to check in after we spoke earlier this week, she tells me that she's feeling better today and I would agree that she sounds better. She says that she was very appreciative of being able to talk things out the other day and it helped her see that she was too overwhelmed with everything happening. Since we last spoke, she did speak to both of her sisters about helping with their sister while she is recovering, both were open to helping but both have some challenges to make this happen. Her sister in [facility] has a  who needs heart surgery and is waiting to get that scheduled, and her other sister in [place] would have to fly here to essentially  their sister Hayde Mccallum and fly back with her, as she is not capable of flying alone based on past experiences. There is also the option that her brother can come stay with her, and she hopes to talk to him this weekend. She says that she held off talking to him this week as it was the anniversary of his wife's death and he was spending time with his children. She also realized that she would need to get someone to stay with her dog while she's in the hospital if Hayde Mccallum goes to [facility] or [place], and we talked about a few scenarios including her daughter, grandchildren, or a neighbor. Pt says that she continues to work on quitting smoking and feels that she is down from her original 10 per day, but says that it varies by day so she can't say she's down to any certain number for sure. She talked about having empty boxes around that she was thinking of filling with a set amount of cigarettes, that way when she's out for the day, she's done. She also talked about other means of keeping her hands and mind distracted, including knitting or crocheting blanket squares to make a bigger blanket. I shared that these are all great ideas and strategies and I was proud of her for thinking outside of the box.     At this time pt is less anxious and has a better path of what needs to get settled before her surgery. She has upcoming appts next week and I will check in with her afterward to see how she's doing and how I can continue to help. She has my direct number and was encouraged to call me in the interim if she needed to talk. She shared that she is very grateful for the support given to her in the past few weeks. No other needs noted at this time.

## 2023-11-06 ENCOUNTER — TELEPHONE (OUTPATIENT)
Dept: HEMATOLOGY ONCOLOGY | Facility: CLINIC | Age: 68
End: 2023-11-06

## 2023-11-06 ENCOUNTER — TELEMEDICINE (OUTPATIENT)
Dept: HEMATOLOGY ONCOLOGY | Facility: CLINIC | Age: 68
End: 2023-11-06
Payer: COMMERCIAL

## 2023-11-06 VITALS
DIASTOLIC BLOOD PRESSURE: 78 MMHG | HEIGHT: 64 IN | SYSTOLIC BLOOD PRESSURE: 124 MMHG | WEIGHT: 149 LBS | BODY MASS INDEX: 25.44 KG/M2 | HEART RATE: 86 BPM | OXYGEN SATURATION: 99 % | TEMPERATURE: 96.9 F | RESPIRATION RATE: 18 BRPM

## 2023-11-06 DIAGNOSIS — T45.1X5A CHEMOTHERAPY INDUCED NAUSEA AND VOMITING: ICD-10-CM

## 2023-11-06 DIAGNOSIS — R11.2 CHEMOTHERAPY INDUCED NAUSEA AND VOMITING: ICD-10-CM

## 2023-11-06 DIAGNOSIS — C34.91 ADENOCARCINOMA OF RIGHT LUNG (HCC): Primary | Chronic | ICD-10-CM

## 2023-11-06 PROCEDURE — 99443 PR PHYS/QHP TELEPHONE EVALUATION 21-30 MIN: CPT | Performed by: INTERNAL MEDICINE

## 2023-11-06 PROCEDURE — 99406 BEHAV CHNG SMOKING 3-10 MIN: CPT | Performed by: INTERNAL MEDICINE

## 2023-11-06 RX ORDER — LORATADINE 10 MG/1
10 TABLET, ORALLY DISINTEGRATING ORAL DAILY
COMMUNITY

## 2023-11-06 NOTE — TELEPHONE ENCOUNTER
Called and LVM for patient to make her aware that 's appointments today will be virtual. Calling to ask patient if she wants a [employer] virtual or if she'd like a telephone call. Patient did not answer.     RYLAND

## 2023-11-07 ENCOUNTER — HOSPITAL ENCOUNTER (OUTPATIENT)
Dept: MRI IMAGING | Facility: HOSPITAL | Age: 68
Discharge: HOME | End: 2023-11-07
Payer: COMMERCIAL

## 2023-11-07 DIAGNOSIS — K86.9 PANCREATIC LESION: ICD-10-CM

## 2023-11-07 PROCEDURE — 74183 MRI ABD W/O CNTR FLWD CNTR: CPT

## 2023-11-07 PROCEDURE — A9585 GADOBUTROL INJECTION: HCPCS | Performed by: PHYSICIAN ASSISTANT

## 2023-11-07 PROCEDURE — G1004 CDSM NDSC: HCPCS

## 2023-11-07 RX ORDER — GADOBUTROL 604.72 MG/ML
6 INJECTION INTRAVENOUS
Status: COMPLETED | OUTPATIENT
Start: 2023-11-07 | End: 2023-11-07

## 2023-11-07 RX ADMIN — GADOBUTROL 6 ML: 604.72 INJECTION INTRAVENOUS at 07:06

## 2023-11-08 ENCOUNTER — OFFICE VISIT (OUTPATIENT)
Dept: GASTROENTEROLOGY | Facility: CLINIC | Age: 68
End: 2023-11-08
Payer: COMMERCIAL

## 2023-11-08 VITALS
HEIGHT: 64 IN | DIASTOLIC BLOOD PRESSURE: 71 MMHG | OXYGEN SATURATION: 99 % | BODY MASS INDEX: 25.06 KG/M2 | WEIGHT: 146.8 LBS | HEART RATE: 93 BPM | SYSTOLIC BLOOD PRESSURE: 107 MMHG

## 2023-11-08 DIAGNOSIS — K86.2 PANCREATIC CYST: Primary | ICD-10-CM

## 2023-11-08 DIAGNOSIS — K86.9 PANCREATIC LESION: ICD-10-CM

## 2023-11-08 PROCEDURE — 99203 OFFICE O/P NEW LOW 30 MIN: CPT | Performed by: PHYSICIAN ASSISTANT

## 2023-11-08 RX ORDER — AMITRIPTYLINE HYDROCHLORIDE 25 MG/1
TABLET, FILM COATED ORAL
COMMUNITY
Start: 2023-11-07

## 2023-11-08 NOTE — PROGRESS NOTES
[facility] - Outpatient Consultation  Madelyn Light 80 y.o. female MRN: [id number]  Encounter: [id number]          ASSESSMENT AND PLAN:      1. Pancreatic cyst  PET/CT x 2 shows cystic lesion at hte pancreatic head - enlarging from July - October of this year  Will refer for EUS    She has NSCLC s/p chemo therapy with response per PET scan  Plan is for surgical resection but this is pending endoscopic ultrasound with tissue sampling of the pancreatic lesion  ______________________________________________________________________    HPI: 81-year-old female with a recent diagnosis of non-small cell lung cancer status post chemotherapy with apparent improvement per follow-up PET CT scan who presents for evaluation of pancreatic cystic lesion. This was noted on PET scan in July of this year. On repeat PET CT scan from October of this year it appears to have grown slightly. She had an MRI of the abdomen and follow-up to this yesterday with the results are still pending. She does report intermittent epigastric pain. She is always related this to her history of peptic ulcer disease. She denies any vomiting since her chemotherapy stopped. Previous imaging from June of 2020 was reviewed at which point the pancreas appeared unremarkable. The patient is being thoracic to remove any remaining tumor burden. Obviously this is pending our work-up of this pancreatic lesion. REVIEW OF SYSTEMS:    CONSTITUTIONAL: Denies any fever, chills, rigors, and weight loss. HEENT: No earache or tinnitus. Denies hearing loss or visual disturbances. CARDIOVASCULAR: No chest pain or palpitations. RESPIRATORY: Denies any cough, hemoptysis, shortness of breath or dyspnea on exertion. GASTROINTESTINAL: As noted in the History of Present Illness. GENITOURINARY: No problems with urination. Denies any hematuria or dysuria. NEUROLOGIC: No dizziness or vertigo, denies headaches.    MUSCULOSKELETAL: Denies any muscle or joint pain. SKIN: Denies skin rashes or itching. ENDOCRINE: Denies excessive thirst. Denies intolerance to heat or cold. PSYCHOSOCIAL: Denies depression or anxiety. Denies any recent memory loss. Historical Information   Past Medical History:   Diagnosis Date    Fibromyalgia     Heart murmur     Lumbosacral disc herniation     Lung cancer (HCC)     Psoriasis     Psoriasis     RSD (reflex sympathetic dystrophy)      Past Surgical History:   Procedure Laterality Date    IR PORT PLACEMENT  8/4/2023    PARTIAL HYSTERECTOMY       Social History   Social History     Substance and Sexual Activity   Alcohol Use Yes    Alcohol/week: 1.0 standard drink of alcohol    Types: 1 Glasses of wine per week    Comment: daily     Social History     Substance and Sexual Activity   Drug Use No     Social History     Tobacco Use   Smoking Status Every Day    Packs/day: 0.50    Years: 47.00    Total pack years: 23.50    Types: Cigarettes    Start date: 1976   Smokeless Tobacco Never     Family History   Problem Relation Age of Onset    Sarcoidosis Mother        Meds/Allergies       Current Outpatient Medications:     amitriptyline (ELAVIL) 25 mg tablet    atorvastatin (LIPITOR) 20 mg tablet    calcipotriene (DOVONEX) 0.005 % cream    DULoxetine (CYMBALTA) 30 mg delayed release capsule    Fluticasone-Salmeterol (Advair) 100-50 mcg/dose inhaler    folic acid (KP Folic Acid) 1 mg tablet    lidocaine-prilocaine (EMLA) cream    loratadine (CLARITIN REDITABS) 10 MG dissolvable tablet    ondansetron (ZOFRAN) 8 mg tablet    ondansetron (ZOFRAN-ODT) 8 mg disintegrating tablet    propranolol (INDERAL) 20 mg/5 mL solution    triamcinolone (KENALOG) 0.1 % cream    OLANZapine (ZyPREXA) 5 mg tablet    Allergies   Allergen Reactions    Prednisone Palpitations           Objective     Blood pressure 107/71, pulse 93, height 5' 4" (1.626 m), weight 66.6 kg (146 lb 12.8 oz), SpO2 99 %. Body mass index is 25.2 kg/m².         PHYSICAL EXAM:      General Appearance:   Alert, cooperative, no distress   HEENT:   Normocephalic, atraumatic, anicteric. Neck:  Supple, symmetrical, trachea midline   Lungs:   Clear to auscultation bilaterally; no rales, rhonchi or wheezing; respirations unlabored    Heart[de-identified]   Regular rate and rhythm; no murmur, rub, or gallop. Abdomen:   Soft, non-tender, non-distended; normal bowel sounds; no masses, no organomegaly    Genitalia:   Deferred    Rectal:   Deferred    Extremities:  No cyanosis, clubbing or edema    Pulses:  2+ and symmetric    Skin:  No jaundice, rashes, or lesions    Lymph nodes:  No palpable cervical lymphadenopathy        Lab Results:   No visits with results within 1 Day(s) from this visit. Latest known visit with results is:   Hospital Outpatient Visit on 10/18/2023   Component Date Value    POC Glucose 10/18/2023 137          Radiology Results:   NM PET CT skull base to mid thigh    Result Date: 10/18/2023  Narrative: PET/CT SCAN INDICATION: Restaging of lung cancer. C34.91: Malignant neoplasm of unspecified part of right bronchus or lung MODIFIER: PS COMPARISON: PET/CT 7/3/2023, bone scan 8/8/2023 CELL TYPE: Non-small cell carcinoma consistent with pulmonary adenocarcinoma, right middle lobe TECHNIQUE:   8.3 mCi F-18-FDG administered IV. Multiplanar attenuation corrected and non attenuation corrected PET images are available for interpretation, and contiguous, low dose, axial CT sections were obtained from the skull base through the femurs. Intravenous contrast material was not utilized. This examination, like all CT scans performed in the [facility], was performed utilizing techniques to minimize radiation dose exposure, including the use of iterative reconstruction and automated exposure control. Fasting serum glucose: 137 mg/dl FINDINGS: VISUALIZED BRAIN: No acute abnormalities are seen. HEAD/NECK: There is a physiologic distribution of FDG.  No FDG avid cervical adenopathy is seen. CT images: Unremarkable. CHEST: Decreased radiotracer uptake at the right middle lobe lesion now SUV max of 3.9, previously 25. Activity is along the peripheral margin of the lesion. There is central photopenia which may be related to necrosis or fibrosis. Mass measures on the order of  4.4 cm in size, previously 7-8 cm. The mass abuts the right hilar region. Resolution of previously seen activity at the mediastinal lymph nodes. CT images: Right-sided Mediport line tip terminates at the SVC. Trace right pleural effusion is new. Biapical pleural-parenchymal changes. ABDOMEN: Previously questioned small focus of uptake at the left lobe of the liver medially is not seen on the current exam. Persistent mild patchy radiotracer uptake at the head of the pancreas, SUV max of 2.8, previously 2.9. Again underlying cystic lesion identified at the pancreatic head and neck measuring up to 2.9 cm in size, larger previously 2.1 cm. CT images: No additional significant findings. PELVIS: No FDG avid soft tissue lesions are seen. CT images: There is a small fat-containing left inguinal hernia. OSSEOUS STRUCTURES: No FDG avid lesions are seen. CT images: No significant findings. Impression: 1. Overall findings suggest response to therapy. 2. Decreased radiotracer uptake at the smaller right middle lobe pulmonary lesion suggesting response to therapy with moderate FDG uptake remaining. 3. No residual activity at the previously seen mediastinal lymph nodes. 4. Previously questioned small focus of uptake at the left lobe of the liver medially is not seen on the current exam. 5.  Enlarging cystic lesion at the pancreatic head superiorly with mild patchy radiotracer uptake. Cystic neoplasm should be excluded. Again recommend follow-up with dedicated MRI of the pancreas with and without IV contrast if not previously performed.  Workstation performed: [id number]

## 2023-11-09 ENCOUNTER — OFFICE VISIT (OUTPATIENT)
Dept: PALLIATIVE MEDICINE | Facility: CLINIC | Age: 68
End: 2023-11-09
Payer: COMMERCIAL

## 2023-11-09 VITALS
OXYGEN SATURATION: 100 % | WEIGHT: 147.2 LBS | HEIGHT: 64 IN | RESPIRATION RATE: 16 BRPM | SYSTOLIC BLOOD PRESSURE: 118 MMHG | BODY MASS INDEX: 25.13 KG/M2 | HEART RATE: 90 BPM | DIASTOLIC BLOOD PRESSURE: 58 MMHG | TEMPERATURE: 97.2 F

## 2023-11-09 DIAGNOSIS — Z51.5 PALLIATIVE CARE ENCOUNTER: ICD-10-CM

## 2023-11-09 DIAGNOSIS — C34.91 ADENOCARCINOMA OF RIGHT LUNG (HCC): Primary | ICD-10-CM

## 2023-11-09 DIAGNOSIS — Z71.89 GOALS OF CARE, COUNSELING/DISCUSSION: ICD-10-CM

## 2023-11-09 PROBLEM — C34.2: Status: ACTIVE | Noted: 2023-06-23

## 2023-11-09 PROCEDURE — 99214 OFFICE O/P EST MOD 30 MIN: CPT | Performed by: NURSE PRACTITIONER

## 2023-11-09 NOTE — PROGRESS NOTES
Outpatient Follow-Up - Palliative and Supportive Care   Sandra Lesch 80 y.o. female [id number]    Patient Active Problem List   Diagnosis    Pulmonary emphysema, unspecified emphysema type ([address])    Adenocarcinoma of right lung ([address])    Mediastinal adenopathy    Port-A-Cath in place    Hypomagnesemia         Assessment & Plan  Problem List Items Addressed This Visit          Respiratory    Adenocarcinoma of right lung ([address]) - Primary (Chronic)     Other Visit Diagnoses       Palliative care encounter        Goals of care, counseling/discussion              Adenocarcinoma of right lung- lung resection to be scheduled pending endoscopy for pancreatic cyst/lesion. Continue to follow with Oncology for management and treatment  Palliative Care Encounter- symptom management and goals of care  Goals of Care- treatment focused at this time   Psychosocial Support- supportive listening provided    Medications adjusted this encounter:  Requested Prescriptions      No prescriptions requested or ordered in this encounter     No orders of the defined types were placed in this encounter. There are no discontinued medications. Sandra Lesch was seen today for symptoms and planning cares related to above illnesses. I have reviewed the patient's controlled substance dispensing history in the Prescription Drug Monitoring Program in compliance with the [facility] regulations before prescribing any controlled substances. They are invited to continue to follow with us. If there are questions or concerns, please contact us through our clinic/answering service 24 hours a day, seven days a week. NAHUN Davis  [facility]  [phone]      Visit Information    Accompanied By: No one    Source of History: Self    History Limitations: None       Chief Complaint  No chief complaint on file.       Narrative and Interval History      Sandra Lesch is a 80 y.o. female who presents in follow up of symptoms related to adenocarcinoma of right lung. Pertinent issues include: symptom management, nausea, assessment of goals of care. Patient reports she is doing well, denies pain, states she had some discomfort in her low-mid back last night, resolved this morning. She admits to productive cough, no hemoptysis, shortness of breath or chest pain. She tried decreasing smoking but unable at this time related to family stress. She was seen by GI yesterday and is planning to have endoscopic ultrasound tissue sampling of pancreas. Lung resection to be scheduled pending results of ultrasound. Past medical, surgical, social, and family histories are reviewed and pertinent updates are made. Review of Systems   Cardiovascular:  Negative for chest pain. Respiratory:  Positive for cough and sputum production. Negative for shortness of breath. Musculoskeletal:         Low back discomfort last night, denies pain today   Gastrointestinal:  Positive for nausea. Negative for vomiting. Vital Signs  /58 (BP Location: Right arm, Patient Position: Sitting, Cuff Size: Standard)   Pulse 90   Temp (!) 97.2 °F (36.2 °C) (Tympanic)   Resp 16   Ht 5' 4" (1.626 m)   Wt 66.8 kg (147 lb 3.2 oz)   SpO2 100%   BMI 25.27 kg/m²     Physical Exam and Objective Data  Physical Exam  Vitals reviewed. Constitutional:       General: She is not in acute distress. Appearance: She is not ill-appearing. Cardiovascular:      Rate and Rhythm: Normal rate. Pulmonary:      Effort: Pulmonary effort is normal.   Neurological:      Mental Status: She is alert and oriented to person, place, and time.    Psychiatric:         Mood and Affect: Mood normal.           Radiology and Laboratory:  I personally reviewed  the following results:   NM PET CT skull base to mid thigh  Status: Final result     PACS Images     Show images for NM PET CT skull base to mid thigh  Study Result    Result Text   PET/CT SCAN INDICATION: Restaging of lung cancer. C34.91: Malignant neoplasm of unspecified part of right bronchus or lung     MODIFIER: PS     COMPARISON: PET/CT 7/3/2023, bone scan 8/8/2023     CELL TYPE: Non-small cell carcinoma consistent with pulmonary adenocarcinoma, right middle lobe     TECHNIQUE:   8.3 mCi F-18-FDG administered IV. Multiplanar attenuation corrected and non attenuation corrected PET images are available for interpretation, and contiguous, low dose, axial CT sections were obtained from the skull base through the femurs. Intravenous contrast material was not utilized. This examination, like all CT scans performed in the [facility], was performed utilizing techniques to minimize radiation dose exposure, including the use of iterative reconstruction and   automated exposure control. Fasting serum glucose: 137 mg/dl     FINDINGS:     VISUALIZED BRAIN:  No acute abnormalities are seen. HEAD/NECK:  There is a physiologic distribution of FDG. No FDG avid cervical adenopathy is seen. CT images: Unremarkable. CHEST:  Decreased radiotracer uptake at the right middle lobe lesion now SUV max of 3.9, previously 25. Activity is along the peripheral margin of the lesion. There is central photopenia which may be related to necrosis or fibrosis. Mass measures on the order of   4.4 cm in size, previously 7-8 cm. The mass abuts the right hilar region. Resolution of previously seen activity at the mediastinal lymph nodes. CT images: Right-sided Mediport line tip terminates at the SVC. Trace right pleural effusion is new. Biapical pleural-parenchymal changes. ABDOMEN:  Previously questioned small focus of uptake at the left lobe of the liver medially is not seen on the current exam.     Persistent mild patchy radiotracer uptake at the head of the pancreas, SUV max of 2.8, previously 2.9.  Again underlying cystic lesion identified at the pancreatic head and neck measuring up to 2.9 cm in size, larger previously 2.1 cm. CT images: No additional significant findings. PELVIS:  No FDG avid soft tissue lesions are seen. CT images: There is a small fat-containing left inguinal hernia. OSSEOUS STRUCTURES:  No FDG avid lesions are seen. CT images: No significant findings. IMPRESSION:     1. Overall findings suggest response to therapy. 2. Decreased radiotracer uptake at the smaller right middle lobe pulmonary lesion suggesting response to therapy with moderate FDG uptake remaining. 3. No residual activity at the previously seen mediastinal lymph nodes. 4. Previously questioned small focus of uptake at the left lobe of the liver medially is not seen on the current exam.  5.  Enlarging cystic lesion at the pancreatic head superiorly with mild patchy radiotracer uptake. Cystic neoplasm should be excluded. Again recommend follow-up with dedicated MRI of the pancreas with and without IV contrast if not previously performed. Workstation performed: [id number]         I have spent a total time of 45 minutes on 11/09/23 in caring for this patient including  symptom management and goals of care . All of the patient's / Agent's questions were answered during this discussion.

## 2023-11-10 ENCOUNTER — PATIENT OUTREACH (OUTPATIENT)
Dept: CASE MANAGEMENT | Facility: HOSPITAL | Age: 68
End: 2023-11-10

## 2023-11-10 NOTE — PROGRESS NOTES
PT r/o to MSW this afternoon and shared with me that her sister Vernell Tai, who lives in [place], passed away unexpectedly a few days ago. She notes that she did have cancer and apparently has not been honest with her family about how she has been doing, and they have not seen her in person in a few years to know any different. Zabrina's  obviously knew that she was not doing well but says that he was shocked at her passing, that she laid down to rest and he went to check on her later on and she had  in her sleep. Kerline Burch is upset and shocked and spent a significant amount of time processing this news and sharing the few details that she knows at this point. There will be a service tomorrow in [place] and their brother will be attending, and the plan is that Vernell Tai will be cremated and her ashes brought back to a family plot in [place]. This news now has Saskia considering her diagnosis and her own mortality. She wondered out loud what will happen to her sister, who is in her care. Vernell Tai was her backup POA and she will discuss with her brother and her daughter who else can step up to be the back up person for her. She is unsure what the future holds for her and now this has her more and more concerned about her own passing. She also notes that she is smoking quite a bit due to the stress of all of this. Emotional support provided throughout the conversation, pt seems to benefit from being able to get this off of her chest.  I offered my condolences on her loss and offered to check in with her next week. I let her know she is welcome to call me in the interim if she needs to talk. No other needs noted at this time.

## 2023-11-13 ENCOUNTER — TELEPHONE (OUTPATIENT)
Dept: HEMATOLOGY ONCOLOGY | Facility: CLINIC | Age: 68
End: 2023-11-13

## 2023-11-13 NOTE — TELEPHONE ENCOUNTER
Patient Call    Who are you speaking with? Patient    If it is not the patient, are they listed on an active communication consent form? N/A   What is the reason for this call? Patient calling to speak with Dr. Albert Rosales regarding her upcoming appointment 11/15/23. The appt was to discuss test results and plan of care. The test results are not back yet and she wants to know if she should keep appointment. Does this require a call back? yes   If a call back is required, please list best call back number [phone]   If a call back is required, advise that a message will be forwarded to their care team and someone will return their call as soon as possible. Did you relay this information to the patient?  yes

## 2023-11-14 ENCOUNTER — TELEPHONE (OUTPATIENT)
Dept: HEMATOLOGY ONCOLOGY | Facility: CLINIC | Age: 68
End: 2023-11-14

## 2023-11-14 ENCOUNTER — TELEPHONE (OUTPATIENT)
Dept: GASTROENTEROLOGY | Facility: CLINIC | Age: 68
End: 2023-11-14

## 2023-11-14 ENCOUNTER — TELEPHONE (OUTPATIENT)
Dept: CARDIAC SURGERY | Facility: CLINIC | Age: 68
End: 2023-11-14

## 2023-11-14 NOTE — TELEPHONE ENCOUNTER
I spoke with patient and told her we have forwarded her concerns to Dr. Ehsan Dudley for his advise. He's currently in the OR and someone from the office will get back to her. EUS hasn't been scheduled yet per patient.

## 2023-11-14 NOTE — TELEPHONE ENCOUNTER
Her appointment was moved up. She was seen on 11/8/23. She doesn't have a date for EUS. I messaged PA that saw her last week to see if she can help get a date for her. I had her appointment for tomorrow cancelled.   Chilo Nelson

## 2023-11-14 NOTE — TELEPHONE ENCOUNTER
Received vm from patient, "David Gilbert, it's Karina Caballero. My birthday is [birth year]. I just got the results for the MRI on my pancreas and I really don't understand it. I'm hoping that yourself or Doctor Natasha Molina give me a holler back and let me know what this is telling me. Thank you very much. Bye."    Returned call out to patient and reviewed that MRI to be reviewed by Dr. Karyna Nj. Patient is asking if Dr. Wilian Dominguez office can review the MRI and or if the appointment on 11/15/23 is necessary or needs to be rescheduled.

## 2023-11-15 ENCOUNTER — TELEPHONE (OUTPATIENT)
Dept: CARDIAC SURGERY | Facility: CLINIC | Age: 68
End: 2023-11-15

## 2023-11-21 ENCOUNTER — PATIENT OUTREACH (OUTPATIENT)
Dept: CASE MANAGEMENT | Facility: HOSPITAL | Age: 68
End: 2023-11-21

## 2023-11-21 NOTE — PROGRESS NOTES
Call out to pt this afternoon to check in since our last conversation, pt was appreciative of the call and shared that she was working on a painting this afternoon, which she enjoys doing. She talked about the various methods of art that she enjoys and different techniques that she uses. Pt says that she is still processing her sister's death. Their brother was able to attend her services in [facility] and pt says that he seems like he's struggling and just not himself since. She says that he is going to try to come to see her and Marko Savage for Thanksgiving, and she is hopeful that he actually can make it. She feels like it will be good for them all to be together after her loss, and she knows that he is worried about what is going on with her too. She also notes that she has some legalities to work out now that her sister has passed away, as she was the backup POA for their sister Marko Savage. She notes that if she has her surgery soon, she wants someone to be able to access their finances in case something comes up that needs to be paid and she's unable to do it. She talked about prepaying bills once she has a surgery date so that she doesn't have to worry about it, but knows that her sister will need things while she's recovering too that someone will need to be able to access her money for. Pt will have an EBUS next week to determine what the mass on her pancreas is and how to best proceed, she has some nerves about this but knows that she needs to get some definitive answers about it so she can move forward. Pt has no other needs or concerns today but was appreciative of the time spent talking and support offered after her sister's death. We agreed that I will check in with her after her procedure next week to see how things are going. She has my direct number if she needed anything in the interim. No other needs or concerns noted at this time.

## 2023-11-28 ENCOUNTER — ANESTHESIA (OUTPATIENT)
Dept: GASTROENTEROLOGY | Facility: HOSPITAL | Age: 68
End: 2023-11-28

## 2023-11-28 ENCOUNTER — HOSPITAL ENCOUNTER (OUTPATIENT)
Dept: GASTROENTEROLOGY | Facility: HOSPITAL | Age: 68
Setting detail: OUTPATIENT SURGERY
Discharge: HOME | End: 2023-11-28
Attending: INTERNAL MEDICINE
Payer: COMMERCIAL

## 2023-11-28 ENCOUNTER — ANESTHESIA EVENT (OUTPATIENT)
Dept: GASTROENTEROLOGY | Facility: HOSPITAL | Age: 68
End: 2023-11-28

## 2023-11-28 VITALS
HEIGHT: 64 IN | HEART RATE: 91 BPM | WEIGHT: 147 LBS | BODY MASS INDEX: 25.1 KG/M2 | OXYGEN SATURATION: 97 % | RESPIRATION RATE: 18 BRPM | SYSTOLIC BLOOD PRESSURE: 128 MMHG | TEMPERATURE: 97.8 F | DIASTOLIC BLOOD PRESSURE: 61 MMHG

## 2023-11-28 DIAGNOSIS — K86.2 PANCREATIC CYST: ICD-10-CM

## 2023-11-28 PROCEDURE — 88305 TISSUE EXAM BY PATHOLOGIST: CPT | Performed by: PATHOLOGY

## 2023-11-28 PROCEDURE — 43242 EGD US FINE NEEDLE BX/ASPIR: CPT | Performed by: INTERNAL MEDICINE

## 2023-11-28 PROCEDURE — C1889 IMPLANT/INSERT DEVICE, NOC: HCPCS

## 2023-11-28 PROCEDURE — 88112 CYTOPATH CELL ENHANCE TECH: CPT | Performed by: PATHOLOGY

## 2023-11-28 PROCEDURE — 43239 EGD BIOPSY SINGLE/MULTIPLE: CPT | Performed by: INTERNAL MEDICINE

## 2023-11-28 RX ORDER — CIPROFLOXACIN 2 MG/ML
INJECTION, SOLUTION INTRAVENOUS CONTINUOUS PRN
Status: DISCONTINUED | OUTPATIENT
Start: 2023-11-28 | End: 2023-11-28

## 2023-11-28 RX ORDER — CIPROFLOXACIN 500 MG/1
500 TABLET, FILM COATED ORAL EVERY 12 HOURS SCHEDULED
Qty: 6 TABLET | Refills: 0 | Status: SHIPPED | OUTPATIENT
Start: 2023-11-28 | End: 2023-12-01

## 2023-11-28 RX ORDER — SODIUM CHLORIDE 9 MG/ML
INJECTION, SOLUTION INTRAVENOUS CONTINUOUS PRN
Status: DISCONTINUED | OUTPATIENT
Start: 2023-11-28 | End: 2023-11-28

## 2023-11-28 RX ORDER — PROPOFOL 10 MG/ML
INJECTION, EMULSION INTRAVENOUS CONTINUOUS PRN
Status: DISCONTINUED | OUTPATIENT
Start: 2023-11-28 | End: 2023-11-28

## 2023-11-28 RX ORDER — FENTANYL CITRATE 50 UG/ML
INJECTION, SOLUTION INTRAMUSCULAR; INTRAVENOUS AS NEEDED
Status: DISCONTINUED | OUTPATIENT
Start: 2023-11-28 | End: 2023-11-28

## 2023-11-28 RX ORDER — LIDOCAINE HYDROCHLORIDE 20 MG/ML
INJECTION, SOLUTION EPIDURAL; INFILTRATION; INTRACAUDAL; PERINEURAL AS NEEDED
Status: DISCONTINUED | OUTPATIENT
Start: 2023-11-28 | End: 2023-11-28

## 2023-11-28 RX ORDER — PROPOFOL 10 MG/ML
INJECTION, EMULSION INTRAVENOUS AS NEEDED
Status: DISCONTINUED | OUTPATIENT
Start: 2023-11-28 | End: 2023-11-28

## 2023-11-28 RX ADMIN — LIDOCAINE HYDROCHLORIDE 100 MG: 20 INJECTION, SOLUTION EPIDURAL; INFILTRATION; INTRACAUDAL; PERINEURAL at 12:36

## 2023-11-28 RX ADMIN — FENTANYL CITRATE 50 MCG: 50 INJECTION, SOLUTION INTRAMUSCULAR; INTRAVENOUS at 12:42

## 2023-11-28 RX ADMIN — PROPOFOL 80 MG: 10 INJECTION, EMULSION INTRAVENOUS at 12:37

## 2023-11-28 RX ADMIN — CIPROFLOXACIN: 2 INJECTION, SOLUTION INTRAVENOUS at 12:47

## 2023-11-28 RX ADMIN — PROPOFOL 50 MG: 10 INJECTION, EMULSION INTRAVENOUS at 12:58

## 2023-11-28 RX ADMIN — PROPOFOL 40 MG: 10 INJECTION, EMULSION INTRAVENOUS at 12:45

## 2023-11-28 RX ADMIN — PROPOFOL 40 MG: 10 INJECTION, EMULSION INTRAVENOUS at 12:55

## 2023-11-28 RX ADMIN — PROPOFOL 140 MCG/KG/MIN: 10 INJECTION, EMULSION INTRAVENOUS at 12:37

## 2023-11-28 RX ADMIN — PROPOFOL 20 MG: 10 INJECTION, EMULSION INTRAVENOUS at 12:41

## 2023-11-28 RX ADMIN — FENTANYL CITRATE 50 MCG: 50 INJECTION, SOLUTION INTRAMUSCULAR; INTRAVENOUS at 12:33

## 2023-11-28 RX ADMIN — PROPOFOL 20 MG: 10 INJECTION, EMULSION INTRAVENOUS at 12:38

## 2023-11-28 RX ADMIN — SODIUM CHLORIDE: 9 INJECTION, SOLUTION INTRAVENOUS at 12:30

## 2023-11-28 NOTE — ANESTHESIA POSTPROCEDURE EVALUATION
Post-Op Assessment Note    CV Status:  Stable    Pain management: adequate       Mental Status:  Alert and awake   Hydration Status:  Euvolemic   PONV Controlled:  Controlled   Airway Patency:  Patent     Post Op Vitals Reviewed: Yes    No anethesia notable event occurred.     Staff: CRNA               /58 (11/28/23 1314)    Temp 97.8 °F (36.6 °C) (11/28/23 1314)    Pulse 102 (11/28/23 1314)   Resp 18 (11/28/23 1314)    SpO2 97 % (11/28/23 1314)

## 2023-11-28 NOTE — H&P
History and Physical - [facility]  Tanvir Crow 80 y.o. female MRN: [id number]    HPI: Tanvir Crow is a 80y.o. year old female who presents with pancreatic cyst/ h/o lung cancer. Review of Systems    Historical Information   Past Medical History:   Diagnosis Date    Fibromyalgia     Heart murmur     Lumbosacral disc herniation     Lung cancer (HCC)     Psoriasis     Psoriasis     RSD (reflex sympathetic dystrophy)      Past Surgical History:   Procedure Laterality Date    IR PORT PLACEMENT  08/04/2023    LUNG BIOPSY      x2    PARTIAL HYSTERECTOMY       Social History   Social History     Substance and Sexual Activity   Alcohol Use Yes    Alcohol/week: 1.0 - 2.0 standard drink of alcohol    Types: 1 - 2 Glasses of wine per week    Comment: daily     Social History     Substance and Sexual Activity   Drug Use No     Social History     Tobacco Use   Smoking Status Every Day    Packs/day: 0.50    Years: 47.00    Total pack years: 23.50    Types: Cigarettes    Start date: 1976   Smokeless Tobacco Never     Family History   Problem Relation Age of Onset    Sarcoidosis Mother        Meds/Allergies     (Not in a hospital admission)      Allergies   Allergen Reactions    Prednisone Palpitations       Objective     /61   Pulse 91   Temp 97.8 °F (36.6 °C) (Tympanic)   Resp 18   Ht 5' 4" (1.626 m)   Wt 66.7 kg (147 lb)   SpO2 97%   BMI 25.23 kg/m²       PHYSICAL EXAM    Gen: NAD  CV: RRR  CHEST: Clear  ABD: soft, NT/ND  EXT: no edema  Neuro: AAO      ASSESSMENT/PLAN:  This is a 80y.o. year old female here for eus for evaluation of pancreatic cyst.     PLAN:   Procedure: EUS. Discussed risks of bleeding/ infection/ pancreatitis.

## 2023-11-28 NOTE — ANESTHESIA PREPROCEDURE EVALUATION
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      Anesthesia Plan  ASA Score- 2     Anesthesia Type- IV sedation with anesthesia with ASA Monitors. Additional Monitors:     Airway Plan:            Plan Factors-Exercise tolerance (METS): >4 METS. Chart reviewed. Patient is not a current smoker. Patient did not smoke on day of surgery. Obstructive sleep apnea risk education given perioperatively. Induction- intravenous. Postoperative Plan-     Informed Consent- Anesthetic plan and risks discussed with patient. I personally reviewed this patient with the CRNA. Discussed and agreed on the Anesthesia Plan with the CRNA. Rik Salas Anesthesia plan and consent discussed with Dheeraj Hunter who expressed understanding and agreement. Risks/benefits and alternatives discussed with patient including possible PONV, sore throat, damage to teeth/lips/gums and possibility of rare anesthetic and surgical emergencies.

## 2023-11-30 PROCEDURE — 88305 TISSUE EXAM BY PATHOLOGIST: CPT | Performed by: PATHOLOGY

## 2023-11-30 PROCEDURE — 88112 CYTOPATH CELL ENHANCE TECH: CPT | Performed by: PATHOLOGY

## 2023-12-03 DIAGNOSIS — K86.2 PANCREATIC CYST: Primary | ICD-10-CM

## 2023-12-03 NOTE — RESULT ENCOUNTER NOTE
Dr. Molly Mohan. I think you can hold off on seeing her since this apepars to be a pseudocyst. I sent you the earlier message as I thought you were already involved in her care. Thanks. [facility]    GI team : please call patient regarding the H. Pylori treatment. Thank you. Inform patient via [employer]. Please review the pathology/lab result of further discussion. Copied from [employer] message :       Gigi Massed,     Your stomach biopsies that showed H. pylori infection. This will require antibiotics for 2 weeks. We will send these to your pharmacy. The biopsies from the pancreas cyst was likely consistent with a inflammatory cyst called a pancreatic pseudocyst.  This is likely related to prior history of inflammation of the pancreas. .  I would like to repeat imaging in 3 months and follow-up with you in the office as well.     Best regards,     Td Ruvalcaba MD

## 2023-12-04 ENCOUNTER — TELEPHONE (OUTPATIENT)
Age: 68
End: 2023-12-04

## 2023-12-04 DIAGNOSIS — A04.8 HELICOBACTER PYLORI INFECTION: Primary | ICD-10-CM

## 2023-12-04 RX ORDER — TETRACYCLINE HYDROCHLORIDE 500 MG/1
CAPSULE ORAL
Qty: 56 CAPSULE | Refills: 0 | Status: SHIPPED | OUTPATIENT
Start: 2023-12-04 | End: 2023-12-18

## 2023-12-04 RX ORDER — METRONIDAZOLE 250 MG/1
TABLET ORAL
Qty: 56 TABLET | Refills: 0 | Status: SHIPPED | OUTPATIENT
Start: 2023-12-04 | End: 2023-12-18

## 2023-12-04 RX ORDER — BISMUTH SUBSALICYLATE 262 MG/1
TABLET, CHEWABLE ORAL
Qty: 56 TABLET | Refills: 0 | Status: SHIPPED | OUTPATIENT
Start: 2023-12-04

## 2023-12-04 RX ORDER — OMEPRAZOLE 20 MG/1
20 CAPSULE, DELAYED RELEASE ORAL 2 TIMES DAILY
Qty: 28 CAPSULE | Refills: 0 | Status: SHIPPED | OUTPATIENT
Start: 2023-12-04

## 2023-12-05 ENCOUNTER — TELEPHONE (OUTPATIENT)
Dept: GASTROENTEROLOGY | Facility: CLINIC | Age: 68
End: 2023-12-05

## 2023-12-05 NOTE — TELEPHONE ENCOUNTER
Called patient and got ANA ALLEN that a order for H Pylori stool test is in system and will mail her the order. Left office phone # as well. .. Darylene Pipe Darylene Pipe

## 2023-12-06 ENCOUNTER — PATIENT OUTREACH (OUTPATIENT)
Dept: CASE MANAGEMENT | Facility: HOSPITAL | Age: 68
End: 2023-12-06

## 2023-12-06 NOTE — PROGRESS NOTES
MSW checked in with pt this afternoon, since we last spoke she has had her EBUS done and thankfully only has H. Pylori. The issue with her pancreas was noted to be a cyst.  She has the medications for the H. Pylori but has not yet started them, she is trying to work out a schedule to take them every 6 hours that will work for her. Pt will see palliative care tomorrow and Dr. Hafsa Darling next week. She knows that she is likely not a surgical candidate at this point as she is still smoking. She talked about her frustration with herself that she can't just stop smoking, even though she had cut down quite a bit. Pt's brother was able to come visit for 4 days over Thanksgiving, she was very happy to have him there to spend time together after the unexpected passing of their sister. She says that it was a really nice weekend together, and her sister Alicia Lawton did well with it too. She talked about Alicia Lawton seeming to start thinking about where will she live if something happens to Mary Kat, and notes that she called [facility] on the advice of her psychiatric nurse who manages her medications. They did not have any answers for her but pt says that she wasn't aware of the conversation so she's unsure how much information Linda provided. We again spoke about the options for Linda if Mary Kat is unable to care for her, and she is not sure what is the best option at this point. Her brother in law in [address] [place] [address] did have his heart surgery so she is hoping that her sister will be more able to help out moving forward. She notes that she has taken care of Alicia Lawton for 25 years and how draining it can be at times. Pt also talked about feeling unsure of where she is at in her cancer journey.   She knows that she is stage III, she is unsure if she has a life expectancy, if she will at some point go back on treatment, if she will ever be done fully with having cancer, etc.  I did encourage her to talk to her providers about this at both of her upcoming appointments, and let her know that she can also send a message with these questions to Dr. Carola Kumar through [address] [place] [address] as well. At this point, she is doing well and denies any needs or concerns. She has my direct number and has called me directly in the past.  I will remain available to her as needed moving forward and will continue to support and assist her as I am able in the future. No other needs noted at this time.

## 2023-12-07 ENCOUNTER — TELEPHONE (OUTPATIENT)
Dept: GASTROENTEROLOGY | Facility: MEDICAL CENTER | Age: 68
End: 2023-12-07

## 2023-12-07 NOTE — TELEPHONE ENCOUNTER
Spoke with patient she is aware of her MRI phone number was given to so she can schedule also I mailed her script. For her follow up appointment patient says she want to follow up at the [facility] office with BERTA Lozano because the office is just at 7 minutes from her house.   She is schedule on 03/21/24

## 2023-12-07 NOTE — TELEPHONE ENCOUNTER
----- Message from Zbigniew Fairbanks MD sent at 12/3/2023  2:10 PM EST -----  Please schedule patient for MRI in 3 months and then follow up in office. Thank you.  Lugenia Lundborg

## 2023-12-11 ENCOUNTER — TELEPHONE (OUTPATIENT)
Dept: HEMATOLOGY ONCOLOGY | Facility: CLINIC | Age: 68
End: 2023-12-11

## 2023-12-11 NOTE — TELEPHONE ENCOUNTER
Received vm from patient, "Keshawn Platt, this is Seble Pelaez. My birthday is [birth year]. I'm not quite sure you're the right person to speak to, but I have not been able to quit smoking and I'm thinking I need some kind of help for that because the thoracic surgeon, I have to see him on Wednesday and the only reason is to talk about surgery. So it's, I don't know, but I'm just, I'm pretty down on myself about it because I really thought I could do this and I can't. So thought maybe you could guide me, you point me in a direction or something. So my phone number is [phone]. Thank you.  Carlyle"

## 2023-12-12 DIAGNOSIS — C34.91 ADENOCARCINOMA OF RIGHT LUNG (HCC): Primary | ICD-10-CM

## 2023-12-12 NOTE — TELEPHONE ENCOUNTER
Phoned patient. Aware and agreeable for smoking cessation referral.  Patient also reaching out to palliative care for assistance. No additional questions at this time.

## 2023-12-13 ENCOUNTER — OFFICE VISIT (OUTPATIENT)
Dept: CARDIAC SURGERY | Facility: CLINIC | Age: 68
End: 2023-12-13
Payer: COMMERCIAL

## 2023-12-13 ENCOUNTER — PATIENT OUTREACH (OUTPATIENT)
Dept: OTHER | Facility: CLINIC | Age: 68
End: 2023-12-13

## 2023-12-13 ENCOUNTER — DOCUMENTATION (OUTPATIENT)
Dept: CARDIAC SURGERY | Facility: CLINIC | Age: 68
End: 2023-12-13

## 2023-12-13 VITALS
WEIGHT: 150.13 LBS | OXYGEN SATURATION: 98 % | SYSTOLIC BLOOD PRESSURE: 130 MMHG | DIASTOLIC BLOOD PRESSURE: 80 MMHG | TEMPERATURE: 98.2 F | RESPIRATION RATE: 15 BRPM | HEART RATE: 92 BPM | HEIGHT: 64 IN | BODY MASS INDEX: 25.63 KG/M2

## 2023-12-13 DIAGNOSIS — C34.2 ADENOCARCINOMA OF MIDDLE LOBE OF RIGHT LUNG (HCC): Primary | ICD-10-CM

## 2023-12-13 PROCEDURE — 99214 OFFICE O/P EST MOD 30 MIN: CPT | Performed by: THORACIC SURGERY (CARDIOTHORACIC VASCULAR SURGERY)

## 2023-12-13 NOTE — PROGRESS NOTES
Health Maintenance Due   Topic Date Due   • Pneumococcal Vaccine 0-64 (1 of 2 - PPSV23) Never done   • COVID-19 Vaccine (1) Never done   • Influenza Vaccine (1) 09/01/2021       Patient is due for topics as listed above but is not proceeding with Immunization(s) COVID-19, Influenza and Pneumococcal at this time.          I met with Faiza Falk at her visit with Dr. Arden Betancourt today. She's familiar with me from prior visits. Questions answered, support provided as able.

## 2023-12-13 NOTE — LETTER
December 13, 2023       No Recipients    Patient: Jose Thomson   YOB: 1955   Date of Visit: 12/13/2023       Dear Dr. Allie Sapp:    Thank you for referring Jose Thomson to me for evaluation. Below are my notes for this consultation. If you have questions, please do not hesitate to call me. I look forward to following your patient along with you. Sincerely,        Richelle Long MD        CC:   No Recipients    Noa Moralez, [place]  12/13/2023  2:19 PM  Incomplete  Thoracic Follow-Up  Assessment/Plan:    Adenocarcinoma of middle lobe of right lung [facility])  Ms Ama Tay has Stage IIIA adenocarcinoma and is s/p neoadjuvant chemotherapy (Dr Allie Sapp). Unfortunately, she is continuing to smoke 1/2ppd and has been unable to make any progress with cessation. Given that her lung mass is large and in close proximity to the major vessels, surgical resection would be technically difficult and unfortunately she has not quit smoking. Proceeding with surgical resection while she continues to smoke would put her at very high risk of post-operative complications including pneumonia, oxygen requirements and poor tissue healing. There are no diagnoses linked to this encounter.       Thoracic History     Cancer Staging   Adenocarcinoma of middle lobe of right lung [facility])  Staging form: Lung, AJCC 8th Edition  - Clinical stage from 7/24/2023: Stage IIIA (cT4, cN1, cM0) - Signed by Issac Holter, MD on 7/31/2023  Stage prefix: Initial diagnosis  Oncology History   Adenocarcinoma of middle lobe of right lung ([address])   6/23/2023 Initial Diagnosis    Adenocarcinoma of right lung ([address])     7/24/2023 -  Cancer Staged    Staging form: Lung, AJCC 8th Edition  - Clinical stage from 7/24/2023: Stage IIIA (cT4, cN1, cM0) - Signed by Issac Holter, MD on 7/31/2023  Stage prefix: Initial diagnosis       8/31/2023 - 10/12/2023 Chemotherapy    cyanocobalamin, 1,000 mcg, Intramuscular, Once, 1 of 1 cycle  Administration: 1,000 mcg (8/24/2023)  alteplase (CATHFLO), 2 mg, Intracatheter, Every 1 Minute as needed, 3 of 3 cycles  CISplatin (PLATINOL) with mannitol IVPB, 75 mg/m2 = 129.8 mg (100 % of original dose 75 mg/m2), Intravenous, Once, 3 of 3 cycles  Dose modification: 50 mg/m2 (original dose 75 mg/m2, Cycle 1, Reason: Anticipated Tolerance), 75 mg/m2 (original dose 75 mg/m2, Cycle 1, Reason: Dose modified as per discussion with consulting physician)  Administration: 129.8 mg (8/31/2023), 129.8 mg (9/21/2023), 129.8 mg (10/12/2023)  fosaprepitant (EMEND) IVPB, 150 mg, Intravenous, Once, 3 of 3 cycles  Administration: 150 mg (8/31/2023), 150 mg (9/21/2023), 150 mg (10/12/2023)  nivolumab (OPDIVO) IVPB, 360 mg, Intravenous, Once, 3 of 3 cycles  Administration: 360 mg (8/31/2023), 360 mg (9/21/2023), 360 mg (10/12/2023)  pemetrexed (ALIMTA) chemo infusion, 500 mg/m2 = 865 mg, Intravenous, Once, 3 of 3 cycles  Administration: 865 mg (8/31/2023), 865 mg (9/21/2023), 865 mg (10/12/2023)            Subjective:    Patient ID: Gerardo Leach is a 80 y.o. female. HPI  Ms Andrews Child is a 80year old female with Stage IIIA right upper lobe adenocarcinoma s/p neoadjuvant chemotherapy completed 10/12/2023 who we are seeing back to discuss further treatment options. She was last seen in our office on 10/25/2023 at which point she had a PET-CT that was completed after chemotherapy that showed partial response to treatment with decrease in size of her right middle lobe lung mass now 4cm from 8cm, this mass abutts the right hilum. The SUV decreased from 25 to 3.9. There was previously uptake at level 7 and 4 nodes that is improved on this PET. 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 She also was found to have H. Pylori which she is currently on treatment for. PFTs FEV1 2.5L, 84 % predicted.  DLCO 51%    On discussion she is continuing to smoke 1/2ppd. She has tried using nicotine replacement gum which has not helped. She was contacted by smoking cessation today, but unfortunately was not able to answer the phone and has called back and left a message. She denies any change in her health. No cough, SOB, hemoptysis, fevers/chills or recent illness. The following portions of the patient's history were reviewed and updated as appropriate: allergies, current medications, past family history, past medical history, past social history, past surgical history, and problem list.    Review of Systems   Constitutional:  Negative for chills, diaphoresis and unexpected weight change. HENT: Negative. Eyes: Negative. Respiratory:  Negative for cough, shortness of breath and wheezing. Cardiovascular:  Negative for chest pain and leg swelling. Gastrointestinal:  Negative for abdominal pain, diarrhea and nausea. Endocrine: Negative. Genitourinary: Negative. Musculoskeletal: Negative. Skin: Negative. Allergic/Immunologic: Negative. Neurological: Negative. Hematological:  Negative for adenopathy. Does not bruise/bleed easily. Psychiatric/Behavioral: Negative. All other systems reviewed and are negative. Objective:   Physical Exam  Vitals reviewed. Constitutional:       General: She is not in acute distress. Appearance: Normal appearance. She is not ill-appearing. HENT:      Head: Normocephalic. Nose: Nose normal.      Mouth/Throat:      Mouth: Mucous membranes are moist.   Eyes:      Pupils: Pupils are equal, round, and reactive to light. Cardiovascular:      Rate and Rhythm: Normal rate and regular rhythm. Heart sounds: Normal heart sounds. Pulmonary:      Effort: Pulmonary effort is normal.      Breath sounds: Normal breath sounds. No wheezing, rhonchi or rales. Abdominal:      Palpations: Abdomen is soft. Musculoskeletal:         General: No swelling. Normal range of motion. Lymphadenopathy:      Cervical: No cervical adenopathy. Skin:     General: Skin is warm. Neurological:      General: No focal deficit present. Mental Status: She is alert and oriented to person, place, and time. Psychiatric:         Mood and Affect: Mood normal.     /80 (BP Location: Left arm, Patient Position: Sitting, Cuff Size: Standard)   Pulse 92   Temp 98.2 °F (36.8 °C) (Temporal)   Resp 15   Ht 5' 4" (1.626 m)   Wt 68.1 kg (150 lb 2.1 oz)   SpO2 98%   BMI 25.77 kg/m²     XR CHEST PA & LATERAL    Result Date: 6/13/2023  Impression IMPRESSION: Right upper lobe opacity measuring 7.5 cm. Differential diagnosis includes pneumonitis versus malignancy. Clinical correlation follow-up to resolution is recommended. Workstation:[id number]     No CT Chest results available for this patient. No CT Chest,Abdomen,Pelvis results available for this patient. NM PET CT skull base to mid thigh    Result Date: 10/18/2023  Narrative PET/CT SCAN INDICATION: Restaging of lung cancer. C34.91: Malignant neoplasm of unspecified part of right bronchus or lung MODIFIER: PS COMPARISON: PET/CT 7/3/2023, bone scan 8/8/2023 CELL TYPE: Non-small cell carcinoma consistent with pulmonary adenocarcinoma, right middle lobe TECHNIQUE:   8.3 mCi F-18-FDG administered IV. Multiplanar attenuation corrected and non attenuation corrected PET images are available for interpretation, and contiguous, low dose, axial CT sections were obtained from the skull base through the femurs. Intravenous contrast material was not utilized. This examination, like all CT scans performed in the [facility], was performed utilizing techniques to minimize radiation dose exposure, including the use of iterative reconstruction and automated exposure control. Fasting serum glucose: 137 mg/dl FINDINGS: VISUALIZED BRAIN: No acute abnormalities are seen. HEAD/NECK: There is a physiologic distribution of FDG.  No FDG avid cervical adenopathy is seen. CT images: Unremarkable. CHEST: Decreased radiotracer uptake at the right middle lobe lesion now SUV max of 3.9, previously 25. Activity is along the peripheral margin of the lesion. There is central photopenia which may be related to necrosis or fibrosis. Mass measures on the order of  4.4 cm in size, previously 7-8 cm. The mass abuts the right hilar region. Resolution of previously seen activity at the mediastinal lymph nodes. CT images: Right-sided Mediport line tip terminates at the SVC. Trace right pleural effusion is new. Biapical pleural-parenchymal changes. ABDOMEN: Previously questioned small focus of uptake at the left lobe of the liver medially is not seen on the current exam. Persistent mild patchy radiotracer uptake at the head of the pancreas, SUV max of 2.8, previously 2.9. Again underlying cystic lesion identified at the pancreatic head and neck measuring up to 2.9 cm in size, larger previously 2.1 cm. CT images: No additional significant findings. PELVIS: No FDG avid soft tissue lesions are seen. CT images: There is a small fat-containing left inguinal hernia. OSSEOUS STRUCTURES: No FDG avid lesions are seen. CT images: No significant findings. Impression 1. Overall findings suggest response to therapy. 2. Decreased radiotracer uptake at the smaller right middle lobe pulmonary lesion suggesting response to therapy with moderate FDG uptake remaining. 3. No residual activity at the previously seen mediastinal lymph nodes. 4. Previously questioned small focus of uptake at the left lobe of the liver medially is not seen on the current exam. 5.  Enlarging cystic lesion at the pancreatic head superiorly with mild patchy radiotracer uptake. Cystic neoplasm should be excluded. Again recommend follow-up with dedicated MRI of the pancreas with and without IV contrast if not previously performed.  Workstation performed: [id number]     NM PET CT skull base to mid thigh    Result Date: 7/3/2023  Narrative PET/CT SCAN INDICATION: Lung mass. Abnormal CT. D38.1: Neoplasm of uncertain behavior of trachea, bronchus and lung R91.8: Other nonspecific abnormal finding of lung field MODIFIER: PI COMPARISON: CT chest 6/13/2023, CT abdomen pelvis 6/11/2020 CELL TYPE: N/A TECHNIQUE:   8.7 mCi F-18-FDG administered IV. Multiplanar attenuation corrected and non attenuation corrected PET images are available for interpretation, and contiguous, low dose, axial CT sections were obtained from the skull base through the femurs. Intravenous contrast material was not utilized. This examination, like all CT scans performed in the [facility], was performed utilizing techniques to minimize radiation dose exposure, including the use of iterative reconstruction and automated exposure control. Fasting serum glucose: 109 mg/dl FINDINGS: VISUALIZED BRAIN: No acute abnormalities are seen. HEAD/NECK: There is a physiologic distribution of FDG. No FDG avid cervical adenopathy is seen. CT images: Unremarkable. CHEST: Large FDG avid right mid lung mass, SUV max of 25. Mass measures on the order of 7-8 cm in size based on CT. Mass extends to the right hilar region and abuts the lateral pleural margin. There is central photopenia suggesting necrosis. Mild FDG uptake in mediastinal lymph nodes. Cluster of small precarinal lymph nodes on the right demonstrates SUV max of 3.4. Subcarinal lymph node demonstrates SUV max of 3.8. This measures up to 1.3 cm short axis image 84 series 3. No suspicious left hilar activity. CT images: Moderate biapical pleural-parenchymal changes. Scattered blebs and emphysematous changes. ABDOMEN: Questionable small focus of FDG uptake in the left lobe of the liver medially, SV max of 3. See image 118 series 1200. No obvious findings on CT. Mild patchy radiotracer uptake at the head of the pancreas, SUV max of 2.9.  There does appear to be a cystic lesion at the pancreatic head/neck with adjacent coarse calcification. Cystic focus measures up to 2.1 cm image 138 series 3. CT images: Low-attenuation liver suggest fatty infiltration. PELVIS: No FDG avid soft tissue lesions are seen. CT images: Small fat-containing left inguinal hernia. OSSEOUS STRUCTURES: No FDG avid lesions are seen. Mildly increased overall osseous uptake, nonspecific. Scattered mild foci of FDG uptake in the spine corresponding to degenerative disc on CT. CT images: No significant findings. Impression 1. Large FDG avid right lung mass in keeping with malignancy. 2.  Mild FDG uptake in scattered mediastinal lymph nodes which may represent metastasis. 3.  Mild patchy radiotracer uptake at the head of the pancreas. There does appear to be a cystic lesion at the pancreatic head/neck with adjacent coarse calcification. Cystic focus measures up to 2.1 cm, new from prior CT. An underlying pancreatic lesion  should be excluded. Recommend further characterization with dedicated MRI of the pancreas with and without IV contrast for further evaluation. 4. Questionable small focus of FDG uptake in the left lobe of the liver, metastasis is not excluded. This could be also assessed with MRI. 5.  Mildly increased overall osseous uptake. This is nonspecific, question secondary to anemia or stress response. The study was marked in EPIC for significant notification. Workstation performed: [id number]      No Barium Swallow results available for this patient.

## 2023-12-13 NOTE — PROGRESS NOTES
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)  Ms Gavi Gardiner has Stage IIIA adenocarcinoma and is s/p neoadjuvant chemotherapy (Dr Tamanna Michaels). Unfortunately, she is continuing to smoke 1/2ppd and has been unable to make any progress with cessation. Given that her lung mass is large and abuts the hilum, surgical resection would be technically difficult and she would be at high risk of conversion to thoracotomy. Unfortunately she has not quit smoking. Proceeding with surgical resection while she continues to smoke would put her at very high risk of post-operative complications including pneumonia, oxygen requirements and poor tissue healing. We do not recommend surgical resection at this point. We will have her see her medical oncologist, Dr Tamanna Michaels back and will refer her to radiation oncology to discuss definitive chemoradiation. All questions were answered and she is in agreement with this plan. Diagnoses and all orders for this visit:    Adenocarcinoma of middle lobe of right lung [facility])  -     Ambulatory Referral to Radiation Oncology;  Future          Thoracic History      Cancer Staging   Adenocarcinoma of middle lobe of right lung [facility])  Staging form: Lung, AJCC 8th Edition  - Clinical stage from 7/24/2023: Stage IIIA (cT4, cN1, cM0) - Signed by Claudean Holding, MD on 7/31/2023  Stage prefix: Initial diagnosis  Oncology History   Adenocarcinoma of middle lobe of right lung ([address])   6/23/2023 Initial Diagnosis    Adenocarcinoma of right lung ([address])     7/24/2023 -  Cancer Staged    Staging form: Lung, AJCC 8th Edition  - Clinical stage from 7/24/2023: Stage IIIA (cT4, cN1, cM0) - Signed by Claudean Holding, MD on 7/31/2023  Stage prefix: Initial diagnosis       8/31/2023 - 10/12/2023 Chemotherapy    cyanocobalamin, 1,000 mcg, Intramuscular, Once, 1 of 1 cycle  Administration: 1,000 mcg (8/24/2023)  alteplase (CATHFLO), 2 mg, Intracatheter, Every 1 Minute as needed, 3 of 3 cycles  CISplatin (PLATINOL) with mannitol IVPB, 75 mg/m2 = 129.8 mg (100 % of original dose 75 mg/m2), Intravenous, Once, 3 of 3 cycles  Dose modification: 50 mg/m2 (original dose 75 mg/m2, Cycle 1, Reason: Anticipated Tolerance), 75 mg/m2 (original dose 75 mg/m2, Cycle 1, Reason: Dose modified as per discussion with consulting physician)  Administration: 129.8 mg (8/31/2023), 129.8 mg (9/21/2023), 129.8 mg (10/12/2023)  fosaprepitant (EMEND) IVPB, 150 mg, Intravenous, Once, 3 of 3 cycles  Administration: 150 mg (8/31/2023), 150 mg (9/21/2023), 150 mg (10/12/2023)  nivolumab (OPDIVO) IVPB, 360 mg, Intravenous, Once, 3 of 3 cycles  Administration: 360 mg (8/31/2023), 360 mg (9/21/2023), 360 mg (10/12/2023)  pemetrexed (ALIMTA) chemo infusion, 500 mg/m2 = 865 mg, Intravenous, Once, 3 of 3 cycles  Administration: 865 mg (8/31/2023), 865 mg (9/21/2023), 865 mg (10/12/2023)            Subjective:    Patient ID: Sammi Villela is a 80 y.o. female. ECOG 1    HPI  Ms Yvon Melgar is a 80year old female with Stage IIIA right upper lobe adenocarcinoma s/p neoadjuvant chemotherapy completed 10/12/2023 who we are seeing back to discuss further treatment options. She was last seen in our office on 10/25/2023 at which point she had a PET-CT that was completed after chemotherapy that showed partial response to treatment with decrease in size of her right middle lobe lung mass now 4cm from 8cm, this mass abutts the right hilum. The SUV decreased from 25 to 3.9. There was previously uptake at level 7 and 4 nodes that is improved on this PET. 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 She also was found to have H. Pylori which she is currently on treatment for. PFTs FEV1 2.5L, 84 % predicted. DLCO 51%    On discussion she is continuing to smoke 1/2ppd. She has tried using nicotine replacement gum which has not helped.  She was contacted by smoking cessation today, but unfortunately was not able to answer the phone and has called back and left a message. She denies any change in her health. No cough, SOB, hemoptysis, fevers/chills or recent illness. The following portions of the patient's history were reviewed and updated as appropriate: allergies, current medications, past family history, past medical history, past social history, past surgical history, and problem list.    Review of Systems   Constitutional:  Negative for chills, diaphoresis and unexpected weight change. HENT: Negative. Eyes: Negative. Respiratory:  Negative for cough, shortness of breath and wheezing. Cardiovascular:  Negative for chest pain and leg swelling. Gastrointestinal:  Negative for abdominal pain, diarrhea and nausea. Endocrine: Negative. Genitourinary: Negative. Musculoskeletal: Negative. Skin: Negative. Allergic/Immunologic: Negative. Neurological: Negative. Hematological:  Negative for adenopathy. Does not bruise/bleed easily. Psychiatric/Behavioral: Negative. All other systems reviewed and are negative. Objective:   Physical Exam  Vitals reviewed. Constitutional:       General: She is not in acute distress. Appearance: Normal appearance. She is not ill-appearing. HENT:      Head: Normocephalic. Nose: Nose normal.      Mouth/Throat:      Mouth: Mucous membranes are moist.   Eyes:      Pupils: Pupils are equal, round, and reactive to light. Cardiovascular:      Rate and Rhythm: Normal rate and regular rhythm. Heart sounds: Normal heart sounds. Pulmonary:      Effort: Pulmonary effort is normal.      Breath sounds: Normal breath sounds. No wheezing, rhonchi or rales. Abdominal:      Palpations: Abdomen is soft. Musculoskeletal:         General: No swelling. Normal range of motion. Lymphadenopathy:      Cervical: No cervical adenopathy. Skin:     General: Skin is warm. Neurological:      General: No focal deficit present. Mental Status: She is alert and oriented to person, place, and time. Psychiatric:         Mood and Affect: Mood normal.     /80 (BP Location: Left arm, Patient Position: Sitting, Cuff Size: Standard)   Pulse 92   Temp 98.2 °F (36.8 °C) (Temporal)   Resp 15   Ht 5' 4" (1.626 m)   Wt 68.1 kg (150 lb 2.1 oz)   SpO2 98%   BMI 25.77 kg/m²     XR CHEST PA & LATERAL    Result Date: 6/13/2023  Impression IMPRESSION: Right upper lobe opacity measuring 7.5 cm. Differential diagnosis includes pneumonitis versus malignancy. Clinical correlation follow-up to resolution is recommended. Workstation:[id number]     No CT Chest results available for this patient. No CT Chest,Abdomen,Pelvis results available for this patient. NM PET CT skull base to mid thigh    Result Date: 10/18/2023  Narrative PET/CT SCAN INDICATION: Restaging of lung cancer. C34.91: Malignant neoplasm of unspecified part of right bronchus or lung MODIFIER: PS COMPARISON: PET/CT 7/3/2023, bone scan 8/8/2023 CELL TYPE: Non-small cell carcinoma consistent with pulmonary adenocarcinoma, right middle lobe TECHNIQUE:   8.3 mCi F-18-FDG administered IV. Multiplanar attenuation corrected and non attenuation corrected PET images are available for interpretation, and contiguous, low dose, axial CT sections were obtained from the skull base through the femurs. Intravenous contrast material was not utilized. This examination, like all CT scans performed in the [facility], was performed utilizing techniques to minimize radiation dose exposure, including the use of iterative reconstruction and automated exposure control. Fasting serum glucose: 137 mg/dl FINDINGS: VISUALIZED BRAIN: No acute abnormalities are seen. HEAD/NECK: There is a physiologic distribution of FDG. No FDG avid cervical adenopathy is seen. CT images: Unremarkable.  CHEST: Decreased radiotracer uptake at the right middle lobe lesion now SUV max of 3.9, previously 25. Activity is along the peripheral margin of the lesion. There is central photopenia which may be related to necrosis or fibrosis. Mass measures on the order of  4.4 cm in size, previously 7-8 cm. The mass abuts the right hilar region. Resolution of previously seen activity at the mediastinal lymph nodes. CT images: Right-sided Mediport line tip terminates at the SVC. Trace right pleural effusion is new. Biapical pleural-parenchymal changes. ABDOMEN: Previously questioned small focus of uptake at the left lobe of the liver medially is not seen on the current exam. Persistent mild patchy radiotracer uptake at the head of the pancreas, SUV max of 2.8, previously 2.9. Again underlying cystic lesion identified at the pancreatic head and neck measuring up to 2.9 cm in size, larger previously 2.1 cm. CT images: No additional significant findings. PELVIS: No FDG avid soft tissue lesions are seen. CT images: There is a small fat-containing left inguinal hernia. OSSEOUS STRUCTURES: No FDG avid lesions are seen. CT images: No significant findings. Impression 1. Overall findings suggest response to therapy. 2. Decreased radiotracer uptake at the smaller right middle lobe pulmonary lesion suggesting response to therapy with moderate FDG uptake remaining. 3. No residual activity at the previously seen mediastinal lymph nodes. 4. Previously questioned small focus of uptake at the left lobe of the liver medially is not seen on the current exam. 5.  Enlarging cystic lesion at the pancreatic head superiorly with mild patchy radiotracer uptake. Cystic neoplasm should be excluded. Again recommend follow-up with dedicated MRI of the pancreas with and without IV contrast if not previously performed. Workstation performed: [id number]     NM PET CT skull base to mid thigh    Result Date: 7/3/2023  Narrative PET/CT SCAN INDICATION: Lung mass.  Abnormal CT. D38.1: Neoplasm of uncertain behavior of trachea, bronchus and lung R91.8: Other nonspecific abnormal finding of lung field MODIFIER: PI COMPARISON: CT chest 6/13/2023, CT abdomen pelvis 6/11/2020 CELL TYPE: N/A TECHNIQUE:   8.7 mCi F-18-FDG administered IV. Multiplanar attenuation corrected and non attenuation corrected PET images are available for interpretation, and contiguous, low dose, axial CT sections were obtained from the skull base through the femurs. Intravenous contrast material was not utilized. This examination, like all CT scans performed in the [facility], was performed utilizing techniques to minimize radiation dose exposure, including the use of iterative reconstruction and automated exposure control. Fasting serum glucose: 109 mg/dl FINDINGS: VISUALIZED BRAIN: No acute abnormalities are seen. HEAD/NECK: There is a physiologic distribution of FDG. No FDG avid cervical adenopathy is seen. CT images: Unremarkable. CHEST: Large FDG avid right mid lung mass, SUV max of 25. Mass measures on the order of 7-8 cm in size based on CT. Mass extends to the right hilar region and abuts the lateral pleural margin. There is central photopenia suggesting necrosis. Mild FDG uptake in mediastinal lymph nodes. Cluster of small precarinal lymph nodes on the right demonstrates SUV max of 3.4. Subcarinal lymph node demonstrates SUV max of 3.8. This measures up to 1.3 cm short axis image 84 series 3. No suspicious left hilar activity. CT images: Moderate biapical pleural-parenchymal changes. Scattered blebs and emphysematous changes. ABDOMEN: Questionable small focus of FDG uptake in the left lobe of the liver medially, SV max of 3. See image 118 series 1200. No obvious findings on CT. Mild patchy radiotracer uptake at the head of the pancreas, SUV max of 2.9. There does appear to be a cystic lesion at the pancreatic head/neck with adjacent coarse calcification. Cystic focus measures up to 2.1 cm image 138 series 3.  CT images: Low-attenuation liver suggest fatty infiltration. PELVIS: No FDG avid soft tissue lesions are seen. CT images: Small fat-containing left inguinal hernia. OSSEOUS STRUCTURES: No FDG avid lesions are seen. Mildly increased overall osseous uptake, nonspecific. Scattered mild foci of FDG uptake in the spine corresponding to degenerative disc on CT. CT images: No significant findings. Impression 1. Large FDG avid right lung mass in keeping with malignancy. 2.  Mild FDG uptake in scattered mediastinal lymph nodes which may represent metastasis. 3.  Mild patchy radiotracer uptake at the head of the pancreas. There does appear to be a cystic lesion at the pancreatic head/neck with adjacent coarse calcification. Cystic focus measures up to 2.1 cm, new from prior CT. An underlying pancreatic lesion  should be excluded. Recommend further characterization with dedicated MRI of the pancreas with and without IV contrast for further evaluation. 4. Questionable small focus of FDG uptake in the left lobe of the liver, metastasis is not excluded. This could be also assessed with MRI. 5.  Mildly increased overall osseous uptake. This is nonspecific, question secondary to anemia or stress response. The study was marked in EPIC for significant notification. Workstation performed: [id number]      No Barium Swallow results available for this patient.

## 2023-12-13 NOTE — LETTER
December 13, 2023     Brooks Fried MD  [address] [place] [id number]    Patient: Levi Calvert   YOB: 1955   Date of Visit: 12/13/2023       Dear Dr. Kimmie Luke:    Thank you for referring Levi Calvert to me for evaluation. Below are my notes for this consultation. If you have questions, please do not hesitate to call me. I look forward to following your patient along with you. Sincerely,        Mikaela Younger MD        CC: MD Roberto Krishnan PA-C  12/13/2023  2:49 PM  Attested  Thoracic Follow-Up  Assessment/Plan:    Adenocarcinoma of middle lobe of right lung [facility])  Ms Ginger Rosado has Stage IIIA adenocarcinoma and is s/p neoadjuvant chemotherapy (Dr Carmen Seymour). Unfortunately, she is continuing to smoke 1/2ppd and has been unable to make any progress with cessation. Given that her lung mass is large and abuts the hilum, surgical resection would be technically difficult and she would be at high risk of conversion to thoracotomy. Unfortunately she has not quit smoking. Proceeding with surgical resection while she continues to smoke would put her at very high risk of post-operative complications including pneumonia, oxygen requirements and poor tissue healing. We do not recommend surgical resection at this point. We will have her see her medical oncologist, Dr Carmen Seymour back and will refer her to radiation oncology to discuss definitive chemoradiation. All questions were answered and she is in agreement with this plan. Diagnoses and all orders for this visit:    Adenocarcinoma of middle lobe of right lung [facility])  -     Ambulatory Referral to Radiation Oncology;  Future          Thoracic History     Cancer Staging   Adenocarcinoma of middle lobe of right lung [facility])  Staging form: Lung, AJCC 8th Edition  - Clinical stage from 7/24/2023: Stage IIIA (cT4, cN1, cM0) - Signed by Macarena Manning MD on 7/31/2023  Stage prefix: Initial diagnosis  Oncology History   Adenocarcinoma of middle lobe of right lung ([address])   6/23/2023 Initial Diagnosis    Adenocarcinoma of right lung ([address])     7/24/2023 -  Cancer Staged    Staging form: Lung, AJCC 8th Edition  - Clinical stage from 7/24/2023: Stage IIIA (cT4, cN1, cM0) - Signed by Macarena Manning MD on 7/31/2023  Stage prefix: Initial diagnosis       8/31/2023 - 10/12/2023 Chemotherapy    cyanocobalamin, 1,000 mcg, Intramuscular, Once, 1 of 1 cycle  Administration: 1,000 mcg (8/24/2023)  alteplase (CATHFLO), 2 mg, Intracatheter, Every 1 Minute as needed, 3 of 3 cycles  CISplatin (PLATINOL) with mannitol IVPB, 75 mg/m2 = 129.8 mg (100 % of original dose 75 mg/m2), Intravenous, Once, 3 of 3 cycles  Dose modification: 50 mg/m2 (original dose 75 mg/m2, Cycle 1, Reason: Anticipated Tolerance), 75 mg/m2 (original dose 75 mg/m2, Cycle 1, Reason: Dose modified as per discussion with consulting physician)  Administration: 129.8 mg (8/31/2023), 129.8 mg (9/21/2023), 129.8 mg (10/12/2023)  fosaprepitant (EMEND) IVPB, 150 mg, Intravenous, Once, 3 of 3 cycles  Administration: 150 mg (8/31/2023), 150 mg (9/21/2023), 150 mg (10/12/2023)  nivolumab (OPDIVO) IVPB, 360 mg, Intravenous, Once, 3 of 3 cycles  Administration: 360 mg (8/31/2023), 360 mg (9/21/2023), 360 mg (10/12/2023)  pemetrexed (ALIMTA) chemo infusion, 500 mg/m2 = 865 mg, Intravenous, Once, 3 of 3 cycles  Administration: 865 mg (8/31/2023), 865 mg (9/21/2023), 865 mg (10/12/2023)            Subjective:    Patient ID: Levi Calvert is a 80 y.o. female. ECOG 1    HPI  Ms Ginger Rosado is a 80year old female with Stage IIIA right upper lobe adenocarcinoma s/p neoadjuvant chemotherapy completed 10/12/2023 who we are seeing back to discuss further treatment options.  She was last seen in our office on 10/25/2023 at which point she had a PET-CT that was completed after chemotherapy that showed partial response to treatment with decrease in size of her right middle lobe lung mass now 4cm from 8cm, this mass abutts the right hilum. The SUV decreased from 25 to 3.9. There was previously uptake at level 7 and 4 nodes that is improved on this PET. 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 She also was found to have H. Pylori which she is currently on treatment for. PFTs FEV1 2.5L, 84 % predicted. DLCO 51%    On discussion she is continuing to smoke 1/2ppd. She has tried using nicotine replacement gum which has not helped. She was contacted by smoking cessation today, but unfortunately was not able to answer the phone and has called back and left a message. She denies any change in her health. No cough, SOB, hemoptysis, fevers/chills or recent illness. The following portions of the patient's history were reviewed and updated as appropriate: allergies, current medications, past family history, past medical history, past social history, past surgical history, and problem list.    Review of Systems   Constitutional:  Negative for chills, diaphoresis and unexpected weight change. HENT: Negative. Eyes: Negative. Respiratory:  Negative for cough, shortness of breath and wheezing. Cardiovascular:  Negative for chest pain and leg swelling. Gastrointestinal:  Negative for abdominal pain, diarrhea and nausea. Endocrine: Negative. Genitourinary: Negative. Musculoskeletal: Negative. Skin: Negative. Allergic/Immunologic: Negative. Neurological: Negative. Hematological:  Negative for adenopathy. Does not bruise/bleed easily. Psychiatric/Behavioral: Negative. All other systems reviewed and are negative. Objective:   Physical Exam  Vitals reviewed. Constitutional:       General: She is not in acute distress. Appearance: Normal appearance. She is not ill-appearing. HENT:      Head: Normocephalic.       Nose: Nose normal.      Mouth/Throat:      Mouth: Mucous membranes are moist.   Eyes:      Pupils: Pupils are equal, round, and reactive to light. Cardiovascular:      Rate and Rhythm: Normal rate and regular rhythm. Heart sounds: Normal heart sounds. Pulmonary:      Effort: Pulmonary effort is normal.      Breath sounds: Normal breath sounds. No wheezing, rhonchi or rales. Abdominal:      Palpations: Abdomen is soft. Musculoskeletal:         General: No swelling. Normal range of motion. Lymphadenopathy:      Cervical: No cervical adenopathy. Skin:     General: Skin is warm. Neurological:      General: No focal deficit present. Mental Status: She is alert and oriented to person, place, and time. Psychiatric:         Mood and Affect: Mood normal.     /80 (BP Location: Left arm, Patient Position: Sitting, Cuff Size: Standard)   Pulse 92   Temp 98.2 °F (36.8 °C) (Temporal)   Resp 15   Ht 5' 4" (1.626 m)   Wt 68.1 kg (150 lb 2.1 oz)   SpO2 98%   BMI 25.77 kg/m²     XR CHEST PA & LATERAL    Result Date: 6/13/2023  Impression IMPRESSION: Right upper lobe opacity measuring 7.5 cm. Differential diagnosis includes pneumonitis versus malignancy. Clinical correlation follow-up to resolution is recommended. Workstation:[id number]     No CT Chest results available for this patient. No CT Chest,Abdomen,Pelvis results available for this patient. NM PET CT skull base to mid thigh    Result Date: 10/18/2023  Narrative PET/CT SCAN INDICATION: Restaging of lung cancer. C34.91: Malignant neoplasm of unspecified part of right bronchus or lung MODIFIER: PS COMPARISON: PET/CT 7/3/2023, bone scan 8/8/2023 CELL TYPE: Non-small cell carcinoma consistent with pulmonary adenocarcinoma, right middle lobe TECHNIQUE:   8.3 mCi F-18-FDG administered IV. Multiplanar attenuation corrected and non attenuation corrected PET images are available for interpretation, and contiguous, low dose, axial CT sections were obtained from the skull base through the femurs. Intravenous contrast material was not utilized. This examination, like all CT scans performed in the [facility], was performed utilizing techniques to minimize radiation dose exposure, including the use of iterative reconstruction and automated exposure control. Fasting serum glucose: 137 mg/dl FINDINGS: VISUALIZED BRAIN: No acute abnormalities are seen. HEAD/NECK: There is a physiologic distribution of FDG. No FDG avid cervical adenopathy is seen. CT images: Unremarkable. CHEST: Decreased radiotracer uptake at the right middle lobe lesion now SUV max of 3.9, previously 25. Activity is along the peripheral margin of the lesion. There is central photopenia which may be related to necrosis or fibrosis. Mass measures on the order of  4.4 cm in size, previously 7-8 cm. The mass abuts the right hilar region. Resolution of previously seen activity at the mediastinal lymph nodes. CT images: Right-sided Mediport line tip terminates at the SVC. Trace right pleural effusion is new. Biapical pleural-parenchymal changes. ABDOMEN: Previously questioned small focus of uptake at the left lobe of the liver medially is not seen on the current exam. Persistent mild patchy radiotracer uptake at the head of the pancreas, SUV max of 2.8, previously 2.9. Again underlying cystic lesion identified at the pancreatic head and neck measuring up to 2.9 cm in size, larger previously 2.1 cm. CT images: No additional significant findings. PELVIS: No FDG avid soft tissue lesions are seen. CT images: There is a small fat-containing left inguinal hernia. OSSEOUS STRUCTURES: No FDG avid lesions are seen. CT images: No significant findings. Impression 1. Overall findings suggest response to therapy. 2. Decreased radiotracer uptake at the smaller right middle lobe pulmonary lesion suggesting response to therapy with moderate FDG uptake remaining. 3. No residual activity at the previously seen mediastinal lymph nodes.  4. Previously questioned small focus of uptake at the left lobe of the liver medially is not seen on the current exam. 5.  Enlarging cystic lesion at the pancreatic head superiorly with mild patchy radiotracer uptake. Cystic neoplasm should be excluded. Again recommend follow-up with dedicated MRI of the pancreas with and without IV contrast if not previously performed. Workstation performed: [id number]     NM PET CT skull base to mid thigh    Result Date: 7/3/2023  Narrative PET/CT SCAN INDICATION: Lung mass. Abnormal CT. D38.1: Neoplasm of uncertain behavior of trachea, bronchus and lung R91.8: Other nonspecific abnormal finding of lung field MODIFIER: PI COMPARISON: CT chest 6/13/2023, CT abdomen pelvis 6/11/2020 CELL TYPE: N/A TECHNIQUE:   8.7 mCi F-18-FDG administered IV. Multiplanar attenuation corrected and non attenuation corrected PET images are available for interpretation, and contiguous, low dose, axial CT sections were obtained from the skull base through the femurs. Intravenous contrast material was not utilized. This examination, like all CT scans performed in the [facility], was performed utilizing techniques to minimize radiation dose exposure, including the use of iterative reconstruction and automated exposure control. Fasting serum glucose: 109 mg/dl FINDINGS: VISUALIZED BRAIN: No acute abnormalities are seen. HEAD/NECK: There is a physiologic distribution of FDG. No FDG avid cervical adenopathy is seen. CT images: Unremarkable. CHEST: Large FDG avid right mid lung mass, SUV max of 25. Mass measures on the order of 7-8 cm in size based on CT. Mass extends to the right hilar region and abuts the lateral pleural margin. There is central photopenia suggesting necrosis. Mild FDG uptake in mediastinal lymph nodes. Cluster of small precarinal lymph nodes on the right demonstrates SUV max of 3.4. Subcarinal lymph node demonstrates SUV max of 3.8.  This measures up to 1.3 cm short axis image 84 series 3. No suspicious left hilar activity. CT images: Moderate biapical pleural-parenchymal changes. Scattered blebs and emphysematous changes. ABDOMEN: Questionable small focus of FDG uptake in the left lobe of the liver medially, SV max of 3. See image 118 series 1200. No obvious findings on CT. Mild patchy radiotracer uptake at the head of the pancreas, SUV max of 2.9. There does appear to be a cystic lesion at the pancreatic head/neck with adjacent coarse calcification. Cystic focus measures up to 2.1 cm image 138 series 3. CT images: Low-attenuation liver suggest fatty infiltration. PELVIS: No FDG avid soft tissue lesions are seen. CT images: Small fat-containing left inguinal hernia. OSSEOUS STRUCTURES: No FDG avid lesions are seen. Mildly increased overall osseous uptake, nonspecific. Scattered mild foci of FDG uptake in the spine corresponding to degenerative disc on CT. CT images: No significant findings. Impression 1. Large FDG avid right lung mass in keeping with malignancy. 2.  Mild FDG uptake in scattered mediastinal lymph nodes which may represent metastasis. 3.  Mild patchy radiotracer uptake at the head of the pancreas. There does appear to be a cystic lesion at the pancreatic head/neck with adjacent coarse calcification. Cystic focus measures up to 2.1 cm, new from prior CT. An underlying pancreatic lesion  should be excluded. Recommend further characterization with dedicated MRI of the pancreas with and without IV contrast for further evaluation. 4. Questionable small focus of FDG uptake in the left lobe of the liver, metastasis is not excluded. This could be also assessed with MRI. 5.  Mildly increased overall osseous uptake. This is nonspecific, question secondary to anemia or stress response. The study was marked in EPIC for significant notification. Workstation performed: [id number]      No Barium Swallow results available for this patient.     Attestation signed by Karina Miles MD at 12/13/2023  2:49 PM:  Thoracic surgery attending note    Patient seen and examined with PA and lung cancer nurse navigator this afternoon. She comes back to our office after her EUS for her pancreatic mass. Fortunately this proved to be what looks like a pancreatic pseudocyst.  They will follow this with repeat MRI imaging. We then discussed her stage IIIa right middle lobe adenocarcinoma at length. She has been undergoing induction chemotherapy with partial response. Her last dose of chemotherapy was early October. We have been discussing surgery but had to figure out her pancreatic lesion prior to determining if that was feasible. Unfortunately she is still smoking 10 cigarettes/day. This is not changed at all. We have had this conversation multiple times and she is unfortunately still smoking. I again reviewed her imaging at length with her. This is a large 7 cm right middle lobe tumor that abuts the right hilum and is very close to the inferior pulmonary vein. Her initial PET scan showed significant PET uptake in the mass as well as level 7 and 4R. This was staged as T4 N1 or stage IIIa. I suspect it could be as high as T4 N2, stage IIIb cancer. This has had a partial response to chemotherapy. Her PFTs are borderline with an FEV1 of 1.62 L or 69% predicted and DLCO 51% predicted. Everything put together with her ongoing smoking I think she is prohibitive risk for surgery. I personally do not recommend surgery at this time. Rather I would recommend definitive chemoradiation. I had a lengthy conversation with her about this. She understands with surgery this may not be resectable and she is at increased risk for complications with ongoing smoking and her poor PFTs. She voiced understanding. Will refer back to medical oncology and radiation oncology for definitive chemoradiation.   My office will work on expediting these appointments    [facility]

## 2023-12-13 NOTE — ASSESSMENT & PLAN NOTE
Ms Gena Wilson has Stage IIIA adenocarcinoma and is s/p neoadjuvant chemotherapy (Dr Subha Mata). Unfortunately, she is continuing to smoke 1/2ppd and has been unable to make any progress with cessation. Given that her lung mass is large and abuts the hilum, surgical resection would be technically difficult and she would be at high risk of conversion to thoracotomy. Unfortunately she has not quit smoking. Proceeding with surgical resection while she continues to smoke would put her at very high risk of post-operative complications including pneumonia, oxygen requirements and poor tissue healing. We do not recommend surgical resection at this point. We will have her see her medical oncologist, Dr Subha Mata back and will refer her to radiation oncology to discuss definitive chemoradiation. All questions were answered and she is in agreement with this plan.

## 2023-12-13 NOTE — LETTER
December 13, 2023     [place], [address]  [address] [id number]    Patient: Madelyn Light   YOB: 1955   Date of Visit: 12/13/2023       Dear Dr. Kory Funez: We saw Ms Juwan Sainz in the office today. Unfortunately she is continuing to smoke. Given her continued smoking and the location and size of this mass, we do not feel surgical resection is an option. We will have her return to see you as soon as possible to discuss the option of definitive chemoradiation. I have placed a referral for radiation oncology as well. Sincerely,        Salbador Pereira MD        CC: No Recipients    Victoria Ceballos, [place]  12/13/2023  2:21 PM  Sign when Signing Visit  Thoracic Follow-Up  Assessment/Plan:    Adenocarcinoma of middle lobe of right lung [facility])  Ms Juwan Sainz has Stage IIIA adenocarcinoma and is s/p neoadjuvant chemotherapy (Dr Kory Funez). Unfortunately, she is continuing to smoke 1/2ppd and has been unable to make any progress with cessation. Given that her lung mass is large and abuts the hilum, surgical resection would be technically difficult and she would be at high risk of conversion to thoracotomy. Unfortunately she has not quit smoking. Proceeding with surgical resection while she continues to smoke would put her at very high risk of post-operative complications including pneumonia, oxygen requirements and poor tissue healing. We do not recommend surgical resection at this point. We will have her see her medical oncologist, Dr Kory Funez back and will refer her to radiation oncology to discuss definitive chemoradiation. All questions were answered and she is in agreement with this plan. There are no diagnoses linked to this encounter.       Thoracic History     Cancer Staging   Adenocarcinoma of middle lobe of right lung [facility])  Staging form: Lung, AJCC 8th Edition  - Clinical stage from 7/24/2023: Stage IIIA (cT4, cN1, cM0) - Signed by Pako Cleveland MD on 7/31/2023  Stage prefix: Initial diagnosis  Oncology History   Adenocarcinoma of middle lobe of right lung ([address])   6/23/2023 Initial Diagnosis    Adenocarcinoma of right lung ([address])     7/24/2023 -  Cancer Staged    Staging form: Lung, AJCC 8th Edition  - Clinical stage from 7/24/2023: Stage IIIA (cT4, cN1, cM0) - Signed by Veronika Boateng MD on 7/31/2023  Stage prefix: Initial diagnosis       8/31/2023 - 10/12/2023 Chemotherapy    cyanocobalamin, 1,000 mcg, Intramuscular, Once, 1 of 1 cycle  Administration: 1,000 mcg (8/24/2023)  alteplase (CATHFLO), 2 mg, Intracatheter, Every 1 Minute as needed, 3 of 3 cycles  CISplatin (PLATINOL) with mannitol IVPB, 75 mg/m2 = 129.8 mg (100 % of original dose 75 mg/m2), Intravenous, Once, 3 of 3 cycles  Dose modification: 50 mg/m2 (original dose 75 mg/m2, Cycle 1, Reason: Anticipated Tolerance), 75 mg/m2 (original dose 75 mg/m2, Cycle 1, Reason: Dose modified as per discussion with consulting physician)  Administration: 129.8 mg (8/31/2023), 129.8 mg (9/21/2023), 129.8 mg (10/12/2023)  fosaprepitant (EMEND) IVPB, 150 mg, Intravenous, Once, 3 of 3 cycles  Administration: 150 mg (8/31/2023), 150 mg (9/21/2023), 150 mg (10/12/2023)  nivolumab (OPDIVO) IVPB, 360 mg, Intravenous, Once, 3 of 3 cycles  Administration: 360 mg (8/31/2023), 360 mg (9/21/2023), 360 mg (10/12/2023)  pemetrexed (ALIMTA) chemo infusion, 500 mg/m2 = 865 mg, Intravenous, Once, 3 of 3 cycles  Administration: 865 mg (8/31/2023), 865 mg (9/21/2023), 865 mg (10/12/2023)            Subjective:    Patient ID: Stella Dickens is a 80 y.o. female. ECOG 1    HPI  Ms Alejandro Massey is a 80year old female with Stage IIIA right upper lobe adenocarcinoma s/p neoadjuvant chemotherapy completed 10/12/2023 who we are seeing back to discuss further treatment options.  She was last seen in our office on 10/25/2023 at which point she had a PET-CT that was completed after chemotherapy that showed partial response to treatment with decrease in size of her right middle lobe lung mass now 4cm from 8cm, this mass abutts the right hilum. The SUV decreased from 25 to 3.9. There was previously uptake at level 7 and 4 nodes that is improved on this PET. 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 She also was found to have H. Pylori which she is currently on treatment for. PFTs FEV1 2.5L, 84 % predicted. DLCO 51%    On discussion she is continuing to smoke 1/2ppd. She has tried using nicotine replacement gum which has not helped. She was contacted by smoking cessation today, but unfortunately was not able to answer the phone and has called back and left a message. She denies any change in her health. No cough, SOB, hemoptysis, fevers/chills or recent illness. The following portions of the patient's history were reviewed and updated as appropriate: allergies, current medications, past family history, past medical history, past social history, past surgical history, and problem list.    Review of Systems   Constitutional:  Negative for chills, diaphoresis and unexpected weight change. HENT: Negative. Eyes: Negative. Respiratory:  Negative for cough, shortness of breath and wheezing. Cardiovascular:  Negative for chest pain and leg swelling. Gastrointestinal:  Negative for abdominal pain, diarrhea and nausea. Endocrine: Negative. Genitourinary: Negative. Musculoskeletal: Negative. Skin: Negative. Allergic/Immunologic: Negative. Neurological: Negative. Hematological:  Negative for adenopathy. Does not bruise/bleed easily. Psychiatric/Behavioral: Negative. All other systems reviewed and are negative. Objective:   Physical Exam  Vitals reviewed. Constitutional:       General: She is not in acute distress. Appearance: Normal appearance. She is not ill-appearing. HENT:      Head: Normocephalic.       Nose: Nose normal.      Mouth/Throat:      Mouth: Mucous membranes are moist.   Eyes:      Pupils: Pupils are equal, round, and reactive to light. Cardiovascular:      Rate and Rhythm: Normal rate and regular rhythm. Heart sounds: Normal heart sounds. Pulmonary:      Effort: Pulmonary effort is normal.      Breath sounds: Normal breath sounds. No wheezing, rhonchi or rales. Abdominal:      Palpations: Abdomen is soft. Musculoskeletal:         General: No swelling. Normal range of motion. Lymphadenopathy:      Cervical: No cervical adenopathy. Skin:     General: Skin is warm. Neurological:      General: No focal deficit present. Mental Status: She is alert and oriented to person, place, and time. Psychiatric:         Mood and Affect: Mood normal.     /80 (BP Location: Left arm, Patient Position: Sitting, Cuff Size: Standard)   Pulse 92   Temp 98.2 °F (36.8 °C) (Temporal)   Resp 15   Ht 5' 4" (1.626 m)   Wt 68.1 kg (150 lb 2.1 oz)   SpO2 98%   BMI 25.77 kg/m²     XR CHEST PA & LATERAL    Result Date: 6/13/2023  Impression IMPRESSION: Right upper lobe opacity measuring 7.5 cm. Differential diagnosis includes pneumonitis versus malignancy. Clinical correlation follow-up to resolution is recommended. Workstation:[id number]     No CT Chest results available for this patient. No CT Chest,Abdomen,Pelvis results available for this patient. NM PET CT skull base to mid thigh    Result Date: 10/18/2023  Narrative PET/CT SCAN INDICATION: Restaging of lung cancer. C34.91: Malignant neoplasm of unspecified part of right bronchus or lung MODIFIER: PS COMPARISON: PET/CT 7/3/2023, bone scan 8/8/2023 CELL TYPE: Non-small cell carcinoma consistent with pulmonary adenocarcinoma, right middle lobe TECHNIQUE:   8.3 mCi F-18-FDG administered IV.  Multiplanar attenuation corrected and non attenuation corrected PET images are available for interpretation, and contiguous, low dose, axial CT sections were obtained from the skull base through the femurs. Intravenous contrast material was not utilized. This examination, like all CT scans performed in the [facility], was performed utilizing techniques to minimize radiation dose exposure, including the use of iterative reconstruction and automated exposure control. Fasting serum glucose: 137 mg/dl FINDINGS: VISUALIZED BRAIN: No acute abnormalities are seen. HEAD/NECK: There is a physiologic distribution of FDG. No FDG avid cervical adenopathy is seen. CT images: Unremarkable. CHEST: Decreased radiotracer uptake at the right middle lobe lesion now SUV max of 3.9, previously 25. Activity is along the peripheral margin of the lesion. There is central photopenia which may be related to necrosis or fibrosis. Mass measures on the order of  4.4 cm in size, previously 7-8 cm. The mass abuts the right hilar region. Resolution of previously seen activity at the mediastinal lymph nodes. CT images: Right-sided Mediport line tip terminates at the SVC. Trace right pleural effusion is new. Biapical pleural-parenchymal changes. ABDOMEN: Previously questioned small focus of uptake at the left lobe of the liver medially is not seen on the current exam. Persistent mild patchy radiotracer uptake at the head of the pancreas, SUV max of 2.8, previously 2.9. Again underlying cystic lesion identified at the pancreatic head and neck measuring up to 2.9 cm in size, larger previously 2.1 cm. CT images: No additional significant findings. PELVIS: No FDG avid soft tissue lesions are seen. CT images: There is a small fat-containing left inguinal hernia. OSSEOUS STRUCTURES: No FDG avid lesions are seen. CT images: No significant findings. Impression 1. Overall findings suggest response to therapy. 2. Decreased radiotracer uptake at the smaller right middle lobe pulmonary lesion suggesting response to therapy with moderate FDG uptake remaining.  3. No residual activity at the previously seen mediastinal lymph nodes. 4. Previously questioned small focus of uptake at the left lobe of the liver medially is not seen on the current exam. 5.  Enlarging cystic lesion at the pancreatic head superiorly with mild patchy radiotracer uptake. Cystic neoplasm should be excluded. Again recommend follow-up with dedicated MRI of the pancreas with and without IV contrast if not previously performed. Workstation performed: [id number]     NM PET CT skull base to mid thigh    Result Date: 7/3/2023  Narrative PET/CT SCAN INDICATION: Lung mass. Abnormal CT. D38.1: Neoplasm of uncertain behavior of trachea, bronchus and lung R91.8: Other nonspecific abnormal finding of lung field MODIFIER: PI COMPARISON: CT chest 6/13/2023, CT abdomen pelvis 6/11/2020 CELL TYPE: N/A TECHNIQUE:   8.7 mCi F-18-FDG administered IV. Multiplanar attenuation corrected and non attenuation corrected PET images are available for interpretation, and contiguous, low dose, axial CT sections were obtained from the skull base through the femurs. Intravenous contrast material was not utilized. This examination, like all CT scans performed in the [facility], was performed utilizing techniques to minimize radiation dose exposure, including the use of iterative reconstruction and automated exposure control. Fasting serum glucose: 109 mg/dl FINDINGS: VISUALIZED BRAIN: No acute abnormalities are seen. HEAD/NECK: There is a physiologic distribution of FDG. No FDG avid cervical adenopathy is seen. CT images: Unremarkable. CHEST: Large FDG avid right mid lung mass, SUV max of 25. Mass measures on the order of 7-8 cm in size based on CT. Mass extends to the right hilar region and abuts the lateral pleural margin. There is central photopenia suggesting necrosis. Mild FDG uptake in mediastinal lymph nodes. Cluster of small precarinal lymph nodes on the right demonstrates SUV max of 3.4.  Subcarinal lymph node demonstrates SUV max of 3.8. This measures up to 1.3 cm short axis image 84 series 3. No suspicious left hilar activity. CT images: Moderate biapical pleural-parenchymal changes. Scattered blebs and emphysematous changes. ABDOMEN: Questionable small focus of FDG uptake in the left lobe of the liver medially, SV max of 3. See image 118 series 1200. No obvious findings on CT. Mild patchy radiotracer uptake at the head of the pancreas, SUV max of 2.9. There does appear to be a cystic lesion at the pancreatic head/neck with adjacent coarse calcification. Cystic focus measures up to 2.1 cm image 138 series 3. CT images: Low-attenuation liver suggest fatty infiltration. PELVIS: No FDG avid soft tissue lesions are seen. CT images: Small fat-containing left inguinal hernia. OSSEOUS STRUCTURES: No FDG avid lesions are seen. Mildly increased overall osseous uptake, nonspecific. Scattered mild foci of FDG uptake in the spine corresponding to degenerative disc on CT. CT images: No significant findings. Impression 1. Large FDG avid right lung mass in keeping with malignancy. 2.  Mild FDG uptake in scattered mediastinal lymph nodes which may represent metastasis. 3.  Mild patchy radiotracer uptake at the head of the pancreas. There does appear to be a cystic lesion at the pancreatic head/neck with adjacent coarse calcification. Cystic focus measures up to 2.1 cm, new from prior CT. An underlying pancreatic lesion  should be excluded. Recommend further characterization with dedicated MRI of the pancreas with and without IV contrast for further evaluation. 4. Questionable small focus of FDG uptake in the left lobe of the liver, metastasis is not excluded. This could be also assessed with MRI. 5.  Mildly increased overall osseous uptake. This is nonspecific, question secondary to anemia or stress response. The study was marked in EPIC for significant notification.  Workstation performed: [id number]      No Barium Swallow results available for this patient.

## 2023-12-19 ENCOUNTER — TELEPHONE (OUTPATIENT)
Dept: HEMATOLOGY ONCOLOGY | Facility: CLINIC | Age: 68
End: 2023-12-19

## 2023-12-19 ENCOUNTER — OFFICE VISIT (OUTPATIENT)
Dept: HEMATOLOGY ONCOLOGY | Facility: CLINIC | Age: 68
End: 2023-12-19
Payer: COMMERCIAL

## 2023-12-19 VITALS
RESPIRATION RATE: 16 BRPM | OXYGEN SATURATION: 99 % | HEART RATE: 68 BPM | HEIGHT: 64 IN | SYSTOLIC BLOOD PRESSURE: 126 MMHG | DIASTOLIC BLOOD PRESSURE: 82 MMHG | WEIGHT: 155 LBS | BODY MASS INDEX: 26.46 KG/M2 | TEMPERATURE: 98 F

## 2023-12-19 DIAGNOSIS — C34.91 ADENOCARCINOMA OF RIGHT LUNG (HCC): Primary | ICD-10-CM

## 2023-12-19 PROCEDURE — 99215 OFFICE O/P EST HI 40 MIN: CPT | Performed by: INTERNAL MEDICINE

## 2023-12-19 NOTE — PATIENT INSTRUCTIONS
Brand Names: US  Paraplatin  Warning   Low blood cell counts may happen with this drug. Low blood cell counts may raise the chance of needing a blood transfusion or getting an infection or bleeding. If you have questions, talk with the doctor.   Throwing up may happen often with this drug.   Allergic reactions have happened with this drug. This may happen within minutes of getting this drug. Call your doctor right away if you have any bad effects.  What is this drug used for?   It is used to treat ovarian cancer.   It may be given to you for other reasons. Talk with the doctor.  What do I need to tell my doctor BEFORE I take this drug?   If you are allergic to this drug; any part of this drug; or any other drugs, foods, or substances. Tell your doctor about the allergy and what signs you had.   If you have any of these health problems: Low blood cell count or poor bone marrow function.   If you have bleeding problems.   If you are breast-feeding. Do not breast-feed while you take this drug.  This is not a list of all drugs or health problems that interact with this drug.  Tell your doctor and pharmacist about all of your drugs (prescription or OTC, natural products, vitamins) and health problems. You must check to make sure that it is safe for you to take this drug with all of your drugs and health problems. Do not start, stop, or change the dose of any drug without checking with your doctor.  What are some things I need to know or do while I take this drug?   Tell all of your health care providers that you take this drug. This includes your doctors, nurses, pharmacists, and dentists.   Have your blood work and other lab tests checked as you have been told by your doctor.   Talk with your doctor before getting any vaccines. Use of some vaccines with this drug may either raise the chance of an infection or make the vaccine not work as well.   You may have more chance of getting an infection. Wash hands often. Stay  away from people with infections, colds, or flu.   You may bleed more easily. Be careful and avoid injury. Use a soft toothbrush and an electric razor.   If you have upset stomach, throwing up, diarrhea, or decreased appetite, talk with your doctor. There may be ways to lower these side effects.   If you are 65 or older, use this drug with care. You could have more side effects.   This drug may cause harm to an unborn baby. If you may become pregnant, you must use birth control while taking this drug. If you get pregnant, call your doctor right away.  What are some side effects that I need to call my doctor about right away?  WARNING/CAUTION: Even though it may be rare, some people may have very bad and sometimes deadly side effects when taking a drug. Tell your doctor or get medical help right away if you have any of the following signs or symptoms that may be related to a very bad side effect:   Signs of an allergic reaction, like rash; hives; itching; red, swollen, blistered, or peeling skin with or without fever; wheezing; tightness in the chest or throat; trouble breathing, swallowing, or talking; unusual hoarseness; or swelling of the mouth, face, lips, tongue, or throat.   Signs of infection like fever, chills, very bad sore throat, ear or sinus pain, cough, more sputum or change in color of sputum, pain with passing urine, mouth sores, or wound that will not heal.   Signs of bleeding like throwing up or coughing up blood; vomit that looks like coffee grounds; blood in the urine; black, red, or tarry stools; bleeding from the gums; abnormal vaginal bleeding; bruises without a cause or that get bigger; or bleeding you cannot stop.   Signs of electrolyte problems like mood changes; confusion; muscle pain, cramps, or spasms; weakness; shakiness; change in balance; an abnormal heartbeat; seizures; loss of appetite; or severe upset stomach or throwing up.   Signs of kidney problems like unable to pass urine,  change in how much urine is passed, blood in the urine, or a big weight gain.   Signs of liver problems like dark urine, tiredness, decreased appetite, upset stomach or stomach pain, light-colored stools, throwing up, or yellow skin or eyes.   Pale skin.   Ringing in the ears, hearing loss, or any other changes in hearing.   A burning, numbness, or tingling feeling that is not normal.   Loss of eyesight has happened with high doses of this drug. This has gone back to normal or gotten much better within weeks after stopping this drug. Call your doctor right away if you have loss of eyesight or other changes in eyesight.   This drug may cause tissue damage if the drug leaks from the vein. Tell your nurse if you have any redness, burning, pain, swelling, blisters, skin sores, or leaking of fluid where the drug is going into your body.  What are some other side effects of this drug?  All drugs may cause side effects. However, many people have no side effects or only have minor side effects. Call your doctor or get medical help if any of these side effects or any other side effects bother you or do not go away:   Constipation, diarrhea, stomach pain, upset stomach, or throwing up.   Hair loss.   Feeling tired or weak.   Mouth irritation or mouth sores.  These are not all of the side effects that may occur. If you have questions about side effects, call your doctor. Call your doctor for medical advice about side effects.  You may report side effects to your national health agency.  How is this drug best taken?  Use this drug as ordered by your doctor. Read all information given to you. Follow all instructions closely.   It is given as an infusion into a vein over a period of time.   This drug may cause upset stomach or throwing up. Other drugs may be given before this drug to help with this.  What do I do if I miss a dose?   Call your doctor to find out what to do.  How do I store and/or throw out this drug?   If you need  "to store this drug at home, talk with your doctor, nurse, or pharmacist about how to store it.  General drug facts   If your symptoms or health problems do not get better or if they become worse, call your doctor.   Do not share your drugs with others and do not take anyone else's drugs.   Keep all drugs in a safe place. Keep all drugs out of the reach of children and pets.   Throw away unused or  drugs. Do not flush down a toilet or pour down a drain unless you are told to do so. Check with your pharmacist if you have questions about the best way to throw out drugs. There may be drug take-back programs in your area.   Some drugs may have another patient information leaflet. If you have any questions about this drug, please talk with your doctor, nurse, pharmacist, or other health care provider.   If you think there has been an overdose, call your poison control center or get medical care right away. Be ready to tell or show what was taken, how much, and when it happened.      Access [employer] [facility] [employer] for additional drug information, tools, and databases.  Copyright 8048-0499 [employer] All rights reserved.  Contributor Disclosures    (For additional information see \"Paclitaxel (conventional): Drug information\")    You must carefully read the \"Consumer Information Use and Disclaimer\" below in order to understand and correctly use this information.    Warning   This drug may lower the ability of the bone marrow to make blood cells that the body needs. If blood cell counts get very low, this can lead to bleeding problems, infections, or anemia. If you have questions, talk with the doctor.   If you have a low white blood cell count, talk with your doctor. This drug must not be used in certain people with low white blood cell counts.   Have blood work checked as you have been told by the doctor. Talk with the doctor.   Very bad and sometimes deadly allergic side effects have rarely happened. Talk with your " doctor.   Other drugs will be given with this drug to help avoid allergic reactions. This may not prevent deadly allergic reactions from happening.  What is this drug used for?   It is used to treat cancer.  What do I need to tell my doctor BEFORE I take this drug?   If you are allergic to this drug; any part of this drug; or any other drugs, foods, or substances. Tell your doctor about the allergy and what signs you had.   If you are breast-feeding. Do not breast-feed while you take this drug.  This drug may interact with other drugs or health problems.  Tell your doctor and pharmacist about all of your drugs (prescription or OTC, natural products, vitamins) and health problems. You must check to make sure that it is safe for you to take this drug with all of your drugs and health problems. Do not start, stop, or change the dose of any drug without checking with your doctor.  What are some things I need to know or do while I take this drug?   Tell all of your health care providers that you take this drug. This includes your doctors, nurses, pharmacists, and dentists.   If you have upset stomach, throwing up, diarrhea, or decreased appetite, talk with your doctor. There may be ways to lower these side effects.   You may have more chance of getting an infection. Wash hands often. Stay away from people with infections, colds, or flu.   You may bleed more easily. Be careful and avoid injury. Use a soft toothbrush and an electric razor.   Talk with your doctor before getting any vaccines. Use of some vaccines with this drug may either raise the chance of an infection or make the vaccine not work as well.   Some products have alcohol in them. Talk with the doctor.   Talk with your doctor before you use alcohol, marijuana or other forms of cannabis, or prescription or OTC drugs that may slow your actions.   Check blood pressure and heart rate as the doctor has told you.   Change in eyesight may rarely happen. Eyesight  most often gets back to normal when this drug is stopped.   It is common to have nerve problems with this drug. Nerve problems may include a numbness, tingling, or burning feeling in your hands or feet. Call your doctor if you have nerve problems that are very bad, cause problems with daily living, or do not go away.   If you are 65 or older, use this drug with care. You could have more side effects.   This drug may cause harm to an unborn baby. If you may become pregnant, you must use birth control while taking this drug. If you get pregnant, call your doctor right away.  What are some side effects that I need to call my doctor about right away?  WARNING/CAUTION: Even though it may be rare, some people may have very bad and sometimes deadly side effects when taking a drug. Tell your doctor or get medical help right away if you have any of the following signs or symptoms that may be related to a very bad side effect:   Signs of an allergic reaction, like rash; hives; itching; red, swollen, blistered, or peeling skin with or without fever; wheezing; tightness in the chest or throat; trouble breathing, swallowing, or talking; unusual hoarseness; or swelling of the mouth, face, lips, tongue, or throat. Rarely, some allergic reactions have been deadly.   Signs of infection like fever, chills, very bad sore throat, ear or sinus pain, cough, more sputum or change in color of sputum, pain with passing urine, mouth sores, or wound that will not heal.   Signs of bleeding like throwing up or coughing up blood; vomit that looks like coffee grounds; blood in the urine; black, red, or tarry stools; bleeding from the gums; abnormal vaginal bleeding; bruises without a cause or that get bigger; or bleeding you cannot stop.   Signs of high or low blood pressure like very bad headache or dizziness, passing out, or change in eyesight.   Signs of kidney problems like unable to pass urine, change in how much urine is passed, blood in  the urine, or a big weight gain.   Shortness of breath.   Swelling.   Flushing.   Chest pain or pressure.   Fast, slow, or abnormal heartbeat.   Seizures.   This drug may cause tissue damage if the drug leaks from the vein. Tell your nurse if you have any redness, burning, pain, swelling, blisters, skin sores, or leaking of fluid where the drug is going into your body.   Severe and sometimes deadly liver problems have happened with this drug. Call your doctor right away if you have signs of liver problems like dark urine, tiredness, decreased appetite, upset stomach or stomach pain, light-colored stools, throwing up, or yellow skin or eyes.  What are some other side effects of this drug?  All drugs may cause side effects. However, many people have no side effects or only have minor side effects. Call your doctor or get medical help if any of these side effects or any other side effects bother you or do not go away:   Hair loss.   Feeling tired or weak.   Diarrhea, upset stomach, or throwing up.   Mouth irritation or mouth sores.   Muscle or joint pain.   Irritation where the shot is given.  These are not all of the side effects that may occur. If you have questions about side effects, call your doctor. Call your doctor for medical advice about side effects.  You may report side effects to your national health agency.  How is this drug best taken?  Use this drug as ordered by your doctor. Read all information given to you. Follow all instructions closely.   It is given as an infusion into a vein over a period of time.  What do I do if I miss a dose?   Call your doctor to find out what to do.  How do I store and/or throw out this drug?   If you need to store this drug at home, talk with your doctor, nurse, or pharmacist about how to store it.  General drug facts   If your symptoms or health problems do not get better or if they become worse, call your doctor.   Do not share your drugs with others and do not take  anyone else's drugs.   Keep all drugs in a safe place. Keep all drugs out of the reach of children and pets.   Throw away unused or  drugs. Do not flush down a toilet or pour down a drain unless you are told to do so. Check with your pharmacist if you have questions about the best way to throw out drugs. There may be drug take-back programs in your area.   Some drugs may have another patient information leaflet. If you have any questions about this drug, please talk with your doctor, nurse, pharmacist, or other health care provider.   If you think there has been an overdose, call your poison control center or get medical care right away. Be ready to tell or show what was taken, how much, and when it happened.

## 2023-12-19 NOTE — PROGRESS NOTES
[facility] [address] [address]     [place] Dominic  1955        PRIMARY HEMATOLOGIC/ONCOLOGIC DIAGNOSIS:  Adenocarcinoma of the right lung. Clinical Stage III B ( T4N2M0). CARIS: PDL1 TPS 98%, KRAS G12C mutated     PATHOLOGY:   Specimens  A Lung, Right Middle Lobe Bronchial Brushing  B Lung, Right Middle Lobe Bronchial Brushing  C Lung, Right Middle Lobe Bronchoalveolar Lavage  D Lung, Right Middle Lobe Bronchoalveolar Lavage, Cell Block  E Lymph Node, level 7  F Lymph Node, level 7  G Lymph Node, 10R  H Lymph Node, 10R  .  Final Diagnosis  A.-B. Lung, Right Middle Lobe Bronchial Brushing (Thin-prep and smear preparations):  Conclusive evidence of malignancy.  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  Satisfactory for evaluation.  C.-D. Lung, Right Middle Lobe Bronchoalveolar Lavage (Thin-prep and cell block preparations):  Conclusive evidence of malignancy.  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  Satisfactory for evaluation.  Jie Mahajan,  1,2 Jie Mahajan,  1,2  [facility]  Operating Room 1,2  [id number]  DominicDayane  [phone]  Page: 1 of 3 Printed: 7/27/2023 2:02 PM  E.-F. Lymph Node, level 7 (Thin-prep and smear preparations):  Atypical cellular changes seen.  Rare atypical cells.  Lymphocytes.  Few bronchial cells and pulmonary macrophages.  Satisfactory for evaluation.  G.-H. Lymph Node, 10R (Thin-prep and smear preparations):  Negative for malignancy.  Lymphocytes.  Aggregates of neutrophils.  Satisfactory for evaluation.  Electronically signed by Brian Rahman MD on 7/27/2023 at 1402  Note  Please refer to concurrent surgical pathology case, [id number], for additional diagnostic inflammation.  Intraoperative Consultation  B. Positive for non-small cell carcinoma.  Dr. Rahman, Interpretation performed at  [facility], [address] [facility] [place] [address]  F. Lymphocytes present.  Rare atypical cells seen.  Dr. Rahman, Interpretation performed at [facility], [address] [facility] [place] [address]  H. Lymphocytes present.  No malignant cells seen.  Dr. Rahman, Interpretation performed at [facility], [address] [facility] [address]     STAGING: Cancer Staging   Adenocarcinoma of middle lobe of right lung (HCC)  Staging form: Lung, AJCC 8th Edition  - Clinical stage from 7/24/2023: Stage IIIA (cT4, cN1, cM0) - Signed by Home Kirk MD on 7/31/2023  Stage prefix: Initial diagnosis              Oncology History   Adenocarcinoma of middle lobe of right lung (HCC)   6/23/2023 Initial Diagnosis     Adenocarcinoma of right lung (HCC)      7/24/2023 Biopsy     Navigational bronchoscopy with EBUS     A.  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      A.-B.  Lung, Right Middle Lobe Bronchial Brushing (Thin-prep and smear preparations):   Conclusive evidence of malignancy.  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     Satisfactory for evaluation.     C.-D.  Lung, Right Middle Lobe Bronchoalveolar Lavage (Thin-prep and cell block preparations):   Conclusive evidence of malignancy.  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     Satisfactory for evaluation.      E.-F.  Lymph Node, level 7 (Thin-prep and smear preparations):   Atypical cellular changes seen.  Rare atypical cells.  Lymphocytes.  Few bronchial cells and pulmonary macrophages.        Satisfactory for evaluation.     G.-H.  Lymph Node, 10R (Thin-prep and smear preparations):   Negative for malignancy.  Lymphocytes.  Aggregates of neutrophils.     Satisfactory for evaluation.      7/24/2023 -  Cancer Staged     Staging form: Lung, AJCC 8th Edition  - Clinical stage from 7/24/2023: Stage IIIB (cT4, cN2, cM0) - Signed by Celina Treviño MD on  12/26/2023  Stage prefix: Initial diagnosis         8/31/2023 - 10/12/2023 Chemotherapy     cyanocobalamin, 1,000 mcg, Intramuscular, Once, 1 of 1 cycle  Administration: 1,000 mcg (8/24/2023)  alteplase (CATHFLO), 2 mg, Intracatheter, Every 1 Minute as needed, 3 of 3 cycles  CISplatin (PLATINOL) with mannitol IVPB, 75 mg/m2 = 129.8 mg (100 % of original dose 75 mg/m2), Intravenous, Once, 3 of 3 cycles  Dose modification: 50 mg/m2 (original dose 75 mg/m2, Cycle 1, Reason: Anticipated Tolerance), 75 mg/m2 (original dose 75 mg/m2, Cycle 1, Reason: Dose modified as per discussion with consulting physician)  Administration: 129.8 mg (8/31/2023), 129.8 mg (9/21/2023), 129.8 mg (10/12/2023)  fosaprepitant (EMEND) IVPB, 150 mg, Intravenous, Once, 3 of 3 cycles  Administration: 150 mg (8/31/2023), 150 mg (9/21/2023), 150 mg (10/12/2023)  nivolumab (OPDIVO) IVPB, 360 mg, Intravenous, Once, 3 of 3 cycles  Administration: 360 mg (8/31/2023), 360 mg (9/21/2023), 360 mg (10/12/2023)  pemetrexed (ALIMTA) chemo infusion, 500 mg/m2 = 865 mg, Intravenous, Once, 3 of 3 cycles  Administration: 865 mg (8/31/2023), 865 mg (9/21/2023), 865 mg (10/12/2023)         INTERIM HISTORY:  The patient presents for a follow-up. Reports no new complaints in the interim. Original diagnosis of Stage III A ( [id number]) adenocarcinoma of the right middle lobe of the lung was made on 7/24/23. Of note the presence of mediastinal lymph nodes on PET/CT would make this N2 disease--> stage IIIB. From 8/31/23 to 10/12/23 the patient received neoadjuvant Nivolumab+ cisplatin/pemetrexed Q21 days x 3 cycles. PET/CT 10/18/23 showed overall findings suggesting response to therapy. Decreased radiotracer uptake at the smaller right middle lobe pulmonary lesion suggesting response to therapy with moderate FDG uptake remaining.  No residual activity at the previously seen mediastinal lymph nodes. Previously questioned small focus of uptake at the left lobe of the liver  medially was not seen on the current exam. Enlarging cystic lesion at the pancreatic head superiorly with mild patchy radiotracer uptake-- r/o cystic neoplasm. Follow-up with dedicated MRI of the pancreas with and without IV contrast was recommended.Upon follow-up with thoracic surgery the patient was deemed not a surgical candidate and was referred to [facility] and [facility] for definitive chemo/RT. Pending evaluation by [facility].      PAST MEDICAL,PAST SURGICAL, FAMILY AND SOCIAL HISTORY:         Patient Active Problem List   Diagnosis    Pulmonary emphysema, unspecified emphysema type (HCC)    Adenocarcinoma of middle lobe of right lung (HCC)    Mediastinal adenopathy    Port-A-Cath in place    Hypomagnesemia      Medical History        Past Medical History:   Diagnosis Date    Fibromyalgia      Heart murmur      Lumbosacral disc herniation      Lung cancer (HCC)      Psoriasis      Psoriasis      RSD (reflex sympathetic dystrophy)           Surgical History         Past Surgical History:   Procedure Laterality Date    IR PORT PLACEMENT   08/04/2023    LUNG BIOPSY         x2    PARTIAL HYSTERECTOMY             Family History         Family History   Problem Relation Age of Onset    Sarcoidosis Mother      Cancer Sister           Social History               Socioeconomic History    Marital status:        Spouse name: Not on file    Number of children: Not on file    Years of education: Not on file    Highest education level: Not on file   Occupational History    Not on file   Tobacco Use    Smoking status: Every Day       Current packs/day: 0.50       Average packs/day: 0.5 packs/day for 48.0 years (24.0 ttl pk-yrs)       Types: Cigarettes       Start date: 1976    Smokeless tobacco: Never   Vaping Use    Vaping status: Never Used   Substance and Sexual Activity    Alcohol use: Yes       Alcohol/week: 7.0 standard drinks of alcohol       Types: 7 Glasses of wine per week       Comment: daily    Drug use: No     Sexual activity: Not on file   Other Topics Concern    Not on file   Social History Narrative    Not on file      Social Determinants of Health      Financial Resource Strain: Not on file   Food Insecurity: Not on file   Transportation Needs: Not on file   Physical Activity: Not on file   Stress: Not on file   Social Connections: Not on file   Intimate Partner Violence: Not on file   Housing Stability: Not on file            Current Outpatient Medications:     amitriptyline (ELAVIL) 25 mg tablet, , Disp: , Rfl:     atorvastatin (LIPITOR) 20 mg tablet, Take 20 mg by mouth daily, Disp: , Rfl:     bismuth subsalicylate (PEPTO BISMOL) 262 MG chewable tablet, Take 1 tablet by mouth every 6 hours for 14 days, Disp: 56 tablet, Rfl: 0    calcipotriene (DOVONEX) 0.005 % cream, Apply topically 2 (two) times a day To affected areas on Saturday and Sunday only, Disp: 120 g, Rfl: 2    DULoxetine (CYMBALTA) 30 mg delayed release capsule, , Disp: , Rfl:     Fluticasone-Salmeterol (Advair) 100-50 mcg/dose inhaler, Inhale 1 puff 2 (two) times a day Rinse mouth after use., Disp: , Rfl:     folic acid (KP Folic Acid) 1 mg tablet, Take 1 tablet (1 mg total) by mouth daily, Disp: 90 tablet, Rfl: 2    lidocaine-prilocaine (EMLA) cream, Apply topically as needed for mild pain, Disp: 30 g, Rfl: 0    propranolol (INDERAL) 20 mg/5 mL solution, , Disp: , Rfl:     triamcinolone (KENALOG) 0.1 % cream, Apply topically 2 (two) times a day Affected areas Monday through Friday only, Disp: 453.6 g, Rfl: 2    omeprazole (PriLOSEC) 20 mg delayed release capsule, Take 1 capsule (20 mg total) by mouth 2 (two) times a day Every 12 hours for 14 days (Patient not taking: Reported on 12/26/2023), Disp: 28 capsule, Rfl: 0  No current facility-administered medications for this visit.     Facility-Administered Medications Ordered in Other Visits:     alteplase (CATHFLO) injection 2 mg, 2 mg, Intracatheter, Q1MIN PRN, Home Kirk MD       Allergies    Allergen Reactions    Prednisone Palpitations      There were no vitals filed for this visit.     ROS:  CONSTITUTIONAL:  No fever. No chills. No dizziness. No weakness.  EYES:  No pain, erythema, or discharge. No blurring of vision.  ENT:  No sore throat, URI symptoms. No epistaxis. No tinnitus.  CARDIOVASCULAR:  No chest pain. No palpitations. No lower extremity edema.  RESPIRATORY:  No shortness of breath, cough, pain with respiration, pleuritic chest pain. No hemoptysis. No dyspnea. No paroxysmal nocturnal dyspnea.  GASTROINTESTINAL:  Normal appetite. No nausea, vomiting, diarrhea. No pain. No bloating. No melena.  GENITOURINARY:  No frequency, urgency, nocturia. No hematuria or dysuria.  MUSCULOSKELETAL:  No arthralgias or myalgias.  INTEGUMENTARY:  No swelling. No bruising. No contusions. No abrasions. No lymphangitis.  NEUROLOGIC:  No headache. No neck pain. No numbness or tingling of the extremities. No weakness.  PSYCHIATRIC:  No confusion.  ENDOCRINE:  No fatigue. No weakness. No history of thyroid, diabetes or adrenal problems.  HEMATOLOGICAL:  No bleeding. No petechiae. No bruising.     PHYSICAL EXAM:     GENERAL: AAO x 3  HEENT: AT,NC  CVS: S1S2 RRR  LUNGS: CTA b/l  ABD: NT,ND, +BS  EXTR: no edema  NEURO: CN II-XII grossly intact     LABS:  I have reviewed pertinent labs:  CBC:         Lab Results   Component Value Date     WBC 7.48 10/11/2023     RBC 3.73 (L) 10/11/2023     HGB 11.9 10/11/2023     HCT 35.7 10/11/2023     MCV 96 10/11/2023      (H) 10/11/2023     MCH 31.9 10/11/2023     MCHC 33.3 10/11/2023     RDW 13.0 10/11/2023     MPV 9.6 10/11/2023     NEUTROABS 3.99 10/11/2023      CMP:         Lab Results   Component Value Date     SODIUM 136 10/11/2023     K 4.0 10/11/2023      10/11/2023     CO2 27 10/11/2023     AGAP 6 10/11/2023     BUN 22 12/26/2023     CREATININE 0.96 12/26/2023     GLUC 93 10/11/2023     CALCIUM 9.4 10/11/2023     AST 25 10/11/2023     ALT 41 10/11/2023      "ALKPHOS 73 10/11/2023     TP 7.2 10/11/2023     ALB 3.7 10/11/2023     TBILI 0.24 10/11/2023     EGFR 61 12/26/2023      Liver Enzymes:         Lab Results   Component Value Date     AST 25 10/11/2023     ALT 41 10/11/2023     ALKPHOS 73 10/11/2023     TP 7.2 10/11/2023     ALB 3.7 10/11/2023     TBILI 0.24 10/11/2023     BILIDIR 0.12 06/11/2020      Vitamin B12 No results found for: \"CMRHXUBY50\"  Iron Study No results found for: \"RETIC\", \"RETICCTPCT\", \"FERRITIN\", \"CONCFE\", \"TIBC\", \"PRIMIDONE\", \"FOLATEHEMO\", \"IRON\"  Folate No results found for: \"FOLATE\"  Magnesium         Lab Results   Component Value Date     MG 1.5 (L) 10/11/2023      Phosphorus No results found for: \"PHOS\"  Coagulation Panel         Lab Results   Component Value Date     PROTIME 13.3 06/11/2020     INR 1.01 06/11/2020      IMAGING:  Endoscopic ultrasonography, GI (Upper) Linear with FNA     Result Date: 11/28/2023  Narrative: Table formatting from the original result was not included. [facility] [address] [facility] [address] [phone] DATE OF SERVICE: 11/28/23 PHYSICIAN(S): Attending: Randolph Rodriguez MD Fellow: No Staff Documented INDICATION: Pancreatic cyst POST-OP DIAGNOSIS: See the impression below. PREPROCEDURE: Informed consent was obtained for the procedure, including sedation.  Risks of perforation, hemorrhage, adverse drug reaction and aspiration were discussed. The patient was placed in the left lateral decubitus position. Patient was explained about the risks and benefits of the procedure. Risks including but not limited to bleeding, infection, and perforation were explained in detail. Also explained about less than 100% sensitivity with the exam and other alternatives. PROCEDURE: EUS UPPER DETAILS OF PROCEDURE: Patient was taken to the procedure room where a time out was performed to confirm correct patient and correct procedure. The patient underwent monitored anesthesia care, which was administered by " an anesthesia professional. The patient's blood pressure, heart rate, oxygen, respirations, level of consciousness and ECG were monitored throughout the procedure. The endoscope and linear scope were used. The patient experienced no blood loss. The procedure was not difficult. The patient tolerated the procedure well. There were no apparent adverse events. ANESTHESIA INFORMATION: ASA: II Anesthesia Type: IV Sedation with Anesthesia MEDICATIONS: No administrations occurring from 1231 to 1311 on 11/28/23 FINDINGS: The esophagus, stomach and duodenum appeared normal. Gastric biopsies done. The pancreas appeared atrophic. The pancreatic parenchyma was visualized in the entire pancreas. . The parenchyma was hypoechoic and had hyperechoic foci and hyperechoic strands. The pancreatic duct measured 3 mm at the head and 4 mm at the body. The duct in the body of the pancreas was dilated. One round and anechoic cyst measuring 28 mm x 26 mm with debris present, connection with ducts and well-defined margins in the neck of the pancreas. 1 successful fine needle aspiration pass was taken with a 22 gauge needle using a transduodenal approach guided by Doppler. Obtained adequate sample of serous and thick-appearing aspirate, sent sample for chemistry and cytology analysis. There was a large calcification just downstream from the cyst and upstream the duct was dilated. The calcification was causing significant shadowing. The cyst was thick walled. One triangular and anechoic cyst with connection with ducts and well-defined margins in the body of the pancreas. Dilated pancreatic duct side branches. After aspiration the body cyst also seemed to have decompressed along with the pancreatic duct. The bile duct appeared normal. The parenchyma of the liver appeared normal. The parenchyma was visualized in the left lobe and right lobe of the liver. The parenchyma was hyperechoic. Normal celiac axis. No lymphadenopathy was seen. SPECIMENS:  ID Type Source Tests Collected by Time Destination 1 : pancreatic neck cyst FNA FNA FINE NEEDLE ASPIRATION Randolph Rodriguez MD 11/28/2023 12:57 PM  2 : r/o h pylori Tissue Stomach TISSUE EXAM Randolph Rodriguez MD 11/28/2023  1:06 PM        Impression: The esophagus, stomach and duodenum appeared normal. Gastric biopsies were done. Mild atrophic pancreas with a normal pancreatic duct in the head and large thick walled cyst in the pancreatic neck with duct connection and downstream calcification. Upstream the duct was dilated. Aspiration was done. Another thick walled cyst was seen in the pancreatic body. This decompressed the duct and the body cyst as well. These seem to represent pancreatic pseudocysts with possible underlying chronic pancreatitis. RECOMMENDATION:  Await pathology results Ciprofloxacin 500 mg orally twice daily for 3 days. Imaging with either CT or MRI in 3 months.   Randolph Rodriguez MD      I reviewed the above laboratory and imaging data.     ASSESSMENT/PLAN:  Clinical Stage III B Adenocarcinoma of the right lung. CARIS: PDL1 TPS 98%, KRAS G12C mutated. Prior treatment as above. From 8/31/23 to 10/12/23 the patient received neoadjuvant Nivolumab+ cisplatin/pemetrexed Q21 days x 3 cycles--partial response on re-staging. Patient deemed not a surgical candidate and referred for definitive chemo/RT. Plan for Paclitaxel 45-50 mg/m2 weekly; carboplatin AUC 2, concurrent thoracic RT. Pending evaluation by [facility].   2.   Enlarging cystic lesion at the pancreatic head superiorly with mild patchy radiotracer uptake-- r/o cystic neoplasm. Follow-up with dedicated MRI of the pancreas with and without IV contrast was recommended. MRI abdomen 11/7/23--3.0 cm proximal pancreatic cyst with evidence of secondary distal main pancreatic duct obstruction and parenchymal atrophy.  EUS was recommended for further evaluation. Differential diagnosis included intraductal papillary mucinous neoplasm (IPMN), mucinous cystic neoplasm,  neuroendocrine tumor and probably less likely necrotic ductal carcinoma. The differential diagnosis could also include pseudocyst from chronic pancreatitis. S/p EUS  w/ FNA on 11/28/23. Pathology--non-diagnostic/Insufficient. Will need additional evaluation by GI. Check CA 19-9,CEA.

## 2023-12-20 ENCOUNTER — OFFICE VISIT (OUTPATIENT)
Dept: PALLIATIVE MEDICINE | Facility: CLINIC | Age: 68
End: 2023-12-20
Payer: COMMERCIAL

## 2023-12-20 VITALS
BODY MASS INDEX: 25.2 KG/M2 | HEART RATE: 85 BPM | HEIGHT: 64 IN | SYSTOLIC BLOOD PRESSURE: 122 MMHG | DIASTOLIC BLOOD PRESSURE: 68 MMHG | WEIGHT: 147.6 LBS | RESPIRATION RATE: 16 BRPM | TEMPERATURE: 97.7 F | OXYGEN SATURATION: 100 %

## 2023-12-20 DIAGNOSIS — Z71.89 GOALS OF CARE, COUNSELING/DISCUSSION: ICD-10-CM

## 2023-12-20 DIAGNOSIS — C34.91 ADENOCARCINOMA OF RIGHT LUNG (HCC): Primary | ICD-10-CM

## 2023-12-20 DIAGNOSIS — Z51.5 PALLIATIVE CARE ENCOUNTER: ICD-10-CM

## 2023-12-20 PROCEDURE — 99214 OFFICE O/P EST MOD 30 MIN: CPT | Performed by: NURSE PRACTITIONER

## 2023-12-20 NOTE — PROGRESS NOTES
Outpatient Follow-Up - Palliative and Supportive Care   Saskia Alfaro 68 y.o. female [id number]    Assessment & Plan  Problem List Items Addressed This Visit    None  Visit Diagnoses       Adenocarcinoma of right lung (HCC)    -  Primary    Palliative care encounter        Goals of care, counseling/discussion              Adenocarcinoma of right lung- continue to follow with Oncology for management and treatment.  She will be meeting with Oncology about starting chemoRT  Palliative Care Encounter- ongoing symptom management  Goals of Care- treatment focused  Psychosocial support- supportive listening provided    Medications adjusted this encounter:  Requested Prescriptions      No prescriptions requested or ordered in this encounter     No orders of the defined types were placed in this encounter.    Medications Discontinued During This Encounter   Medication Reason    loratadine (CLARITIN REDITABS) 10 MG dissolvable tablet     ondansetron (ZOFRAN) 8 mg tablet     ondansetron (ZOFRAN-ODT) 8 mg disintegrating tablet     OLANZapine (ZyPREXA) 5 mg tablet          Saskia Alfaro was seen today for symptoms and planning cares related to above illnesses.  I have reviewed the patient's controlled substance dispensing history in the Prescription Drug Monitoring Program in compliance with the [facility] regulations before prescribing any controlled substances.    They are invited to continue to follow with us.  If there are questions or concerns, please contact us through our clinic/answering service 24 hours a day, seven days a week.    NAHUN Yang  [facility]  [phone]      Visit Information    Accompanied By: No one    Source of History: Self    History Limitations: None        Chief Complaint  No chief complaint on file.    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)   6/23/2023 Initial Diagnosis     Adenocarcinoma of  right lung (HCC)      7/24/2023 -  Cancer Staged     Staging form: Lung, AJCC 8th Edition  - Clinical stage from 7/24/2023: Stage IIIA (cT4, cN1, cM0) - Signed by Home Kirk MD on 7/31/2023  Stage prefix: Initial diagnosis         8/31/2023 - 10/12/2023 Chemotherapy     cyanocobalamin, 1,000 mcg, Intramuscular, Once, 1 of 1 cycle  Administration: 1,000 mcg (8/24/2023)  alteplase (CATHFLO), 2 mg, Intracatheter, Every 1 Minute as needed, 3 of 3 cycles  CISplatin (PLATINOL) with mannitol IVPB, 75 mg/m2 = 129.8 mg (100 % of original dose 75 mg/m2), Intravenous, Once, 3 of 3 cycles  Dose modification: 50 mg/m2 (original dose 75 mg/m2, Cycle 1, Reason: Anticipated Tolerance), 75 mg/m2 (original dose 75 mg/m2, Cycle 1, Reason: Dose modified as per discussion with consulting physician)  Administration: 129.8 mg (8/31/2023), 129.8 mg (9/21/2023), 129.8 mg (10/12/2023)  fosaprepitant (EMEND) IVPB, 150 mg, Intravenous, Once, 3 of 3 cycles  Administration: 150 mg (8/31/2023), 150 mg (9/21/2023), 150 mg (10/12/2023)  nivolumab (OPDIVO) IVPB, 360 mg, Intravenous, Once, 3 of 3 cycles  Administration: 360 mg (8/31/2023), 360 mg (9/21/2023), 360 mg (10/12/2023)  pemetrexed (ALIMTA) chemo infusion, 500 mg/m2 = 865 mg, Intravenous, Once, 3 of 3 cycles  Administration: 865 mg (8/31/2023), 865 mg (9/21/2023), 865 mg (10/12/2023)         Narrative and Interval History      Saskia Alfaro is a 68 y.o. female who presents in follow up of symptoms related to adenocarcinoma of right lung.  Pertinent issues include: symptom management, assessment of goals of care.  Patient was seen, states she will be meeting with Oncology about starting chemoRT, surgical risk due to history and current tobacco abuse.  She states she is feeling tired and weak, occasional pain, but does not want to take opioids.  + for cough, denies hemoptysis.      Past medical, surgical, social, and family histories are reviewed and pertinent updates are made.    Review  "of Systems   Constitutional: Positive for malaise/fatigue.   Cardiovascular:  Negative for chest pain.   Respiratory:  Positive for cough.    Gastrointestinal:  Negative for constipation, diarrhea, nausea and vomiting.   Neurological:  Positive for weakness.   Psychiatric/Behavioral:  The patient is nervous/anxious.          Vital Signs    /68 (BP Location: Left arm, Patient Position: Sitting, Cuff Size: Standard)   Pulse 85   Temp 97.7 °F (36.5 °C) (Tympanic)   Resp 16   Ht 5' 4\" (1.626 m)   Wt 67 kg (147 lb 9.6 oz)   SpO2 100%   BMI 25.34 kg/m²     Physical Exam and Objective Data  Physical Exam  Vitals reviewed.   Constitutional:       General: She is not in acute distress.     Appearance: She is not ill-appearing.   Cardiovascular:      Rate and Rhythm: Normal rate.   Pulmonary:      Effort: Pulmonary effort is normal.   Neurological:      Mental Status: She is alert and oriented to person, place, and time.   Psychiatric:         Mood and Affect: Mood normal.           Radiology and Laboratory:  I personally reviewed and interpreted the following results:   Tissue Exam: [id number]  Order: [id number]  Collected 11/28/2023  1:06 PM       Status: Final result       Visible to patient: Yes (seen)       Dx: Pancreatic cyst    5 Result Notes       1 Patient Communication      Component    Case Report   Surgical Pathology Report                         Case: [id number]                                   Authorizing Provider:  Randolph Rodriguez MD             Collected:           11/28/2023 1306               Ordering Location:     [facility]      Received:            11/28/2023 1604                                      [facility]                                                           Pathologist:           Abigail Torres MD                                                     Specimen:    Stomach, r/o h pylori                                                                      Final " Diagnosis   A. Stomach, biopsy:  - Fragments of gastric antral and oxyntic mucosa with moderate chronic active gastritis.  - Helicobacter pylori organisms identified with routine H&E stain.  - Negative for intestinal metaplasia, dysplasia or carcinoma.   Electronically signed by Abigail Torres MD on 11/30/2023 at  3:38 PM        Case Report   Surgical Pathology Report                         Case: [id number]                                   Authorizing Provider:  Randolph Rodriguez MD             Collected:           11/28/2023 1306               Ordering Location:     [facility]      Received:            11/28/2023 1604                                      [facility]                                                           Pathologist:           Abigail Torres MD                                                     Specimen:    Stomach, r/o h pylori                                                                      Final Diagnosis   A. Stomach, biopsy:  - Fragments of gastric antral and oxyntic mucosa with moderate chronic active gastritis.  - Helicobacter pylori organisms identified with routine H&E stain.  - Negative for intestinal metaplasia, dysplasia or carcinoma.   Electronically signed by Abigail Torres MD on 11/30/2023 at  3:38 PM       I have spent a total time of 40 minutes on 12/20/23 in caring for this patient including Counseling / Coordination of care, Documenting in the medical record, Reviewing / ordering tests, medicine, procedures  , and symptom management .    All of the patient's / Agent's questions were answered during this discussion.

## 2023-12-26 ENCOUNTER — APPOINTMENT (OUTPATIENT)
Dept: LAB | Facility: HOSPITAL | Age: 68
End: 2023-12-26
Attending: RADIOLOGY
Payer: COMMERCIAL

## 2023-12-26 ENCOUNTER — RADIATION ONCOLOGY CONSULT (OUTPATIENT)
Dept: RADIATION ONCOLOGY | Facility: CLINIC | Age: 68
End: 2023-12-26
Attending: RADIOLOGY
Payer: COMMERCIAL

## 2023-12-26 VITALS
OXYGEN SATURATION: 98 % | RESPIRATION RATE: 16 BRPM | BODY MASS INDEX: 24.37 KG/M2 | HEART RATE: 84 BPM | SYSTOLIC BLOOD PRESSURE: 130 MMHG | DIASTOLIC BLOOD PRESSURE: 72 MMHG | WEIGHT: 142 LBS | TEMPERATURE: 97.7 F

## 2023-12-26 DIAGNOSIS — C34.2 ADENOCARCINOMA OF MIDDLE LOBE OF RIGHT LUNG (HCC): ICD-10-CM

## 2023-12-26 DIAGNOSIS — C34.2 ADENOCARCINOMA OF MIDDLE LOBE OF RIGHT LUNG (HCC): Primary | ICD-10-CM

## 2023-12-26 LAB
BUN SERPL-MCNC: 22 MG/DL (ref 5–25)
CREAT SERPL-MCNC: 0.96 MG/DL (ref 0.6–1.3)
GFR SERPL CREATININE-BSD FRML MDRD: 61 ML/MIN/1.73SQ M

## 2023-12-26 PROCEDURE — 99205 OFFICE O/P NEW HI 60 MIN: CPT | Performed by: RADIOLOGY

## 2023-12-26 PROCEDURE — 84520 ASSAY OF UREA NITROGEN: CPT

## 2023-12-26 PROCEDURE — 77263 THER RADIOLOGY TX PLNG CPLX: CPT | Performed by: RADIOLOGY

## 2023-12-26 PROCEDURE — 99211 OFF/OP EST MAY X REQ PHY/QHP: CPT | Performed by: RADIOLOGY

## 2023-12-26 PROCEDURE — 36415 COLL VENOUS BLD VENIPUNCTURE: CPT

## 2023-12-26 PROCEDURE — G0463 HOSPITAL OUTPT CLINIC VISIT: HCPCS | Performed by: RADIOLOGY

## 2023-12-26 PROCEDURE — 82565 ASSAY OF CREATININE: CPT

## 2023-12-26 NOTE — PROGRESS NOTES
Consultation - Radiation Oncology      MRN:[id number] : 1955  Encounter: [id number]  Patient Information: Saskia Alfaro      CHIEF COMPLAINT  Chief Complaint   Patient presents with    Lung Cancer    Consult     Cancer Staging   Adenocarcinoma of middle lobe of right lung (HCC)  Staging form: Lung, AJCC 8th Edition  - Clinical stage from 2023: Stage IIIA (cT4, cN1, cM0) - Signed by Home Kirk MD on 2023  Stage prefix: Initial diagnosis           History of Present Illness   Saskia Alfaro is a 68 y.o. 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  She underwent neoadjuvant chemoimmunotherapy with partial response.  She was not felt to be a surgical candidate.  She has been referred for definitive concurrent chemoradiation by Dr. Jo.    Briefly, the patient initially presented to ED with complaints of right-sided shoulder pain, shortness of breath, and small-volume hemoptysis.     23 CTA chest pe study  No pulmonary embolism.    6 x 7 x 5 cm mass in the middle lobe extending centrally to the right hilum, and abuts the adjacent pleura along the fissures and laterally, possibly pleural invasion. Findings are highly suspicious for bronchogenic malignancy.   Mildly enlarged right hilar and mediastinal lymph nodes, suspicious for malignant michela involvement.   Recommend further evaluation with nonemergent outpatient FDG PET CT for staging.     23 Dr. Jo  concerning for lung cancer   next steps would be biopsy and PET scan.   Should this be a non-small cell lung cancer she understands this is a clinical T4N2 or stage IIIb cancer.  We discussed that the mainstay of treatment for that would be chemotherapy/immunotherapy.      23 Complete PFT  FEV1/FVC Ratio: 66.35  Forced Vital Capacity: 2.51 L, 84% predicted  FEV1: 1.67 L, 71%  predicted  After administration of bronchodilator FEV1: 1.62 L, 69% predicted with no appreciable response to the bronchodilator   Lung volumes by plethysmography: Total Lung Capacity 83% predicted Residual volume 81% predicted   DLCO corrected for patients hemoglobin level: 51%     7/3/23 PET CT  1. Large FDG avid right lung mass in keeping with malignancy.  2.  Mild FDG uptake in scattered mediastinal lymph nodes which may represent metastasis.  3.  Mild patchy radiotracer uptake at the head of the pancreas. There does appear to be a cystic lesion at the pancreatic head/neck with adjacent coarse calcification. Cystic focus measures up to 2.1 cm, new from prior CT. An underlying pancreatic lesion should be excluded. Recommend further characterization with dedicated MRI of the pancreas with and without IV contrast for further evaluation.  4. Questionable small focus of FDG uptake in the left lobe of the liver, metastasis is not excluded. This could be also assessed with MRI.  5.  Mildly increased overall osseous uptake. This is nonspecific, question secondary to anemia or stress response.     7/5/23 Bronch/EBUS  The vocal cords, trachea, main joleen, left main stem, BENJIE, lingula, LLL, RUL and RLL appeared normal.  Generalized cobblestone, erythematous and nodular mucosa in the RML; brushing and washings performed  Performed multiple transbronchial biopsies with forceps in the right middle lobar bronchus with minimal amount of bleeding after intervention; applied cold saline to control bleeding; hemostasis achieved  Lymph node observed at station 7 measuring 35 mm x 30 mm. Took 20 passes with a 21 gauge needle. The sample was adequate. Lymph node observed at station 4R measuring 15 mm x 15 mm. Took 8 passes with a 21 gauge needle. Nodes observed under convex ultrasound guidance. Onsite cytologist was present, and cytology results were preliminarily benign.     7/5/23 Pathology  A-B. Lung, Right Middle Lobe Bronchial  Washing:Negative for malignancy.  D. Lung, Right Middle Lobe Bronchial Brushing :Atypical cellular changes seen.  A.  Lung, right middle lobe, biopsy:     - Portions of bronchial wall with mild chronic inflammation.     - No dysplasia or neoplasia identified.  A-B-C. Lymph Node, Subcarinal level 7: Negative for malignancy, see note  D-E-F. Lymph Node, 4R: Negative for malignancy.       7/26/23 MRI brain w wo contrast  Normal examination.        7/31/23 Dr. Kirk  Cisplatin 75mg/m2, Alimta 500mg/m2, Nivo 360mg x 3 cycles  Restaging PET/CT after C2 to be ordered to take place after C3  CARIS NGS will be followed up on which was sent off of the 7/24/2023 RML lung biopsy  NM Bone scan 8/8/2023 to work-up shoulder pain and rule out mets  Nicotine cessation counseling provided     7/31/23  Thoracic Oncology Multidisciplinary Tumor Conference   PHYSICIAN RECOMMENDED PLAN:  Patient is a potential surgical candidate.  Induction chemotherapy followed by re-imaging.  RT not needed at this time.      8/4/23 IR port placement     8/8/23 Bone scan  1.  No scintigraphic evidence of osseous metastasis, with particular attention to the right shoulder.   2. Degenerative changes as described, including facet arthropathy in the right posterior cervical spine.   3. Linear uptake along the lateral aspect of both tibias may represent hypertrophic osteoarthropathy.     10/18/23 PET CT  1. Overall findings suggest response to therapy.  2. Decreased radiotracer uptake at the smaller right middle lobe pulmonary lesion suggesting response to therapy with moderate FDG uptake remaining.  3. No residual activity at the previously seen mediastinal lymph nodes.  4. Previously questioned small focus of uptake at the left lobe of the liver medially is not seen on the current exam.  5.  Enlarging cystic lesion at the pancreatic head superiorly with mild patchy radiotracer uptake. Cystic neoplasm should be excluded. Again recommend follow-up with  dedicated MRI of the pancreas with and without IV contrast if not previously performed.     10/25/23 Dr. Jo  reviewed PET/CT imaging   shows at least a partial response to chemo.  Her right middle lobe mass decreased from 8 cm to 4 cm.   She had previous PET uptake in level 7 and 4 lymph nodes.  This has improved.   Additionally she has a 2.9 cm cystic pancreatic head lesion.   Discussed treatment options being surgery followed by likely adjuvant chemotherapy possibly radiation versus definitive chemoradiation.    She is still smoking and just finished chemotherapy 2 weeks prior.  She understands she would have to stop smoking completely to be a surgical candidate.      11/6/23 Dr. Kirk  F/u thoracic oncology for goal for surgery  MRI Abd to assess liver coming up tomorrow  Thoracic surgery mandating EGD to rule out pancreatic/biliary tract disease (second pancreatic cancer?)     11/7/23 MRI abdomen w wo contrast and mrcp  3.0 cm proximal pancreatic with evidence of secondary distal main pancreatic duct obstruction and parenchymal atrophy. Recommend EUS for further evaluation. Differential diagnosis includes intraductal papillary mucinous neoplasm (IPMN), mucinous cystic neoplasm, neuroendocrine tumor and probably less likely necrotic ductal carcinoma. The differential diagnosis could also include pseudocyst from chronic pancreatitis.     11/28/23 EUS  The esophagus, stomach and duodenum appeared normal. Gastric biopsies done.  The pancreas appeared atrophic. The pancreatic parenchyma was visualized in the entire pancreas. . The parenchyma was hypoechoic and had hyperechoic foci and hyperechoic strands. The pancreatic duct measured 3 mm at the head and 4 mm at the body. The duct in the body of the pancreas was dilated.  One round and anechoic cyst measuring 28 mm x 26 mm with debris present, connection with ducts and well-defined margins in the neck of the pancreas. 1 successful fine needle aspiration  pass was taken with a 22 gauge needle using a transduodenal approach guided by Doppler. Obtained adequate sample of serous and thick-appearing aspirate, sent sample for chemistry and cytology analysis. There was a large calcification just downstream from the cyst and upstream the duct was dilated. The calcification was causing significant shadowing. The cyst was thick walled.  One triangular and anechoic cyst with connection with ducts and well-defined margins in the body of the pancreas.  Dilated pancreatic duct side branches.  After aspiration the body cyst also seemed to have decompressed along with the pancreatic duct.  The bile duct appeared normal.  The parenchyma of the liver appeared normal. The parenchyma was visualized in the left lobe and right lobe of the liver. The parenchyma was hyperechoic.  Normal celiac axis. No lymphadenopathy was seen.  A.B. FNA, pancreatic neck cyst (ThinPrep and cell block preparations):  Non-diagnostic/Insufficient.   Amorphous debris.  A. Stomach, biopsy:  - Fragments of gastric antral and oxyntic mucosa with moderate chronic active gastritis.  - Helicobacter pylori organisms identified with routine H&E stain.  - Negative for intestinal metaplasia, dysplasia or carcinoma     12/13/23 Dr. Jo   undergoing induction chemotherapy with partial response.  Her last dose of chemotherapy was early October.      she is still smoking 10 cigarettes/day.   Her PFTs are borderline   Everything put together with her ongoing smoking I think she is prohibitive risk for surgery.    recommend definitive chemoradiation   Refer to Med Onc and Rad Onc      12/19/23 Dr. Ray  Paclitaxel 45-50 mg/m2 weekly; carboplatin AUC 2, concurrent thoracic RT     12/20/23 Palliative Care     The patient has no new respiratory symptoms.  Baseline cough of clear sputum in morning.  She denies hemoptysis.  She denies unplanned weight loss.  The patient denies shortness of breath at rest or  progressive dyspnea on exertion.  She denies chest pain or palpitations.  Her shoulder pain resolved.    Upcoming:  She does not have Med Onc follow up  1/17/24 Palliative Care  3/4/24 MRI abdomen  3/21/24 GI    Historical Information   Oncology History   Adenocarcinoma of middle lobe of right lung (HCC)   6/23/2023 Initial Diagnosis    Adenocarcinoma of right lung (HCC)     7/24/2023 -  Cancer Staged    Staging form: Lung, AJCC 8th Edition  - Clinical stage from 7/24/2023: Stage IIIA (cT4, cN1, cM0) - Signed by Home Kirk MD on 7/31/2023  Stage prefix: Initial diagnosis       7/24/2023 Biopsy    Navigational bronchoscopy with EBUS    A.  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     A.-B.  Lung, Right Middle Lobe Bronchial Brushing (Thin-prep and smear preparations):   Conclusive evidence of malignancy.  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     Satisfactory for evaluation.     C.-D.  Lung, Right Middle Lobe Bronchoalveolar Lavage (Thin-prep and cell block preparations):   Conclusive evidence of malignancy.  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     Satisfactory for evaluation.      E.-F.  Lymph Node, level 7 (Thin-prep and smear preparations):   Atypical cellular changes seen.  Rare atypical cells.  Lymphocytes.  Few bronchial cells and pulmonary macrophages.        Satisfactory for evaluation.     G.-H.  Lymph Node, 10R (Thin-prep and smear preparations):   Negative for malignancy.  Lymphocytes.  Aggregates of neutrophils.     Satisfactory for evaluation.     8/31/2023 - 10/12/2023 Chemotherapy    cyanocobalamin, 1,000 mcg, Intramuscular, Once, 1 of 1 cycle  Administration: 1,000 mcg (8/24/2023)  alteplase (CATHFLO), 2 mg, Intracatheter, Every 1 Minute as needed, 3 of 3 cycles  CISplatin (PLATINOL) with mannitol IVPB, 75 mg/m2 = 129.8 mg (100 % of original dose 75 mg/m2), Intravenous,  Once, 3 of 3 cycles  Dose modification: 50 mg/m2 (original dose 75 mg/m2, Cycle 1, Reason: Anticipated Tolerance), 75 mg/m2 (original dose 75 mg/m2, Cycle 1, Reason: Dose modified as per discussion with consulting physician)  Administration: 129.8 mg (8/31/2023), 129.8 mg (9/21/2023), 129.8 mg (10/12/2023)  fosaprepitant (EMEND) IVPB, 150 mg, Intravenous, Once, 3 of 3 cycles  Administration: 150 mg (8/31/2023), 150 mg (9/21/2023), 150 mg (10/12/2023)  nivolumab (OPDIVO) IVPB, 360 mg, Intravenous, Once, 3 of 3 cycles  Administration: 360 mg (8/31/2023), 360 mg (9/21/2023), 360 mg (10/12/2023)  pemetrexed (ALIMTA) chemo infusion, 500 mg/m2 = 865 mg, Intravenous, Once, 3 of 3 cycles  Administration: 865 mg (8/31/2023), 865 mg (9/21/2023), 865 mg (10/12/2023)           Past Medical History:   Diagnosis Date    Fibromyalgia     Heart murmur     Lumbosacral disc herniation     Lung cancer (HCC)     Psoriasis     Psoriasis     RSD (reflex sympathetic dystrophy)      Past Surgical History:   Procedure Laterality Date    IR PORT PLACEMENT  08/04/2023    LUNG BIOPSY      x2    PARTIAL HYSTERECTOMY         Family History   Problem Relation Age of Onset    Sarcoidosis Mother     Cancer Sister        Social History   Social History     Substance and Sexual Activity   Alcohol Use Yes    Alcohol/week: 7.0 standard drinks of alcohol    Types: 7 Glasses of wine per week    Comment: daily     Social History     Substance and Sexual Activity   Drug Use No     Social History     Tobacco Use   Smoking Status Every Day    Current packs/day: 0.50    Average packs/day: 0.5 packs/day for 48.0 years (24.0 ttl pk-yrs)    Types: Cigarettes    Start date: 1976   Smokeless Tobacco Never         Meds/Allergies     Current Outpatient Medications:     amitriptyline (ELAVIL) 25 mg tablet, , Disp: , Rfl:     atorvastatin (LIPITOR) 20 mg tablet, Take 20 mg by mouth daily, Disp: , Rfl:     bismuth subsalicylate (PEPTO BISMOL) 262 MG chewable tablet,  Take 1 tablet by mouth every 6 hours for 14 days, Disp: 56 tablet, Rfl: 0    calcipotriene (DOVONEX) 0.005 % cream, Apply topically 2 (two) times a day To affected areas on Saturday and Sunday only, Disp: 120 g, Rfl: 2    DULoxetine (CYMBALTA) 30 mg delayed release capsule, , Disp: , Rfl:     Fluticasone-Salmeterol (Advair) 100-50 mcg/dose inhaler, Inhale 1 puff 2 (two) times a day Rinse mouth after use., Disp: , Rfl:     folic acid ( Folic Acid) 1 mg tablet, Take 1 tablet (1 mg total) by mouth daily, Disp: 90 tablet, Rfl: 2    lidocaine-prilocaine (EMLA) cream, Apply topically as needed for mild pain, Disp: 30 g, Rfl: 0    propranolol (INDERAL) 20 mg/5 mL solution, , Disp: , Rfl:     triamcinolone (KENALOG) 0.1 % cream, Apply topically 2 (two) times a day Affected areas Monday through Friday only, Disp: 453.6 g, Rfl: 2    omeprazole (PriLOSEC) 20 mg delayed release capsule, Take 1 capsule (20 mg total) by mouth 2 (two) times a day Every 12 hours for 14 days (Patient not taking: Reported on 12/26/2023), Disp: 28 capsule, Rfl: 0  Allergies   Allergen Reactions    Prednisone Palpitations         Review of Systems   Constitutional:  Positive for appetite change and fatigue.   HENT:  Positive for dental problem (dentures and implants).         Ears clogged   Eyes:  Positive for visual disturbance (worsening).   Respiratory:  Positive for cough.         Current smoker 1/2 pack per day   Cardiovascular: Negative.    Gastrointestinal:  Positive for nausea.   Genitourinary: Negative.    Musculoskeletal:  Positive for back pain.   Skin:  Positive for rash (psoriasis).   Allergic/Immunologic: Negative.    Neurological:  Positive for light-headedness and numbness (left leg).   Hematological: Negative.    Psychiatric/Behavioral:  Positive for sleep disturbance.          OBJECTIVE:   /72   Pulse 84   Temp 97.7 °F (36.5 °C)   Resp 16   Wt 64.4 kg (142 lb)   SpO2 98%   BMI 24.37 kg/m²   Performance Status: Karnofsky:  80 - Normal activity with effort; some signs or symptoms of disease    Physical Exam  Vitals and nursing note reviewed.   Constitutional:       General: She is not in acute distress.     Appearance: She is well-developed.   Cardiovascular:      Rate and Rhythm: Normal rate and regular rhythm.   Pulmonary:      Breath sounds: No wheezing, rhonchi or rales.   Abdominal:      Palpations: Abdomen is soft.      Tenderness: There is no abdominal tenderness.   Musculoskeletal:      Right lower leg: No edema.      Left lower leg: No edema.   Lymphadenopathy:      Cervical: No cervical adenopathy.      Upper Body:      Right upper body: No supraclavicular adenopathy.      Left upper body: No supraclavicular adenopathy.   Neurological:      Mental Status: She is alert and oriented to person, place, and time.      Gait: Gait normal.          RESULTS  Lab Results    Chemistry        Component Value Date/Time    K 4.0 10/11/2023 1144     10/11/2023 1144    CO2 27 10/11/2023 1144    BUN 11 10/11/2023 1144    CREATININE 0.62 10/11/2023 1144        Component Value Date/Time    CALCIUM 9.4 10/11/2023 1144    ALKPHOS 73 10/11/2023 1144    AST 25 10/11/2023 1144    ALT 41 10/11/2023 1144          Lab Results   Component Value Date    WBC 7.48 10/11/2023    HGB 11.9 10/11/2023    HCT 35.7 10/11/2023    MCV 96 10/11/2023     (H) 10/11/2023       Imaging Studies  NM PET CT skull base to mid thigh    Result Date: 10/18/2023  Narrative PET/CT SCAN INDICATION: Restaging of lung cancer. C34.91: Malignant neoplasm of unspecified part of right bronchus or lung MODIFIER: PS COMPARISON: PET/CT 7/3/2023, bone scan 8/8/2023 CELL TYPE: Non-small cell carcinoma consistent with pulmonary adenocarcinoma, right middle lobe TECHNIQUE:   8.3 mCi F-18-FDG administered IV. Multiplanar attenuation corrected and non attenuation corrected PET images are available for interpretation, and contiguous, low dose, axial CT sections were obtained from  the skull base through the femurs. Intravenous contrast material was not utilized. This examination, like all CT scans performed in the [facility], was performed utilizing techniques to minimize radiation dose exposure, including the use of iterative reconstruction and automated exposure control. Fasting serum glucose: 137 mg/dl FINDINGS: VISUALIZED BRAIN: No acute abnormalities are seen. HEAD/NECK: There is a physiologic distribution of FDG. No FDG avid cervical adenopathy is seen. CT images: Unremarkable. CHEST: Decreased radiotracer uptake at the right middle lobe lesion now SUV max of 3.9, previously 25. Activity is along the peripheral margin of the lesion. There is central photopenia which may be related to necrosis or fibrosis. Mass measures on the order of  4.4 cm in size, previously 7-8 cm. The mass abuts the right hilar region. Resolution of previously seen activity at the mediastinal lymph nodes. CT images: Right-sided Mediport line tip terminates at the SVC. Trace right pleural effusion is new. Biapical pleural-parenchymal changes. ABDOMEN: Previously questioned small focus of uptake at the left lobe of the liver medially is not seen on the current exam. Persistent mild patchy radiotracer uptake at the head of the pancreas, SUV max of 2.8, previously 2.9. Again underlying cystic lesion identified at the pancreatic head and neck measuring up to 2.9 cm in size, larger previously 2.1 cm. CT images: No additional significant findings. PELVIS: No FDG avid soft tissue lesions are seen. CT images: There is a small fat-containing left inguinal hernia. OSSEOUS STRUCTURES: No FDG avid lesions are seen. CT images: No significant findings.     Impression 1. Overall findings suggest response to therapy. 2. Decreased radiotracer uptake at the smaller right middle lobe pulmonary lesion suggesting response to therapy with moderate FDG uptake remaining. 3. No residual activity at the previously seen  mediastinal lymph nodes. 4. Previously questioned small focus of uptake at the left lobe of the liver medially is not seen on the current exam. 5.  Enlarging cystic lesion at the pancreatic head superiorly with mild patchy radiotracer uptake. Cystic neoplasm should be excluded. Again recommend follow-up with dedicated MRI of the pancreas with and without IV contrast if not previously performed. Workstation performed: [id number]     NM PET CT skull base to mid thigh    Result Date: 7/3/2023  Narrative PET/CT SCAN INDICATION: Lung mass. Abnormal CT. D38.1: Neoplasm of uncertain behavior of trachea, bronchus and lung R91.8: Other nonspecific abnormal finding of lung field MODIFIER: PI COMPARISON: CT chest 6/13/2023, CT abdomen pelvis 6/11/2020 CELL TYPE: N/A TECHNIQUE:   8.7 mCi F-18-FDG administered IV. Multiplanar attenuation corrected and non attenuation corrected PET images are available for interpretation, and contiguous, low dose, axial CT sections were obtained from the skull base through the femurs. Intravenous contrast material was not utilized. This examination, like all CT scans performed in the [facility], was performed utilizing techniques to minimize radiation dose exposure, including the use of iterative reconstruction and automated exposure control. Fasting serum glucose: 109 mg/dl FINDINGS: VISUALIZED BRAIN: No acute abnormalities are seen. HEAD/NECK: There is a physiologic distribution of FDG. No FDG avid cervical adenopathy is seen. CT images: Unremarkable. CHEST: Large FDG avid right mid lung mass, SUV max of 25. Mass measures on the order of 7-8 cm in size based on CT. Mass extends to the right hilar region and abuts the lateral pleural margin. There is central photopenia suggesting necrosis. Mild FDG uptake in mediastinal lymph nodes. Cluster of small precarinal lymph nodes on the right demonstrates SUV max of 3.4. Subcarinal lymph node demonstrates SUV max of 3.8. This  measures up to 1.3 cm short axis image 84 series 3. No suspicious left hilar activity. CT images: Moderate biapical pleural-parenchymal changes. Scattered blebs and emphysematous changes. ABDOMEN: Questionable small focus of FDG uptake in the left lobe of the liver medially, SV max of 3. See image 118 series 1200. No obvious findings on CT. Mild patchy radiotracer uptake at the head of the pancreas, SUV max of 2.9. There does appear to be a cystic lesion at the pancreatic head/neck with adjacent coarse calcification. Cystic focus measures up to 2.1 cm image 138 series 3. CT images: Low-attenuation liver suggest fatty infiltration. PELVIS: No FDG avid soft tissue lesions are seen. CT images: Small fat-containing left inguinal hernia. OSSEOUS STRUCTURES: No FDG avid lesions are seen. Mildly increased overall osseous uptake, nonspecific. Scattered mild foci of FDG uptake in the spine corresponding to degenerative disc on CT. CT images: No significant findings.     Impression 1. Large FDG avid right lung mass in keeping with malignancy. 2.  Mild FDG uptake in scattered mediastinal lymph nodes which may represent metastasis. 3.  Mild patchy radiotracer uptake at the head of the pancreas. There does appear to be a cystic lesion at the pancreatic head/neck with adjacent coarse calcification. Cystic focus measures up to 2.1 cm, new from prior CT. An underlying pancreatic lesion  should be excluded. Recommend further characterization with dedicated MRI of the pancreas with and without IV contrast for further evaluation. 4. Questionable small focus of FDG uptake in the left lobe of the liver, metastasis is not excluded. This could be also assessed with MRI. 5.  Mildly increased overall osseous uptake. This is nonspecific, question secondary to anemia or stress response. The study was marked in EPIC for significant notification. Workstation performed: [id number]      Pathology:    Collected 7/24/2023 08:25     Status:  Edited Result - FINAL     Visible to patient: Yes (not seen)     Dx: Mass of middle lobe of right lung     0 Result Notes      Component    Case Report   Surgical Pathology Report                         Case: [id number]                                    Authorizing Provider:  Jie Mahajan DO    Collected:           07/24/2023 0825               Ordering Location:     [facility]      Received:            07/24/2023 0946                                      Hospital Operating Room                                                       Pathologist:           Brian Rahman MD                                                          Specimen:    Lung, Right Middle Lobe                                                                    Addendum   At the request of Dr. LOUISE LEON paraffin BLOCK A5 containing the patient's cancer cells were sent to [facility] for MI Profile testing. Upon completion of testing, the [facility] report will be directly sent to the requesting physician as well as posted in the Media Tab of the patient's [employer] EMR by the [facility] Pathology Department.     Please note: The [facility]'s analysis and report is performed independently of [facility], and neither the Hospital nor the Pathology Department screen, review or comment upon [facility]'s report.  Because the role of testing in cancer diagnosis and management is subject to evolving development, usage and interpretation, we strongly urge that the test limitations described in the [facility] report be carefully read, appropriately shared with the patient, and critically considered when reaching treatment decisions.     This pathology material was removed from archive for purpose of molecular testing. It was reviewed and approved by Dr. Rahman.      Addendum electronically signed by Brian Rahman MD on 8/22/2023 at  0807   Final Diagnosis   A.  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       Electronically signed by Brian Rahman MD on 7/27/2023 at 1401              ASSESSMENT  1. Adenocarcinoma of middle lobe of right lung (HCC)  Ambulatory Referral to Radiation Oncology        Cancer Staging   Adenocarcinoma of middle lobe of right lung (HCC)  Staging form: Lung, AJCC 8th Edition  - Clinical stage from 7/24/2023: Stage IIIA (cT4, cN1, cM0) - Signed by Home Kirk MD on 7/31/2023  Stage prefix: Initial diagnosis        PLAN/DISCUSSION  Saskia Alfaro is a 68 y.o. woman an active smoker who was recently diagnosed with clinical stage IIIB non-small cell lung carcinoma of the right middle lobe status post neoadjuvant chemoimmunotherapy with partial response.  She is not a surgical candidate.    I agree with recommendation to proceed with definitive concurrent chemoradiation to a total dose of 60Gy.  I feel this would offer the patient benefit in terms of local control potential cure.  This is in accordance with NCCN guidelines.    The rationale and potential benefits, health subtypes such as history to try for a pack I discussed explained that because I did not have to, chest pain pleasant chest expansion but going to the risks and acute and late side effects and potential toxicities of radiation were discussed with the patient at length.  Side effects discussed included, but were not limited to: Fatigue, skin erythema and hyperpigmentation, esophagitis, GERD, nausea, vomiting, dysphagia/odynophagia, esophageal stricture, cardiac injury, radiation pericarditis, radiation pneumonitis, pulmonary fibrosis resulting in decreased pulmonary function.      She was given the opportunity to ask questions and all questions were answered to her satisfaction.  She wished to proceed with the recommended treatment plan.    4DCT simulation will be scheduled.    We will help  "schedule follow-up with Medical Oncology and coordinate to begin treatment soon thereafter.  Smoking cessation counseling was provided.    Total Time Spent  58 minutes spent reviewing EMR in preparation for visit, with the patient, coordination with other providers, and documentation. Greater than 50% of total time was spent with the patient and/or family for counseling and/or coordination of care.    Celina Treviño MD  12/26/2023,2:27 PM      Portions of the record may have been created with voice recognition software.  Occasional wrong word or \"sound a like\" substitutions may have occurred due to the inherent limitations of voice recognition software.  Read the chart carefully and recognize, using context, where substitutions have occurred.          "

## 2023-12-26 NOTE — LETTER
2023     Frank Jo MD  [address] [facility]  [address]  [place] [address]    Patient: Saskia Alfaro   YOB: 1955   Date of Visit: 2023       Dear Dr. oJ:    Thank you for referring Saskia Alfaro to me for evaluation. Below are my notes for this consultation.    If you have questions, please do not hesitate to call me. I look forward to following your patient along with you.         Sincerely,        Celina Treviño MD        CC: No Recipients    Celina Treviño MD  2023  5:07 PM  Sign when Signing Visit  Consultation - Radiation Oncology      MRN:[id number] : 1955  Encounter: [id number]  Patient Information: Saskia Alfaro      CHIEF COMPLAINT  Chief Complaint   Patient presents with   • Lung Cancer   • Consult     Cancer Staging   Adenocarcinoma of middle lobe of right lung (HCC)  Staging form: Lung, AJCC 8th Edition  - Clinical stage from 2023: Stage IIIA (cT4, cN1, cM0) - Signed by Home Kirk MD on 2023  Stage prefix: Initial diagnosis           History of Present Illness   Saskia Alfaro is a 68 y.o. 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  She underwent neoadjuvant chemoimmunotherapy with partial response.  She was not felt to be a surgical candidate.  She has been referred for definitive concurrent chemoradiation by Dr. Jo.    Briefly, the patient initially presented to ED with complaints of right-sided shoulder pain, shortness of breath, and small-volume hemoptysis.     23 CTA chest pe study  No pulmonary embolism.    6 x 7 x 5 cm mass in the middle lobe extending centrally to the right hilum, and abuts the adjacent pleura along the fissures and laterally, possibly pleural invasion. Findings are highly suspicious for bronchogenic malignancy.   Mildly enlarged right hilar and mediastinal  lymph nodes, suspicious for malignant michela involvement.   Recommend further evaluation with nonemergent outpatient FDG PET CT for staging.     6/23/23 Dr. Jo  concerning for lung cancer   next steps would be biopsy and PET scan.   Should this be a non-small cell lung cancer she understands this is a clinical T4N2 or stage IIIb cancer.  We discussed that the mainstay of treatment for that would be chemotherapy/immunotherapy.      6/30/23 Complete PFT  FEV1/FVC Ratio: 66.35  Forced Vital Capacity: 2.51 L, 84% predicted  FEV1: 1.67 L, 71% predicted  After administration of bronchodilator FEV1: 1.62 L, 69% predicted with no appreciable response to the bronchodilator   Lung volumes by plethysmography: Total Lung Capacity 83% predicted Residual volume 81% predicted   DLCO corrected for patients hemoglobin level: 51%     7/3/23 PET CT  1. Large FDG avid right lung mass in keeping with malignancy.  2.  Mild FDG uptake in scattered mediastinal lymph nodes which may represent metastasis.  3.  Mild patchy radiotracer uptake at the head of the pancreas. There does appear to be a cystic lesion at the pancreatic head/neck with adjacent coarse calcification. Cystic focus measures up to 2.1 cm, new from prior CT. An underlying pancreatic lesion should be excluded. Recommend further characterization with dedicated MRI of the pancreas with and without IV contrast for further evaluation.  4. Questionable small focus of FDG uptake in the left lobe of the liver, metastasis is not excluded. This could be also assessed with MRI.  5.  Mildly increased overall osseous uptake. This is nonspecific, question secondary to anemia or stress response.     7/5/23 Bronch/EBUS  The vocal cords, trachea, main joleen, left main stem, BENJIE, lingula, LLL, RUL and RLL appeared normal.  Generalized cobblestone, erythematous and nodular mucosa in the RML; brushing and washings performed  Performed multiple transbronchial biopsies with forceps in the  right middle lobar bronchus with minimal amount of bleeding after intervention; applied cold saline to control bleeding; hemostasis achieved  Lymph node observed at station 7 measuring 35 mm x 30 mm. Took 20 passes with a 21 gauge needle. The sample was adequate. Lymph node observed at station 4R measuring 15 mm x 15 mm. Took 8 passes with a 21 gauge needle. Nodes observed under convex ultrasound guidance. Onsite cytologist was present, and cytology results were preliminarily benign.     7/5/23 Pathology  A-B. Lung, Right Middle Lobe Bronchial Washing:Negative for malignancy.  D. Lung, Right Middle Lobe Bronchial Brushing :Atypical cellular changes seen.  A.  Lung, right middle lobe, biopsy:     - Portions of bronchial wall with mild chronic inflammation.     - No dysplasia or neoplasia identified.  A-B-C. Lymph Node, Subcarinal level 7: Negative for malignancy, see note  D-E-F. Lymph Node, 4R: Negative for malignancy.       7/26/23 MRI brain w wo contrast  Normal examination.        7/31/23 Dr. Kirk  Cisplatin 75mg/m2, Alimta 500mg/m2, Nivo 360mg x 3 cycles  Restaging PET/CT after C2 to be ordered to take place after C3  CARIS NGS will be followed up on which was sent off of the 7/24/2023 RML lung biopsy  NM Bone scan 8/8/2023 to work-up shoulder pain and rule out mets  Nicotine cessation counseling provided     7/31/23  Thoracic Oncology Multidisciplinary Tumor Conference   PHYSICIAN RECOMMENDED PLAN:  Patient is a potential surgical candidate.  Induction chemotherapy followed by re-imaging.  RT not needed at this time.      8/4/23 IR port placement     8/8/23 Bone scan  1.  No scintigraphic evidence of osseous metastasis, with particular attention to the right shoulder.   2. Degenerative changes as described, including facet arthropathy in the right posterior cervical spine.   3. Linear uptake along the lateral aspect of both tibias may represent hypertrophic osteoarthropathy.     10/18/23 PET CT  1. Overall  findings suggest response to therapy.  2. Decreased radiotracer uptake at the smaller right middle lobe pulmonary lesion suggesting response to therapy with moderate FDG uptake remaining.  3. No residual activity at the previously seen mediastinal lymph nodes.  4. Previously questioned small focus of uptake at the left lobe of the liver medially is not seen on the current exam.  5.  Enlarging cystic lesion at the pancreatic head superiorly with mild patchy radiotracer uptake. Cystic neoplasm should be excluded. Again recommend follow-up with dedicated MRI of the pancreas with and without IV contrast if not previously performed.     10/25/23 Dr. Jo  reviewed PET/CT imaging   shows at least a partial response to chemo.  Her right middle lobe mass decreased from 8 cm to 4 cm.   She had previous PET uptake in level 7 and 4 lymph nodes.  This has improved.   Additionally she has a 2.9 cm cystic pancreatic head lesion.   Discussed treatment options being surgery followed by likely adjuvant chemotherapy possibly radiation versus definitive chemoradiation.    She is still smoking and just finished chemotherapy 2 weeks prior.  She understands she would have to stop smoking completely to be a surgical candidate.      11/6/23 Dr. Kirk  F/u thoracic oncology for goal for surgery  MRI Abd to assess liver coming up tomorrow  Thoracic surgery mandating EGD to rule out pancreatic/biliary tract disease (second pancreatic cancer?)     11/7/23 MRI abdomen w wo contrast and mrcp  3.0 cm proximal pancreatic with evidence of secondary distal main pancreatic duct obstruction and parenchymal atrophy. Recommend EUS for further evaluation. Differential diagnosis includes intraductal papillary mucinous neoplasm (IPMN), mucinous cystic neoplasm, neuroendocrine tumor and probably less likely necrotic ductal carcinoma. The differential diagnosis could also include pseudocyst from chronic pancreatitis.     11/28/23 EUS  The esophagus,  stomach and duodenum appeared normal. Gastric biopsies done.  The pancreas appeared atrophic. The pancreatic parenchyma was visualized in the entire pancreas. . The parenchyma was hypoechoic and had hyperechoic foci and hyperechoic strands. The pancreatic duct measured 3 mm at the head and 4 mm at the body. The duct in the body of the pancreas was dilated.  One round and anechoic cyst measuring 28 mm x 26 mm with debris present, connection with ducts and well-defined margins in the neck of the pancreas. 1 successful fine needle aspiration pass was taken with a 22 gauge needle using a transduodenal approach guided by Doppler. Obtained adequate sample of serous and thick-appearing aspirate, sent sample for chemistry and cytology analysis. There was a large calcification just downstream from the cyst and upstream the duct was dilated. The calcification was causing significant shadowing. The cyst was thick walled.  One triangular and anechoic cyst with connection with ducts and well-defined margins in the body of the pancreas.  Dilated pancreatic duct side branches.  After aspiration the body cyst also seemed to have decompressed along with the pancreatic duct.  The bile duct appeared normal.  The parenchyma of the liver appeared normal. The parenchyma was visualized in the left lobe and right lobe of the liver. The parenchyma was hyperechoic.  Normal celiac axis. No lymphadenopathy was seen.  A.B. FNA, pancreatic neck cyst (ThinPrep and cell block preparations):  Non-diagnostic/Insufficient.   Amorphous debris.  A. Stomach, biopsy:  - Fragments of gastric antral and oxyntic mucosa with moderate chronic active gastritis.  - Helicobacter pylori organisms identified with routine H&E stain.  - Negative for intestinal metaplasia, dysplasia or carcinoma     12/13/23 Dr. Jo   undergoing induction chemotherapy with partial response.  Her last dose of chemotherapy was early October.      she is still smoking 10  cigarettes/day.   Her PFTs are borderline   Everything put together with her ongoing smoking I think she is prohibitive risk for surgery.    recommend definitive chemoradiation   Refer to Med Onc and Rad Onc      12/19/23 Dr. Ray  Paclitaxel 45-50 mg/m2 weekly; carboplatin AUC 2, concurrent thoracic RT     12/20/23 Palliative Care     The patient has no new respiratory symptoms.  Baseline cough of clear sputum in morning.  She denies hemoptysis.  She denies unplanned weight loss.  The patient denies shortness of breath at rest or progressive dyspnea on exertion.  She denies chest pain or palpitations.  Her shoulder pain resolved.    Upcoming:  She does not have Med Onc follow up  1/17/24 Palliative Care  3/4/24 MRI abdomen  3/21/24 GI    Historical Information   Oncology History   Adenocarcinoma of middle lobe of right lung (HCC)   6/23/2023 Initial Diagnosis    Adenocarcinoma of right lung (HCC)     7/24/2023 -  Cancer Staged    Staging form: Lung, AJCC 8th Edition  - Clinical stage from 7/24/2023: Stage IIIA (cT4, cN1, cM0) - Signed by Home Kirk MD on 7/31/2023  Stage prefix: Initial diagnosis       7/24/2023 Biopsy    Navigational bronchoscopy with EBUS    A.  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     A.-B.  Lung, Right Middle Lobe Bronchial Brushing (Thin-prep and smear preparations):   Conclusive evidence of malignancy.  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     Satisfactory for evaluation.     C.-D.  Lung, Right Middle Lobe Bronchoalveolar Lavage (Thin-prep and cell block preparations):   Conclusive evidence of malignancy.  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     Satisfactory for evaluation.      E.-F.  Lymph Node, level 7 (Thin-prep and smear preparations):   Atypical cellular changes seen.  Rare atypical cells.  Lymphocytes.  Few bronchial cells and pulmonary  macrophages.        Satisfactory for evaluation.     G.-H.  Lymph Node, 10R (Thin-prep and smear preparations):   Negative for malignancy.  Lymphocytes.  Aggregates of neutrophils.     Satisfactory for evaluation.     8/31/2023 - 10/12/2023 Chemotherapy    cyanocobalamin, 1,000 mcg, Intramuscular, Once, 1 of 1 cycle  Administration: 1,000 mcg (8/24/2023)  alteplase (CATHFLO), 2 mg, Intracatheter, Every 1 Minute as needed, 3 of 3 cycles  CISplatin (PLATINOL) with mannitol IVPB, 75 mg/m2 = 129.8 mg (100 % of original dose 75 mg/m2), Intravenous, Once, 3 of 3 cycles  Dose modification: 50 mg/m2 (original dose 75 mg/m2, Cycle 1, Reason: Anticipated Tolerance), 75 mg/m2 (original dose 75 mg/m2, Cycle 1, Reason: Dose modified as per discussion with consulting physician)  Administration: 129.8 mg (8/31/2023), 129.8 mg (9/21/2023), 129.8 mg (10/12/2023)  fosaprepitant (EMEND) IVPB, 150 mg, Intravenous, Once, 3 of 3 cycles  Administration: 150 mg (8/31/2023), 150 mg (9/21/2023), 150 mg (10/12/2023)  nivolumab (OPDIVO) IVPB, 360 mg, Intravenous, Once, 3 of 3 cycles  Administration: 360 mg (8/31/2023), 360 mg (9/21/2023), 360 mg (10/12/2023)  pemetrexed (ALIMTA) chemo infusion, 500 mg/m2 = 865 mg, Intravenous, Once, 3 of 3 cycles  Administration: 865 mg (8/31/2023), 865 mg (9/21/2023), 865 mg (10/12/2023)           Past Medical History:   Diagnosis Date   • Fibromyalgia    • Heart murmur    • Lumbosacral disc herniation    • Lung cancer (HCC)    • Psoriasis    • Psoriasis    • RSD (reflex sympathetic dystrophy)      Past Surgical History:   Procedure Laterality Date   • IR PORT PLACEMENT  08/04/2023   • LUNG BIOPSY      x2   • PARTIAL HYSTERECTOMY         Family History   Problem Relation Age of Onset   • Sarcoidosis Mother    • Cancer Sister        Social History   Social History     Substance and Sexual Activity   Alcohol Use Yes   • Alcohol/week: 7.0 standard drinks of alcohol   • Types: 7 Glasses of wine per week     Comment: daily     Social History     Substance and Sexual Activity   Drug Use No     Social History     Tobacco Use   Smoking Status Every Day   • Current packs/day: 0.50   • Average packs/day: 0.5 packs/day for 48.0 years (24.0 ttl pk-yrs)   • Types: Cigarettes   • Start date: 1976   Smokeless Tobacco Never         Meds/Allergies     Current Outpatient Medications:   •  amitriptyline (ELAVIL) 25 mg tablet, , Disp: , Rfl:   •  atorvastatin (LIPITOR) 20 mg tablet, Take 20 mg by mouth daily, Disp: , Rfl:   •  bismuth subsalicylate (PEPTO BISMOL) 262 MG chewable tablet, Take 1 tablet by mouth every 6 hours for 14 days, Disp: 56 tablet, Rfl: 0  •  calcipotriene (DOVONEX) 0.005 % cream, Apply topically 2 (two) times a day To affected areas on Saturday and Sunday only, Disp: 120 g, Rfl: 2  •  DULoxetine (CYMBALTA) 30 mg delayed release capsule, , Disp: , Rfl:   •  Fluticasone-Salmeterol (Advair) 100-50 mcg/dose inhaler, Inhale 1 puff 2 (two) times a day Rinse mouth after use., Disp: , Rfl:   •  folic acid (KP Folic Acid) 1 mg tablet, Take 1 tablet (1 mg total) by mouth daily, Disp: 90 tablet, Rfl: 2  •  lidocaine-prilocaine (EMLA) cream, Apply topically as needed for mild pain, Disp: 30 g, Rfl: 0  •  propranolol (INDERAL) 20 mg/5 mL solution, , Disp: , Rfl:   •  triamcinolone (KENALOG) 0.1 % cream, Apply topically 2 (two) times a day Affected areas Monday through Friday only, Disp: 453.6 g, Rfl: 2  •  omeprazole (PriLOSEC) 20 mg delayed release capsule, Take 1 capsule (20 mg total) by mouth 2 (two) times a day Every 12 hours for 14 days (Patient not taking: Reported on 12/26/2023), Disp: 28 capsule, Rfl: 0  Allergies   Allergen Reactions   • Prednisone Palpitations         Review of Systems   Constitutional:  Positive for appetite change and fatigue.   HENT:  Positive for dental problem (dentures and implants).         Ears clogged   Eyes:  Positive for visual disturbance (worsening).   Respiratory:  Positive for cough.          Current smoker 1/2 pack per day   Cardiovascular: Negative.    Gastrointestinal:  Positive for nausea.   Genitourinary: Negative.    Musculoskeletal:  Positive for back pain.   Skin:  Positive for rash (psoriasis).   Allergic/Immunologic: Negative.    Neurological:  Positive for light-headedness and numbness (left leg).   Hematological: Negative.    Psychiatric/Behavioral:  Positive for sleep disturbance.          OBJECTIVE:   /72   Pulse 84   Temp 97.7 °F (36.5 °C)   Resp 16   Wt 64.4 kg (142 lb)   SpO2 98%   BMI 24.37 kg/m²   Performance Status: Karnofsky: 80 - Normal activity with effort; some signs or symptoms of disease    Physical Exam  Vitals and nursing note reviewed.   Constitutional:       General: She is not in acute distress.     Appearance: She is well-developed.   Cardiovascular:      Rate and Rhythm: Normal rate and regular rhythm.   Pulmonary:      Breath sounds: No wheezing, rhonchi or rales.   Abdominal:      Palpations: Abdomen is soft.      Tenderness: There is no abdominal tenderness.   Musculoskeletal:      Right lower leg: No edema.      Left lower leg: No edema.   Lymphadenopathy:      Cervical: No cervical adenopathy.      Upper Body:      Right upper body: No supraclavicular adenopathy.      Left upper body: No supraclavicular adenopathy.   Neurological:      Mental Status: She is alert and oriented to person, place, and time.      Gait: Gait normal.          RESULTS  Lab Results    Chemistry        Component Value Date/Time    K 4.0 10/11/2023 1144     10/11/2023 1144    CO2 27 10/11/2023 1144    BUN 11 10/11/2023 1144    CREATININE 0.62 10/11/2023 1144        Component Value Date/Time    CALCIUM 9.4 10/11/2023 1144    ALKPHOS 73 10/11/2023 1144    AST 25 10/11/2023 1144    ALT 41 10/11/2023 1144          Lab Results   Component Value Date    WBC 7.48 10/11/2023    HGB 11.9 10/11/2023    HCT 35.7 10/11/2023    MCV 96 10/11/2023     (H) 10/11/2023        Imaging Studies  NM PET CT skull base to mid thigh    Result Date: 10/18/2023  Narrative PET/CT SCAN INDICATION: Restaging of lung cancer. C34.91: Malignant neoplasm of unspecified part of right bronchus or lung MODIFIER: PS COMPARISON: PET/CT 7/3/2023, bone scan 8/8/2023 CELL TYPE: Non-small cell carcinoma consistent with pulmonary adenocarcinoma, right middle lobe TECHNIQUE:   8.3 mCi F-18-FDG administered IV. Multiplanar attenuation corrected and non attenuation corrected PET images are available for interpretation, and contiguous, low dose, axial CT sections were obtained from the skull base through the femurs. Intravenous contrast material was not utilized. This examination, like all CT scans performed in the [facility], was performed utilizing techniques to minimize radiation dose exposure, including the use of iterative reconstruction and automated exposure control. Fasting serum glucose: 137 mg/dl FINDINGS: VISUALIZED BRAIN: No acute abnormalities are seen. HEAD/NECK: There is a physiologic distribution of FDG. No FDG avid cervical adenopathy is seen. CT images: Unremarkable. CHEST: Decreased radiotracer uptake at the right middle lobe lesion now SUV max of 3.9, previously 25. Activity is along the peripheral margin of the lesion. There is central photopenia which may be related to necrosis or fibrosis. Mass measures on the order of  4.4 cm in size, previously 7-8 cm. The mass abuts the right hilar region. Resolution of previously seen activity at the mediastinal lymph nodes. CT images: Right-sided Mediport line tip terminates at the SVC. Trace right pleural effusion is new. Biapical pleural-parenchymal changes. ABDOMEN: Previously questioned small focus of uptake at the left lobe of the liver medially is not seen on the current exam. Persistent mild patchy radiotracer uptake at the head of the pancreas, SUV max of 2.8, previously 2.9. Again underlying cystic lesion identified at the  pancreatic head and neck measuring up to 2.9 cm in size, larger previously 2.1 cm. CT images: No additional significant findings. PELVIS: No FDG avid soft tissue lesions are seen. CT images: There is a small fat-containing left inguinal hernia. OSSEOUS STRUCTURES: No FDG avid lesions are seen. CT images: No significant findings.     Impression 1. Overall findings suggest response to therapy. 2. Decreased radiotracer uptake at the smaller right middle lobe pulmonary lesion suggesting response to therapy with moderate FDG uptake remaining. 3. No residual activity at the previously seen mediastinal lymph nodes. 4. Previously questioned small focus of uptake at the left lobe of the liver medially is not seen on the current exam. 5.  Enlarging cystic lesion at the pancreatic head superiorly with mild patchy radiotracer uptake. Cystic neoplasm should be excluded. Again recommend follow-up with dedicated MRI of the pancreas with and without IV contrast if not previously performed. Workstation performed: [id number]     NM PET CT skull base to mid thigh    Result Date: 7/3/2023  Narrative PET/CT SCAN INDICATION: Lung mass. Abnormal CT. D38.1: Neoplasm of uncertain behavior of trachea, bronchus and lung R91.8: Other nonspecific abnormal finding of lung field MODIFIER: PI COMPARISON: CT chest 6/13/2023, CT abdomen pelvis 6/11/2020 CELL TYPE: N/A TECHNIQUE:   8.7 mCi F-18-FDG administered IV. Multiplanar attenuation corrected and non attenuation corrected PET images are available for interpretation, and contiguous, low dose, axial CT sections were obtained from the skull base through the femurs. Intravenous contrast material was not utilized. This examination, like all CT scans performed in the [facility], was performed utilizing techniques to minimize radiation dose exposure, including the use of iterative reconstruction and automated exposure control. Fasting serum glucose: 109 mg/dl FINDINGS: VISUALIZED  BRAIN: No acute abnormalities are seen. HEAD/NECK: There is a physiologic distribution of FDG. No FDG avid cervical adenopathy is seen. CT images: Unremarkable. CHEST: Large FDG avid right mid lung mass, SUV max of 25. Mass measures on the order of 7-8 cm in size based on CT. Mass extends to the right hilar region and abuts the lateral pleural margin. There is central photopenia suggesting necrosis. Mild FDG uptake in mediastinal lymph nodes. Cluster of small precarinal lymph nodes on the right demonstrates SUV max of 3.4. Subcarinal lymph node demonstrates SUV max of 3.8. This measures up to 1.3 cm short axis image 84 series 3. No suspicious left hilar activity. CT images: Moderate biapical pleural-parenchymal changes. Scattered blebs and emphysematous changes. ABDOMEN: Questionable small focus of FDG uptake in the left lobe of the liver medially, SV max of 3. See image 118 series 1200. No obvious findings on CT. Mild patchy radiotracer uptake at the head of the pancreas, SUV max of 2.9. There does appear to be a cystic lesion at the pancreatic head/neck with adjacent coarse calcification. Cystic focus measures up to 2.1 cm image 138 series 3. CT images: Low-attenuation liver suggest fatty infiltration. PELVIS: No FDG avid soft tissue lesions are seen. CT images: Small fat-containing left inguinal hernia. OSSEOUS STRUCTURES: No FDG avid lesions are seen. Mildly increased overall osseous uptake, nonspecific. Scattered mild foci of FDG uptake in the spine corresponding to degenerative disc on CT. CT images: No significant findings.     Impression 1. Large FDG avid right lung mass in keeping with malignancy. 2.  Mild FDG uptake in scattered mediastinal lymph nodes which may represent metastasis. 3.  Mild patchy radiotracer uptake at the head of the pancreas. There does appear to be a cystic lesion at the pancreatic head/neck with adjacent coarse calcification. Cystic focus measures up to 2.1 cm, new from prior CT. An  underlying pancreatic lesion  should be excluded. Recommend further characterization with dedicated MRI of the pancreas with and without IV contrast for further evaluation. 4. Questionable small focus of FDG uptake in the left lobe of the liver, metastasis is not excluded. This could be also assessed with MRI. 5.  Mildly increased overall osseous uptake. This is nonspecific, question secondary to anemia or stress response. The study was marked in EPIC for significant notification. Workstation performed: [id number]      Pathology:    Collected 7/24/2023 08:25     Status: Edited Result - FINAL     Visible to patient: Yes (not seen)     Dx: Mass of middle lobe of right lung     0 Result Notes      Component    Case Report   Surgical Pathology Report                         Case: [id number]                                    Authorizing Provider:  Jie Mahajan DO    Collected:           07/24/2023 0825               Ordering Location:     [facility]      Received:            07/24/2023 0946                                      Hospital Operating Room                                                       Pathologist:           Brian Rahman MD                                                          Specimen:    Lung, Right Middle Lobe                                                                    Addendum   At the request of Dr. LOUISE LEON paraffin BLOCK A5 containing the patient's cancer cells were sent to [facility] for MI Profile testing. Upon completion of testing, the [facility] report will be directly sent to the requesting physician as well as posted in the Media Tab of the patient's [facility] EMR by the [facility] Pathology Department.     Please note: The [facility]'s analysis and report is performed independently of [facility], and neither the Hospital nor the Pathology Department screen, review or comment upon  [facility]'s report.  Because the role of testing in cancer diagnosis and management is subject to evolving development, usage and interpretation, we strongly urge that the test limitations described in the [facility] report be carefully read, appropriately shared with the patient, and critically considered when reaching treatment decisions.     This pathology material was removed from archive for purpose of molecular testing. It was reviewed and approved by Dr. Rahman.      Addendum electronically signed by Brian Rahman MD on 8/22/2023 at 0807   Final Diagnosis   A.  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       Electronically signed by Brian Rahman MD on 7/27/2023 at 1401              ASSESSMENT  1. Adenocarcinoma of middle lobe of right lung (HCC)  Ambulatory Referral to Radiation Oncology        Cancer Staging   Adenocarcinoma of middle lobe of right lung (HCC)  Staging form: Lung, AJCC 8th Edition  - Clinical stage from 7/24/2023: Stage IIIA (cT4, cN1, cM0) - Signed by Home Kirk MD on 7/31/2023  Stage prefix: Initial diagnosis        PLAN/DISCUSSION  Saskia Alfaro is a 68 y.o. woman an active smoker who was recently diagnosed with clinical stage IIIB non-small cell lung carcinoma of the right middle lobe status post neoadjuvant chemoimmunotherapy with partial response.  She is not a surgical candidate.    I agree with recommendation to proceed with definitive concurrent chemoradiation to a total dose of 60Gy.  I feel this would offer the patient benefit in terms of local control potential cure.  This is in accordance with NCCN guidelines.    The rationale and potential benefits, health subtypes such as history to try for a pack I discussed explained that because I did not have to, chest pain pleasant chest expansion but going to the risks and acute and late side effects and potential toxicities of  "radiation were discussed with the patient at length.  Side effects discussed included, but were not limited to: Fatigue, skin erythema and hyperpigmentation, esophagitis, GERD, nausea, vomiting, dysphagia/odynophagia, esophageal stricture, cardiac injury, radiation pericarditis, radiation pneumonitis, pulmonary fibrosis resulting in decreased pulmonary function.      She was given the opportunity to ask questions and all questions were answered to her satisfaction.  She wished to proceed with the recommended treatment plan.    4DCT simulation will be scheduled.    We will help schedule follow-up with Medical Oncology and coordinate to begin treatment soon thereafter.  Smoking cessation counseling was provided.    Total Time Spent  58 minutes spent reviewing EMR in preparation for visit, with the patient, coordination with other providers, and documentation. Greater than 50% of total time was spent with the patient and/or family for counseling and/or coordination of care.    Celina Treviño MD  12/26/2023,2:27 PM      Portions of the record may have been created with voice recognition software.  Occasional wrong word or \"sound a like\" substitutions may have occurred due to the inherent limitations of voice recognition software.  Read the chart carefully and recognize, using context, where substitutions have occurred.             "

## 2023-12-26 NOTE — PROGRESS NOTES
Saskia Alfaro 1955 is a 68 y.o. female who is a current smoker was diagnosed with Stage IIIA adenocarcinoma of the right middle lobe of lung. She initially presented to Urgent Care and eventually ED for shortness of breath, right sided back pain, and mild hemoptysis. She was found to have a  6 x 7 x 5 cm mass in the middle lobe extending centrally to the right hilum on imaging. She underwent navigational bronchoscopy with EBUS and completed neoadjuvant chemotherapy. She is not a surgical candidate. She is being referred by Dr. Jo and presents today for consult.      6/13/23 CTA chest pe study  No pulmonary embolism.   6 x 7 x 5 cm mass in the middle lobe extending centrally to the right hilum, and abuts the adjacent pleura along the fissures and laterally, possibly pleural invasion. Findings are highly suspicious for bronchogenic malignancy.   Mildly enlarged right hilar and mediastinal lymph nodes, suspicious for malignant michela involvement.   Recommend further evaluation with nonemergent outpatient FDG PET CT for staging.       6/23/23 Dr. Jo  concerning for lung cancer   next steps would be biopsy and PET scan.   Should this be a non-small cell lung cancer she understands this is a clinical T4N2 or stage IIIb cancer.  We discussed that the mainstay of treatment for that would be chemotherapy/immunotherapy.       6/30/23 Complete PFT  FEV1/FVC Ratio: 66.35  Forced Vital Capacity: 2.51 L, 84% predicted  FEV1: 1.67 L, 71% predicted  After administration of bronchodilator FEV1: 1.62 L, 69% predicted with no appreciable response to the bronchodilator   Lung volumes by plethysmography: Total Lung Capacity 83% predicted Residual volume 81% predicted   DLCO corrected for patients hemoglobin level: 51%      7/3/23 PET CT  1. Large FDG avid right lung mass in keeping with malignancy.  2.  Mild FDG uptake in scattered mediastinal lymph nodes which may represent metastasis.  3.  Mild patchy radiotracer  uptake at the head of the pancreas. There does appear to be a cystic lesion at the pancreatic head/neck with adjacent coarse calcification. Cystic focus measures up to 2.1 cm, new from prior CT. An underlying pancreatic lesion   should be excluded. Recommend further characterization with dedicated MRI of the pancreas with and without IV contrast for further evaluation.  4. Questionable small focus of FDG uptake in the left lobe of the liver, metastasis is not excluded. This could be also assessed with MRI.  5.  Mildly increased overall osseous uptake. This is nonspecific, question secondary to anemia or stress response.      7/5/23 EBUS  The vocal cords, trachea, main joleen, left main stem, BENJIE, lingula, LLL, RUL and RLL appeared normal.  Generalized cobblestone, erythematous and nodular mucosa in the RML; performed 2 brushings with cytology ; performed washing using 40 mL of saline with a total return of 30 mL, sample sent for cytology analysis  Performed multiple transbronchial biopsies with forceps in the right middle lobar bronchus with minimal amount of bleeding after intervention; applied cold saline to control bleeding; hemostasis achieved  Lymph node observed at station 7 measuring 35 mm x 30 mm. Took 20 passes with a 21 gauge needle. The sample was adequate. Lymph node observed at station 4R measuring 15 mm x 15 mm. Took 8 passes with a 21 gauge needle. Nodes observed under convex ultrasound guidance. Onsite cytologist was present, and cytology results were preliminarily benign.    7/5/23 Pathology  A-B. Lung, Right Middle Lobe Bronchial Washing (ThinPrep and cell block preparations):  Negative for malignancy.  Scant benign bronchial cells.  Neutrophils, lymphocytes and pulmonary macrophages.   Satisfactory for evaluation.   D. Lung, Right Middle Lobe Bronchial Brushing, :  Atypical cellular changes seen.  Rare atypical epithelial cells in a background of benign bronchial cells.   Satisfactory for  evaluation    A.  Lung, right middle lobe, biopsy:     - Portions of bronchial wall with mild chronic inflammation.     - No dysplasia or neoplasia identified.      7/10/23 Thoracic Oncology Multidisciplinary Tumor Conference   PHYSICIAN RECOMMENDED PLAN: Navigational bronchoscopy with EBUS.      7/24/23 Navigation bronchoscopy with EBUS    Right middle lobe lung mass, positive for non-small cell carcinoma  Robotic navigational bronchoscopy with transbronchial forceps biopsy, bronchial brushing and bronchoalveolar lavage performed in right middle lobe.  Radial endobronchial ultrasound (R-EBUS) performed to verify target lesion.  Linear endobronchial ultrasound (EBUS) performed with FNA of level 7 and 10 R lymph nodes      7/26/23 MRI brain w wo contrast  Normal examination.       7/31/23 Dr. Yelena Llanesfran 8mg q8 prn nausea/vomiting to be sent home  IR referral port  F/u thoracic oncology  Cisplatin 75mg/m2, Alimta 500mg/m2, Nivo 360mg x 3 cycles, Vit B12 supportive Care IM plan  Restaging PET/CT after C2 to be ordered to take place after C3  EMLA  CARIS NGS will be followed up on which was sent off of the 7/24/2023 RML lung biopsy  NM Bone scan 8/8/2023 to work-up shoulder pain and rule out mets  Nicotine cessation counseling provided   Follow Up: q3 weeks while on systemic therapy      7/31/23  Thoracic Oncology Multidisciplinary Tumor Conference   PHYSICIAN RECOMMENDED PLAN:  Patient is a potential surgical candidate.  Induction chemotherapy followed by re-imaging.  RT not needed at this time.     8/4/23 IR port placement    8/8/23 Bone scan  1.  No scintigraphic evidence of osseous metastasis, with particular attention to the right shoulder.   2. Degenerative changes as described, including facet arthropathy in the right posterior cervical spine.   3. Linear uptake along the lateral aspect of both tibias may represent hypertrophic osteoarthropathy.    10/18/23 PET CT  1. Overall findings suggest response to  therapy.  2. Decreased radiotracer uptake at the smaller right middle lobe pulmonary lesion suggesting response to therapy with moderate FDG uptake remaining.  3. No residual activity at the previously seen mediastinal lymph nodes.  4. Previously questioned small focus of uptake at the left lobe of the liver medially is not seen on the current exam.  5.  Enlarging cystic lesion at the pancreatic head superiorly with mild patchy radiotracer uptake. Cystic neoplasm should be excluded. Again recommend follow-up with dedicated MRI of the pancreas with and without IV contrast if not previously performed.        10/25/23 Dr. Jo  reviewed her PET/CT imaging   shows at least a partial response to chemo.  Her right middle lobe mass decreased from 8 cm to 4 cm.    She had previous PET uptake in level 7 and 4 lymph nodes.  This has improved.    Additionally she has a 2.9 cm cystic pancreatic head lesion.    discussed treatment options being surgery followed by likely adjuvant chemotherapy possibly radiation versus definitive chemoradiation.    She is still smoking and just finished chemotherapy 2 weeks prior.  She understands she would have to stop smoking completely to be a surgical candidate.       11/6/23 Dr. Kirk  F/u thoracic oncology for goal for surgery  MRI Abd to assess liver coming up tomorrow  Thoracic surgery mandating EGD to rule out pancreatic/biliary tract disease (second pancreatic cancer?)      11/7/23 MRI abdomen w wo contrast and mrcp  3.0 cm proximal pancreatic with evidence of secondary distal main pancreatic duct obstruction and parenchymal atrophy. Recommend EUS for further evaluation. Differential diagnosis includes intraductal papillary mucinous neoplasm (IPMN), mucinous cystic   neoplasm, neuroendocrine tumor and probably less likely necrotic ductal carcinoma. The differential diagnosis could also include pseudocyst from chronic pancreatitis.      11/28/23 EUS  The esophagus, stomach and  duodenum appeared normal. Gastric biopsies done.  The pancreas appeared atrophic. The pancreatic parenchyma was visualized in the entire pancreas. . The parenchyma was hypoechoic and had hyperechoic foci and hyperechoic strands. The pancreatic duct measured 3 mm at the head and 4 mm at the body. The duct in the body of the pancreas was dilated.  One round and anechoic cyst measuring 28 mm x 26 mm with debris present, connection with ducts and well-defined margins in the neck of the pancreas. 1 successful fine needle aspiration pass was taken with a 22 gauge needle using a transduodenal approach guided by Doppler. Obtained adequate sample of serous and thick-appearing aspirate, sent sample for chemistry and cytology analysis. There was a large calcification just downstream from the cyst and upstream the duct was dilated. The calcification was causing significant shadowing. The cyst was thick walled.  One triangular and anechoic cyst with connection with ducts and well-defined margins in the body of the pancreas.  Dilated pancreatic duct side branches.  After aspiration the body cyst also seemed to have decompressed along with the pancreatic duct.  The bile duct appeared normal.  The parenchyma of the liver appeared normal. The parenchyma was visualized in the left lobe and right lobe of the liver. The parenchyma was hyperechoic.  Normal celiac axis. No lymphadenopathy was seen.  A.B. FNA, pancreatic neck cyst (ThinPrep and cell block preparations):  Non-diagnostic/Insufficient.   Amorphous debris.    A. Stomach, biopsy:  - Fragments of gastric antral and oxyntic mucosa with moderate chronic active gastritis.  - Helicobacter pylori organisms identified with routine H&E stain.  - Negative for intestinal metaplasia, dysplasia or carcinoma      12/13/23 Dr. Jo   undergoing induction chemotherapy with partial response.  Her last dose of chemotherapy was early October.      she is still smoking 10 cigarettes/day.    Her PFTs are borderline   Everything put together with her ongoing smoking I think she is prohibitive risk for surgery.    recommend definitive chemoradiation   Refer to Med Onc and Rad Onc      12/19/23 Dr. Ray  Paclitaxel 45-50 mg/m2 weekly; carboplatin AUC 2, concurrent thoracic RT      12/20/23 Palliative Care      Upcoming:  She does not have Med Onc follow up  1/17/24 Palliative Care  3/4/24 MRI abdomen  3/21/24 GI    Oncology History   Adenocarcinoma of middle lobe of right lung (HCC)   6/23/2023 Initial Diagnosis    Adenocarcinoma of right lung (HCC)     7/24/2023 -  Cancer Staged    Staging form: Lung, AJCC 8th Edition  - Clinical stage from 7/24/2023: Stage IIIA (cT4, cN1, cM0) - Signed by Home Kirk MD on 7/31/2023  Stage prefix: Initial diagnosis       7/24/2023 Biopsy    Navigational bronchoscopy with EBUS    A.  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     A.-B.  Lung, Right Middle Lobe Bronchial Brushing (Thin-prep and smear preparations):   Conclusive evidence of malignancy.  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     Satisfactory for evaluation.     C.-D.  Lung, Right Middle Lobe Bronchoalveolar Lavage (Thin-prep and cell block preparations):   Conclusive evidence of malignancy.  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     Satisfactory for evaluation.      E.-F.  Lymph Node, level 7 (Thin-prep and smear preparations):   Atypical cellular changes seen.  Rare atypical cells.  Lymphocytes.  Few bronchial cells and pulmonary macrophages.        Satisfactory for evaluation.     G.-H.  Lymph Node, 10R (Thin-prep and smear preparations):   Negative for malignancy.  Lymphocytes.  Aggregates of neutrophils.     Satisfactory for evaluation.     8/31/2023 - 10/12/2023 Chemotherapy    cyanocobalamin, 1,000 mcg, Intramuscular, Once, 1 of 1 cycle  Administration: 1,000 mcg  (8/24/2023)  alteplase (CATHFLO), 2 mg, Intracatheter, Every 1 Minute as needed, 3 of 3 cycles  CISplatin (PLATINOL) with mannitol IVPB, 75 mg/m2 = 129.8 mg (100 % of original dose 75 mg/m2), Intravenous, Once, 3 of 3 cycles  Dose modification: 50 mg/m2 (original dose 75 mg/m2, Cycle 1, Reason: Anticipated Tolerance), 75 mg/m2 (original dose 75 mg/m2, Cycle 1, Reason: Dose modified as per discussion with consulting physician)  Administration: 129.8 mg (8/31/2023), 129.8 mg (9/21/2023), 129.8 mg (10/12/2023)  fosaprepitant (EMEND) IVPB, 150 mg, Intravenous, Once, 3 of 3 cycles  Administration: 150 mg (8/31/2023), 150 mg (9/21/2023), 150 mg (10/12/2023)  nivolumab (OPDIVO) IVPB, 360 mg, Intravenous, Once, 3 of 3 cycles  Administration: 360 mg (8/31/2023), 360 mg (9/21/2023), 360 mg (10/12/2023)  pemetrexed (ALIMTA) chemo infusion, 500 mg/m2 = 865 mg, Intravenous, Once, 3 of 3 cycles  Administration: 865 mg (8/31/2023), 865 mg (9/21/2023), 865 mg (10/12/2023)         Review of Systems:  Review of Systems   Constitutional:  Positive for appetite change and fatigue.   HENT:  Positive for dental problem (dentures and implants).         Ears clogged   Eyes:  Positive for visual disturbance (worsening).   Respiratory:  Positive for cough.         Current smoker 1/2 pack per day   Cardiovascular: Negative.    Gastrointestinal:  Positive for nausea.   Genitourinary: Negative.    Musculoskeletal:  Positive for back pain.   Skin:  Positive for rash (psoriasis).   Allergic/Immunologic: Negative.    Neurological:  Positive for light-headedness and numbness (left leg).   Hematological: Negative.    Psychiatric/Behavioral:  Positive for sleep disturbance.        Clinical Trial: no    No LMP recorded. Patient has had a hysterectomy.    Pregnancy test needed:  no    ONCOTYPE/MAMMOPRINT results: N/A    PFT: yes; 6/30/23    Prior Radiation: no    Teaching: NIH book, side effects, SIM    MST: completed    Implantable Devices (Port,  Pacemaker, pain stimulator): right chest port    Hip Replacement: no      [unfilled]  Health Maintenance   Topic Date Due    Hepatitis C Screening  Never done    Medicare Annual Wellness Visit (AWV)  Never done    Pneumococcal Vaccine: 65+ Years (1 - PCV) Never done    Depression Screening  Never done    BMI: Followup Plan  Never done    Hepatitis A Vaccine (1 of 2 - Risk 2-dose series) Never done    Osteoporosis Screening  Never done    Fall Risk  Never done    Urinary Incontinence Screening  Never done    Breast Cancer Screening: Mammogram  10/21/2022    Influenza Vaccine (1) Never done    COVID-19 Vaccine (3 - 2023-24 season) 09/01/2023    BMI: Adult  12/20/2024    Colorectal Cancer Screening  08/10/2025    HIB Vaccine  Aged Out    IPV Vaccine  Aged Out    Meningococcal ACWY Vaccine  Aged Out    HPV Vaccine  Aged Out    Lung Cancer Screening  Discontinued     Past Medical History:   Diagnosis Date    Fibromyalgia     Heart murmur     Lumbosacral disc herniation     Lung cancer (HCC)     Psoriasis     Psoriasis     RSD (reflex sympathetic dystrophy)      Past Surgical History:   Procedure Laterality Date    IR PORT PLACEMENT  08/04/2023    LUNG BIOPSY      x2    PARTIAL HYSTERECTOMY       Family History   Problem Relation Age of Onset    Sarcoidosis Mother      Social History     Tobacco Use    Smoking status: Every Day     Current packs/day: 0.50     Average packs/day: 0.5 packs/day for 48.0 years (24.0 ttl pk-yrs)     Types: Cigarettes     Start date: 1976    Smokeless tobacco: Never   Vaping Use    Vaping status: Never Used   Substance Use Topics    Alcohol use: Yes     Alcohol/week: 1.0 - 2.0 standard drink of alcohol     Types: 1 - 2 Glasses of wine per week     Comment: daily    Drug use: No        Current Outpatient Medications:     amitriptyline (ELAVIL) 25 mg tablet, , Disp: , Rfl:     atorvastatin (LIPITOR) 20 mg tablet, Take 20 mg by mouth daily, Disp: , Rfl:     bismuth subsalicylate (PEPTO  BISMOL) 262 MG chewable tablet, Take 1 tablet by mouth every 6 hours for 14 days, Disp: 56 tablet, Rfl: 0    calcipotriene (DOVONEX) 0.005 % cream, Apply topically 2 (two) times a day To affected areas on Saturday and Sunday only, Disp: 120 g, Rfl: 2    DULoxetine (CYMBALTA) 30 mg delayed release capsule, , Disp: , Rfl:     Fluticasone-Salmeterol (Advair) 100-50 mcg/dose inhaler, Inhale 1 puff 2 (two) times a day Rinse mouth after use., Disp: , Rfl:     folic acid (KP Folic Acid) 1 mg tablet, Take 1 tablet (1 mg total) by mouth daily, Disp: 90 tablet, Rfl: 2    lidocaine-prilocaine (EMLA) cream, Apply topically as needed for mild pain, Disp: 30 g, Rfl: 0    omeprazole (PriLOSEC) 20 mg delayed release capsule, Take 1 capsule (20 mg total) by mouth 2 (two) times a day Every 12 hours for 14 days, Disp: 28 capsule, Rfl: 0    propranolol (INDERAL) 20 mg/5 mL solution, , Disp: , Rfl:     triamcinolone (KENALOG) 0.1 % cream, Apply topically 2 (two) times a day Affected areas Monday through Friday only, Disp: 453.6 g, Rfl: 2  Allergies   Allergen Reactions    Prednisone Palpitations      There were no vitals filed for this visit.

## 2023-12-27 ENCOUNTER — TELEPHONE (OUTPATIENT)
Dept: HEMATOLOGY ONCOLOGY | Facility: CLINIC | Age: 68
End: 2023-12-27

## 2023-12-27 NOTE — TELEPHONE ENCOUNTER
Informed patient that appointment was made; the date and time; and left call back number if any questions

## 2023-12-28 ENCOUNTER — TELEPHONE (OUTPATIENT)
Dept: HEMATOLOGY ONCOLOGY | Facility: CLINIC | Age: 68
End: 2023-12-28

## 2023-12-28 ENCOUNTER — TELEPHONE (OUTPATIENT)
Dept: RADIATION ONCOLOGY | Facility: CLINIC | Age: 68
End: 2023-12-28

## 2023-12-28 NOTE — TELEPHONE ENCOUNTER
I called and left a detailed voicemail with patients appt date and time, 01/03/24 @ 4pm. I left the [facility] line number to call back with any questions or concerns.

## 2023-12-28 NOTE — TELEPHONE ENCOUNTER
Called Saskia. L/M [phone]. Made her aware of date and time for appointment in infusion for port flush. Requested a call back to confirm.

## 2024-01-02 ENCOUNTER — HOSPITAL ENCOUNTER (OUTPATIENT)
Dept: INFUSION CENTER | Facility: CLINIC | Age: 69
Discharge: HOME | End: 2024-01-02

## 2024-01-02 DIAGNOSIS — Z95.828 PORT-A-CATH IN PLACE: Primary | ICD-10-CM

## 2024-01-02 NOTE — PROGRESS NOTES
Port flushed per protocol. Good blood return noted, offer no complaints. Instructed pt to make future port flush appts @

## 2024-01-03 ENCOUNTER — TELEPHONE (OUTPATIENT)
Dept: HEMATOLOGY ONCOLOGY | Facility: CLINIC | Age: 69
End: 2024-01-03

## 2024-01-03 ENCOUNTER — RADIATION THERAPY TREATMENT (OUTPATIENT)
Dept: RADIATION ONCOLOGY | Facility: CLINIC | Age: 69
End: 2024-01-03
Attending: RADIOLOGY
Payer: COMMERCIAL

## 2024-01-03 ENCOUNTER — OFFICE VISIT (OUTPATIENT)
Dept: HEMATOLOGY ONCOLOGY | Facility: CLINIC | Age: 69
End: 2024-01-03
Payer: COMMERCIAL

## 2024-01-03 VITALS
DIASTOLIC BLOOD PRESSURE: 78 MMHG | SYSTOLIC BLOOD PRESSURE: 118 MMHG | OXYGEN SATURATION: 98 % | WEIGHT: 147 LBS | TEMPERATURE: 98 F | HEIGHT: 64 IN | BODY MASS INDEX: 25.1 KG/M2 | HEART RATE: 68 BPM | RESPIRATION RATE: 16 BRPM

## 2024-01-03 DIAGNOSIS — K86.2 PANCREATIC CYST: ICD-10-CM

## 2024-01-03 DIAGNOSIS — C34.81 MALIGNANT NEOPLASM OF OVERLAPPING SITES OF RIGHT BRONCHUS AND LUNG (HCC): ICD-10-CM

## 2024-01-03 DIAGNOSIS — T45.1X5A CHEMOTHERAPY INDUCED NAUSEA AND VOMITING: Primary | ICD-10-CM

## 2024-01-03 DIAGNOSIS — C34.91 ADENOCARCINOMA OF RIGHT LUNG (HCC): ICD-10-CM

## 2024-01-03 DIAGNOSIS — R79.89 OTHER SPECIFIED ABNORMAL FINDINGS OF BLOOD CHEMISTRY: ICD-10-CM

## 2024-01-03 DIAGNOSIS — R11.2 CHEMOTHERAPY INDUCED NAUSEA AND VOMITING: Primary | ICD-10-CM

## 2024-01-03 DIAGNOSIS — R97.8 OTHER ABNORMAL TUMOR MARKERS: ICD-10-CM

## 2024-01-03 PROCEDURE — 77470 SPECIAL RADIATION TREATMENT: CPT | Performed by: RADIOLOGY

## 2024-01-03 PROCEDURE — 99215 OFFICE O/P EST HI 40 MIN: CPT | Performed by: INTERNAL MEDICINE

## 2024-01-03 PROCEDURE — 77334 RADIATION TREATMENT AID(S): CPT | Performed by: RADIOLOGY

## 2024-01-03 NOTE — PATIENT INSTRUCTIONS
"Access [employer] for additional drug information, tools, and databases.  Copyright 0823-2475 [employer] All rights reserved.  Contributor Disclosures    (For additional information see \"Paclitaxel (conventional): Drug information\")    You must carefully read the \"Consumer Information Use and Disclaimer\" below in order to understand and correctly use this information.    Warning   This drug may lower the ability of the bone marrow to make blood cells that the body needs. If blood cell counts get very low, this can lead to bleeding problems, infections, or anemia. If you have questions, talk with the doctor.   If you have a low white blood cell count, talk with your doctor. This drug must not be used in certain people with low white blood cell counts.   Have blood work checked as you have been told by the doctor. Talk with the doctor.   Very bad and sometimes deadly allergic side effects have rarely happened. Talk with your doctor.   Other drugs will be given with this drug to help avoid allergic reactions. This may not prevent deadly allergic reactions from happening.  What is this drug used for?   It is used to treat cancer.  What do I need to tell my doctor BEFORE I take this drug?   If you are allergic to this drug; any part of this drug; or any other drugs, foods, or substances. Tell your doctor about the allergy and what signs you had.   If you are breast-feeding. Do not breast-feed while you take this drug.  This drug may interact with other drugs or health problems.  Tell your doctor and pharmacist about all of your drugs (prescription or OTC, natural products, vitamins) and health problems. You must check to make sure that it is safe for you to take this drug with all of your drugs and health problems. Do not start, stop, or change the dose of any drug without checking with your doctor.  What are some things I need to know or do while I take this drug?   Tell all of your health care providers that " you take this drug. This includes your doctors, nurses, pharmacists, and dentists.   If you have upset stomach, throwing up, diarrhea, or decreased appetite, talk with your doctor. There may be ways to lower these side effects.   You may have more chance of getting an infection. Wash hands often. Stay away from people with infections, colds, or flu.   You may bleed more easily. Be careful and avoid injury. Use a soft toothbrush and an electric razor.   Talk with your doctor before getting any vaccines. Use of some vaccines with this drug may either raise the chance of an infection or make the vaccine not work as well.   Some products have alcohol in them. Talk with the doctor.   Talk with your doctor before you use alcohol, marijuana or other forms of cannabis, or prescription or OTC drugs that may slow your actions.   Check blood pressure and heart rate as the doctor has told you.   Change in eyesight may rarely happen. Eyesight most often gets back to normal when this drug is stopped.   It is common to have nerve problems with this drug. Nerve problems may include a numbness, tingling, or burning feeling in your hands or feet. Call your doctor if you have nerve problems that are very bad, cause problems with daily living, or do not go away.   If you are 65 or older, use this drug with care. You could have more side effects.   This drug may cause harm to an unborn baby. If you may become pregnant, you must use birth control while taking this drug. If you get pregnant, call your doctor right away.  What are some side effects that I need to call my doctor about right away?  WARNING/CAUTION: Even though it may be rare, some people may have very bad and sometimes deadly side effects when taking a drug. Tell your doctor or get medical help right away if you have any of the following signs or symptoms that may be related to a very bad side effect:   Signs of an allergic reaction, like rash; hives; itching; red, swollen,  blistered, or peeling skin with or without fever; wheezing; tightness in the chest or throat; trouble breathing, swallowing, or talking; unusual hoarseness; or swelling of the mouth, face, lips, tongue, or throat. Rarely, some allergic reactions have been deadly.   Signs of infection like fever, chills, very bad sore throat, ear or sinus pain, cough, more sputum or change in color of sputum, pain with passing urine, mouth sores, or wound that will not heal.   Signs of bleeding like throwing up or coughing up blood; vomit that looks like coffee grounds; blood in the urine; black, red, or tarry stools; bleeding from the gums; abnormal vaginal bleeding; bruises without a cause or that get bigger; or bleeding you cannot stop.   Signs of high or low blood pressure like very bad headache or dizziness, passing out, or change in eyesight.   Signs of kidney problems like unable to pass urine, change in how much urine is passed, blood in the urine, or a big weight gain.   Shortness of breath.   Swelling.   Flushing.   Chest pain or pressure.   Fast, slow, or abnormal heartbeat.   Seizures.   This drug may cause tissue damage if the drug leaks from the vein. Tell your nurse if you have any redness, burning, pain, swelling, blisters, skin sores, or leaking of fluid where the drug is going into your body.   Severe and sometimes deadly liver problems have happened with this drug. Call your doctor right away if you have signs of liver problems like dark urine, tiredness, decreased appetite, upset stomach or stomach pain, light-colored stools, throwing up, or yellow skin or eyes.  What are some other side effects of this drug?  All drugs may cause side effects. However, many people have no side effects or only have minor side effects. Call your doctor or get medical help if any of these side effects or any other side effects bother you or do not go away:   Hair loss.   Feeling tired or weak.   Diarrhea, upset stomach, or throwing  "up.   Mouth irritation or mouth sores.   Muscle or joint pain.   Irritation where the shot is given.  These are not all of the side effects that may occur. If you have questions about side effects, call your doctor. Call your doctor for medical advice about side effects.  You may report side effects to your national health agency.  How is this drug best taken?  Use this drug as ordered by your doctor. Read all information given to you. Follow all instructions closely.   It is given as an infusion into a vein over a period of time.  What do I do if I miss a dose?   Call your doctor to find out what to do.  How do I store and/or throw out this drug?   If you need to store this drug at home, talk with your doctor, nurse, or pharmacist about how to store it.  General drug facts   If your symptoms or health problems do not get better or if they become worse, call your doctor.   Do not share your drugs with others and do not take anyone else's drugs.   Keep all drugs in a safe place. Keep all drugs out of the reach of children and pets.   Throw away unused or  drugs. Do not flush down a toilet or pour down a drain unless you are told to do so. Check with your pharmacist if you have questions about the best way to throw out drugs. There may be drug take-back programs in your area.   Some drugs may have another patient information leaflet. If you have any questions about this drug, please talk with your doctor, nurse, pharmacist, or other health care provider.   If you think there has been an overdose, call your poison control center or get medical care right away. Be ready to tell or show what was taken, how much, and when it happened.    Access [employer] for additional drug information, tools, and databases.  Copyright 3430-3456 [employer] All rights reserved.  Contributor Disclosures    (For additional information see \"Carboplatin: Drug information\" and see \"Carboplatin: Pediatric drug " "information\")    You must carefully read the \"Consumer Information Use and Disclaimer\" below in order to understand and correctly use this information.    Brand Names: [place]  Paraplatin  Warning   Low blood cell counts may happen with this drug. Low blood cell counts may raise the chance of needing a blood transfusion or getting an infection or bleeding. If you have questions, talk with the doctor.   Throwing up may happen often with this drug.   Allergic reactions have happened with this drug. This may happen within minutes of getting this drug. Call your doctor right away if you have any bad effects.  What is this drug used for?   It is used to treat ovarian cancer.   It may be given to you for other reasons. Talk with the doctor.  What do I need to tell my doctor BEFORE I take this drug?   If you are allergic to this drug; any part of this drug; or any other drugs, foods, or substances. Tell your doctor about the allergy and what signs you had.   If you have any of these health problems: Low blood cell count or poor bone marrow function.   If you have bleeding problems.   If you are breast-feeding. Do not breast-feed while you take this drug.  This is not a list of all drugs or health problems that interact with this drug.  Tell your doctor and pharmacist about all of your drugs (prescription or OTC, natural products, vitamins) and health problems. You must check to make sure that it is safe for you to take this drug with all of your drugs and health problems. Do not start, stop, or change the dose of any drug without checking with your doctor.  What are some things I need to know or do while I take this drug?   Tell all of your health care providers that you take this drug. This includes your doctors, nurses, pharmacists, and dentists.   Have your blood work and other lab tests checked as you have been told by your doctor.   Talk with your doctor before getting any vaccines. Use of some vaccines with this drug may " either raise the chance of an infection or make the vaccine not work as well.   You may have more chance of getting an infection. Wash hands often. Stay away from people with infections, colds, or flu.   You may bleed more easily. Be careful and avoid injury. Use a soft toothbrush and an electric razor.   If you have upset stomach, throwing up, diarrhea, or decreased appetite, talk with your doctor. There may be ways to lower these side effects.   If you are 65 or older, use this drug with care. You could have more side effects.   This drug may cause harm to an unborn baby. If you may become pregnant, you must use birth control while taking this drug. If you get pregnant, call your doctor right away.  What are some side effects that I need to call my doctor about right away?  WARNING/CAUTION: Even though it may be rare, some people may have very bad and sometimes deadly side effects when taking a drug. Tell your doctor or get medical help right away if you have any of the following signs or symptoms that may be related to a very bad side effect:   Signs of an allergic reaction, like rash; hives; itching; red, swollen, blistered, or peeling skin with or without fever; wheezing; tightness in the chest or throat; trouble breathing, swallowing, or talking; unusual hoarseness; or swelling of the mouth, face, lips, tongue, or throat.   Signs of infection like fever, chills, very bad sore throat, ear or sinus pain, cough, more sputum or change in color of sputum, pain with passing urine, mouth sores, or wound that will not heal.   Signs of bleeding like throwing up or coughing up blood; vomit that looks like coffee grounds; blood in the urine; black, red, or tarry stools; bleeding from the gums; abnormal vaginal bleeding; bruises without a cause or that get bigger; or bleeding you cannot stop.   Signs of electrolyte problems like mood changes; confusion; muscle pain, cramps, or spasms; weakness; shakiness; change in  balance; an abnormal heartbeat; seizures; loss of appetite; or severe upset stomach or throwing up.   Signs of kidney problems like unable to pass urine, change in how much urine is passed, blood in the urine, or a big weight gain.   Signs of liver problems like dark urine, tiredness, decreased appetite, upset stomach or stomach pain, light-colored stools, throwing up, or yellow skin or eyes.   Pale skin.   Ringing in the ears, hearing loss, or any other changes in hearing.   A burning, numbness, or tingling feeling that is not normal.   Loss of eyesight has happened with high doses of this drug. This has gone back to normal or gotten much better within weeks after stopping this drug. Call your doctor right away if you have loss of eyesight or other changes in eyesight.   This drug may cause tissue damage if the drug leaks from the vein. Tell your nurse if you have any redness, burning, pain, swelling, blisters, skin sores, or leaking of fluid where the drug is going into your body.  What are some other side effects of this drug?  All drugs may cause side effects. However, many people have no side effects or only have minor side effects. Call your doctor or get medical help if any of these side effects or any other side effects bother you or do not go away:   Constipation, diarrhea, stomach pain, upset stomach, or throwing up.   Hair loss.   Feeling tired or weak.   Mouth irritation or mouth sores.  These are not all of the side effects that may occur. If you have questions about side effects, call your doctor. Call your doctor for medical advice about side effects.  You may report side effects to your national health agency.  How is this drug best taken?  Use this drug as ordered by your doctor. Read all information given to you. Follow all instructions closely.   It is given as an infusion into a vein over a period of time.   This drug may cause upset stomach or throwing up. Other drugs may be given before this  drug to help with this.  What do I do if I miss a dose?   Call your doctor to find out what to do.  How do I store and/or throw out this drug?   If you need to store this drug at home, talk with your doctor, nurse, or pharmacist about how to store it.  General drug facts   If your symptoms or health problems do not get better or if they become worse, call your doctor.   Do not share your drugs with others and do not take anyone else's drugs.   Keep all drugs in a safe place. Keep all drugs out of the reach of children and pets.   Throw away unused or  drugs. Do not flush down a toilet or pour down a drain unless you are told to do so. Check with your pharmacist if you have questions about the best way to throw out drugs. There may be drug take-back programs in your area.   Some drugs may have another patient information leaflet. If you have any questions about this drug, please talk with your doctor, nurse, pharmacist, or other health care provider.   If you think there has been an overdose, call your poison control center or get medical care right away. Be ready to tell or show what was taken, how much, and when it happened.

## 2024-01-03 NOTE — PROGRESS NOTES
[facility] [address] [address]    Saskia Dominic  1955      PRIMARY HEMATOLOGIC/ONCOLOGIC DIAGNOSIS:  Adenocarcinoma of the right lung. Clinical Stage III B ( T4N2M0). CARIS: PDL1 TPS 98%, KRAS G12C mutated    PATHOLOGY:   Specimens  A Lung, Right Middle Lobe Bronchial Brushing  B Lung, Right Middle Lobe Bronchial Brushing  C Lung, Right Middle Lobe Bronchoalveolar Lavage  D Lung, Right Middle Lobe Bronchoalveolar Lavage, Cell Block  E Lymph Node, level 7  F Lymph Node, level 7  G Lymph Node, 10R  H Lymph Node, 10R  .  Final Diagnosis  A.-B. Lung, Right Middle Lobe Bronchial Brushing (Thin-prep and smear preparations):  Conclusive evidence of malignancy.  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  Satisfactory for evaluation.  C.-D. Lung, Right Middle Lobe Bronchoalveolar Lavage (Thin-prep and cell block preparations):  Conclusive evidence of malignancy.  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  Satisfactory for evaluation.  Jie Mahajan,  1,2 Jie Mahajan,  1,2  [facility]  Operating Room 1,2  [id number]  DominicDayane  [phone]  Page: 1 of 3 Printed: 7/27/2023 2:02 PM  E.-F. Lymph Node, level 7 (Thin-prep and smear preparations):  Atypical cellular changes seen.  Rare atypical cells.  Lymphocytes.  Few bronchial cells and pulmonary macrophages.  Satisfactory for evaluation.  G.-H. Lymph Node, 10R (Thin-prep and smear preparations):  Negative for malignancy.  Lymphocytes.  Aggregates of neutrophils.  Satisfactory for evaluation.  Electronically signed by Brian Rahman MD on 7/27/2023 at 1402  Note  Please refer to concurrent surgical pathology case, [id number], for additional diagnostic inflammation.  Intraoperative Consultation  B. Positive for non-small cell carcinoma.  Dr. Rahman, Interpretation performed at  [facility], [address] [facility] [place] [address]  F. Lymphocytes present.  Rare atypical cells seen.  Dr. Rahman, Interpretation performed at [facility], [address] [facility] [place] [address]  H. Lymphocytes present.  No malignant cells seen.  Dr. Rahman, Interpretation performed at [facility], [address] [facility] [address]    STAGING: Cancer Staging   Adenocarcinoma of middle lobe of right lung (HCC)  Staging form: Lung, AJCC 8th Edition  - Clinical stage from 7/24/2023: Stage IIIA (cT4, cN1, cM0) - Signed by Home Kirk MD on 7/31/2023  Stage prefix: Initial diagnosis         Oncology History   Adenocarcinoma of middle lobe of right lung (HCC)   6/23/2023 Initial Diagnosis    Adenocarcinoma of right lung (HCC)     7/24/2023 Biopsy    Navigational bronchoscopy with EBUS    A.  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     A.-B.  Lung, Right Middle Lobe Bronchial Brushing (Thin-prep and smear preparations):   Conclusive evidence of malignancy.  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     Satisfactory for evaluation.     C.-D.  Lung, Right Middle Lobe Bronchoalveolar Lavage (Thin-prep and cell block preparations):   Conclusive evidence of malignancy.  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     Satisfactory for evaluation.      E.-F.  Lymph Node, level 7 (Thin-prep and smear preparations):   Atypical cellular changes seen.  Rare atypical cells.  Lymphocytes.  Few bronchial cells and pulmonary macrophages.        Satisfactory for evaluation.     G.-H.  Lymph Node, 10R (Thin-prep and smear preparations):   Negative for malignancy.  Lymphocytes.  Aggregates of neutrophils.     Satisfactory for evaluation.     7/24/2023 -  Cancer Staged    Staging form: Lung, AJCC 8th Edition  - Clinical stage from 7/24/2023: Stage IIIB (cT4, cN2, cM0) - Signed by Celina Treviño MD on  12/26/2023  Stage prefix: Initial diagnosis       8/31/2023 - 10/12/2023 Chemotherapy    cyanocobalamin, 1,000 mcg, Intramuscular, Once, 1 of 1 cycle  Administration: 1,000 mcg (8/24/2023)  alteplase (CATHFLO), 2 mg, Intracatheter, Every 1 Minute as needed, 3 of 3 cycles  CISplatin (PLATINOL) with mannitol IVPB, 75 mg/m2 = 129.8 mg (100 % of original dose 75 mg/m2), Intravenous, Once, 3 of 3 cycles  Dose modification: 50 mg/m2 (original dose 75 mg/m2, Cycle 1, Reason: Anticipated Tolerance), 75 mg/m2 (original dose 75 mg/m2, Cycle 1, Reason: Dose modified as per discussion with consulting physician)  Administration: 129.8 mg (8/31/2023), 129.8 mg (9/21/2023), 129.8 mg (10/12/2023)  fosaprepitant (EMEND) IVPB, 150 mg, Intravenous, Once, 3 of 3 cycles  Administration: 150 mg (8/31/2023), 150 mg (9/21/2023), 150 mg (10/12/2023)  nivolumab (OPDIVO) IVPB, 360 mg, Intravenous, Once, 3 of 3 cycles  Administration: 360 mg (8/31/2023), 360 mg (9/21/2023), 360 mg (10/12/2023)  pemetrexed (ALIMTA) chemo infusion, 500 mg/m2 = 865 mg, Intravenous, Once, 3 of 3 cycles  Administration: 865 mg (8/31/2023), 865 mg (9/21/2023), 865 mg (10/12/2023)       INTERIM HISTORY:  The patient presents for a follow-up. Reports no new complaints in the interim. Original diagnosis of Stage III A ( [id number]) adenocarcinoma of the right middle lobe of the lung was made on 7/24/23. Of note the presence of mediastinal lymph nodes on PET/CT would make this N2 disease--> stage IIIB. From 8/31/23 to 10/12/23 the patient received neoadjuvant Nivolumab+ cisplatin/pemetrexed Q21 days x 3 cycles. PET/CT 10/18/23 showed overall findings suggesting response to therapy. Decreased radiotracer uptake at the smaller right middle lobe pulmonary lesion suggesting response to therapy with moderate FDG uptake remaining.  No residual activity at the previously seen mediastinal lymph nodes. Previously questioned small focus of uptake at the left lobe of the liver  medially was not seen on the current exam. Enlarging cystic lesion at the pancreatic head superiorly with mild patchy radiotracer uptake-- r/o cystic neoplasm. Follow-up with dedicated MRI of the pancreas with and without IV contrast was recommended.Upon follow-up with thoracic surgery the patient was deemed not a surgical candidate and was referred to [facility] for definitive chemo/RT.    PAST MEDICAL,PAST SURGICAL, FAMILY AND SOCIAL HISTORY:    Patient Active Problem List   Diagnosis    Pulmonary emphysema, unspecified emphysema type (HCC)    Adenocarcinoma of middle lobe of right lung (HCC)    Mediastinal adenopathy    Port-A-Cath in place    Hypomagnesemia     Past Medical History:   Diagnosis Date    Fibromyalgia     Heart murmur     Lumbosacral disc herniation     Lung cancer (HCC)     Psoriasis     Psoriasis     RSD (reflex sympathetic dystrophy)      Past Surgical History:   Procedure Laterality Date    IR PORT PLACEMENT  08/04/2023    LUNG BIOPSY      x2    PARTIAL HYSTERECTOMY       Family History   Problem Relation Age of Onset    Sarcoidosis Mother     Cancer Sister      Social History     Socioeconomic History    Marital status:      Spouse name: Not on file    Number of children: Not on file    Years of education: Not on file    Highest education level: Not on file   Occupational History    Not on file   Tobacco Use    Smoking status: Every Day     Current packs/day: 0.50     Average packs/day: 0.5 packs/day for 48.0 years (24.0 ttl pk-yrs)     Types: Cigarettes     Start date: 1976    Smokeless tobacco: Never   Vaping Use    Vaping status: Never Used   Substance and Sexual Activity    Alcohol use: Yes     Alcohol/week: 7.0 standard drinks of alcohol     Types: 7 Glasses of wine per week     Comment: daily    Drug use: No    Sexual activity: Not on file   Other Topics Concern    Not on file   Social History Narrative    Not on file     Social Determinants of Health     Financial  Resource Strain: Not on file   Food Insecurity: Not on file   Transportation Needs: Not on file   Physical Activity: Not on file   Stress: Not on file   Social Connections: Not on file   Intimate Partner Violence: Not on file   Housing Stability: Not on file       Current Outpatient Medications:     amitriptyline (ELAVIL) 25 mg tablet, , Disp: , Rfl:     atorvastatin (LIPITOR) 20 mg tablet, Take 20 mg by mouth daily, Disp: , Rfl:     bismuth subsalicylate (PEPTO BISMOL) 262 MG chewable tablet, Take 1 tablet by mouth every 6 hours for 14 days, Disp: 56 tablet, Rfl: 0    calcipotriene (DOVONEX) 0.005 % cream, Apply topically 2 (two) times a day To affected areas on Saturday and Sunday only, Disp: 120 g, Rfl: 2    DULoxetine (CYMBALTA) 30 mg delayed release capsule, , Disp: , Rfl:     Fluticasone-Salmeterol (Advair) 100-50 mcg/dose inhaler, Inhale 1 puff 2 (two) times a day Rinse mouth after use., Disp: , Rfl:     folic acid (KP Folic Acid) 1 mg tablet, Take 1 tablet (1 mg total) by mouth daily, Disp: 90 tablet, Rfl: 2    lidocaine-prilocaine (EMLA) cream, Apply topically as needed for mild pain, Disp: 30 g, Rfl: 0    propranolol (INDERAL) 20 mg/5 mL solution, , Disp: , Rfl:     triamcinolone (KENALOG) 0.1 % cream, Apply topically 2 (two) times a day Affected areas Monday through Friday only, Disp: 453.6 g, Rfl: 2    omeprazole (PriLOSEC) 20 mg delayed release capsule, Take 1 capsule (20 mg total) by mouth 2 (two) times a day Every 12 hours for 14 days (Patient not taking: Reported on 12/26/2023), Disp: 28 capsule, Rfl: 0  No current facility-administered medications for this visit.    Facility-Administered Medications Ordered in Other Visits:     alteplase (CATHFLO) injection 2 mg, 2 mg, Intracatheter, Q1MIN PRN, Home Kirk MD  Allergies   Allergen Reactions    Prednisone Palpitations     There were no vitals filed for this visit.    ROS:  CONSTITUTIONAL:  No fever. No chills. No dizziness. No weakness.  EYES:   No pain, erythema, or discharge. No blurring of vision.  ENT:  No sore throat, URI symptoms. No epistaxis. No tinnitus.  CARDIOVASCULAR:  No chest pain. No palpitations. No lower extremity edema.  RESPIRATORY:  No shortness of breath, cough, pain with respiration, pleuritic chest pain. No hemoptysis. No dyspnea. No paroxysmal nocturnal dyspnea.  GASTROINTESTINAL:  Normal appetite. No nausea, vomiting, diarrhea. No pain. No bloating. No melena.  GENITOURINARY:  No frequency, urgency, nocturia. No hematuria or dysuria.  MUSCULOSKELETAL:  No arthralgias or myalgias.  INTEGUMENTARY:  No swelling. No bruising. No contusions. No abrasions. No lymphangitis.  NEUROLOGIC:  No headache. No neck pain. No numbness or tingling of the extremities. No weakness.  PSYCHIATRIC:  No confusion.  ENDOCRINE:  No fatigue. No weakness. No history of thyroid, diabetes or adrenal problems.  HEMATOLOGICAL:  No bleeding. No petechiae. No bruising.    PHYSICAL EXAM:    GENERAL: AAO x 3  HEENT: AT,NC  CVS: S1S2 RRR  LUNGS: CTA b/l  ABD: NT,ND, +BS  EXTR: no edema  NEURO: CN II-XII grossly intact    LABS:  I have reviewed pertinent labs:  CBC:   Lab Results   Component Value Date    WBC 7.48 10/11/2023    RBC 3.73 (L) 10/11/2023    HGB 11.9 10/11/2023    HCT 35.7 10/11/2023    MCV 96 10/11/2023     (H) 10/11/2023    MCH 31.9 10/11/2023    MCHC 33.3 10/11/2023    RDW 13.0 10/11/2023    MPV 9.6 10/11/2023    NEUTROABS 3.99 10/11/2023     CMP:   Lab Results   Component Value Date    SODIUM 136 10/11/2023    K 4.0 10/11/2023     10/11/2023    CO2 27 10/11/2023    AGAP 6 10/11/2023    BUN 22 12/26/2023    CREATININE 0.96 12/26/2023    GLUC 93 10/11/2023    CALCIUM 9.4 10/11/2023    AST 25 10/11/2023    ALT 41 10/11/2023    ALKPHOS 73 10/11/2023    TP 7.2 10/11/2023    ALB 3.7 10/11/2023    TBILI 0.24 10/11/2023    EGFR 61 12/26/2023     Liver Enzymes:   Lab Results   Component Value Date    AST 25 10/11/2023    ALT 41 10/11/2023     "ALKPHOS 73 10/11/2023    TP 7.2 10/11/2023    ALB 3.7 10/11/2023    TBILI 0.24 10/11/2023    BILIDIR 0.12 06/11/2020     Vitamin B12 No results found for: \"RQDPTXAP13\"  Iron Study No results found for: \"RETIC\", \"RETICCTPCT\", \"FERRITIN\", \"CONCFE\", \"TIBC\", \"PRIMIDONE\", \"FOLATEHEMO\", \"IRON\"  Folate No results found for: \"FOLATE\"  Magnesium   Lab Results   Component Value Date    MG 1.5 (L) 10/11/2023     Phosphorus No results found for: \"PHOS\"  Coagulation Panel   Lab Results   Component Value Date    PROTIME 13.3 06/11/2020    INR 1.01 06/11/2020     IMAGING:  Endoscopic ultrasonography, GI (Upper) Linear with FNA    Result Date: 11/28/2023  Narrative: Table formatting from the original result was not included. [facility] [address] [facility] [address] [phone] DATE OF SERVICE: 11/28/23 PHYSICIAN(S): Attending: Randolph Rodriguez MD Fellow: No Staff Documented INDICATION: Pancreatic cyst POST-OP DIAGNOSIS: See the impression below. PREPROCEDURE: Informed consent was obtained for the procedure, including sedation.  Risks of perforation, hemorrhage, adverse drug reaction and aspiration were discussed. The patient was placed in the left lateral decubitus position. Patient was explained about the risks and benefits of the procedure. Risks including but not limited to bleeding, infection, and perforation were explained in detail. Also explained about less than 100% sensitivity with the exam and other alternatives. PROCEDURE: EUS UPPER DETAILS OF PROCEDURE: Patient was taken to the procedure room where a time out was performed to confirm correct patient and correct procedure. The patient underwent monitored anesthesia care, which was administered by an anesthesia professional. The patient's blood pressure, heart rate, oxygen, respirations, level of consciousness and ECG were monitored throughout the procedure. The endoscope and linear scope were used. The patient experienced no blood loss. The " procedure was not difficult. The patient tolerated the procedure well. There were no apparent adverse events. ANESTHESIA INFORMATION: ASA: II Anesthesia Type: IV Sedation with Anesthesia MEDICATIONS: No administrations occurring from 1231 to 1311 on 11/28/23 FINDINGS: The esophagus, stomach and duodenum appeared normal. Gastric biopsies done. The pancreas appeared atrophic. The pancreatic parenchyma was visualized in the entire pancreas. . The parenchyma was hypoechoic and had hyperechoic foci and hyperechoic strands. The pancreatic duct measured 3 mm at the head and 4 mm at the body. The duct in the body of the pancreas was dilated. One round and anechoic cyst measuring 28 mm x 26 mm with debris present, connection with ducts and well-defined margins in the neck of the pancreas. 1 successful fine needle aspiration pass was taken with a 22 gauge needle using a transduodenal approach guided by Doppler. Obtained adequate sample of serous and thick-appearing aspirate, sent sample for chemistry and cytology analysis. There was a large calcification just downstream from the cyst and upstream the duct was dilated. The calcification was causing significant shadowing. The cyst was thick walled. One triangular and anechoic cyst with connection with ducts and well-defined margins in the body of the pancreas. Dilated pancreatic duct side branches. After aspiration the body cyst also seemed to have decompressed along with the pancreatic duct. The bile duct appeared normal. The parenchyma of the liver appeared normal. The parenchyma was visualized in the left lobe and right lobe of the liver. The parenchyma was hyperechoic. Normal celiac axis. No lymphadenopathy was seen. SPECIMENS: ID Type Source Tests Collected by Time Destination 1 : pancreatic neck cyst FNA FNA FINE NEEDLE ASPIRATION Randolph Rodriguez MD 11/28/2023 12:57 PM  2 : r/o h pylori Tissue Stomach TISSUE EXAM Randolph Rodriguez MD 11/28/2023  1:06 PM       Impression: The  esophagus, stomach and duodenum appeared normal. Gastric biopsies were done. Mild atrophic pancreas with a normal pancreatic duct in the head and large thick walled cyst in the pancreatic neck with duct connection and downstream calcification. Upstream the duct was dilated. Aspiration was done. Another thick walled cyst was seen in the pancreatic body. This decompressed the duct and the body cyst as well. These seem to represent pancreatic pseudocysts with possible underlying chronic pancreatitis. RECOMMENDATION:  Await pathology results Ciprofloxacin 500 mg orally twice daily for 3 days. Imaging with either CT or MRI in 3 months.   Randolph Rodriguez MD     I reviewed the above laboratory and imaging data.    ASSESSMENT/PLAN:  Clinical Stage III B Adenocarcinoma of the right lung. CARIS: PDL1 TPS 98%, KRAS G12C mutated. Prior treatment as above. Patient deemed not a surgical candidate and referred for definitive chemo/RT. Plan for Paclitaxel 45-50 mg/m2 weekly; carboplatin AUC 2, concurrent thoracic RT. Possible side effects from treatment discussed with patient. Additional literature provided. Re-staging imaging ordered.   2.Enlarging cystic lesion at the pancreatic head superiorly with mild patchy radiotracer uptake-- r/o cystic neoplasm. Follow-up with dedicated MRI of the pancreas with and without IV contrast was recommended. MRI abdomen 11/7/23--3.0 cm proximal pancreatic cyst with evidence of secondary distal main pancreatic duct obstruction and parenchymal atrophy.  EUS was recommended for further evaluation. Differential diagnosis included intraductal papillary mucinous neoplasm (IPMN), mucinous cystic neoplasm, neuroendocrine tumor and probably less likely necrotic ductal carcinoma. The differential diagnosis could also include pseudocyst from chronic pancreatitis. S/p EUS  w/ FNA on 11/28/23. Pathology--non-diagnostic/Insufficient. Will need additional evaluation by GI. Check CA 19-9,CEA.

## 2024-01-04 ENCOUNTER — TELEPHONE (OUTPATIENT)
Dept: HEMATOLOGY ONCOLOGY | Facility: CLINIC | Age: 69
End: 2024-01-04

## 2024-01-04 DIAGNOSIS — E83.42 HYPOMAGNESEMIA: Primary | ICD-10-CM

## 2024-01-04 DIAGNOSIS — C34.2 ADENOCARCINOMA OF MIDDLE LOBE OF RIGHT LUNG (HCC): ICD-10-CM

## 2024-01-04 RX ORDER — ONDANSETRON 4 MG/1
4 TABLET, FILM COATED ORAL EVERY 8 HOURS PRN
Qty: 20 TABLET | Refills: 0 | Status: SHIPPED | OUTPATIENT
Start: 2024-01-04

## 2024-01-04 NOTE — TELEPHONE ENCOUNTER
Lvm on answering machine with time and date of first infusion, patient aware schedule is updated on [employer]. Patient has my teams number to return my call and confirm appts.

## 2024-01-04 NOTE — TELEPHONE ENCOUNTER
While we try to accommodate patient requests, our priority is to schedule treatment according to Doctor's orders and site availability.    Does the Provider use the intake sheet or checkout note?  What would be a preferred day of the week that would work best for your infusion appointment? Any day   Do you prefer mornings or afternoons for your appointments? Mornings   Are there any days or dates that do not work for your schedule, including any upcoming vacations?   We are going to try our best to schedule you at the infusion center closest to your home.  In the event that we are unable to what would be your next preferred infusion site or sites?     1. [facility] infusion   2. [facility] infusion     Do you have transportation to take you to all of your appointments? Yes  Would you like the infusion center to draw labs from your port? (disregard if patient doesn't have a port or need labs for infusion appointment) Yes

## 2024-01-04 NOTE — TELEPHONE ENCOUNTER
Lvm on answering machine with time and date of first infusion, patient aware schedule is updated on [employer]. Patient has my teams number to return my call and confirm appts.

## 2024-01-05 NOTE — TELEPHONE ENCOUNTER
Patient confrimed appts via [employer]. Patient has mine and [facility] inf number if rescheduling is needed.

## 2024-01-08 ENCOUNTER — TELEPHONE (OUTPATIENT)
Dept: HEMATOLOGY ONCOLOGY | Facility: CLINIC | Age: 69
End: 2024-01-08

## 2024-01-08 PROBLEM — C34.91 ADENOCARCINOMA OF RIGHT LUNG (HCC): Status: ACTIVE | Noted: 2023-06-23

## 2024-01-15 PROCEDURE — 77300 RADIATION THERAPY DOSE PLAN: CPT | Performed by: RADIOLOGY

## 2024-01-15 PROCEDURE — 77301 RADIOTHERAPY DOSE PLAN IMRT: CPT | Performed by: RADIOLOGY

## 2024-01-15 PROCEDURE — 77293 RESPIRATOR MOTION MGMT SIMUL: CPT | Performed by: RADIOLOGY

## 2024-01-15 PROCEDURE — 77338 DESIGN MLC DEVICE FOR IMRT: CPT | Performed by: RADIOLOGY

## 2024-01-16 ENCOUNTER — APPOINTMENT (OUTPATIENT)
Dept: RADIATION ONCOLOGY | Facility: CLINIC | Age: 69
End: 2024-01-16
Attending: RADIOLOGY
Payer: COMMERCIAL

## 2024-01-16 ENCOUNTER — PATIENT OUTREACH (OUTPATIENT)
Dept: CASE MANAGEMENT | Facility: HOSPITAL | Age: 69
End: 2024-01-16

## 2024-01-16 NOTE — PROGRESS NOTES
Returning a message from pt this morning, no answer, LM for her with her upcoming appts as pt's message to me says that she's unsure what is happening next for her.  I let her know I will meet her in person in the office when she starts radiation tx and left my direct number if she has more questions or concerns, and encouraged her to reach out if necessary.   If not I will see her in the office this week or early next week.  No other needs noted at this time.

## 2024-01-17 ENCOUNTER — TELEMEDICINE (OUTPATIENT)
Dept: PALLIATIVE MEDICINE | Facility: CLINIC | Age: 69
End: 2024-01-17
Payer: COMMERCIAL

## 2024-01-17 DIAGNOSIS — Z71.89 GOALS OF CARE, COUNSELING/DISCUSSION: ICD-10-CM

## 2024-01-17 DIAGNOSIS — R53.1 WEAKNESS: ICD-10-CM

## 2024-01-17 DIAGNOSIS — Z51.5 PALLIATIVE CARE ENCOUNTER: ICD-10-CM

## 2024-01-17 DIAGNOSIS — R53.83 FATIGUE: ICD-10-CM

## 2024-01-17 DIAGNOSIS — C34.91 ADENOCARCINOMA OF RIGHT LUNG (HCC): Primary | ICD-10-CM

## 2024-01-17 PROCEDURE — 99214 OFFICE O/P EST MOD 30 MIN: CPT | Performed by: NURSE PRACTITIONER

## 2024-01-17 NOTE — PROGRESS NOTES
Outpatient Virtual Regular Visit - Follow-up with Palliative and Supportive Care  Saskia Alfaro 68 y.o. female [id number]    Intake:    Reason for virtual visit is patient request.      After connecting through [employer], the patient was identified by name and date of birth. Saskia Alfaro or their agent was informed that this was a telemedicine visit and that the visit is being conducted through the Epic Embedded platform. She agrees to proceed..  My office door was closed. No one else was in the room.  They acknowledged consent and understanding of privacy and security of the video platform. The patient or agent has agreed to participate and understands they can discontinue the visit at any time.     1. Adenocarcinoma of right lung (HCC)    2. Palliative care encounter    3. Goals of care, counseling/discussion    4. Weakness    5. Fatigue        Assessment & Plan  Problem List Items Addressed This Visit          Respiratory    Adenocarcinoma of right lung (HCC) - Primary     Other Visit Diagnoses       Palliative care encounter        Goals of care, counseling/discussion        Weakness        Fatigue              1. Adenocarcinoma of right lung (HCC)- continues with chemotherapy, starting radiation therapy.  Continue to follow with Oncology for management and treatment    2. Palliative care encounter- ongoing symptom management    3. Goals of care, counseling/discussion- patient continues to be treatment focused    4. Weakness- increased weakness and fatigue.  Monitor    5. Fatigue- Monitor      Medications adjusted this encounter:  Requested Prescriptions      No prescriptions requested or ordered in this encounter     No orders of the defined types were placed in this encounter.    There are no discontinued medications.      Saskia Alfaro was seen today for symptoms and planning cares related to above illnesses.  I have reviewed the patient's controlled substance dispensing history in the Prescription Drug  Monitoring Program in compliance with the [facility] regulations before prescribing any controlled substances.    They are invited to continue to follow with us.  If there are questions or concerns, please contact us through our clinic/answering service 24 hours a day, seven days a week.    NAHUN Yang  [facility]  [phone]        Visit Information    Accompanied By: No one    Source of History: Self    History Limitations: None       Narrative and Interval History      Saskia Alfaro is a 68 y.o. female who presents in follow up of symptoms related to adenocarcinoma of right lung.  Pertinent issues include: symptom management, fatigue, assessment of goals of care  Patient reports she is starting radiation this week, continues chemotherapy.  Occasional nausea, no vomiting.  Appetite has improved.  Reports increased weakness, does want to rest more during the day.  Denies chest pain, increased shortness of breath.  Occasional cough, no hemoptysis.    Past Medical History:   Diagnosis Date    Fibromyalgia     Heart murmur     Lumbosacral disc herniation     Lung cancer (HCC)     Psoriasis     Psoriasis     RSD (reflex sympathetic dystrophy)      Past Surgical History:   Procedure Laterality Date    IR PORT PLACEMENT  08/04/2023    LUNG BIOPSY      x2    PARTIAL HYSTERECTOMY       Current Outpatient Medications   Medication Sig Dispense Refill    amitriptyline (ELAVIL) 25 mg tablet       atorvastatin (LIPITOR) 20 mg tablet Take 20 mg by mouth daily      bismuth subsalicylate (PEPTO BISMOL) 262 MG chewable tablet Take 1 tablet by mouth every 6 hours for 14 days 56 tablet 0    calcipotriene (DOVONEX) 0.005 % cream Apply topically 2 (two) times a day To affected areas on Saturday and Sunday only 120 g 2    DULoxetine (CYMBALTA) 30 mg delayed release capsule       Fluticasone-Salmeterol (Advair) 100-50 mcg/dose inhaler Inhale 1 puff 2 (two) times a day Rinse mouth after use.       folic acid ( Folic Acid) 1 mg tablet Take 1 tablet (1 mg total) by mouth daily 90 tablet 2    lidocaine-prilocaine (EMLA) cream Apply topically as needed for mild pain 30 g 0    omeprazole (PriLOSEC) 20 mg delayed release capsule Take 1 capsule (20 mg total) by mouth 2 (two) times a day Every 12 hours for 14 days (Patient not taking: Reported on 12/26/2023) 28 capsule 0    ondansetron (ZOFRAN) 4 mg tablet Take 1 tablet (4 mg total) by mouth every 8 (eight) hours as needed for nausea or vomiting 20 tablet 0    propranolol (INDERAL) 20 mg/5 mL solution       triamcinolone (KENALOG) 0.1 % cream Apply topically 2 (two) times a day Affected areas Monday through Friday only 453.6 g 2     No current facility-administered medications for this visit.      Allergies   Allergen Reactions    Prednisone Palpitations       Review of Systems   Constitutional:  Positive for fatigue.   Respiratory:  Positive for cough and shortness of breath.    Cardiovascular:  Negative for chest pain.   Gastrointestinal:  Positive for nausea. Negative for constipation, diarrhea and vomiting.   Neurological:  Positive for weakness.   Psychiatric/Behavioral:  The patient is nervous/anxious.          Video Exam  There were no vitals filed for this visit.  Physical Exam  Constitutional:       General: She is not in acute distress.     Appearance: She is not ill-appearing.   Pulmonary:      Effort: Pulmonary effort is normal.   Neurological:      Mental Status: She is alert and oriented to person, place, and time.   Psychiatric:         Mood and Affect: Mood normal.               I spent 35+ minutes was spent during this virtual visit by VIDEO TELECOMMUNICATIONS, face to face with Saskia Alfaro with greater than 50% of the time spent in counseling or coordination of care including discussions of symptom management, psychosocial and emotional support.  Review and documenting in medical record  All of the patient's or agent's questions were answered  during this discussion.      Encounter provider NAHUN Yang, located at:  [facility]  [address]  [address]  [facility] [address] [phone]    Recent Visits  No visits were found meeting these conditions.  Showing recent visits within past 7 days and meeting all other requirements  Today's Visits  Date Type Provider Dept   01/17/24 Telemedicine NAHUN Yang  Palliative [facility]   Showing today's visits and meeting all other requirements  Future Appointments  No visits were found meeting these conditions.  Showing future appointments within next 150 days and meeting all other requirements       VIRTUAL VISIT DISCLAIMER    Saskia Alfaro or their agent has consented to an online visit or consultation.  They understands that the online visit is based solely on information provided by the patient or agent, and that, in the absence of a face-to-face physical evaluation by the physician, the diagnosis they receive is both limited and provisional in terms of accuracy and completeness.  This is not intended to replace a full medical face-to-face evaluation by the physician. Saskia Alfaro or their agent understands and accepts these terms.  The patient or their agent was informed this is a billable service.

## 2024-01-18 ENCOUNTER — TELEPHONE (OUTPATIENT)
Dept: HEMATOLOGY ONCOLOGY | Facility: CLINIC | Age: 69
End: 2024-01-18

## 2024-01-18 ENCOUNTER — HOSPITAL ENCOUNTER (OUTPATIENT)
Dept: INFUSION CENTER | Facility: CLINIC | Age: 69
Discharge: HOME | End: 2024-01-18
Payer: COMMERCIAL

## 2024-01-18 ENCOUNTER — OFFICE VISIT (OUTPATIENT)
Dept: HEMATOLOGY ONCOLOGY | Facility: CLINIC | Age: 69
End: 2024-01-18
Payer: COMMERCIAL

## 2024-01-18 ENCOUNTER — APPOINTMENT (OUTPATIENT)
Dept: RADIATION ONCOLOGY | Facility: CLINIC | Age: 69
End: 2024-01-18
Attending: RADIOLOGY
Payer: COMMERCIAL

## 2024-01-18 VITALS
OXYGEN SATURATION: 99 % | WEIGHT: 148 LBS | BODY MASS INDEX: 25.27 KG/M2 | DIASTOLIC BLOOD PRESSURE: 78 MMHG | RESPIRATION RATE: 16 BRPM | TEMPERATURE: 97.6 F | HEART RATE: 78 BPM | SYSTOLIC BLOOD PRESSURE: 118 MMHG | HEIGHT: 64 IN

## 2024-01-18 DIAGNOSIS — E83.42 HYPOMAGNESEMIA: ICD-10-CM

## 2024-01-18 DIAGNOSIS — J43.9 PULMONARY EMPHYSEMA, UNSPECIFIED EMPHYSEMA TYPE (HCC): ICD-10-CM

## 2024-01-18 DIAGNOSIS — K86.2 PANCREATIC CYST: ICD-10-CM

## 2024-01-18 DIAGNOSIS — C34.91 ADENOCARCINOMA OF RIGHT LUNG (HCC): ICD-10-CM

## 2024-01-18 DIAGNOSIS — K86.3 PANCREATIC PSEUDOCYST/CYST: ICD-10-CM

## 2024-01-18 DIAGNOSIS — C34.91 ADENOCARCINOMA OF RIGHT LUNG (HCC): Primary | ICD-10-CM

## 2024-01-18 DIAGNOSIS — Z95.828 PORT-A-CATH IN PLACE: Primary | ICD-10-CM

## 2024-01-18 DIAGNOSIS — R97.8 OTHER ABNORMAL TUMOR MARKERS: ICD-10-CM

## 2024-01-18 DIAGNOSIS — K86.2 PANCREATIC PSEUDOCYST/CYST: ICD-10-CM

## 2024-01-18 LAB
ALBUMIN SERPL BCP-MCNC: 4.2 G/DL (ref 3.5–5)
ALP SERPL-CCNC: 69 U/L (ref 34–104)
ALT SERPL W P-5'-P-CCNC: 32 U/L (ref 7–52)
ANION GAP SERPL CALCULATED.3IONS-SCNC: 9 MMOL/L
AST SERPL W P-5'-P-CCNC: 25 U/L (ref 13–39)
BASOPHILS # BLD AUTO: 0.06 THOUSANDS/ÂΜL (ref 0–0.1)
BASOPHILS NFR BLD AUTO: 1 % (ref 0–1)
BILIRUB SERPL-MCNC: 0.46 MG/DL (ref 0.2–1)
BUN SERPL-MCNC: 20 MG/DL (ref 5–25)
CALCIUM SERPL-MCNC: 9.8 MG/DL (ref 8.4–10.2)
CHLORIDE SERPL-SCNC: 101 MMOL/L (ref 96–108)
CO2 SERPL-SCNC: 27 MMOL/L (ref 21–32)
CREAT SERPL-MCNC: 0.76 MG/DL (ref 0.6–1.3)
EOSINOPHIL # BLD AUTO: 0.1 THOUSAND/ÂΜL (ref 0–0.61)
EOSINOPHIL NFR BLD AUTO: 1 % (ref 0–6)
ERYTHROCYTE [DISTWIDTH] IN BLOOD BY AUTOMATED COUNT: 11.7 % (ref 11.6–15.1)
GFR SERPL CREATININE-BSD FRML MDRD: 81 ML/MIN/1.73SQ M
GLUCOSE SERPL-MCNC: 116 MG/DL (ref 65–140)
HCT VFR BLD AUTO: 37.7 % (ref 34.8–46.1)
HGB BLD-MCNC: 12.6 G/DL (ref 11.5–15.4)
IMM GRANULOCYTES # BLD AUTO: 0.05 THOUSAND/UL (ref 0–0.2)
IMM GRANULOCYTES NFR BLD AUTO: 1 % (ref 0–2)
LYMPHOCYTES # BLD AUTO: 1.87 THOUSANDS/ÂΜL (ref 0.6–4.47)
LYMPHOCYTES NFR BLD AUTO: 18 % (ref 14–44)
MCH RBC QN AUTO: 33.5 PG (ref 26.8–34.3)
MCHC RBC AUTO-ENTMCNC: 33.4 G/DL (ref 31.4–37.4)
MCV RBC AUTO: 100 FL (ref 82–98)
MONOCYTES # BLD AUTO: 0.79 THOUSAND/ÂΜL (ref 0.17–1.22)
MONOCYTES NFR BLD AUTO: 8 % (ref 4–12)
NEUTROPHILS # BLD AUTO: 7.61 THOUSANDS/ÂΜL (ref 1.85–7.62)
NEUTS SEG NFR BLD AUTO: 71 % (ref 43–75)
NRBC BLD AUTO-RTO: 0 /100 WBCS
PLATELET # BLD AUTO: 258 THOUSANDS/UL (ref 149–390)
PMV BLD AUTO: 10.5 FL (ref 8.9–12.7)
POTASSIUM SERPL-SCNC: 3.9 MMOL/L (ref 3.5–5.3)
PROT SERPL-MCNC: 7.8 G/DL (ref 6.4–8.4)
RBC # BLD AUTO: 3.76 MILLION/UL (ref 3.81–5.12)
SODIUM SERPL-SCNC: 137 MMOL/L (ref 135–147)
WBC # BLD AUTO: 10.48 THOUSAND/UL (ref 4.31–10.16)

## 2024-01-18 PROCEDURE — 77386 HB NTSTY MODUL RAD TX DLVR CPLX: CPT | Performed by: STUDENT IN AN ORGANIZED HEALTH CARE EDUCATION/TRAINING PROGRAM

## 2024-01-18 PROCEDURE — 85025 COMPLETE CBC W/AUTO DIFF WBC: CPT | Performed by: INTERNAL MEDICINE

## 2024-01-18 PROCEDURE — 77014 CHG CT GUIDANCE RADIATION THERAPY FLDS PLACEMENT: CPT | Performed by: STUDENT IN AN ORGANIZED HEALTH CARE EDUCATION/TRAINING PROGRAM

## 2024-01-18 PROCEDURE — 77427 RADIATION TX MANAGEMENT X5: CPT | Performed by: RADIOLOGY

## 2024-01-18 PROCEDURE — 86301 IMMUNOASSAY TUMOR CA 19-9: CPT

## 2024-01-18 PROCEDURE — 80053 COMPREHEN METABOLIC PANEL: CPT | Performed by: INTERNAL MEDICINE

## 2024-01-18 PROCEDURE — 99214 OFFICE O/P EST MOD 30 MIN: CPT | Performed by: INTERNAL MEDICINE

## 2024-01-18 RX ORDER — VITAMIN B COMPLEX
1 CAPSULE ORAL DAILY
COMMUNITY

## 2024-01-18 RX ORDER — SODIUM CHLORIDE 9 MG/ML
20 INJECTION, SOLUTION INTRAVENOUS ONCE
Status: CANCELLED | OUTPATIENT
Start: 2024-01-19

## 2024-01-18 RX ORDER — RIBOFLAVIN (VITAMIN B2) 100 MG
100 TABLET ORAL DAILY
COMMUNITY

## 2024-01-18 NOTE — PROGRESS NOTES
Hematology/Oncology Outpatient Follow- up Note  Saskia Alfaro 68 y.o. female MRN: @ Encounter: [id number]        Date:  1/18/2024        Assessment / Plan:    1 adenocarcinoma right lung clinical stage IIIb T4 N2 M0 TPS score 98 for PD-L1 K-minna G12 C mutated  2 presumed pseudocyst of pancreas being followed by GI  3 to begin combined chemoradiation for the cancer having received chemo previously.  Plan: Essentially as above.  She begins treatment tomorrow.  She is start radiation today.  Will see her again in 3 weeks.  She will receive weekly Taxol carboplatinum.  She will be an excellent candidate long-term for use of Imfinzi as a maintenance/adjuvant treatment given her high PD-L1 TPS score.  Follow-up here in 3 weeks.      HPI: 68-year-old female with history of adenocarcinoma right lung clinical stage IIIb T4 N2 M0.  PD-L1 TPS 98% K-minna G12 C mutation.  She has completed chemotherapy.  PET CT scan done at the completion showed some improvement disease.  She was seen by surgery felt she was not a surgical candidate.  She also has a cystic lesion approximately 3 cm in the head of the pancreas.  She has been evaluated by GI who felt this was a pseudocyst.  Biopsy was nondiagnostic.  Their recommendation is to repeat her MRI which will be done in March.  Reviewing her situation we were going to plan to go ahead with her chemo therapy tomorrow.  She is to receive combined Taxol carboplatin.  She is feeling fairly well denies any other major problems.      Interval History:    She is eating she is not having any pain discomfort she is trying to stop smoking.      Cancer Staging:  Cancer Staging   Adenocarcinoma of right lung (HCC)  Staging form: Lung, AJCC 8th Edition  - Clinical stage from 7/24/2023: Stage IIIB (cT4, cN2, cM0) - Signed by Celina Treviño MD on 12/26/2023  Stage prefix: Initial diagnosis      Molecular Testing:     Previous Hematologic/ Oncologic History:    Oncology History   Adenocarcinoma of right lung  (HCC)   6/23/2023 Initial Diagnosis    Adenocarcinoma of right lung (HCC)     7/24/2023 Biopsy    Navigational bronchoscopy with EBUS    A.  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     A.-B.  Lung, Right Middle Lobe Bronchial Brushing (Thin-prep and smear preparations):   Conclusive evidence of malignancy.  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     Satisfactory for evaluation.     C.-D.  Lung, Right Middle Lobe Bronchoalveolar Lavage (Thin-prep and cell block preparations):   Conclusive evidence of malignancy.  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     Satisfactory for evaluation.      E.-F.  Lymph Node, level 7 (Thin-prep and smear preparations):   Atypical cellular changes seen.  Rare atypical cells.  Lymphocytes.  Few bronchial cells and pulmonary macrophages.        Satisfactory for evaluation.     G.-H.  Lymph Node, 10R (Thin-prep and smear preparations):   Negative for malignancy.  Lymphocytes.  Aggregates of neutrophils.     Satisfactory for evaluation.     7/24/2023 -  Cancer Staged    Staging form: Lung, AJCC 8th Edition  - Clinical stage from 7/24/2023: Stage IIIB (cT4, cN2, cM0) - Signed by Celina Treviño MD on 12/26/2023  Stage prefix: Initial diagnosis       8/31/2023 - 10/12/2023 Chemotherapy    cyanocobalamin, 1,000 mcg, Intramuscular, Once, 1 of 1 cycle  Administration: 1,000 mcg (8/24/2023)  alteplase (CATHFLO), 2 mg, Intracatheter, Every 1 Minute as needed, 3 of 3 cycles  CISplatin (PLATINOL) with mannitol IVPB, 75 mg/m2 = 129.8 mg (100 % of original dose 75 mg/m2), Intravenous, Once, 3 of 3 cycles  Dose modification: 50 mg/m2 (original dose 75 mg/m2, Cycle 1, Reason: Anticipated Tolerance), 75 mg/m2 (original dose 75 mg/m2, Cycle 1, Reason: Dose modified as per discussion with consulting physician)  Administration: 129.8 mg (8/31/2023), 129.8 mg (9/21/2023), 129.8 mg  "(10/12/2023)  fosaprepitant (EMEND) IVPB, 150 mg, Intravenous, Once, 3 of 3 cycles  Administration: 150 mg (8/31/2023), 150 mg (9/21/2023), 150 mg (10/12/2023)  nivolumab (OPDIVO) IVPB, 360 mg, Intravenous, Once, 3 of 3 cycles  Administration: 360 mg (8/31/2023), 360 mg (9/21/2023), 360 mg (10/12/2023)  pemetrexed (ALIMTA) chemo infusion, 500 mg/m2 = 865 mg, Intravenous, Once, 3 of 3 cycles  Administration: 865 mg (8/31/2023), 865 mg (9/21/2023), 865 mg (10/12/2023)     1/19/2024 -  Chemotherapy    alteplase (CATHFLO), 2 mg, Intracatheter, Every 1 Minute as needed, 0 of 7 cycles  CARBOplatin (PARAPLATIN) IVPB (GOG AUC DOSING), 185 mg, Intravenous, Once, 0 of 7 cycles  PACLItaxel (TAXOL) chemo IVPB, 50 mg/m2 = 85.8 mg, Intravenous, Once, 0 of 7 cycles         Current Hematologic/ Oncologic Treatment:       Cycle 1         Test Results:    Imaging: No results found.          Labs:   Lab Results   Component Value Date    WBC 10.48 (H) 01/18/2024    HGB 12.6 01/18/2024    HCT 37.7 01/18/2024     (H) 01/18/2024     01/18/2024     Lab Results   Component Value Date    K 3.9 01/18/2024     01/18/2024    CO2 27 01/18/2024    BUN 20 01/18/2024    CREATININE 0.76 01/18/2024    CALCIUM 9.8 01/18/2024    AST 25 01/18/2024    ALT 32 01/18/2024    ALKPHOS 69 01/18/2024    EGFR 81 01/18/2024         No results found for: \"SPEP\", \"UPEP\"    No results found for: \"PSA\"    No results found for: \"CEA\"    No results found for: \"\"    No results found for: \"AFP\"    No results found for: \"IRON\", \"TIBC\", \"FERRITIN\"    No results found for: \"DGGGAJOI24\"      ROS: Review of Systems   Constitutional: Negative.    Respiratory: Negative.     Gastrointestinal: Negative.          Current Medications: Reviewed  Allergies: Reviewed  PMH/FH/SH:  Reviewed      Physical Exam:    Body surface area is 1.72 meters squared.    Wt Readings from Last 3 Encounters:   01/18/24 67.1 kg (148 lb)   01/03/24 66.7 kg (147 lb)   12/26/23 " 64.4 kg (142 lb)        Temp Readings from Last 3 Encounters:   01/18/24 97.6 °F (36.4 °C) (Temporal)   01/03/24 98 °F (36.7 °C) (Temporal)   12/26/23 97.7 °F (36.5 °C)        BP Readings from Last 3 Encounters:   01/18/24 118/78   01/03/24 118/78   12/26/23 130/72         Pulse Readings from Last 3 Encounters:   01/18/24 78   01/03/24 68   12/26/23 84     @LASTSAO2(3)@      Physical Exam  Constitutional:       Appearance: Normal appearance. She is normal weight.   HENT:      Head: Normocephalic and atraumatic.   Eyes:      Extraocular Movements: Extraocular movements intact.      Conjunctiva/sclera: Conjunctivae normal.      Pupils: Pupils are equal, round, and reactive to light.   Cardiovascular:      Rate and Rhythm: Normal rate and regular rhythm.   Pulmonary:      Effort: Pulmonary effort is normal.      Breath sounds: Normal breath sounds.   Abdominal:      General: Abdomen is flat. Bowel sounds are normal.      Palpations: Abdomen is soft.   Musculoskeletal:         General: Normal range of motion.      Cervical back: Normal range of motion and neck supple.   Skin:     General: Skin is warm and dry.   Neurological:      General: No focal deficit present.      Mental Status: She is alert and oriented to person, place, and time. Mental status is at baseline.           Goals and Barriers:  Current Goal: Prolong Survival from Cancer.   Barriers: None.      Patient's Capacity to Self Care:  Patient is able to self care.    Code Status: [unfilled]  Advance Directive and Living Will:      Power of :

## 2024-01-18 NOTE — PROGRESS NOTES
Patient presents for central venous labs and port flush. Patient offers no complaints. Port accessed, flushed, saline locked, labs drawn, port flushed and de-accessed. Gauze placed on site.  AVS declined, next appointment reviewed.

## 2024-01-18 NOTE — TELEPHONE ENCOUNTER
Patient is in need of an appointment in 2-3 wks with Dr. Thrasher but there is no availability at this time. Please advise.

## 2024-01-19 ENCOUNTER — TELEPHONE (OUTPATIENT)
Dept: HEMATOLOGY ONCOLOGY | Facility: CLINIC | Age: 69
End: 2024-01-19

## 2024-01-19 ENCOUNTER — HOSPITAL ENCOUNTER (OUTPATIENT)
Dept: INFUSION CENTER | Facility: CLINIC | Age: 69
End: 2024-01-19
Payer: COMMERCIAL

## 2024-01-19 VITALS
TEMPERATURE: 97.3 F | DIASTOLIC BLOOD PRESSURE: 61 MMHG | BODY MASS INDEX: 25.03 KG/M2 | HEART RATE: 85 BPM | OXYGEN SATURATION: 97 % | RESPIRATION RATE: 18 BRPM | SYSTOLIC BLOOD PRESSURE: 129 MMHG | WEIGHT: 146.6 LBS | HEIGHT: 64 IN

## 2024-01-19 DIAGNOSIS — C34.91 ADENOCARCINOMA OF RIGHT LUNG (HCC): Primary | ICD-10-CM

## 2024-01-19 DIAGNOSIS — E83.42 HYPOMAGNESEMIA: ICD-10-CM

## 2024-01-19 LAB — CANCER AG19-9 SERPL-ACNC: 9 U/ML (ref 0–35)

## 2024-01-19 PROCEDURE — 96367 TX/PROPH/DG ADDL SEQ IV INF: CPT

## 2024-01-19 PROCEDURE — 96376 TX/PRO/DX INJ SAME DRUG ADON: CPT

## 2024-01-19 PROCEDURE — 77014 CHG CT GUIDANCE RADIATION THERAPY FLDS PLACEMENT: CPT | Performed by: RADIOLOGY

## 2024-01-19 PROCEDURE — 77386 HB NTSTY MODUL RAD TX DLVR CPLX: CPT | Performed by: RADIOLOGY

## 2024-01-19 PROCEDURE — 96411 CHEMO IV PUSH ADDL DRUG: CPT

## 2024-01-19 PROCEDURE — 96375 TX/PRO/DX INJ NEW DRUG ADDON: CPT

## 2024-01-19 PROCEDURE — 96413 CHEMO IV INFUSION 1 HR: CPT

## 2024-01-19 RX ORDER — METHYLPREDNISOLONE SODIUM SUCCINATE 40 MG/ML
20 INJECTION, POWDER, LYOPHILIZED, FOR SOLUTION INTRAMUSCULAR; INTRAVENOUS ONCE
Status: COMPLETED | OUTPATIENT
Start: 2024-01-19 | End: 2024-01-19

## 2024-01-19 RX ORDER — DIPHENHYDRAMINE HYDROCHLORIDE 50 MG/ML
50 INJECTION INTRAMUSCULAR; INTRAVENOUS ONCE
Status: COMPLETED | OUTPATIENT
Start: 2024-01-19 | End: 2024-01-19

## 2024-01-19 RX ORDER — FAMOTIDINE 10 MG/ML
20 INJECTION, SOLUTION INTRAVENOUS EVERY 12 HOURS SCHEDULED
Status: DISCONTINUED | OUTPATIENT
Start: 2024-01-19 | End: 2024-01-22 | Stop reason: HOSPADM

## 2024-01-19 RX ORDER — SODIUM CHLORIDE 9 MG/ML
20 INJECTION, SOLUTION INTRAVENOUS ONCE
Status: COMPLETED | OUTPATIENT
Start: 2024-01-19 | End: 2024-01-19

## 2024-01-19 RX ADMIN — DEXAMETHASONE SODIUM PHOSPHATE: 10 INJECTION, SOLUTION INTRAMUSCULAR; INTRAVENOUS at 08:29

## 2024-01-19 RX ADMIN — SODIUM CHLORIDE 20 ML/HR: 0.9 INJECTION, SOLUTION INTRAVENOUS at 08:21

## 2024-01-19 RX ADMIN — DIPHENHYDRAMINE HYDROCHLORIDE 50 MG: 50 INJECTION, SOLUTION INTRAMUSCULAR; INTRAVENOUS at 09:50

## 2024-01-19 RX ADMIN — PACLITAXEL 85.8 MG: 6 INJECTION, SOLUTION, CONCENTRATE INTRAVENOUS at 09:41

## 2024-01-19 RX ADMIN — METHYLPREDNISOLONE SODIUM SUCCINATE 20 MG: 40 INJECTION, POWDER, FOR SOLUTION INTRAMUSCULAR; INTRAVENOUS at 09:51

## 2024-01-19 RX ADMIN — FAMOTIDINE 20 MG: 10 INJECTION, SOLUTION INTRAVENOUS at 09:52

## 2024-01-19 RX ADMIN — FAMOTIDINE 20 MG: 10 INJECTION, SOLUTION INTRAVENOUS at 09:10

## 2024-01-19 RX ADMIN — DIPHENHYDRAMINE HYDROCHLORIDE 25 MG: 50 INJECTION, SOLUTION INTRAMUSCULAR; INTRAVENOUS at 08:49

## 2024-01-19 RX ADMIN — CARBOPLATIN 185 MG: 10 INJECTION INTRAVENOUS at 11:16

## 2024-01-19 NOTE — TELEPHONE ENCOUNTER
Title: Patient presented with SOB and throat tightness seven minutes into her first Taxol infusion. Hypersensitivity reaction protocol was initiated- SOB and throat resolved, however patient then still reported tingling/numbness in B/L fingers. Dr. Doan made aware. Taxol is to be held and not rechallenged. Patient is ok to proceed with Carboplatin today once VSS are stable. Will get patient in for an appointment with Dr. Thrasher as patient may need to be consented for Taxotere.     Date patient scheduled: 1/19/24    Original medication ordered: Weekly Carboplatin/Taxol    Timeout done with JOSÉ Jacobo at [facility] on 1/19/24 @ 1025.

## 2024-01-19 NOTE — TELEPHONE ENCOUNTER
Patient was scheduled for an appointment with Dr. Thrasher on 1/22 to f/u Taxol infusion reaction on 1/19 and possible Taxotere consent/teach if Dr. Thrasher orders.

## 2024-01-22 ENCOUNTER — OFFICE VISIT (OUTPATIENT)
Dept: HEMATOLOGY ONCOLOGY | Facility: CLINIC | Age: 69
End: 2024-01-22
Payer: COMMERCIAL

## 2024-01-22 ENCOUNTER — PATIENT OUTREACH (OUTPATIENT)
Dept: CASE MANAGEMENT | Facility: HOSPITAL | Age: 69
End: 2024-01-22

## 2024-01-22 VITALS
TEMPERATURE: 97.3 F | RESPIRATION RATE: 18 BRPM | WEIGHT: 146 LBS | OXYGEN SATURATION: 97 % | DIASTOLIC BLOOD PRESSURE: 62 MMHG | HEART RATE: 84 BPM | HEIGHT: 64 IN | BODY MASS INDEX: 24.92 KG/M2 | SYSTOLIC BLOOD PRESSURE: 128 MMHG

## 2024-01-22 DIAGNOSIS — T78.40XD ALLERGIC REACTION TO DRUG, SUBSEQUENT ENCOUNTER: ICD-10-CM

## 2024-01-22 DIAGNOSIS — C34.91 ADENOCARCINOMA OF RIGHT LUNG (HCC): Primary | ICD-10-CM

## 2024-01-22 PROCEDURE — 99213 OFFICE O/P EST LOW 20 MIN: CPT | Performed by: INTERNAL MEDICINE

## 2024-01-22 PROCEDURE — 77386 HB NTSTY MODUL RAD TX DLVR CPLX: CPT | Performed by: RADIOLOGY

## 2024-01-22 PROCEDURE — 77014 CHG CT GUIDANCE RADIATION THERAPY FLDS PLACEMENT: CPT | Performed by: RADIOLOGY

## 2024-01-22 NOTE — PROGRESS NOTES
CHANA met with pt in the Rad Onc office prior to her treatment today, we have spoken on the phone several times but never met in person.  Pt tells me that her radiation tx is going well and shared that she started chemo last week and unfortunately had a reaction.  She will see Med Onc today to talk about options to prevent this from happening again.  She tells me that otherwise things are going well.  She had started to talk about her sister over the holidays but got called back for treatment.  We agreed to continue to meet in person while she's having treatment, and I stressed that she is still welcome to call me as needed too.  MSW will remain available to her as needed moving forward and will check in with her weekly while she's on treatment.  No other needs noted at this time.

## 2024-01-23 PROBLEM — T78.40XA ALLERGIC REACTION CAUSED BY A DRUG: Status: ACTIVE | Noted: 2024-01-23

## 2024-01-23 PROCEDURE — 77014 CHG CT GUIDANCE RADIATION THERAPY FLDS PLACEMENT: CPT | Performed by: STUDENT IN AN ORGANIZED HEALTH CARE EDUCATION/TRAINING PROGRAM

## 2024-01-23 PROCEDURE — 77386 HB NTSTY MODUL RAD TX DLVR CPLX: CPT | Performed by: STUDENT IN AN ORGANIZED HEALTH CARE EDUCATION/TRAINING PROGRAM

## 2024-01-23 RX ORDER — SODIUM CHLORIDE 9 MG/ML
20 INJECTION, SOLUTION INTRAVENOUS ONCE
Status: CANCELLED | OUTPATIENT
Start: 2024-01-26

## 2024-01-23 NOTE — PROGRESS NOTES
Hematology/Oncology Outpatient Follow- up Note  Saskia Alfaro 68 y.o. female MRN: @ Encounter: [id number]        Date:  1/23/2024        Assessment / Plan:    1 right lung cancer adenocarcinoma stage IIIb T4 N2 M0 TPS score 98 K-minna G12 C mutated.  2 allergic reaction to Taxol  3 presumed pseudocyst of pancreas  Plan: Patient will have Taxol limited from her regiment will use carboplatin with this coming week.  Beyond that we will look at the possibility of adding Alimta or -16 to her regimen.  No other major suggestions.      HPI: 68-year-old female seen with the above problem.  She began her combined chemoradiation with Taxol carboplatinum and radiation on 1/19/2024.  Unfortunately she developed a significant reaction after 7 minutes of infusion with Taxol short of breath lightheaded dizzy feeling poorly and the drug was stopped.  Presumably related to the camper for in the Taxol.  Unfortunately I think it would be difficult to give her more Taxol admitted due to.  I would hold that.  I think we are choices beyond this coming week of carboplatin alone will be in either -16 or Alimta.  We will look into one of the other.  I discussed this with her she is aware of that.  Note from 1/18/2024: 68-year-old female with history of adenocarcinoma right lung clinical stage IIIb T4 N2 M0.  PD-L1 TPS 98% K-minna G12 C mutation.  She has completed chemotherapy.  PET CT scan done at the completion showed some improvement disease.  She was seen by surgery felt she was not a surgical candidate.  She also has a cystic lesion approximately 3 cm in the head of the pancreas.  She has been evaluated by GI who felt this was a pseudocyst.  Biopsy was nondiagnostic.  Their recommendation is to repeat her MRI which will be done in March.  Reviewing her situation we were going to plan to go ahead with her chemo therapy tomorrow.  She is to receive combined Taxol carboplatin.  She is feeling fairly well denies any other major problems.      Interval History:    As above      Cancer Staging:  Cancer Staging   Adenocarcinoma of right lung (HCC)  Staging form: Lung, AJCC 8th Edition  - Clinical stage from 7/24/2023: Stage IIIB (cT4, cN2, cM0) - Signed by Celina Treviño MD on 12/26/2023  Stage prefix: Initial diagnosis      Molecular Testing:     Previous Hematologic/ Oncologic History:    Oncology History   Adenocarcinoma of right lung (HCC)   6/23/2023 Initial Diagnosis    Adenocarcinoma of right lung (HCC)     7/24/2023 Biopsy    Navigational bronchoscopy with EBUS    A.  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     A.-B.  Lung, Right Middle Lobe Bronchial Brushing (Thin-prep and smear preparations):   Conclusive evidence of malignancy.  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     Satisfactory for evaluation.     C.-D.  Lung, Right Middle Lobe Bronchoalveolar Lavage (Thin-prep and cell block preparations):   Conclusive evidence of malignancy.  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     Satisfactory for evaluation.      E.-F.  Lymph Node, level 7 (Thin-prep and smear preparations):   Atypical cellular changes seen.  Rare atypical cells.  Lymphocytes.  Few bronchial cells and pulmonary macrophages.        Satisfactory for evaluation.     G.-H.  Lymph Node, 10R (Thin-prep and smear preparations):   Negative for malignancy.  Lymphocytes.  Aggregates of neutrophils.     Satisfactory for evaluation.     7/24/2023 -  Cancer Staged    Staging form: Lung, AJCC 8th Edition  - Clinical stage from 7/24/2023: Stage IIIB (cT4, cN2, cM0) - Signed by Celina Treviño MD on 12/26/2023  Stage prefix: Initial diagnosis       8/31/2023 - 10/12/2023 Chemotherapy    cyanocobalamin, 1,000 mcg, Intramuscular, Once, 1 of 1 cycle  Administration: 1,000 mcg (8/24/2023)  alteplase (CATHFLO), 2 mg, Intracatheter, Every 1 Minute as needed, 3 of 3 cycles  CISplatin  "(PLATINOL) with mannitol IVPB, 75 mg/m2 = 129.8 mg (100 % of original dose 75 mg/m2), Intravenous, Once, 3 of 3 cycles  Dose modification: 50 mg/m2 (original dose 75 mg/m2, Cycle 1, Reason: Anticipated Tolerance), 75 mg/m2 (original dose 75 mg/m2, Cycle 1, Reason: Dose modified as per discussion with consulting physician)  Administration: 129.8 mg (8/31/2023), 129.8 mg (9/21/2023), 129.8 mg (10/12/2023)  fosaprepitant (EMEND) IVPB, 150 mg, Intravenous, Once, 3 of 3 cycles  Administration: 150 mg (8/31/2023), 150 mg (9/21/2023), 150 mg (10/12/2023)  nivolumab (OPDIVO) IVPB, 360 mg, Intravenous, Once, 3 of 3 cycles  Administration: 360 mg (8/31/2023), 360 mg (9/21/2023), 360 mg (10/12/2023)  pemetrexed (ALIMTA) chemo infusion, 500 mg/m2 = 865 mg, Intravenous, Once, 3 of 3 cycles  Administration: 865 mg (8/31/2023), 865 mg (9/21/2023), 865 mg (10/12/2023)     1/19/2024 -  Chemotherapy    alteplase (CATHFLO), 2 mg, Intracatheter, Every 1 Minute as needed, 1 of 7 cycles  CARBOplatin (PARAPLATIN) IVPB (GOG AUC DOSING), 185 mg, Intravenous, Once, 1 of 7 cycles  Administration: 185 mg (1/19/2024)  PACLItaxel (TAXOL) chemo IVPB, 50 mg/m2 = 85.8 mg, Intravenous, Once, 1 of 1 cycle  Administration: 85.8 mg (1/19/2024)         Current Hematologic/ Oncologic Treatment:       Cycle 1         Test Results:    Imaging: No results found.          Labs:   Lab Results   Component Value Date    WBC 10.48 (H) 01/18/2024    HGB 12.6 01/18/2024    HCT 37.7 01/18/2024     (H) 01/18/2024     01/18/2024     Lab Results   Component Value Date    K 3.9 01/18/2024     01/18/2024    CO2 27 01/18/2024    BUN 20 01/18/2024    CREATININE 0.76 01/18/2024    CALCIUM 9.8 01/18/2024    AST 25 01/18/2024    ALT 32 01/18/2024    ALKPHOS 69 01/18/2024    EGFR 81 01/18/2024         No results found for: \"SPEP\", \"UPEP\"    No results found for: \"PSA\"    No results found for: \"CEA\"    No results found for: \"\"    No results found for: " "\"AFP\"    No results found for: \"IRON\", \"TIBC\", \"FERRITIN\"    No results found for: \"EXFISUSI77\"      ROS: Review of Systems   Constitutional: Negative.    Neurological:  Positive for light-headedness (Noted occasional lightheadedness over the last several days.  MRI previously negative.).   All other systems reviewed and are negative.        Current Medications: Reviewed  Allergies: Reviewed  PMH/FH/SH:  Reviewed      Physical Exam:    Body surface area is 1.71 meters squared.    Wt Readings from Last 3 Encounters:   01/22/24 66.2 kg (146 lb)   01/19/24 66.5 kg (146 lb 9.6 oz)   01/18/24 67.1 kg (148 lb)        Temp Readings from Last 3 Encounters:   01/22/24 (!) 97.3 °F (36.3 °C) (Temporal)   01/19/24 (!) 97.3 °F (36.3 °C) (Temporal)   01/18/24 97.6 °F (36.4 °C) (Temporal)        BP Readings from Last 3 Encounters:   01/22/24 128/62   01/19/24 129/61   01/18/24 118/78         Pulse Readings from Last 3 Encounters:   01/22/24 84   01/19/24 85   01/18/24 78     @LASTSAO2(3)@      Physical Exam  Constitutional:       Appearance: Normal appearance. She is normal weight.   HENT:      Head: Normocephalic and atraumatic.   Eyes:      Extraocular Movements: Extraocular movements intact.      Conjunctiva/sclera: Conjunctivae normal.      Pupils: Pupils are equal, round, and reactive to light.   Cardiovascular:      Heart sounds: Normal heart sounds.   Pulmonary:      Effort: Pulmonary effort is normal.      Breath sounds: Normal breath sounds.   Abdominal:      General: Abdomen is flat. Bowel sounds are normal.      Palpations: Abdomen is soft.   Musculoskeletal:         General: Normal range of motion.      Cervical back: Normal range of motion and neck supple.   Skin:     General: Skin is dry.   Neurological:      General: No focal deficit present.      Mental Status: She is alert and oriented to person, place, and time. Mental status is at baseline.           Goals and Barriers:  Current Goal: Prolong Survival from " Cancer.   Barriers: None.      Patient's Capacity to Self Care:  Patient is able to self care.    Code Status: [unfilled]  Advance Directive and Living Will:      Power of :

## 2024-01-24 PROCEDURE — 77014 CHG CT GUIDANCE RADIATION THERAPY FLDS PLACEMENT: CPT | Performed by: RADIOLOGY

## 2024-01-24 PROCEDURE — 77386 HB NTSTY MODUL RAD TX DLVR CPLX: CPT | Performed by: RADIOLOGY

## 2024-01-24 PROCEDURE — 77336 RADIATION PHYSICS CONSULT: CPT | Performed by: RADIOLOGY

## 2024-01-25 ENCOUNTER — HOSPITAL ENCOUNTER (OUTPATIENT)
Dept: INFUSION CENTER | Facility: CLINIC | Age: 69
Discharge: HOME | End: 2024-01-25
Payer: COMMERCIAL

## 2024-01-25 DIAGNOSIS — C34.91 ADENOCARCINOMA OF RIGHT LUNG (HCC): ICD-10-CM

## 2024-01-25 DIAGNOSIS — Z95.828 PORT-A-CATH IN PLACE: Primary | ICD-10-CM

## 2024-01-25 DIAGNOSIS — E83.42 HYPOMAGNESEMIA: ICD-10-CM

## 2024-01-25 LAB
ALBUMIN SERPL BCP-MCNC: 4.1 G/DL (ref 3.5–5)
ALP SERPL-CCNC: 64 U/L (ref 34–104)
ALT SERPL W P-5'-P-CCNC: 34 U/L (ref 7–52)
ANION GAP SERPL CALCULATED.3IONS-SCNC: 7 MMOL/L
AST SERPL W P-5'-P-CCNC: 22 U/L (ref 13–39)
BASOPHILS # BLD AUTO: 0.04 THOUSANDS/ÂΜL (ref 0–0.1)
BASOPHILS NFR BLD AUTO: 1 % (ref 0–1)
BILIRUB SERPL-MCNC: 0.48 MG/DL (ref 0.2–1)
BUN SERPL-MCNC: 14 MG/DL (ref 5–25)
CALCIUM SERPL-MCNC: 9.6 MG/DL (ref 8.4–10.2)
CHLORIDE SERPL-SCNC: 100 MMOL/L (ref 96–108)
CO2 SERPL-SCNC: 29 MMOL/L (ref 21–32)
CREAT SERPL-MCNC: 0.77 MG/DL (ref 0.6–1.3)
EOSINOPHIL # BLD AUTO: 0.15 THOUSAND/ÂΜL (ref 0–0.61)
EOSINOPHIL NFR BLD AUTO: 2 % (ref 0–6)
ERYTHROCYTE [DISTWIDTH] IN BLOOD BY AUTOMATED COUNT: 11.6 % (ref 11.6–15.1)
GFR SERPL CREATININE-BSD FRML MDRD: 80 ML/MIN/1.73SQ M
GLUCOSE SERPL-MCNC: 143 MG/DL (ref 65–140)
HCT VFR BLD AUTO: 37.7 % (ref 34.8–46.1)
HGB BLD-MCNC: 12.3 G/DL (ref 11.5–15.4)
IMM GRANULOCYTES # BLD AUTO: 0.03 THOUSAND/UL (ref 0–0.2)
IMM GRANULOCYTES NFR BLD AUTO: 0 % (ref 0–2)
LYMPHOCYTES # BLD AUTO: 1.44 THOUSANDS/ÂΜL (ref 0.6–4.47)
LYMPHOCYTES NFR BLD AUTO: 21 % (ref 14–44)
MCH RBC QN AUTO: 32.7 PG (ref 26.8–34.3)
MCHC RBC AUTO-ENTMCNC: 32.6 G/DL (ref 31.4–37.4)
MCV RBC AUTO: 100 FL (ref 82–98)
MONOCYTES # BLD AUTO: 0.51 THOUSAND/ÂΜL (ref 0.17–1.22)
MONOCYTES NFR BLD AUTO: 7 % (ref 4–12)
NEUTROPHILS # BLD AUTO: 4.73 THOUSANDS/ÂΜL (ref 1.85–7.62)
NEUTS SEG NFR BLD AUTO: 69 % (ref 43–75)
NRBC BLD AUTO-RTO: 0 /100 WBCS
PLATELET # BLD AUTO: 261 THOUSANDS/UL (ref 149–390)
PMV BLD AUTO: 10.3 FL (ref 8.9–12.7)
POTASSIUM SERPL-SCNC: 3.6 MMOL/L (ref 3.5–5.3)
PROT SERPL-MCNC: 7.4 G/DL (ref 6.4–8.4)
RBC # BLD AUTO: 3.76 MILLION/UL (ref 3.81–5.12)
SODIUM SERPL-SCNC: 136 MMOL/L (ref 135–147)
WBC # BLD AUTO: 6.9 THOUSAND/UL (ref 4.31–10.16)

## 2024-01-25 PROCEDURE — 80053 COMPREHEN METABOLIC PANEL: CPT | Performed by: INTERNAL MEDICINE

## 2024-01-25 PROCEDURE — 77427 RADIATION TX MANAGEMENT X5: CPT | Performed by: RADIOLOGY

## 2024-01-25 PROCEDURE — 77014 CHG CT GUIDANCE RADIATION THERAPY FLDS PLACEMENT: CPT | Performed by: STUDENT IN AN ORGANIZED HEALTH CARE EDUCATION/TRAINING PROGRAM

## 2024-01-25 PROCEDURE — 85025 COMPLETE CBC W/AUTO DIFF WBC: CPT | Performed by: INTERNAL MEDICINE

## 2024-01-25 PROCEDURE — 77386 HB NTSTY MODUL RAD TX DLVR CPLX: CPT | Performed by: STUDENT IN AN ORGANIZED HEALTH CARE EDUCATION/TRAINING PROGRAM

## 2024-01-25 NOTE — PROGRESS NOTES
Patient presents to the infusion center today for labs offering no complaints. Port accessed with positive blood flow. Labs drawn and port was saline locked prior to deaccessing. Pt aware of next appointment on 1/26/24 at 0830.

## 2024-01-26 ENCOUNTER — HOSPITAL ENCOUNTER (OUTPATIENT)
Dept: INFUSION CENTER | Facility: CLINIC | Age: 69
End: 2024-01-26
Payer: COMMERCIAL

## 2024-01-26 ENCOUNTER — HOSPITAL ENCOUNTER (OUTPATIENT)
Dept: INFUSION CENTER | Facility: CLINIC | Age: 69
End: 2024-01-26

## 2024-01-26 VITALS
DIASTOLIC BLOOD PRESSURE: 58 MMHG | HEIGHT: 64 IN | BODY MASS INDEX: 25.33 KG/M2 | RESPIRATION RATE: 18 BRPM | HEART RATE: 94 BPM | SYSTOLIC BLOOD PRESSURE: 124 MMHG | WEIGHT: 148.4 LBS | TEMPERATURE: 96.9 F

## 2024-01-26 DIAGNOSIS — C34.91 ADENOCARCINOMA OF RIGHT LUNG (HCC): Primary | ICD-10-CM

## 2024-01-26 DIAGNOSIS — E83.42 HYPOMAGNESEMIA: ICD-10-CM

## 2024-01-26 PROCEDURE — 77386 HB NTSTY MODUL RAD TX DLVR CPLX: CPT | Performed by: RADIOLOGY

## 2024-01-26 PROCEDURE — 96367 TX/PROPH/DG ADDL SEQ IV INF: CPT

## 2024-01-26 PROCEDURE — 96413 CHEMO IV INFUSION 1 HR: CPT

## 2024-01-26 PROCEDURE — 77014 CHG CT GUIDANCE RADIATION THERAPY FLDS PLACEMENT: CPT | Performed by: RADIOLOGY

## 2024-01-26 RX ORDER — SODIUM CHLORIDE 9 MG/ML
20 INJECTION, SOLUTION INTRAVENOUS ONCE
Status: COMPLETED | OUTPATIENT
Start: 2024-01-26 | End: 2024-01-26

## 2024-01-26 RX ADMIN — SODIUM CHLORIDE 20 ML/HR: 0.9 INJECTION, SOLUTION INTRAVENOUS at 08:46

## 2024-01-26 RX ADMIN — FAMOTIDINE 20 MG: 10 INJECTION, SOLUTION INTRAVENOUS at 09:29

## 2024-01-26 RX ADMIN — DEXAMETHASONE SODIUM PHOSPHATE: 10 INJECTION, SOLUTION INTRAMUSCULAR; INTRAVENOUS at 08:45

## 2024-01-26 RX ADMIN — CARBOPLATIN 183.2 MG: 10 INJECTION INTRAVENOUS at 10:15

## 2024-01-26 RX ADMIN — DIPHENHYDRAMINE HYDROCHLORIDE 25 MG: 50 INJECTION, SOLUTION INTRAMUSCULAR; INTRAVENOUS at 09:07

## 2024-01-26 NOTE — PROGRESS NOTES
Pt to clinic for Carboplatin. Pt offers no complaints today. Tolerated infusion without complications. Aware of next appointment on 2/1/24 at 900. AVS declined. Port de-accessed.

## 2024-01-29 DIAGNOSIS — C34.2 ADENOCARCINOMA OF MIDDLE LOBE OF RIGHT LUNG (HCC): Primary | ICD-10-CM

## 2024-01-29 PROCEDURE — 77386 HB NTSTY MODUL RAD TX DLVR CPLX: CPT | Performed by: RADIOLOGY

## 2024-01-29 PROCEDURE — 77014 CHG CT GUIDANCE RADIATION THERAPY FLDS PLACEMENT: CPT | Performed by: RADIOLOGY

## 2024-01-30 PROCEDURE — 77014 CHG CT GUIDANCE RADIATION THERAPY FLDS PLACEMENT: CPT | Performed by: RADIOLOGY

## 2024-01-30 PROCEDURE — 77386 HB NTSTY MODUL RAD TX DLVR CPLX: CPT | Performed by: RADIOLOGY

## 2024-01-31 PROCEDURE — 77014 CHG CT GUIDANCE RADIATION THERAPY FLDS PLACEMENT: CPT | Performed by: RADIOLOGY

## 2024-01-31 PROCEDURE — 77386 HB NTSTY MODUL RAD TX DLVR CPLX: CPT | Performed by: RADIOLOGY

## 2024-01-31 PROCEDURE — 77336 RADIATION PHYSICS CONSULT: CPT | Performed by: RADIOLOGY

## 2024-01-31 RX ORDER — SODIUM CHLORIDE 9 MG/ML
20 INJECTION, SOLUTION INTRAVENOUS ONCE
Status: CANCELLED | OUTPATIENT
Start: 2024-02-02

## 2024-02-01 ENCOUNTER — HOSPITAL ENCOUNTER (OUTPATIENT)
Dept: INFUSION CENTER | Facility: CLINIC | Age: 69
Discharge: HOME | End: 2024-02-01
Payer: COMMERCIAL

## 2024-02-01 ENCOUNTER — PATIENT OUTREACH (OUTPATIENT)
Dept: CASE MANAGEMENT | Facility: HOSPITAL | Age: 69
End: 2024-02-01

## 2024-02-01 ENCOUNTER — APPOINTMENT (OUTPATIENT)
Dept: RADIATION ONCOLOGY | Facility: CLINIC | Age: 69
End: 2024-02-01
Attending: RADIOLOGY
Payer: COMMERCIAL

## 2024-02-01 DIAGNOSIS — Z95.828 PORT-A-CATH IN PLACE: Primary | ICD-10-CM

## 2024-02-01 DIAGNOSIS — C34.91 ADENOCARCINOMA OF RIGHT LUNG (HCC): ICD-10-CM

## 2024-02-01 DIAGNOSIS — E83.42 HYPOMAGNESEMIA: ICD-10-CM

## 2024-02-01 LAB
ALBUMIN SERPL BCP-MCNC: 3.9 G/DL (ref 3.5–5)
ALP SERPL-CCNC: 62 U/L (ref 34–104)
ALT SERPL W P-5'-P-CCNC: 31 U/L (ref 7–52)
ANION GAP SERPL CALCULATED.3IONS-SCNC: 7 MMOL/L
AST SERPL W P-5'-P-CCNC: 20 U/L (ref 13–39)
BASOPHILS # BLD AUTO: 0.03 THOUSANDS/ÂΜL (ref 0–0.1)
BASOPHILS NFR BLD AUTO: 0 % (ref 0–1)
BILIRUB SERPL-MCNC: 0.42 MG/DL (ref 0.2–1)
BUN SERPL-MCNC: 14 MG/DL (ref 5–25)
CALCIUM SERPL-MCNC: 9.5 MG/DL (ref 8.4–10.2)
CHLORIDE SERPL-SCNC: 103 MMOL/L (ref 96–108)
CO2 SERPL-SCNC: 27 MMOL/L (ref 21–32)
CREAT SERPL-MCNC: 0.75 MG/DL (ref 0.6–1.3)
EOSINOPHIL # BLD AUTO: 0.11 THOUSAND/ÂΜL (ref 0–0.61)
EOSINOPHIL NFR BLD AUTO: 2 % (ref 0–6)
ERYTHROCYTE [DISTWIDTH] IN BLOOD BY AUTOMATED COUNT: 11.9 % (ref 11.6–15.1)
GFR SERPL CREATININE-BSD FRML MDRD: 82 ML/MIN/1.73SQ M
GLUCOSE SERPL-MCNC: 133 MG/DL (ref 65–140)
HCT VFR BLD AUTO: 35.6 % (ref 34.8–46.1)
HGB BLD-MCNC: 11.9 G/DL (ref 11.5–15.4)
IMM GRANULOCYTES # BLD AUTO: 0.03 THOUSAND/UL (ref 0–0.2)
IMM GRANULOCYTES NFR BLD AUTO: 0 % (ref 0–2)
LYMPHOCYTES # BLD AUTO: 1.28 THOUSANDS/ÂΜL (ref 0.6–4.47)
LYMPHOCYTES NFR BLD AUTO: 17 % (ref 14–44)
MCH RBC QN AUTO: 33.9 PG (ref 26.8–34.3)
MCHC RBC AUTO-ENTMCNC: 33.4 G/DL (ref 31.4–37.4)
MCV RBC AUTO: 101 FL (ref 82–98)
MONOCYTES # BLD AUTO: 0.62 THOUSAND/ÂΜL (ref 0.17–1.22)
MONOCYTES NFR BLD AUTO: 8 % (ref 4–12)
NEUTROPHILS # BLD AUTO: 5.44 THOUSANDS/ÂΜL (ref 1.85–7.62)
NEUTS SEG NFR BLD AUTO: 73 % (ref 43–75)
NRBC BLD AUTO-RTO: 0 /100 WBCS
PLATELET # BLD AUTO: 203 THOUSANDS/UL (ref 149–390)
PMV BLD AUTO: 10.4 FL (ref 8.9–12.7)
POTASSIUM SERPL-SCNC: 3.7 MMOL/L (ref 3.5–5.3)
PROT SERPL-MCNC: 7.1 G/DL (ref 6.4–8.4)
RBC # BLD AUTO: 3.51 MILLION/UL (ref 3.81–5.12)
SODIUM SERPL-SCNC: 137 MMOL/L (ref 135–147)
WBC # BLD AUTO: 7.51 THOUSAND/UL (ref 4.31–10.16)

## 2024-02-01 PROCEDURE — 77386 HB NTSTY MODUL RAD TX DLVR CPLX: CPT | Performed by: STUDENT IN AN ORGANIZED HEALTH CARE EDUCATION/TRAINING PROGRAM

## 2024-02-01 PROCEDURE — 77427 RADIATION TX MANAGEMENT X5: CPT | Performed by: RADIOLOGY

## 2024-02-01 PROCEDURE — 85025 COMPLETE CBC W/AUTO DIFF WBC: CPT | Performed by: INTERNAL MEDICINE

## 2024-02-01 PROCEDURE — 80053 COMPREHEN METABOLIC PANEL: CPT | Performed by: INTERNAL MEDICINE

## 2024-02-01 PROCEDURE — 77014 CHG CT GUIDANCE RADIATION THERAPY FLDS PLACEMENT: CPT | Performed by: STUDENT IN AN ORGANIZED HEALTH CARE EDUCATION/TRAINING PROGRAM

## 2024-02-01 NOTE — PROGRESS NOTES
Pt to clinic for port flush and lab draw via port, offers no complaints today, tolerated procedure without complications, aware of next appointment on 2/2/24 at 10:30AM, port de-accessed, avs declined.

## 2024-02-01 NOTE — PROGRESS NOTES
CHANA s/w pt in the infusion center this afternoon prior to her lab draw appointment.  She tells me that she is overall tolerating treatment well and doing well at home.  She notes that she feels like she's lost some muscle tone since starting treatment and says that the steroids she is taking are giving her energy and appetite for a few days.  She continues to work on her art and has started putting together 1000 piece puzzles as well to help keep her mind active and pass the time.  She admits that she has not made good progress on the smoking and says that it's hard for her to believe and understand why it's been so hard to quit.  She will see her PCP later today and wants to discuss this with her, and possibly start using Chantix if appropriate.  This was offered to her in the past but she was just newly dx with cancer and felt she was too overwhelmed to start anything else at that point.  She says that she does want to quit but just can't seem to make herself follow through.     Pt says that her sister is doing well and is stable.  She talked about her children and mentioned that her granddaughter will be getting  next year.  She was appreciative of the conversation as always and denies any needs at this time, but seems to benefit from having someone to talk to.  I will continue to check in with her and provide support as needed.

## 2024-02-02 ENCOUNTER — TELEPHONE (OUTPATIENT)
Dept: HEMATOLOGY ONCOLOGY | Facility: CLINIC | Age: 69
End: 2024-02-02

## 2024-02-02 ENCOUNTER — APPOINTMENT (OUTPATIENT)
Dept: RADIATION ONCOLOGY | Facility: CLINIC | Age: 69
End: 2024-02-02
Attending: RADIOLOGY
Payer: COMMERCIAL

## 2024-02-02 ENCOUNTER — HOSPITAL ENCOUNTER (OUTPATIENT)
Dept: INFUSION CENTER | Facility: CLINIC | Age: 69
End: 2024-02-02
Payer: COMMERCIAL

## 2024-02-02 VITALS
HEIGHT: 64 IN | BODY MASS INDEX: 25.3 KG/M2 | HEART RATE: 100 BPM | WEIGHT: 148.2 LBS | RESPIRATION RATE: 19 BRPM | SYSTOLIC BLOOD PRESSURE: 109 MMHG | DIASTOLIC BLOOD PRESSURE: 56 MMHG | TEMPERATURE: 96.6 F

## 2024-02-02 DIAGNOSIS — C34.91 ADENOCARCINOMA OF RIGHT LUNG (HCC): Primary | ICD-10-CM

## 2024-02-02 DIAGNOSIS — E83.42 HYPOMAGNESEMIA: ICD-10-CM

## 2024-02-02 PROCEDURE — 77014 CHG CT GUIDANCE RADIATION THERAPY FLDS PLACEMENT: CPT | Performed by: RADIOLOGY

## 2024-02-02 PROCEDURE — 96367 TX/PROPH/DG ADDL SEQ IV INF: CPT

## 2024-02-02 PROCEDURE — 77386 HB NTSTY MODUL RAD TX DLVR CPLX: CPT | Performed by: RADIOLOGY

## 2024-02-02 PROCEDURE — 96413 CHEMO IV INFUSION 1 HR: CPT

## 2024-02-02 RX ORDER — SODIUM CHLORIDE 9 MG/ML
20 INJECTION, SOLUTION INTRAVENOUS ONCE
Status: COMPLETED | OUTPATIENT
Start: 2024-02-02 | End: 2024-02-02

## 2024-02-02 RX ADMIN — CARBOPLATIN 183.2 MG: 10 INJECTION, SOLUTION INTRAVENOUS at 12:10

## 2024-02-02 RX ADMIN — DEXAMETHASONE SODIUM PHOSPHATE: 10 INJECTION, SOLUTION INTRAMUSCULAR; INTRAVENOUS at 11:22

## 2024-02-02 RX ADMIN — SODIUM CHLORIDE 20 ML/HR: 0.9 INJECTION, SOLUTION INTRAVENOUS at 11:22

## 2024-02-02 NOTE — PROGRESS NOTES
Saskia Alfaro  tolerated Carboplatin well with no complications.      Saskia Alfaro is aware of future appt on 2/8 at 11:30am.     AVS printed and given to Saskia Alfaro:  Yes     Left unit in stable condition.

## 2024-02-02 NOTE — PROGRESS NOTES
Clarified with office RN that pt is receiving only carboplatin today. Dr. Thrasher considering adding Alimta to regimen starting next week. Also did timeout with Lashawn LOZANO RN, Benadryl and Pepcid premeds removed from plan due to pt not receiving taxol.

## 2024-02-02 NOTE — TELEPHONE ENCOUNTER
Title: Removing the premeds Pepcid and Benadryl from today's treatment and all remaining treatments as patient is currently only receiving Carboplatin. Taxol was previously discontinued.     Date patient scheduled: 2/2/24    Time out done with Francesca MEHTA RN from [facility] on 2/2/24 @ 1023.

## 2024-02-05 ENCOUNTER — APPOINTMENT (OUTPATIENT)
Dept: RADIATION ONCOLOGY | Facility: CLINIC | Age: 69
End: 2024-02-05
Attending: RADIOLOGY
Payer: COMMERCIAL

## 2024-02-05 PROCEDURE — 77386 HB NTSTY MODUL RAD TX DLVR CPLX: CPT | Performed by: RADIOLOGY

## 2024-02-05 PROCEDURE — 77014 CHG CT GUIDANCE RADIATION THERAPY FLDS PLACEMENT: CPT | Performed by: RADIOLOGY

## 2024-02-06 ENCOUNTER — OFFICE VISIT (OUTPATIENT)
Dept: HEMATOLOGY ONCOLOGY | Facility: CLINIC | Age: 69
End: 2024-02-06
Payer: COMMERCIAL

## 2024-02-06 ENCOUNTER — TELEPHONE (OUTPATIENT)
Dept: INFUSION CENTER | Facility: CLINIC | Age: 69
End: 2024-02-06

## 2024-02-06 ENCOUNTER — APPOINTMENT (OUTPATIENT)
Dept: RADIATION ONCOLOGY | Facility: CLINIC | Age: 69
End: 2024-02-06
Attending: RADIOLOGY
Payer: COMMERCIAL

## 2024-02-06 ENCOUNTER — TELEPHONE (OUTPATIENT)
Dept: HEMATOLOGY ONCOLOGY | Facility: CLINIC | Age: 69
End: 2024-02-06

## 2024-02-06 VITALS
BODY MASS INDEX: 25.27 KG/M2 | OXYGEN SATURATION: 98 % | HEIGHT: 64 IN | WEIGHT: 148 LBS | SYSTOLIC BLOOD PRESSURE: 92 MMHG | DIASTOLIC BLOOD PRESSURE: 58 MMHG | RESPIRATION RATE: 16 BRPM | TEMPERATURE: 98.1 F | HEART RATE: 100 BPM

## 2024-02-06 DIAGNOSIS — Z79.899 HIGH RISK MEDICATION USE: Primary | ICD-10-CM

## 2024-02-06 DIAGNOSIS — K86.2 PANCREATIC PSEUDOCYST/CYST: ICD-10-CM

## 2024-02-06 DIAGNOSIS — K86.3 PANCREATIC PSEUDOCYST/CYST: ICD-10-CM

## 2024-02-06 DIAGNOSIS — C34.91 ADENOCARCINOMA OF RIGHT LUNG (HCC): ICD-10-CM

## 2024-02-06 DIAGNOSIS — T78.40XD ALLERGIC REACTION TO DRUG, SUBSEQUENT ENCOUNTER: ICD-10-CM

## 2024-02-06 DIAGNOSIS — C34.91 ADENOCARCINOMA OF RIGHT LUNG (HCC): Primary | ICD-10-CM

## 2024-02-06 PROCEDURE — 99213 OFFICE O/P EST LOW 20 MIN: CPT | Performed by: INTERNAL MEDICINE

## 2024-02-06 PROCEDURE — 77386 HB NTSTY MODUL RAD TX DLVR CPLX: CPT | Performed by: RADIOLOGY

## 2024-02-06 PROCEDURE — 77014 CHG CT GUIDANCE RADIATION THERAPY FLDS PLACEMENT: CPT | Performed by: RADIOLOGY

## 2024-02-06 RX ORDER — SODIUM CHLORIDE 9 MG/ML
20 INJECTION, SOLUTION INTRAVENOUS ONCE
Status: CANCELLED | OUTPATIENT
Start: 2024-02-09

## 2024-02-06 RX ORDER — LORATADINE 10 MG/1
10 TABLET ORAL DAILY
COMMUNITY

## 2024-02-06 RX ORDER — CYANOCOBALAMIN 1000 UG/ML
1000 INJECTION, SOLUTION INTRAMUSCULAR; SUBCUTANEOUS ONCE
Status: CANCELLED | OUTPATIENT
Start: 2024-02-09

## 2024-02-06 NOTE — PROGRESS NOTES
Hematology/Oncology Outpatient Follow- up Note  Saskia Alfaro 68 y.o. female MRN: @ Encounter: [id number]        Date:  2/6/2024        Assessment / Plan:    1 right lung cancer adenocarcinoma stage IIIb T4 N2 M0 TPS score 98 K-minna G12 C mutated  2 allergic reaction to Taxol.  3 presumed pseudocyst of pancreas  Plan: Patient has been off Taxol for several weeks.  We are going to add Alimta 500 mg/m² to her weekly carboplatin.  Alimta will be every 3 weeks.  She will receive it this Friday 2/9 and then 3 weeks later.  She is will still be candidate to obtain Imfinzi in the future.  Side effects were discussed consent was obtained she will follow-up in 3 weeks.  HPI: She is tolerating carboplatin fairly well has no major problems.  We discussed the Alimta.  She has had several weeks of now away total of 3 weeks and we will go ahead with Alimta this week.  Decadron/B12/folate were added as part of her regimen.  Side effects were discussed and consents were obtained.  She is ready to try it.  We will have to watch her counts which she will get weekly.      Interval History:   68-year-old female seen with the above problem.  She began her combined chemoradiation with Taxol carboplatinum and radiation on 1/19/2024.  Unfortunately she developed a significant reaction after 7 minutes of infusion with Taxol short of breath lightheaded dizzy feeling poorly and the drug was stopped.  Presumably related to the camper for in the Taxol.  Unfortunately I think it would be difficult to give her more Taxol admitted due to.  I would hold that.  I think we are choices beyond this coming week of carboplatin alone will be in either -16 or Alimta.  We will look into one of the other.  I discussed this with her she is aware of that.  Note from 1/18/2024: 68-year-old female with history of adenocarcinoma right lung clinical stage IIIb T4 N2 M0.  PD-L1 TPS 98% K-minna G12 C mutation.  She has completed chemotherapy.  PET CT scan done at the  completion showed some improvement disease.  She was seen by surgery felt she was not a surgical candidate.  She also has a cystic lesion approximately 3 cm in the head of the pancreas.  She has been evaluated by GI who felt this was a pseudocyst.  Biopsy was nondiagnostic.  Their recommendation is to repeat her MRI which will be done in March.  Reviewing her situation we were going to plan to go ahead with her chemo therapy tomorrow.  She is to receive combined Taxol carboplatin.  She is feeling fairly well denies any other major problems.         Cancer Staging:  Cancer Staging   Adenocarcinoma of right lung (HCC)  Staging form: Lung, AJCC 8th Edition  - Clinical stage from 7/24/2023: Stage IIIB (cT4, cN2, cM0) - Signed by Celina Treviño MD on 12/26/2023  Stage prefix: Initial diagnosis      Molecular Testing:     Previous Hematologic/ Oncologic History:    Oncology History   Adenocarcinoma of right lung (HCC)   6/23/2023 Initial Diagnosis    Adenocarcinoma of right lung (HCC)     7/24/2023 Biopsy    Navigational bronchoscopy with EBUS    A.  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     A.-B.  Lung, Right Middle Lobe Bronchial Brushing (Thin-prep and smear preparations):   Conclusive evidence of malignancy.  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     Satisfactory for evaluation.     C.-D.  Lung, Right Middle Lobe Bronchoalveolar Lavage (Thin-prep and cell block preparations):   Conclusive evidence of malignancy.  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     Satisfactory for evaluation.      E.-F.  Lymph Node, level 7 (Thin-prep and smear preparations):   Atypical cellular changes seen.  Rare atypical cells.  Lymphocytes.  Few bronchial cells and pulmonary macrophages.        Satisfactory for evaluation.     G.-H.  Lymph Node, 10R (Thin-prep and smear preparations):   Negative for  malignancy.  Lymphocytes.  Aggregates of neutrophils.     Satisfactory for evaluation.     7/24/2023 -  Cancer Staged    Staging form: Lung, AJCC 8th Edition  - Clinical stage from 7/24/2023: Stage IIIB (cT4, cN2, cM0) - Signed by Celina Treviño MD on 12/26/2023  Stage prefix: Initial diagnosis       8/31/2023 - 10/12/2023 Chemotherapy    cyanocobalamin, 1,000 mcg, Intramuscular, Once, 1 of 1 cycle  Administration: 1,000 mcg (8/24/2023)  alteplase (CATHFLO), 2 mg, Intracatheter, Every 1 Minute as needed, 3 of 3 cycles  CISplatin (PLATINOL) with mannitol IVPB, 75 mg/m2 = 129.8 mg (100 % of original dose 75 mg/m2), Intravenous, Once, 3 of 3 cycles  Dose modification: 50 mg/m2 (original dose 75 mg/m2, Cycle 1, Reason: Anticipated Tolerance), 75 mg/m2 (original dose 75 mg/m2, Cycle 1, Reason: Dose modified as per discussion with consulting physician)  Administration: 129.8 mg (8/31/2023), 129.8 mg (9/21/2023), 129.8 mg (10/12/2023)  fosaprepitant (EMEND) IVPB, 150 mg, Intravenous, Once, 3 of 3 cycles  Administration: 150 mg (8/31/2023), 150 mg (9/21/2023), 150 mg (10/12/2023)  nivolumab (OPDIVO) IVPB, 360 mg, Intravenous, Once, 3 of 3 cycles  Administration: 360 mg (8/31/2023), 360 mg (9/21/2023), 360 mg (10/12/2023)  pemetrexed (ALIMTA) chemo infusion, 500 mg/m2 = 865 mg, Intravenous, Once, 3 of 3 cycles  Administration: 865 mg (8/31/2023), 865 mg (9/21/2023), 865 mg (10/12/2023)     1/19/2024 -  Chemotherapy    alteplase (CATHFLO), 2 mg, Intracatheter, Every 1 Minute as needed, 3 of 7 cycles  CARBOplatin (PARAPLATIN) IVPB (GOG AUC DOSING), 185 mg, Intravenous, Once, 3 of 7 cycles  Administration: 185 mg (1/19/2024), 183.2 mg (1/26/2024), 183.2 mg (2/2/2024)  PACLItaxel (TAXOL) chemo IVPB, 50 mg/m2 = 85.8 mg, Intravenous, Once, 1 of 1 cycle  Administration: 85.8 mg (1/19/2024)  pemetrexed (ALIMTA) chemo infusion, 500 mg/m2 = 860 mg (original dose ), Intravenous, Once, 0 of 2 cycles  Dose modification: 500 mg/m2 (Cycle 4)    "      Current Hematologic/ Oncologic Treatment:       Cycle 1         Test Results:    Imaging: No results found.          Labs:   Lab Results   Component Value Date    WBC 7.51 02/01/2024    HGB 11.9 02/01/2024    HCT 35.6 02/01/2024     (H) 02/01/2024     02/01/2024     Lab Results   Component Value Date    K 3.7 02/01/2024     02/01/2024    CO2 27 02/01/2024    BUN 14 02/01/2024    CREATININE 0.75 02/01/2024    CALCIUM 9.5 02/01/2024    AST 20 02/01/2024    ALT 31 02/01/2024    ALKPHOS 62 02/01/2024    EGFR 82 02/01/2024         No results found for: \"SPEP\", \"UPEP\"    No results found for: \"PSA\"    No results found for: \"CEA\"    No results found for: \"\"    No results found for: \"AFP\"    No results found for: \"IRON\", \"TIBC\", \"FERRITIN\"    No results found for: \"PFEAXEVX30\"      ROS: Review of Systems   Constitutional: Negative.    All other systems reviewed and are negative.  Her only other complaint has been weight gain      Current Medications: Reviewed  Allergies: Reviewed  PMH/FH/SH:  Reviewed      Physical Exam:    Body surface area is 1.72 meters squared.    Wt Readings from Last 3 Encounters:   02/06/24 67.1 kg (148 lb)   02/02/24 67.2 kg (148 lb 3.2 oz)   01/26/24 67.3 kg (148 lb 6.4 oz)        Temp Readings from Last 3 Encounters:   02/06/24 98.1 °F (36.7 °C) (Temporal)   02/02/24 (!) 96.6 °F (35.9 °C)   01/26/24 (!) 96.9 °F (36.1 °C) (Temporal)        BP Readings from Last 3 Encounters:   02/06/24 92/58   02/02/24 109/56   01/26/24 124/58         Pulse Readings from Last 3 Encounters:   02/06/24 100   02/02/24 100   01/26/24 94     @LASTSAO2(3)@      Physical Exam  Constitutional:       Appearance: Normal appearance. She is normal weight.   HENT:      Head: Normocephalic and atraumatic.   Eyes:      Extraocular Movements: Extraocular movements intact.      Pupils: Pupils are equal, round, and reactive to light.   Cardiovascular:      Rate and Rhythm: Normal rate and regular " rhythm.   Pulmonary:      Effort: Pulmonary effort is normal.      Breath sounds: Normal breath sounds.   Abdominal:      General: Abdomen is flat. Bowel sounds are normal.      Palpations: Abdomen is soft.   Musculoskeletal:         General: Normal range of motion.      Cervical back: Normal range of motion and neck supple.   Skin:     General: Skin is warm and dry.   Neurological:      General: No focal deficit present.      Mental Status: She is alert and oriented to person, place, and time. Mental status is at baseline.     She appears stable and doing well at this time.    Goals and Barriers:  Current Goal: Prolong Survival from Cancer.   Barriers: None.      Patient's Capacity to Self Care:  Patient is able to self care.    Code Status: [unfilled]  Advance Directive and Living Will:      Power of :

## 2024-02-06 NOTE — TELEPHONE ENCOUNTER
David Valdes , patient needs 3 week f/u . Please let me know where we should make it     Thank you

## 2024-02-06 NOTE — TELEPHONE ENCOUNTER
Received message from Keisha Aguilar RN.  Dr. Price LEBLANC/eleazar taxol and adding alimta and B12 to 2/9 appointment.  Patient has been scheduled can you please let her know of her new B12 appointment post treatment.

## 2024-02-07 ENCOUNTER — TELEPHONE (OUTPATIENT)
Dept: HEMATOLOGY ONCOLOGY | Facility: CLINIC | Age: 69
End: 2024-02-07

## 2024-02-07 ENCOUNTER — APPOINTMENT (OUTPATIENT)
Dept: RADIATION ONCOLOGY | Facility: CLINIC | Age: 69
End: 2024-02-07
Attending: RADIOLOGY
Payer: COMMERCIAL

## 2024-02-07 PROCEDURE — 77336 RADIATION PHYSICS CONSULT: CPT | Performed by: RADIOLOGY

## 2024-02-07 PROCEDURE — 77386 HB NTSTY MODUL RAD TX DLVR CPLX: CPT | Performed by: RADIOLOGY

## 2024-02-07 PROCEDURE — 77014 CHG CT GUIDANCE RADIATION THERAPY FLDS PLACEMENT: CPT | Performed by: RADIOLOGY

## 2024-02-07 RX ORDER — FOLIC ACID 1 MG/1
1 TABLET ORAL DAILY
Qty: 90 TABLET | Refills: 2 | Status: SHIPPED | OUTPATIENT
Start: 2024-02-07

## 2024-02-07 RX ORDER — DEXAMETHASONE 4 MG/1
TABLET ORAL
Qty: 6 TABLET | Refills: 2 | Status: SHIPPED | OUTPATIENT
Start: 2024-02-07

## 2024-02-07 NOTE — TELEPHONE ENCOUNTER
Lvm with time and date of next infusion appt, patient aware schedule has been updated on [employer]. Patient has mine and [place] infusion number for rescheduling.

## 2024-02-07 NOTE — TELEPHONE ENCOUNTER
Called patient with new appt scheduled in March . Left message with all info and with hope line tel number

## 2024-02-08 ENCOUNTER — APPOINTMENT (OUTPATIENT)
Dept: RADIATION ONCOLOGY | Facility: CLINIC | Age: 69
End: 2024-02-08
Attending: RADIOLOGY
Payer: COMMERCIAL

## 2024-02-08 ENCOUNTER — HOSPITAL ENCOUNTER (OUTPATIENT)
Dept: INFUSION CENTER | Facility: CLINIC | Age: 69
Discharge: HOME | End: 2024-02-08
Payer: COMMERCIAL

## 2024-02-08 DIAGNOSIS — Z95.828 PORT-A-CATH IN PLACE: Primary | ICD-10-CM

## 2024-02-08 DIAGNOSIS — E83.42 HYPOMAGNESEMIA: ICD-10-CM

## 2024-02-08 DIAGNOSIS — C34.91 ADENOCARCINOMA OF RIGHT LUNG (HCC): ICD-10-CM

## 2024-02-08 LAB
ALBUMIN SERPL BCP-MCNC: 4.2 G/DL (ref 3.5–5)
ALP SERPL-CCNC: 63 U/L (ref 34–104)
ALT SERPL W P-5'-P-CCNC: 36 U/L (ref 7–52)
ANION GAP SERPL CALCULATED.3IONS-SCNC: 7 MMOL/L
AST SERPL W P-5'-P-CCNC: 23 U/L (ref 13–39)
BASOPHILS # BLD AUTO: 0.03 THOUSANDS/ÂΜL (ref 0–0.1)
BASOPHILS NFR BLD AUTO: 0 % (ref 0–1)
BILIRUB SERPL-MCNC: 0.59 MG/DL (ref 0.2–1)
BUN SERPL-MCNC: 16 MG/DL (ref 5–25)
CALCIUM SERPL-MCNC: 9.9 MG/DL (ref 8.4–10.2)
CHLORIDE SERPL-SCNC: 100 MMOL/L (ref 96–108)
CO2 SERPL-SCNC: 28 MMOL/L (ref 21–32)
CREAT SERPL-MCNC: 0.77 MG/DL (ref 0.6–1.3)
EOSINOPHIL # BLD AUTO: 0.02 THOUSAND/ÂΜL (ref 0–0.61)
EOSINOPHIL NFR BLD AUTO: 0 % (ref 0–6)
ERYTHROCYTE [DISTWIDTH] IN BLOOD BY AUTOMATED COUNT: 12 % (ref 11.6–15.1)
GFR SERPL CREATININE-BSD FRML MDRD: 80 ML/MIN/1.73SQ M
GLUCOSE SERPL-MCNC: 117 MG/DL (ref 65–140)
HCT VFR BLD AUTO: 37.3 % (ref 34.8–46.1)
HGB BLD-MCNC: 12.6 G/DL (ref 11.5–15.4)
IMM GRANULOCYTES # BLD AUTO: 0.1 THOUSAND/UL (ref 0–0.2)
IMM GRANULOCYTES NFR BLD AUTO: 1 % (ref 0–2)
LYMPHOCYTES # BLD AUTO: 0.31 THOUSANDS/ÂΜL (ref 0.6–4.47)
LYMPHOCYTES NFR BLD AUTO: 3 % (ref 14–44)
MCH RBC QN AUTO: 34 PG (ref 26.8–34.3)
MCHC RBC AUTO-ENTMCNC: 33.8 G/DL (ref 31.4–37.4)
MCV RBC AUTO: 101 FL (ref 82–98)
MONOCYTES # BLD AUTO: 0.22 THOUSAND/ÂΜL (ref 0.17–1.22)
MONOCYTES NFR BLD AUTO: 2 % (ref 4–12)
NEUTROPHILS # BLD AUTO: 9.27 THOUSANDS/ÂΜL (ref 1.85–7.62)
NEUTS SEG NFR BLD AUTO: 94 % (ref 43–75)
NRBC BLD AUTO-RTO: 0 /100 WBCS
PLATELET # BLD AUTO: 213 THOUSANDS/UL (ref 149–390)
PMV BLD AUTO: 9.9 FL (ref 8.9–12.7)
POTASSIUM SERPL-SCNC: 4.1 MMOL/L (ref 3.5–5.3)
PROT SERPL-MCNC: 7.6 G/DL (ref 6.4–8.4)
RBC # BLD AUTO: 3.71 MILLION/UL (ref 3.81–5.12)
SODIUM SERPL-SCNC: 135 MMOL/L (ref 135–147)
WBC # BLD AUTO: 9.95 THOUSAND/UL (ref 4.31–10.16)

## 2024-02-08 PROCEDURE — 77014 CHG CT GUIDANCE RADIATION THERAPY FLDS PLACEMENT: CPT | Performed by: STUDENT IN AN ORGANIZED HEALTH CARE EDUCATION/TRAINING PROGRAM

## 2024-02-08 PROCEDURE — 80053 COMPREHEN METABOLIC PANEL: CPT | Performed by: INTERNAL MEDICINE

## 2024-02-08 PROCEDURE — 77386 HB NTSTY MODUL RAD TX DLVR CPLX: CPT | Performed by: STUDENT IN AN ORGANIZED HEALTH CARE EDUCATION/TRAINING PROGRAM

## 2024-02-08 PROCEDURE — 85025 COMPLETE CBC W/AUTO DIFF WBC: CPT | Performed by: INTERNAL MEDICINE

## 2024-02-08 PROCEDURE — 77427 RADIATION TX MANAGEMENT X5: CPT | Performed by: RADIOLOGY

## 2024-02-09 ENCOUNTER — APPOINTMENT (OUTPATIENT)
Dept: RADIATION ONCOLOGY | Facility: CLINIC | Age: 69
End: 2024-02-09
Attending: RADIOLOGY
Payer: COMMERCIAL

## 2024-02-09 ENCOUNTER — HOSPITAL ENCOUNTER (OUTPATIENT)
Dept: INFUSION CENTER | Facility: CLINIC | Age: 69
End: 2024-02-09
Payer: COMMERCIAL

## 2024-02-09 VITALS
WEIGHT: 147 LBS | DIASTOLIC BLOOD PRESSURE: 68 MMHG | HEART RATE: 94 BPM | BODY MASS INDEX: 25.1 KG/M2 | RESPIRATION RATE: 17 BRPM | HEIGHT: 64 IN | TEMPERATURE: 96.2 F | SYSTOLIC BLOOD PRESSURE: 124 MMHG

## 2024-02-09 DIAGNOSIS — E83.42 HYPOMAGNESEMIA: ICD-10-CM

## 2024-02-09 DIAGNOSIS — Z79.899 HIGH RISK MEDICATION USE: ICD-10-CM

## 2024-02-09 DIAGNOSIS — C34.91 ADENOCARCINOMA OF RIGHT LUNG (HCC): Primary | ICD-10-CM

## 2024-02-09 PROCEDURE — 77386 HB NTSTY MODUL RAD TX DLVR CPLX: CPT | Performed by: RADIOLOGY

## 2024-02-09 PROCEDURE — 96413 CHEMO IV INFUSION 1 HR: CPT

## 2024-02-09 PROCEDURE — 77014 CHG CT GUIDANCE RADIATION THERAPY FLDS PLACEMENT: CPT | Performed by: RADIOLOGY

## 2024-02-09 PROCEDURE — 96372 THER/PROPH/DIAG INJ SC/IM: CPT

## 2024-02-09 PROCEDURE — 96411 CHEMO IV PUSH ADDL DRUG: CPT

## 2024-02-09 PROCEDURE — 96375 TX/PRO/DX INJ NEW DRUG ADDON: CPT

## 2024-02-09 RX ORDER — SODIUM CHLORIDE 9 MG/ML
20 INJECTION, SOLUTION INTRAVENOUS ONCE
Status: COMPLETED | OUTPATIENT
Start: 2024-02-09 | End: 2024-02-09

## 2024-02-09 RX ORDER — CYANOCOBALAMIN 1000 UG/ML
1000 INJECTION, SOLUTION INTRAMUSCULAR; SUBCUTANEOUS ONCE
OUTPATIENT
Start: 2024-03-08

## 2024-02-09 RX ORDER — CYANOCOBALAMIN 1000 UG/ML
1000 INJECTION, SOLUTION INTRAMUSCULAR; SUBCUTANEOUS ONCE
Status: COMPLETED | OUTPATIENT
Start: 2024-02-09 | End: 2024-02-09

## 2024-02-09 RX ADMIN — CARBOPLATIN 183.2 MG: 10 INJECTION, SOLUTION INTRAVENOUS at 10:49

## 2024-02-09 RX ADMIN — PEMETREXED DISODIUM 860 MG: 500 INJECTION, POWDER, LYOPHILIZED, FOR SOLUTION INTRAVENOUS at 09:57

## 2024-02-09 RX ADMIN — CYANOCOBALAMIN 1000 MCG: 1000 INJECTION, SOLUTION INTRAMUSCULAR; SUBCUTANEOUS at 11:35

## 2024-02-09 RX ADMIN — SODIUM CHLORIDE 20 ML/HR: 0.9 INJECTION, SOLUTION INTRAVENOUS at 08:38

## 2024-02-09 RX ADMIN — ONDANSETRON 8 MG: 2 INJECTION INTRAMUSCULAR; INTRAVENOUS at 08:38

## 2024-02-09 NOTE — PROGRESS NOTES
Patient presents for chemotherapy offering no complaints, patient tolerated treatment without incident. B12 injection was administered in left deltoid. AVS printed. Next appointment on 2/15/24 at 1130 reviewed.

## 2024-02-12 ENCOUNTER — APPOINTMENT (OUTPATIENT)
Dept: RADIATION ONCOLOGY | Facility: CLINIC | Age: 69
End: 2024-02-12
Attending: RADIOLOGY
Payer: COMMERCIAL

## 2024-02-12 PROCEDURE — 77386 HB NTSTY MODUL RAD TX DLVR CPLX: CPT | Performed by: RADIOLOGY

## 2024-02-12 PROCEDURE — 77014 CHG CT GUIDANCE RADIATION THERAPY FLDS PLACEMENT: CPT | Performed by: RADIOLOGY

## 2024-02-12 RX ORDER — SODIUM CHLORIDE 9 MG/ML
20 INJECTION, SOLUTION INTRAVENOUS ONCE
Status: CANCELLED | OUTPATIENT
Start: 2024-02-16

## 2024-02-13 ENCOUNTER — APPOINTMENT (OUTPATIENT)
Dept: RADIATION ONCOLOGY | Facility: CLINIC | Age: 69
End: 2024-02-13
Attending: RADIOLOGY
Payer: COMMERCIAL

## 2024-02-14 ENCOUNTER — APPOINTMENT (OUTPATIENT)
Dept: RADIATION ONCOLOGY | Facility: CLINIC | Age: 69
End: 2024-02-14
Attending: RADIOLOGY
Payer: COMMERCIAL

## 2024-02-14 PROCEDURE — 77386 HB NTSTY MODUL RAD TX DLVR CPLX: CPT | Performed by: RADIOLOGY

## 2024-02-14 PROCEDURE — 77014 CHG CT GUIDANCE RADIATION THERAPY FLDS PLACEMENT: CPT | Performed by: RADIOLOGY

## 2024-02-15 ENCOUNTER — APPOINTMENT (OUTPATIENT)
Dept: RADIATION ONCOLOGY | Facility: CLINIC | Age: 69
End: 2024-02-15
Attending: RADIOLOGY
Payer: COMMERCIAL

## 2024-02-15 ENCOUNTER — HOSPITAL ENCOUNTER (OUTPATIENT)
Dept: INFUSION CENTER | Facility: CLINIC | Age: 69
Discharge: HOME | End: 2024-02-15
Payer: COMMERCIAL

## 2024-02-15 DIAGNOSIS — Z95.828 PORT-A-CATH IN PLACE: Primary | ICD-10-CM

## 2024-02-15 DIAGNOSIS — C34.91 ADENOCARCINOMA OF RIGHT LUNG (HCC): ICD-10-CM

## 2024-02-15 DIAGNOSIS — E83.42 HYPOMAGNESEMIA: ICD-10-CM

## 2024-02-15 LAB
ALBUMIN SERPL BCP-MCNC: 4 G/DL (ref 3.5–5)
ALP SERPL-CCNC: 67 U/L (ref 34–104)
ALT SERPL W P-5'-P-CCNC: 33 U/L (ref 7–52)
ANION GAP SERPL CALCULATED.3IONS-SCNC: 6 MMOL/L
AST SERPL W P-5'-P-CCNC: 22 U/L (ref 13–39)
BASOPHILS # BLD AUTO: 0.02 THOUSANDS/ÂΜL (ref 0–0.1)
BASOPHILS NFR BLD AUTO: 1 % (ref 0–1)
BILIRUB SERPL-MCNC: 0.52 MG/DL (ref 0.2–1)
BUN SERPL-MCNC: 17 MG/DL (ref 5–25)
CALCIUM SERPL-MCNC: 9.3 MG/DL (ref 8.4–10.2)
CHLORIDE SERPL-SCNC: 100 MMOL/L (ref 96–108)
CO2 SERPL-SCNC: 29 MMOL/L (ref 21–32)
CREAT SERPL-MCNC: 0.81 MG/DL (ref 0.6–1.3)
EOSINOPHIL # BLD AUTO: 0.04 THOUSAND/ÂΜL (ref 0–0.61)
EOSINOPHIL NFR BLD AUTO: 1 % (ref 0–6)
ERYTHROCYTE [DISTWIDTH] IN BLOOD BY AUTOMATED COUNT: 12.1 % (ref 11.6–15.1)
GFR SERPL CREATININE-BSD FRML MDRD: 75 ML/MIN/1.73SQ M
GLUCOSE SERPL-MCNC: 148 MG/DL (ref 65–140)
HCT VFR BLD AUTO: 34.9 % (ref 34.8–46.1)
HGB BLD-MCNC: 12.1 G/DL (ref 11.5–15.4)
IMM GRANULOCYTES # BLD AUTO: 0.02 THOUSAND/UL (ref 0–0.2)
IMM GRANULOCYTES NFR BLD AUTO: 1 % (ref 0–2)
LYMPHOCYTES # BLD AUTO: 0.63 THOUSANDS/ÂΜL (ref 0.6–4.47)
LYMPHOCYTES NFR BLD AUTO: 18 % (ref 14–44)
MCH RBC QN AUTO: 34.7 PG (ref 26.8–34.3)
MCHC RBC AUTO-ENTMCNC: 34.7 G/DL (ref 31.4–37.4)
MCV RBC AUTO: 100 FL (ref 82–98)
MONOCYTES # BLD AUTO: 0.13 THOUSAND/ÂΜL (ref 0.17–1.22)
MONOCYTES NFR BLD AUTO: 4 % (ref 4–12)
NEUTROPHILS # BLD AUTO: 2.65 THOUSANDS/ÂΜL (ref 1.85–7.62)
NEUTS SEG NFR BLD AUTO: 75 % (ref 43–75)
NRBC BLD AUTO-RTO: 0 /100 WBCS
PLATELET # BLD AUTO: 111 THOUSANDS/UL (ref 149–390)
PMV BLD AUTO: 9.4 FL (ref 8.9–12.7)
POTASSIUM SERPL-SCNC: 3.9 MMOL/L (ref 3.5–5.3)
PROT SERPL-MCNC: 7 G/DL (ref 6.4–8.4)
RBC # BLD AUTO: 3.49 MILLION/UL (ref 3.81–5.12)
SODIUM SERPL-SCNC: 135 MMOL/L (ref 135–147)
WBC # BLD AUTO: 3.49 THOUSAND/UL (ref 4.31–10.16)

## 2024-02-15 PROCEDURE — 77336 RADIATION PHYSICS CONSULT: CPT | Performed by: RADIOLOGY

## 2024-02-15 PROCEDURE — 77386 HB NTSTY MODUL RAD TX DLVR CPLX: CPT | Performed by: STUDENT IN AN ORGANIZED HEALTH CARE EDUCATION/TRAINING PROGRAM

## 2024-02-15 PROCEDURE — 85025 COMPLETE CBC W/AUTO DIFF WBC: CPT | Performed by: INTERNAL MEDICINE

## 2024-02-15 PROCEDURE — 77014 CHG CT GUIDANCE RADIATION THERAPY FLDS PLACEMENT: CPT | Performed by: STUDENT IN AN ORGANIZED HEALTH CARE EDUCATION/TRAINING PROGRAM

## 2024-02-15 PROCEDURE — 80053 COMPREHEN METABOLIC PANEL: CPT | Performed by: INTERNAL MEDICINE

## 2024-02-15 NOTE — PROGRESS NOTES
Saskia Alfaro  tolerated treatment well with no complications.      Saskia Alfaro is aware of future appt on 2/16 at 0800.     AVS  (Declined by Saskia Alfaro) d/c from unit stable

## 2024-02-16 ENCOUNTER — APPOINTMENT (OUTPATIENT)
Dept: RADIATION ONCOLOGY | Facility: CLINIC | Age: 69
End: 2024-02-16
Attending: RADIOLOGY
Payer: COMMERCIAL

## 2024-02-16 ENCOUNTER — HOSPITAL ENCOUNTER (OUTPATIENT)
Dept: INFUSION CENTER | Facility: CLINIC | Age: 69
End: 2024-02-16
Payer: COMMERCIAL

## 2024-02-16 VITALS
HEIGHT: 64 IN | HEART RATE: 85 BPM | TEMPERATURE: 97 F | WEIGHT: 148 LBS | SYSTOLIC BLOOD PRESSURE: 121 MMHG | RESPIRATION RATE: 18 BRPM | BODY MASS INDEX: 25.27 KG/M2 | DIASTOLIC BLOOD PRESSURE: 60 MMHG

## 2024-02-16 DIAGNOSIS — E83.42 HYPOMAGNESEMIA: ICD-10-CM

## 2024-02-16 DIAGNOSIS — C34.91 ADENOCARCINOMA OF RIGHT LUNG (HCC): Primary | ICD-10-CM

## 2024-02-16 PROCEDURE — 77386 HB NTSTY MODUL RAD TX DLVR CPLX: CPT | Performed by: RADIOLOGY

## 2024-02-16 PROCEDURE — 77427 RADIATION TX MANAGEMENT X5: CPT | Performed by: RADIOLOGY

## 2024-02-16 PROCEDURE — 96375 TX/PRO/DX INJ NEW DRUG ADDON: CPT

## 2024-02-16 PROCEDURE — 96413 CHEMO IV INFUSION 1 HR: CPT

## 2024-02-16 PROCEDURE — 77014 CHG CT GUIDANCE RADIATION THERAPY FLDS PLACEMENT: CPT | Performed by: RADIOLOGY

## 2024-02-16 RX ORDER — SODIUM CHLORIDE 9 MG/ML
20 INJECTION, SOLUTION INTRAVENOUS ONCE
Status: COMPLETED | OUTPATIENT
Start: 2024-02-16 | End: 2024-02-16

## 2024-02-16 RX ADMIN — CARBOPLATIN 183.2 MG: 10 INJECTION, SOLUTION INTRAVENOUS at 09:17

## 2024-02-16 RX ADMIN — ONDANSETRON 8 MG: 2 INJECTION INTRAMUSCULAR; INTRAVENOUS at 08:26

## 2024-02-16 RX ADMIN — SODIUM CHLORIDE 20 ML/HR: 0.9 INJECTION, SOLUTION INTRAVENOUS at 08:26

## 2024-02-16 NOTE — PROGRESS NOTES
Pt presents for chemotherapy offering no compliants. Pt tolerated treatment without incident. AVS declined. Next appointment reviewed 2/22 at 11:30am.

## 2024-02-19 ENCOUNTER — APPOINTMENT (OUTPATIENT)
Dept: RADIATION ONCOLOGY | Facility: CLINIC | Age: 69
End: 2024-02-19
Attending: RADIOLOGY
Payer: COMMERCIAL

## 2024-02-19 PROCEDURE — 77386 HB NTSTY MODUL RAD TX DLVR CPLX: CPT | Performed by: RADIOLOGY

## 2024-02-19 PROCEDURE — 77014 CHG CT GUIDANCE RADIATION THERAPY FLDS PLACEMENT: CPT | Performed by: RADIOLOGY

## 2024-02-20 ENCOUNTER — APPOINTMENT (OUTPATIENT)
Dept: RADIATION ONCOLOGY | Facility: CLINIC | Age: 69
End: 2024-02-20
Attending: RADIOLOGY
Payer: COMMERCIAL

## 2024-02-20 PROCEDURE — 77386 HB NTSTY MODUL RAD TX DLVR CPLX: CPT | Performed by: RADIOLOGY

## 2024-02-20 PROCEDURE — 77014 CHG CT GUIDANCE RADIATION THERAPY FLDS PLACEMENT: CPT | Performed by: RADIOLOGY

## 2024-02-20 RX ORDER — SODIUM CHLORIDE 9 MG/ML
20 INJECTION, SOLUTION INTRAVENOUS ONCE
Status: CANCELLED | OUTPATIENT
Start: 2024-02-23

## 2024-02-21 ENCOUNTER — APPOINTMENT (OUTPATIENT)
Dept: RADIATION ONCOLOGY | Facility: CLINIC | Age: 69
End: 2024-02-21
Attending: RADIOLOGY
Payer: COMMERCIAL

## 2024-02-21 PROCEDURE — 77386 HB NTSTY MODUL RAD TX DLVR CPLX: CPT | Performed by: RADIOLOGY

## 2024-02-21 PROCEDURE — 77014 CHG CT GUIDANCE RADIATION THERAPY FLDS PLACEMENT: CPT | Performed by: RADIOLOGY

## 2024-02-22 ENCOUNTER — TELEPHONE (OUTPATIENT)
Dept: HEMATOLOGY ONCOLOGY | Facility: CLINIC | Age: 69
End: 2024-02-22

## 2024-02-22 ENCOUNTER — HOSPITAL ENCOUNTER (OUTPATIENT)
Dept: INFUSION CENTER | Facility: CLINIC | Age: 69
Discharge: HOME | End: 2024-02-22
Payer: COMMERCIAL

## 2024-02-22 ENCOUNTER — APPOINTMENT (OUTPATIENT)
Dept: RADIATION ONCOLOGY | Facility: CLINIC | Age: 69
End: 2024-02-22
Attending: RADIOLOGY
Payer: COMMERCIAL

## 2024-02-22 DIAGNOSIS — E83.42 HYPOMAGNESEMIA: ICD-10-CM

## 2024-02-22 DIAGNOSIS — C34.91 ADENOCARCINOMA OF RIGHT LUNG (HCC): ICD-10-CM

## 2024-02-22 DIAGNOSIS — Z95.828 PORT-A-CATH IN PLACE: Primary | ICD-10-CM

## 2024-02-22 LAB
ALBUMIN SERPL BCP-MCNC: 4 G/DL (ref 3.5–5)
ALP SERPL-CCNC: 63 U/L (ref 34–104)
ALT SERPL W P-5'-P-CCNC: 28 U/L (ref 7–52)
ANION GAP SERPL CALCULATED.3IONS-SCNC: 6 MMOL/L
AST SERPL W P-5'-P-CCNC: 24 U/L (ref 13–39)
BASOPHILS # BLD AUTO: 0.01 THOUSANDS/ÂΜL (ref 0–0.1)
BASOPHILS NFR BLD AUTO: 0 % (ref 0–1)
BILIRUB SERPL-MCNC: 0.48 MG/DL (ref 0.2–1)
BUN SERPL-MCNC: 11 MG/DL (ref 5–25)
CALCIUM SERPL-MCNC: 9.5 MG/DL (ref 8.4–10.2)
CHLORIDE SERPL-SCNC: 100 MMOL/L (ref 96–108)
CO2 SERPL-SCNC: 30 MMOL/L (ref 21–32)
CREAT SERPL-MCNC: 0.66 MG/DL (ref 0.6–1.3)
EOSINOPHIL # BLD AUTO: 0.08 THOUSAND/ÂΜL (ref 0–0.61)
EOSINOPHIL NFR BLD AUTO: 2 % (ref 0–6)
ERYTHROCYTE [DISTWIDTH] IN BLOOD BY AUTOMATED COUNT: 12.2 % (ref 11.6–15.1)
GFR SERPL CREATININE-BSD FRML MDRD: 91 ML/MIN/1.73SQ M
GLUCOSE SERPL-MCNC: 108 MG/DL (ref 65–140)
HCT VFR BLD AUTO: 32.7 % (ref 34.8–46.1)
HGB BLD-MCNC: 11.2 G/DL (ref 11.5–15.4)
IMM GRANULOCYTES # BLD AUTO: 0.01 THOUSAND/UL (ref 0–0.2)
IMM GRANULOCYTES NFR BLD AUTO: 0 % (ref 0–2)
LYMPHOCYTES # BLD AUTO: 0.48 THOUSANDS/ÂΜL (ref 0.6–4.47)
LYMPHOCYTES NFR BLD AUTO: 12 % (ref 14–44)
MCH RBC QN AUTO: 34 PG (ref 26.8–34.3)
MCHC RBC AUTO-ENTMCNC: 34.3 G/DL (ref 31.4–37.4)
MCV RBC AUTO: 99 FL (ref 82–98)
MONOCYTES # BLD AUTO: 0.26 THOUSAND/ÂΜL (ref 0.17–1.22)
MONOCYTES NFR BLD AUTO: 7 % (ref 4–12)
NEUTROPHILS # BLD AUTO: 3.14 THOUSANDS/ÂΜL (ref 1.85–7.62)
NEUTS SEG NFR BLD AUTO: 79 % (ref 43–75)
NRBC BLD AUTO-RTO: 0 /100 WBCS
PLATELET # BLD AUTO: 66 THOUSANDS/UL (ref 149–390)
PMV BLD AUTO: 9.3 FL (ref 8.9–12.7)
POTASSIUM SERPL-SCNC: 3.9 MMOL/L (ref 3.5–5.3)
PROT SERPL-MCNC: 7.1 G/DL (ref 6.4–8.4)
RBC # BLD AUTO: 3.29 MILLION/UL (ref 3.81–5.12)
SODIUM SERPL-SCNC: 136 MMOL/L (ref 135–147)
WBC # BLD AUTO: 3.98 THOUSAND/UL (ref 4.31–10.16)

## 2024-02-22 PROCEDURE — 77336 RADIATION PHYSICS CONSULT: CPT | Performed by: RADIOLOGY

## 2024-02-22 PROCEDURE — 77014 CHG CT GUIDANCE RADIATION THERAPY FLDS PLACEMENT: CPT | Performed by: STUDENT IN AN ORGANIZED HEALTH CARE EDUCATION/TRAINING PROGRAM

## 2024-02-22 PROCEDURE — 85025 COMPLETE CBC W/AUTO DIFF WBC: CPT | Performed by: INTERNAL MEDICINE

## 2024-02-22 PROCEDURE — 77386 HB NTSTY MODUL RAD TX DLVR CPLX: CPT | Performed by: STUDENT IN AN ORGANIZED HEALTH CARE EDUCATION/TRAINING PROGRAM

## 2024-02-22 PROCEDURE — 80053 COMPREHEN METABOLIC PANEL: CPT | Performed by: INTERNAL MEDICINE

## 2024-02-22 NOTE — PROGRESS NOTES
Pt to clinic for labs only offering no complaints. Pt port accessed, flushed, and labs drawn without complication. Pt port de-accessed. Pt aware of next appointment on 2/23/24 at 800. AVS declined.

## 2024-02-22 NOTE — TELEPHONE ENCOUNTER
Cycle 6 scheduled for 2/23 cancelled secondary to low platelets - 66.    Pt is aware.  She will have repeat labs next week before next scheduled treatment.

## 2024-02-23 ENCOUNTER — HOSPITAL ENCOUNTER (OUTPATIENT)
Dept: INFUSION CENTER | Facility: CLINIC | Age: 69
End: 2024-02-23

## 2024-02-23 ENCOUNTER — APPOINTMENT (OUTPATIENT)
Dept: RADIATION ONCOLOGY | Facility: CLINIC | Age: 69
End: 2024-02-23
Attending: RADIOLOGY
Payer: COMMERCIAL

## 2024-02-23 DIAGNOSIS — C34.2 ADENOCARCINOMA OF MIDDLE LOBE OF RIGHT LUNG (HCC): Primary | ICD-10-CM

## 2024-02-23 PROCEDURE — 77014 CHG CT GUIDANCE RADIATION THERAPY FLDS PLACEMENT: CPT | Performed by: RADIOLOGY

## 2024-02-23 PROCEDURE — 77427 RADIATION TX MANAGEMENT X5: CPT | Performed by: RADIOLOGY

## 2024-02-23 PROCEDURE — 77386 HB NTSTY MODUL RAD TX DLVR CPLX: CPT | Performed by: RADIOLOGY

## 2024-02-23 RX ORDER — AZITHROMYCIN 250 MG/1
TABLET, FILM COATED ORAL
Qty: 6 TABLET | Refills: 0 | Status: SHIPPED | OUTPATIENT
Start: 2024-02-23 | End: 2024-02-27

## 2024-02-24 RX ORDER — SODIUM CHLORIDE 9 MG/ML
20 INJECTION, SOLUTION INTRAVENOUS ONCE
Status: CANCELLED | OUTPATIENT
Start: 2024-03-01

## 2024-02-26 ENCOUNTER — APPOINTMENT (OUTPATIENT)
Dept: RADIATION ONCOLOGY | Facility: CLINIC | Age: 69
End: 2024-02-26
Attending: RADIOLOGY
Payer: COMMERCIAL

## 2024-02-26 PROCEDURE — 77386 HB NTSTY MODUL RAD TX DLVR CPLX: CPT | Performed by: RADIOLOGY

## 2024-02-26 PROCEDURE — 77014 CHG CT GUIDANCE RADIATION THERAPY FLDS PLACEMENT: CPT | Performed by: RADIOLOGY

## 2024-02-27 ENCOUNTER — APPOINTMENT (OUTPATIENT)
Dept: RADIATION ONCOLOGY | Facility: CLINIC | Age: 69
End: 2024-02-27
Attending: RADIOLOGY
Payer: COMMERCIAL

## 2024-02-27 PROCEDURE — 77014 CHG CT GUIDANCE RADIATION THERAPY FLDS PLACEMENT: CPT | Performed by: STUDENT IN AN ORGANIZED HEALTH CARE EDUCATION/TRAINING PROGRAM

## 2024-02-27 PROCEDURE — 77386 HB NTSTY MODUL RAD TX DLVR CPLX: CPT | Performed by: STUDENT IN AN ORGANIZED HEALTH CARE EDUCATION/TRAINING PROGRAM

## 2024-02-28 ENCOUNTER — APPOINTMENT (OUTPATIENT)
Dept: RADIATION ONCOLOGY | Facility: CLINIC | Age: 69
End: 2024-02-28
Attending: RADIOLOGY
Payer: COMMERCIAL

## 2024-02-28 PROCEDURE — 77014 CHG CT GUIDANCE RADIATION THERAPY FLDS PLACEMENT: CPT | Performed by: RADIOLOGY

## 2024-02-28 PROCEDURE — 77386 HB NTSTY MODUL RAD TX DLVR CPLX: CPT | Performed by: RADIOLOGY

## 2024-02-29 ENCOUNTER — APPOINTMENT (OUTPATIENT)
Dept: RADIATION ONCOLOGY | Facility: CLINIC | Age: 69
End: 2024-02-29
Attending: RADIOLOGY
Payer: COMMERCIAL

## 2024-02-29 ENCOUNTER — HOSPITAL ENCOUNTER (OUTPATIENT)
Dept: INFUSION CENTER | Facility: CLINIC | Age: 69
Discharge: HOME | End: 2024-02-29
Payer: COMMERCIAL

## 2024-02-29 DIAGNOSIS — C34.2 ADENOCARCINOMA OF MIDDLE LOBE OF RIGHT LUNG (HCC): ICD-10-CM

## 2024-02-29 DIAGNOSIS — Z95.828 PORT-A-CATH IN PLACE: Primary | ICD-10-CM

## 2024-02-29 LAB
ALBUMIN SERPL BCP-MCNC: 4 G/DL (ref 3.5–5)
ALP SERPL-CCNC: 69 U/L (ref 34–104)
ALT SERPL W P-5'-P-CCNC: 25 U/L (ref 7–52)
ANION GAP SERPL CALCULATED.3IONS-SCNC: 6 MMOL/L
AST SERPL W P-5'-P-CCNC: 20 U/L (ref 13–39)
BASOPHILS # BLD AUTO: 0.01 THOUSANDS/ÂΜL (ref 0–0.1)
BASOPHILS NFR BLD AUTO: 0 % (ref 0–1)
BILIRUB SERPL-MCNC: 0.34 MG/DL (ref 0.2–1)
BUN SERPL-MCNC: 10 MG/DL (ref 5–25)
CALCIUM SERPL-MCNC: 9.8 MG/DL (ref 8.4–10.2)
CHLORIDE SERPL-SCNC: 103 MMOL/L (ref 96–108)
CO2 SERPL-SCNC: 29 MMOL/L (ref 21–32)
CREAT SERPL-MCNC: 0.61 MG/DL (ref 0.6–1.3)
EOSINOPHIL # BLD AUTO: 0.1 THOUSAND/ÂΜL (ref 0–0.61)
EOSINOPHIL NFR BLD AUTO: 3 % (ref 0–6)
ERYTHROCYTE [DISTWIDTH] IN BLOOD BY AUTOMATED COUNT: 13.1 % (ref 11.6–15.1)
GFR SERPL CREATININE-BSD FRML MDRD: 94 ML/MIN/1.73SQ M
GLUCOSE SERPL-MCNC: 92 MG/DL (ref 65–140)
HCT VFR BLD AUTO: 32.1 % (ref 34.8–46.1)
HGB BLD-MCNC: 11.1 G/DL (ref 11.5–15.4)
IMM GRANULOCYTES # BLD AUTO: 0.01 THOUSAND/UL (ref 0–0.2)
IMM GRANULOCYTES NFR BLD AUTO: 0 % (ref 0–2)
LYMPHOCYTES # BLD AUTO: 0.7 THOUSANDS/ÂΜL (ref 0.6–4.47)
LYMPHOCYTES NFR BLD AUTO: 20 % (ref 14–44)
MCH RBC QN AUTO: 34.8 PG (ref 26.8–34.3)
MCHC RBC AUTO-ENTMCNC: 34.6 G/DL (ref 31.4–37.4)
MCV RBC AUTO: 101 FL (ref 82–98)
MONOCYTES # BLD AUTO: 0.57 THOUSAND/ÂΜL (ref 0.17–1.22)
MONOCYTES NFR BLD AUTO: 16 % (ref 4–12)
NEUTROPHILS # BLD AUTO: 2.1 THOUSANDS/ÂΜL (ref 1.85–7.62)
NEUTS SEG NFR BLD AUTO: 61 % (ref 43–75)
NRBC BLD AUTO-RTO: 0 /100 WBCS
PLATELET # BLD AUTO: 257 THOUSANDS/UL (ref 149–390)
PMV BLD AUTO: 9.4 FL (ref 8.9–12.7)
POTASSIUM SERPL-SCNC: 3.9 MMOL/L (ref 3.5–5.3)
PROT SERPL-MCNC: 7.2 G/DL (ref 6.4–8.4)
RBC # BLD AUTO: 3.19 MILLION/UL (ref 3.81–5.12)
SODIUM SERPL-SCNC: 138 MMOL/L (ref 135–147)
WBC # BLD AUTO: 3.49 THOUSAND/UL (ref 4.31–10.16)

## 2024-02-29 PROCEDURE — 85025 COMPLETE CBC W/AUTO DIFF WBC: CPT | Performed by: RADIOLOGY

## 2024-02-29 PROCEDURE — 80053 COMPREHEN METABOLIC PANEL: CPT | Performed by: RADIOLOGY

## 2024-02-29 NOTE — PROGRESS NOTES
Patient presents for central venous labs. Patient offers no complaints. Port accessed, flushed, saline locked, labs drawn, port flushed and de-accessed. Band-aid placed on site.  AVS declined, next appointment on 3/1/24 at 0830 reviewed.

## 2024-03-01 ENCOUNTER — HOSPITAL ENCOUNTER (OUTPATIENT)
Dept: INFUSION CENTER | Facility: CLINIC | Age: 69
End: 2024-03-01
Payer: COMMERCIAL

## 2024-03-01 ENCOUNTER — APPOINTMENT (OUTPATIENT)
Dept: RADIATION ONCOLOGY | Facility: CLINIC | Age: 69
End: 2024-03-01
Attending: RADIOLOGY
Payer: COMMERCIAL

## 2024-03-01 VITALS
HEIGHT: 64 IN | HEART RATE: 83 BPM | WEIGHT: 148.4 LBS | SYSTOLIC BLOOD PRESSURE: 116 MMHG | TEMPERATURE: 98 F | DIASTOLIC BLOOD PRESSURE: 57 MMHG | BODY MASS INDEX: 25.33 KG/M2 | RESPIRATION RATE: 17 BRPM

## 2024-03-01 DIAGNOSIS — C34.91 ADENOCARCINOMA OF RIGHT LUNG (HCC): Primary | ICD-10-CM

## 2024-03-01 DIAGNOSIS — E83.42 HYPOMAGNESEMIA: ICD-10-CM

## 2024-03-01 PROCEDURE — 96411 CHEMO IV PUSH ADDL DRUG: CPT

## 2024-03-01 PROCEDURE — 96367 TX/PROPH/DG ADDL SEQ IV INF: CPT

## 2024-03-01 PROCEDURE — 77386 HB NTSTY MODUL RAD TX DLVR CPLX: CPT | Performed by: RADIOLOGY

## 2024-03-01 PROCEDURE — 96413 CHEMO IV INFUSION 1 HR: CPT

## 2024-03-01 PROCEDURE — 77336 RADIATION PHYSICS CONSULT: CPT | Performed by: RADIOLOGY

## 2024-03-01 PROCEDURE — 77014 CHG CT GUIDANCE RADIATION THERAPY FLDS PLACEMENT: CPT | Performed by: RADIOLOGY

## 2024-03-01 PROCEDURE — 96375 TX/PRO/DX INJ NEW DRUG ADDON: CPT

## 2024-03-01 RX ORDER — SODIUM CHLORIDE 9 MG/ML
20 INJECTION, SOLUTION INTRAVENOUS ONCE
Status: COMPLETED | OUTPATIENT
Start: 2024-03-01 | End: 2024-03-01

## 2024-03-01 RX ADMIN — ONDANSETRON 8 MG: 2 INJECTION INTRAMUSCULAR; INTRAVENOUS at 08:50

## 2024-03-01 RX ADMIN — CARBOPLATIN 196.6 MG: 10 INJECTION, SOLUTION INTRAVENOUS at 10:09

## 2024-03-01 RX ADMIN — SODIUM CHLORIDE 20 ML/HR: 0.9 INJECTION, SOLUTION INTRAVENOUS at 08:50

## 2024-03-01 RX ADMIN — PEMETREXED DISODIUM 860 MG: 500 INJECTION, POWDER, LYOPHILIZED, FOR SOLUTION INTRAVENOUS at 09:55

## 2024-03-01 RX ADMIN — DEXAMETHASONE SODIUM PHOSPHATE 20 MG: 10 INJECTION, SOLUTION INTRAMUSCULAR; INTRAVENOUS at 09:09

## 2024-03-01 NOTE — PROGRESS NOTES
Patient presents for alimta carboplatin offering complaints of fatigue, nausea, and some gassiness. Pt reports eating extra cheese and eggs recently. PT also reports not taking her dexamethasone yesterday or today. I reached out to office RN who reached out to Dr. Kirk, covering provider for Dr. Thrasher. Dexamethasone premed added for today. Pt aware to take dex as prescribed tomorrow.  patient tolerated treatment without incident. AVS printed. No future chemotherapy appts, next b12 appt on 3/9/24 at 0930 reviewed.

## 2024-03-05 ENCOUNTER — OFFICE VISIT (OUTPATIENT)
Dept: HEMATOLOGY ONCOLOGY | Facility: CLINIC | Age: 69
End: 2024-03-05
Payer: COMMERCIAL

## 2024-03-05 ENCOUNTER — TELEPHONE (OUTPATIENT)
Dept: HEMATOLOGY ONCOLOGY | Facility: CLINIC | Age: 69
End: 2024-03-05

## 2024-03-05 VITALS
SYSTOLIC BLOOD PRESSURE: 112 MMHG | RESPIRATION RATE: 16 BRPM | WEIGHT: 142 LBS | OXYGEN SATURATION: 100 % | TEMPERATURE: 98.2 F | DIASTOLIC BLOOD PRESSURE: 68 MMHG | HEIGHT: 64 IN | HEART RATE: 63 BPM | BODY MASS INDEX: 24.24 KG/M2

## 2024-03-05 DIAGNOSIS — C34.91 ADENOCARCINOMA OF RIGHT LUNG (HCC): ICD-10-CM

## 2024-03-05 DIAGNOSIS — R59.0 MEDIASTINAL ADENOPATHY: ICD-10-CM

## 2024-03-05 DIAGNOSIS — J43.9 PULMONARY EMPHYSEMA, UNSPECIFIED EMPHYSEMA TYPE (HCC): ICD-10-CM

## 2024-03-05 DIAGNOSIS — E83.42 HYPOMAGNESEMIA: Primary | ICD-10-CM

## 2024-03-05 DIAGNOSIS — C34.91 ADENOCARCINOMA OF RIGHT LUNG (HCC): Primary | ICD-10-CM

## 2024-03-05 PROCEDURE — G2211 COMPLEX E/M VISIT ADD ON: HCPCS | Performed by: INTERNAL MEDICINE

## 2024-03-05 PROCEDURE — 99214 OFFICE O/P EST MOD 30 MIN: CPT | Performed by: INTERNAL MEDICINE

## 2024-03-05 NOTE — PROGRESS NOTES
Hematology/Oncology Outpatient Follow- up Note  Saskia Alfaro 68 y.o. female MRN: @ Encounter: [id number]        Date:  3/5/2024        Assessment / Plan:    1 right lung cancer adenocarcinoma stage IIIb T4 N2 M0 TPS score 98 K-minna KG12 C mutated  2 allergic reaction to Taxol leading to use of Alimta.  3 presumed pseudocyst of pancreas awaiting repeat MRI  Plan: Patient is due to receive her MRI 3/17.  She will get a CAT scan chest abdomen pelvis on 3/28 along with laboratory work CBC CMP.  She will come back in 4 weeks at which time we will plan to begin her.  Side effects were discussed and consents were obtained.  No other major suggestions at this time.        HPI: 68-year-old female having completed her chemotherapy radiation on 3/1.  She received her last dose of Alimta on 3/1.  She is got some support taste in her mouth some nausea.  She is able to hold some food down.  She received doses of Alimta on 2/93/1.  She received weekly carboplatin.  She has maintained her weight.  She had a plain chest x-ray during her radiation which shows what appear to be some inflammatory changes in her fissure.  This will be followed up in the future.  She will undergo scans in 3 and half weeks along with laboratory work.  We plan to begin her on Imfinzi in 4 weeks on a every 4 week basis.  Side effects were discussed and consents were obtained today.      Interval History:    1 right lung cancer adenocarcinoma stage IIIb T4 N2 M0 TPS score 98 K-minna G12 C mutated  2 allergic reaction to Taxol.  3 presumed pseudocyst of pancreas  Plan: Patient has been off Taxol for several weeks.  We are going to add Alimta 500 mg/m² to her weekly carboplatin.  Alimta will be every 3 weeks.  She will receive it this Friday 2/9 and then 3 weeks later.  She is will still be candidate to obtain Imfinzi in the future.  Side effects were discussed consent was obtained she will follow-up in 3 weeks.  HPI: She is tolerating carboplatin fairly well  has no major problems.  We discussed the Alimta.  She has had several weeks of now away total of 3 weeks and we will go ahead with Alimta this week.  Decadron/B12/folate were added as part of her regimen.  Side effects were discussed and consents were obtained.  She is ready to try it.  We will have to watch her counts which she will get weekly.        Interval History:   68-year-old female seen with the above problem.  She began her combined chemoradiation with Taxol carboplatinum and radiation on 1/19/2024.  Unfortunately she developed a significant reaction after 7 minutes of infusion with Taxol short of breath lightheaded dizzy feeling poorly and the drug was stopped.  Presumably related to the camper for in the Taxol.  Unfortunately I think it would be difficult to give her more Taxol admitted due to.  I would hold that.  I think we are choices beyond this coming week of carboplatin alone will be in either -16 or Alimta.  We will look into one of the other.  I discussed this with her she is aware of that.  Note from 1/18/2024: 68-year-old female with history of adenocarcinoma right lung clinical stage IIIb T4 N2 M0.  PD-L1 TPS 98% K-minna G12 C mutation.  She has completed chemotherapy.  PET CT scan done at the completion showed some improvement disease.  She was seen by surgery felt she was not a surgical candidate.  She also has a cystic lesion approximately 3 cm in the head of the pancreas.  She has been evaluated by GI who felt this was a pseudocyst.  Biopsy was nondiagnostic.  Their recommendation is to repeat her MRI which will be done in March.  Reviewing her situation we were going to plan to go ahead with her chemo therapy tomorrow.  She is to receive combined Taxol carboplatin.  She is feeling fairly well denies any other major problems.       Cancer Staging:  Cancer Staging   Adenocarcinoma of right lung (HCC)  Staging form: Lung, AJCC 8th Edition  - Clinical stage from 7/24/2023: Stage IIIB (cT4,  cN2, cM0) - Signed by Celina Treviño MD on 12/26/2023  Stage prefix: Initial diagnosis      Molecular Testing:     Previous Hematologic/ Oncologic History:    Oncology History   Adenocarcinoma of right lung (HCC)   6/23/2023 Initial Diagnosis    Adenocarcinoma of right lung (HCC)     7/24/2023 Biopsy    Navigational bronchoscopy with EBUS    A.  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     A.-B.  Lung, Right Middle Lobe Bronchial Brushing (Thin-prep and smear preparations):   Conclusive evidence of malignancy.  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     Satisfactory for evaluation.     C.-D.  Lung, Right Middle Lobe Bronchoalveolar Lavage (Thin-prep and cell block preparations):   Conclusive evidence of malignancy.  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     Satisfactory for evaluation.      E.-F.  Lymph Node, level 7 (Thin-prep and smear preparations):   Atypical cellular changes seen.  Rare atypical cells.  Lymphocytes.  Few bronchial cells and pulmonary macrophages.        Satisfactory for evaluation.     G.-H.  Lymph Node, 10R (Thin-prep and smear preparations):   Negative for malignancy.  Lymphocytes.  Aggregates of neutrophils.     Satisfactory for evaluation.     7/24/2023 -  Cancer Staged    Staging form: Lung, AJCC 8th Edition  - Clinical stage from 7/24/2023: Stage IIIB (cT4, cN2, cM0) - Signed by Celina Treviño MD on 12/26/2023  Stage prefix: Initial diagnosis       8/31/2023 - 10/12/2023 Chemotherapy    cyanocobalamin, 1,000 mcg, Intramuscular, Once, 1 of 1 cycle  Administration: 1,000 mcg (8/24/2023)  alteplase (CATHFLO), 2 mg, Intracatheter, Every 1 Minute as needed, 3 of 3 cycles  CISplatin (PLATINOL) with mannitol IVPB, 75 mg/m2 = 129.8 mg (100 % of original dose 75 mg/m2), Intravenous, Once, 3 of 3 cycles  Dose modification: 50 mg/m2 (original dose 75 mg/m2, Cycle 1, Reason: Anticipated  Tolerance), 75 mg/m2 (original dose 75 mg/m2, Cycle 1, Reason: Dose modified as per discussion with consulting physician)  Administration: 129.8 mg (8/31/2023), 129.8 mg (9/21/2023), 129.8 mg (10/12/2023)  fosaprepitant (EMEND) IVPB, 150 mg, Intravenous, Once, 3 of 3 cycles  Administration: 150 mg (8/31/2023), 150 mg (9/21/2023), 150 mg (10/12/2023)  nivolumab (OPDIVO) IVPB, 360 mg, Intravenous, Once, 3 of 3 cycles  Administration: 360 mg (8/31/2023), 360 mg (9/21/2023), 360 mg (10/12/2023)  pemetrexed (ALIMTA) chemo infusion, 500 mg/m2 = 865 mg, Intravenous, Once, 3 of 3 cycles  Administration: 865 mg (8/31/2023), 865 mg (9/21/2023), 865 mg (10/12/2023)     1/19/2024 - 3/1/2024 Chemotherapy    alteplase (CATHFLO), 2 mg, Intracatheter, Every 1 Minute as needed, 6 of 6 cycles  CARBOplatin (PARAPLATIN) IVPB (GOG AUC DOSING), 185 mg, Intravenous, Once, 6 of 6 cycles  Administration: 185 mg (1/19/2024), 183.2 mg (1/26/2024), 183.2 mg (2/2/2024), 183.2 mg (2/9/2024), 183.2 mg (2/16/2024), 196.6 mg (3/1/2024)  PACLItaxel (TAXOL) chemo IVPB, 50 mg/m2 = 85.8 mg, Intravenous, Once, 1 of 1 cycle  Administration: 85.8 mg (1/19/2024)  pemetrexed (ALIMTA) chemo infusion, 500 mg/m2 = 860 mg (100 % of original dose 500 mg/m2), Intravenous, Once, 2 of 2 cycles  Dose modification: 500 mg/m2 (original dose 500 mg/m2, Cycle 4)  Administration: 860 mg (2/9/2024), 860 mg (3/1/2024)     4/2/2024 -  Chemotherapy    alteplase (CATHFLO), 2 mg, Intracatheter, Every 1 Minute as needed, 0 of 6 cycles  durvalumab (IMFINZI) IVPB, 1,500 mg (original dose 10 mg/kg), Intravenous, Once, 0 of 6 cycles  Dose modification: 1,500 mg (original dose 10 mg/kg, Cycle 1, Reason: Other (Must fill in a comment), Comment: Will give every 28 days.)         Current Hematologic/ Oncologic Treatment:       Cycle 1         Test Results:    Imaging: No results found.          Labs:   Lab Results   Component Value Date    WBC 3.49 (L) 02/29/2024    HGB 11.1 (L)  "02/29/2024    HCT 32.1 (L) 02/29/2024     (H) 02/29/2024     02/29/2024     Lab Results   Component Value Date    K 3.9 02/29/2024     02/29/2024    CO2 29 02/29/2024    BUN 10 02/29/2024    CREATININE 0.61 02/29/2024    CALCIUM 9.8 02/29/2024    AST 20 02/29/2024    ALT 25 02/29/2024    ALKPHOS 69 02/29/2024    EGFR 94 02/29/2024         No results found for: \"SPEP\", \"UPEP\"    No results found for: \"PSA\"    No results found for: \"CEA\"    No results found for: \"\"    No results found for: \"AFP\"    No results found for: \"IRON\", \"TIBC\", \"FERRITIN\"    No results found for: \"[id number]\"      ROS: Review of Systems   Constitutional:  Positive for fatigue.   HENT: Negative.     Eyes: Negative.    Respiratory: Negative.     Cardiovascular: Negative.    Gastrointestinal: Negative.    Endocrine: Negative.    Genitourinary: Negative.    Musculoskeletal: Negative.    Skin: Negative.    Allergic/Immunologic: Negative.    Neurological: Negative.    Hematological: Negative.          Current Medications: Reviewed  Allergies: Reviewed  PMH/FH/SH:  Reviewed      Physical Exam:    Body surface area is 1.69 meters squared.    Wt Readings from Last 3 Encounters:   03/05/24 64.4 kg (142 lb)   03/01/24 67.3 kg (148 lb 6.4 oz)   02/16/24 67.1 kg (148 lb)        Temp Readings from Last 3 Encounters:   03/05/24 98.2 °F (36.8 °C) (Temporal)   03/01/24 98 °F (36.7 °C)   02/16/24 (!) 97 °F (36.1 °C) (Temporal)        BP Readings from Last 3 Encounters:   03/05/24 112/68   03/01/24 116/57   02/16/24 121/60         Pulse Readings from Last 3 Encounters:   03/05/24 63   03/01/24 83   02/16/24 85     @LASTSAO2(3)@      Physical Exam  Vitals reviewed.   Constitutional:       Appearance: Normal appearance. She is normal weight.   HENT:      Head: Normocephalic and atraumatic.   Eyes:      Extraocular Movements: Extraocular movements intact.      Conjunctiva/sclera: Conjunctivae normal.      Pupils: Pupils are equal, round, " and reactive to light.   Cardiovascular:      Rate and Rhythm: Normal rate and regular rhythm.      Heart sounds: Normal heart sounds.   Pulmonary:      Effort: Pulmonary effort is normal.      Breath sounds: Normal breath sounds.   Abdominal:      General: Abdomen is flat. Bowel sounds are normal.      Palpations: Abdomen is soft.   Musculoskeletal:         General: Normal range of motion.      Cervical back: Normal range of motion and neck supple.   Skin:     General: Skin is warm and dry.   Neurological:      General: No focal deficit present.      Mental Status: She is alert and oriented to person, place, and time. Mental status is at baseline.           Goals and Barriers:  Current Goal: Prolong Survival from Cancer.   Barriers: None.      Patient's Capacity to Self Care:  Patient is able to self care.    Code Status: [unfilled]  Advance Directive and Living Will:      Power of :

## 2024-03-06 ENCOUNTER — TELEPHONE (OUTPATIENT)
Dept: HEMATOLOGY ONCOLOGY | Facility: CLINIC | Age: 69
End: 2024-03-06

## 2024-03-06 NOTE — TELEPHONE ENCOUNTER
Called patient in regards to an earlier concern she expressed in previous call.     did discuss with Dr. Carty and there are no concerning issues with ending radiation images. Both providers feel it could be an inflammatory response.    A my chart message will also be sent.

## 2024-03-06 NOTE — TELEPHONE ENCOUNTER
Called and spoke to patient regarding B12 injections. Discussed with Price Diamond and he instructed for her to have the last one given this Friday and then they can be cancelled.    Pt has additional concerns with a finding Dr. Treviño mentioned and asked if provider can discuss with her.    Will follow up with Dr. Thrasher and call patient afterwards.

## 2024-03-08 ENCOUNTER — HOSPITAL ENCOUNTER (OUTPATIENT)
Dept: INFUSION CENTER | Facility: CLINIC | Age: 69
End: 2024-03-08
Payer: COMMERCIAL

## 2024-03-08 DIAGNOSIS — C34.91 ADENOCARCINOMA OF RIGHT LUNG (HCC): Primary | ICD-10-CM

## 2024-03-08 DIAGNOSIS — Z79.899 HIGH RISK MEDICATION USE: ICD-10-CM

## 2024-03-08 PROCEDURE — 96372 THER/PROPH/DIAG INJ SC/IM: CPT

## 2024-03-08 RX ORDER — CYANOCOBALAMIN 1000 UG/ML
1000 INJECTION, SOLUTION INTRAMUSCULAR; SUBCUTANEOUS ONCE
Status: COMPLETED | OUTPATIENT
Start: 2024-03-08 | End: 2024-03-08

## 2024-03-08 RX ORDER — CYANOCOBALAMIN 1000 UG/ML
1000 INJECTION, SOLUTION INTRAMUSCULAR; SUBCUTANEOUS ONCE
OUTPATIENT
Start: 2024-04-05

## 2024-03-08 RX ADMIN — CYANOCOBALAMIN 1000 MCG: 1000 INJECTION, SOLUTION INTRAMUSCULAR; SUBCUTANEOUS at 09:10

## 2024-03-08 NOTE — PROGRESS NOTES
Patient arrives to infusion center for B-12 injection. Patient offers ongoing complaints of nausea, mouth tenderness, and lack of appetite. Patient reports she has discussed these symptoms with provider at latest office visit. Patient offered emotional support.     B-12 administered in left deltoid without incident. Calendar of appointments provided for patient.     Next appt: 4/1/24 @ 1249

## 2024-03-17 ENCOUNTER — HOSPITAL ENCOUNTER (OUTPATIENT)
Dept: MRI IMAGING | Facility: HOSPITAL | Age: 69
Discharge: HOME | End: 2024-03-17
Attending: INTERNAL MEDICINE
Payer: COMMERCIAL

## 2024-03-17 DIAGNOSIS — K86.2 PANCREATIC CYST: ICD-10-CM

## 2024-03-17 PROCEDURE — A9585 GADOBUTROL INJECTION: HCPCS | Performed by: INTERNAL MEDICINE

## 2024-03-17 PROCEDURE — G1004 CDSM NDSC: HCPCS

## 2024-03-17 PROCEDURE — 74183 MRI ABD W/O CNTR FLWD CNTR: CPT

## 2024-03-17 RX ORDER — GADOBUTROL 604.72 MG/ML
6 INJECTION INTRAVENOUS
Status: COMPLETED | OUTPATIENT
Start: 2024-03-17 | End: 2024-03-17

## 2024-03-17 RX ADMIN — GADOBUTROL 6 ML: 604.72 INJECTION INTRAVENOUS at 10:13

## 2024-03-25 NOTE — RESULT ENCOUNTER NOTE
Inform patient via [employer].  Please review the pathology/lab result of further discussion.    Copied from [employer] message :       Shai Kruger,     Your pancreatic cyst was still similar in size and is benign. However, there is a stone in the pancreatic duct (the tube emptying the pancreas). We are unable to remove this endoscopically here at [facility] as we do not have lithotripsy available but I can try to refer you to [facility] in [place] or [facility] to see if they have the ability to do so. Please follow up in office with us.     Best regards,     Randolph Rodriguez MD

## 2024-03-26 RX ORDER — SODIUM CHLORIDE 9 MG/ML
20 INJECTION, SOLUTION INTRAVENOUS ONCE
OUTPATIENT
Start: 2024-04-02

## 2024-03-28 ENCOUNTER — HOSPITAL ENCOUNTER (OUTPATIENT)
Dept: CT IMAGING | Facility: HOSPITAL | Age: 69
End: 2024-03-28
Attending: INTERNAL MEDICINE
Payer: COMMERCIAL

## 2024-03-28 ENCOUNTER — NURSE TRIAGE (OUTPATIENT)
Age: 69
End: 2024-03-28

## 2024-03-28 DIAGNOSIS — J43.9 PULMONARY EMPHYSEMA, UNSPECIFIED EMPHYSEMA TYPE (HCC): ICD-10-CM

## 2024-03-28 DIAGNOSIS — C34.91 ADENOCARCINOMA OF RIGHT LUNG (HCC): ICD-10-CM

## 2024-03-28 DIAGNOSIS — R59.0 MEDIASTINAL ADENOPATHY: ICD-10-CM

## 2024-03-28 PROCEDURE — 71260 CT THORAX DX C+: CPT

## 2024-03-28 RX ADMIN — IOHEXOL 85 ML: 350 INJECTION, SOLUTION INTRAVENOUS at 13:32

## 2024-03-28 NOTE — TELEPHONE ENCOUNTER
"Patient calling in regarding tyrellt message from Dr. Rodriguez in regards to MRI she had done. She is asking how serious the stone in the pancreatic duct is and how soon it has to be removed/ symptoms that can happen with stone blockage.   Pt reports she is not sure where [facility] would be but [place] is far for her to get to.   Pt wondering if lithotripsy is an out patient surgery or if she will be admitted.       Reason for Disposition   Nursing judgment    Answer Assessment - Initial Assessment Questions  1. REASON FOR CALL or QUESTION: \"What is your reason for calling today?\" or \"How can I best help you?\" or \"What question do you have that I can help answer?\"      Mri results    Protocols used: Information Only Call - No Triage-ADULT-[place]    "

## 2024-03-29 ENCOUNTER — RADIATION ONCOLOGY FOLLOW-UP (OUTPATIENT)
Dept: RADIATION ONCOLOGY | Facility: CLINIC | Age: 69
End: 2024-03-29
Attending: RADIOLOGY

## 2024-03-29 VITALS
WEIGHT: 152 LBS | TEMPERATURE: 97.6 F | DIASTOLIC BLOOD PRESSURE: 64 MMHG | RESPIRATION RATE: 16 BRPM | BODY MASS INDEX: 25.92 KG/M2 | SYSTOLIC BLOOD PRESSURE: 110 MMHG | HEART RATE: 90 BPM | OXYGEN SATURATION: 95 %

## 2024-03-29 DIAGNOSIS — C34.91 ADENOCARCINOMA OF RIGHT LUNG (HCC): Primary | ICD-10-CM

## 2024-03-29 PROCEDURE — 99024 POSTOP FOLLOW-UP VISIT: CPT | Performed by: RADIOLOGY

## 2024-03-29 NOTE — PROGRESS NOTES
Saskia Alfaro 1955 is a 68 y.o. female with a history of stage IIIB NSCLC status post neoadjuvant chemotherapy, but was not a surgical candidate post treatment, who recently completed a course of definitive radiation on 3/1/24. She presents today for follow up.    The patient had evidence of inflammatory pulmonary reaction with minimal respiratory symptoms and mild episodic pleuritic pain.  She did experience fatigue and continued to smoke during treatment despite counseling.        3/5/24 Dr. Thrasher  1.right lung cancer adenocarcinoma stage IIIb T4 N2 M0 TPS score 98 K-minna KG12 C mutated  2 allergic reaction to Taxol leading to use of Alimta.  3 presumed pseudocyst of pancreas awaiting repeat MRI  Plan: Patient is due to receive her MRI 3/17.  She will get a CAT scan chest abdomen pelvis on 3/28 along with laboratory work CBC CMP.  She will come back in 4 weeks at which time we will plan to begin her.        3/17/24 MRI abdomen w wo contrast and  mrcp  Similar unilocular cystic lesion in the pancreatic neck measuring 3.1 x 2.2 cm communicating with the main pancreatic duct with adjacent downstream 0.6 cm intraductal calculus.   Similar upstream parenchymal atrophy, as well as ectatic main pancreatic ductal dilation measuring up to 0.8 cm with multiple associated dilated sidebranches.   The other previously seen unilocular cystic lesion in the pancreatic body has decreased in size now measuring 1.0 x 0.6 cm, previously 1.2 x 1.1 cm.   No solid enhancing components. These were evaluated with US 2023 and favored to represent pancreatic pseudocysts with possible underlying chronic pancreatitis.   Multiple subcentimeter cystic lesions in the pancreatic head/neck similar to prior.         3/28/24 CT chest w contrast- not read        Upcomin24 Dr. Thrasher    Follow up visit     Oncology History   Adenocarcinoma of right lung (HCC)   2023 Initial Diagnosis    Adenocarcinoma of right lung (HCC)      7/24/2023 Biopsy    Navigational bronchoscopy with EBUS    A.  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     A.-B.  Lung, Right Middle Lobe Bronchial Brushing (Thin-prep and smear preparations):   Conclusive evidence of malignancy.  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     Satisfactory for evaluation.     C.-D.  Lung, Right Middle Lobe Bronchoalveolar Lavage (Thin-prep and cell block preparations):   Conclusive evidence of malignancy.  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     Satisfactory for evaluation.      E.-F.  Lymph Node, level 7 (Thin-prep and smear preparations):   Atypical cellular changes seen.  Rare atypical cells.  Lymphocytes.  Few bronchial cells and pulmonary macrophages.        Satisfactory for evaluation.     G.-H.  Lymph Node, 10R (Thin-prep and smear preparations):   Negative for malignancy.  Lymphocytes.  Aggregates of neutrophils.     Satisfactory for evaluation.     7/24/2023 -  Cancer Staged    Staging form: Lung, AJCC 8th Edition  - Clinical stage from 7/24/2023: Stage IIIB (cT4, cN2, cM0) - Signed by Celina Treviño MD on 12/26/2023  Stage prefix: Initial diagnosis       8/31/2023 - 10/12/2023 Chemotherapy    cyanocobalamin, 1,000 mcg, Intramuscular, Once, 1 of 1 cycle  Administration: 1,000 mcg (8/24/2023)  alteplase (CATHFLO), 2 mg, Intracatheter, Every 1 Minute as needed, 3 of 3 cycles  CISplatin (PLATINOL) with mannitol IVPB, 75 mg/m2 = 129.8 mg (100 % of original dose 75 mg/m2), Intravenous, Once, 3 of 3 cycles  Dose modification: 50 mg/m2 (original dose 75 mg/m2, Cycle 1, Reason: Anticipated Tolerance), 75 mg/m2 (original dose 75 mg/m2, Cycle 1, Reason: Dose modified as per discussion with consulting physician)  Administration: 129.8 mg (8/31/2023), 129.8 mg (9/21/2023), 129.8 mg (10/12/2023)  fosaprepitant (EMEND) IVPB, 150 mg, Intravenous, Once, 3 of 3  cycles  Administration: 150 mg (8/31/2023), 150 mg (9/21/2023), 150 mg (10/12/2023)  nivolumab (OPDIVO) IVPB, 360 mg, Intravenous, Once, 3 of 3 cycles  Administration: 360 mg (8/31/2023), 360 mg (9/21/2023), 360 mg (10/12/2023)  pemetrexed (ALIMTA) chemo infusion, 500 mg/m2 = 865 mg, Intravenous, Once, 3 of 3 cycles  Administration: 865 mg (8/31/2023), 865 mg (9/21/2023), 865 mg (10/12/2023)     1/18/2024 - 3/1/2024 Radiation    Treatments:  Course: C1  Plan ID Energy Fractions Dose per Fraction (cGy) Dose Correction (cGy) Total Dose Delivered (cGy) Elapsed Days   R LUNG MED 6X 30 / 30 200 0 6,000 43    Treatment Dates:  1/18/2024 - 3/1/2024.      1/19/2024 - 3/1/2024 Chemotherapy    alteplase (CATHFLO), 2 mg, Intracatheter, Every 1 Minute as needed, 6 of 6 cycles  CARBOplatin (PARAPLATIN) IVPB (GOG AUC DOSING), 185 mg, Intravenous, Once, 6 of 6 cycles  Administration: 185 mg (1/19/2024), 183.2 mg (1/26/2024), 183.2 mg (2/2/2024), 183.2 mg (2/9/2024), 183.2 mg (2/16/2024), 196.6 mg (3/1/2024)  PACLItaxel (TAXOL) chemo IVPB, 50 mg/m2 = 85.8 mg, Intravenous, Once, 1 of 1 cycle  Administration: 85.8 mg (1/19/2024)  pemetrexed (ALIMTA) chemo infusion, 500 mg/m2 = 860 mg (100 % of original dose 500 mg/m2), Intravenous, Once, 2 of 2 cycles  Dose modification: 500 mg/m2 (original dose 500 mg/m2, Cycle 4)  Administration: 860 mg (2/9/2024), 860 mg (3/1/2024)     4/2/2024 -  Chemotherapy    alteplase (CATHFLO), 2 mg, Intracatheter, Every 1 Minute as needed, 0 of 6 cycles  durvalumab (IMFINZI) IVPB, 1,500 mg (original dose 10 mg/kg), Intravenous, Once, 0 of 6 cycles  Dose modification: 1,500 mg (original dose 10 mg/kg, Cycle 1, Reason: Other (Must fill in a comment), Comment: Will give every 28 days.)         Review of Systems:  Review of Systems   Constitutional:  Positive for appetite change and fatigue.   HENT:  Positive for dental problem (dentures and implants).         Ears clogged   Eyes:  Positive for visual  disturbance (worsening).        Tearing    Respiratory:  Positive for cough (tan phlegm).         Current smoker 1/2 pack per day   Cardiovascular: Negative.    Gastrointestinal:  Positive for nausea.   Genitourinary: Negative.    Musculoskeletal:  Positive for arthralgias (left knee).   Skin: Negative.    Allergic/Immunologic: Negative.    Neurological: Negative.    Hematological: Negative.    Psychiatric/Behavioral: Negative.         Clinical Trial: no    Pregnancy test needed:  not applicable        Health Maintenance   Topic Date Due    Hepatitis C Screening  Never done    Medicare Annual Wellness Visit (AWV)  Never done    Pneumococcal Vaccine: 65+ Years (1 of 2 - PCV) Never done    Depression Follow-up Plan  Never done    Hepatitis A Vaccine (1 of 2 - Risk 2-dose series) Never done    Osteoporosis Screening  Never done    Zoster Vaccine (2 of 2) 12/04/2020    Fall Risk  Never done    Urinary Incontinence Screening  Never done    Breast Cancer Screening: Mammogram  10/21/2022    Influenza Vaccine (1) 09/01/2023    COVID-19 Vaccine (4 - 2023-24 season) 09/01/2023    Depression Screening  12/26/2024    BMI: Adult  03/05/2025    Colorectal Cancer Screening  08/10/2025    HIB Vaccine  Aged Out    IPV Vaccine  Aged Out    Meningococcal ACWY Vaccine  Aged Out    HPV Vaccine  Aged Out    Lung Cancer Screening  Discontinued     Patient Active Problem List   Diagnosis    Pulmonary emphysema, unspecified emphysema type (HCC)    Adenocarcinoma of right lung (HCC)    Mediastinal adenopathy    Port-A-Cath in place    Hypomagnesemia    Pancreatic pseudocyst/cyst    Allergic reaction caused by a drug    High risk medication use     Past Medical History:   Diagnosis Date    Fibromyalgia     Heart murmur     Lumbosacral disc herniation     Lung cancer (HCC)     Psoriasis     Psoriasis     RSD (reflex sympathetic dystrophy)      Past Surgical History:   Procedure Laterality Date    IR PORT PLACEMENT  08/04/2023    LUNG BIOPSY       x2    PARTIAL HYSTERECTOMY       Family History   Problem Relation Age of Onset    Sarcoidosis Mother     Cancer Sister      Social History     Socioeconomic History    Marital status:      Spouse name: Not on file    Number of children: Not on file    Years of education: Not on file    Highest education level: Not on file   Occupational History    Not on file   Tobacco Use    Smoking status: Every Day     Current packs/day: 0.50     Average packs/day: 0.5 packs/day for 48.2 years (24.1 ttl pk-yrs)     Types: Cigarettes     Start date: 1976    Smokeless tobacco: Never   Vaping Use    Vaping status: Never Used   Substance and Sexual Activity    Alcohol use: Yes     Alcohol/week: 7.0 standard drinks of alcohol     Types: 7 Glasses of wine per week     Comment: daily    Drug use: No    Sexual activity: Not on file   Other Topics Concern    Not on file   Social History Narrative    Not on file     Social Determinants of Health     Financial Resource Strain: Not on file   Food Insecurity: Not on file   Transportation Needs: Not on file   Physical Activity: Not on file   Stress: Not on file   Social Connections: Not on file   Intimate Partner Violence: Not on file   Housing Stability: Not on file       Current Outpatient Medications:     amitriptyline (ELAVIL) 25 mg tablet, , Disp: , Rfl:     Ascorbic Acid (vitamin C) 100 MG tablet, Take 100 mg by mouth daily, Disp: , Rfl:     atorvastatin (LIPITOR) 20 mg tablet, Take 20 mg by mouth daily, Disp: , Rfl:     b complex vitamins capsule, Take 1 capsule by mouth daily, Disp: , Rfl:     bismuth subsalicylate (PEPTO BISMOL) 262 MG chewable tablet, Take 1 tablet by mouth every 6 hours for 14 days, Disp: 56 tablet, Rfl: 0    calcipotriene (DOVONEX) 0.005 % cream, Apply topically 2 (two) times a day To affected areas on Saturday and Sunday only, Disp: 120 g, Rfl: 2    dexamethasone (DECADRON) 4 mg tablet, Take 1 tablets (4mg) two times a day the day prior to treatment,  the same day as treatment and the day after treatment. Repeat that with each treatment cycle., Disp: 6 tablet, Rfl: 2    DULoxetine (CYMBALTA) 30 mg delayed release capsule, , Disp: , Rfl:     Fluticasone-Salmeterol (Advair) 100-50 mcg/dose inhaler, Inhale 1 puff 2 (two) times a day Rinse mouth after use., Disp: , Rfl:     folic acid (KP Folic Acid) 1 mg tablet, Take 1 tablet (1 mg total) by mouth daily, Disp: 90 tablet, Rfl: 2    lidocaine-prilocaine (EMLA) cream, Apply topically as needed for mild pain, Disp: 30 g, Rfl: 0    loratadine (CLARITIN) 10 mg tablet, Take 10 mg by mouth daily, Disp: , Rfl:     omeprazole (PriLOSEC) 20 mg delayed release capsule, Take 1 capsule (20 mg total) by mouth 2 (two) times a day Every 12 hours for 14 days (Patient not taking: Reported on 12/26/2023), Disp: 28 capsule, Rfl: 0    ondansetron (ZOFRAN) 4 mg tablet, Take 1 tablet (4 mg total) by mouth every 8 (eight) hours as needed for nausea or vomiting, Disp: 20 tablet, Rfl: 0    propranolol (INDERAL) 20 mg/5 mL solution, , Disp: , Rfl:     triamcinolone (KENALOG) 0.1 % cream, Apply topically 2 (two) times a day Affected areas Monday through Friday only, Disp: 453.6 g, Rfl: 2  No current facility-administered medications for this visit.  Allergies   Allergen Reactions    Prednisone Palpitations     There were no vitals filed for this visit.

## 2024-03-29 NOTE — PROGRESS NOTES
Follow-up - Radiation Oncology   Saskia Alfaro 1955 68 y.o. female [id number]      History of Present Illness   Cancer Staging   Adenocarcinoma of right lung (HCC)  Staging form: Lung, AJCC 8th Edition  - Clinical stage from 7/24/2023: Stage IIIB (cT4, cN2, cM0) - Signed by Celina Treviño MD on 12/26/2023  Stage prefix: Initial diagnosis      Saskia Alfaro is a 68 y.o. woman with a history of stage IIIB NSCLC status post neoadjuvant chemotherapy, but was not a surgical candidate post treatment, who recently completed a course of definitive radiation on 3/1/24. She presents today for follow up.      3/5/24 Dr. Thrasher  1.right lung cancer adenocarcinoma stage IIIb T4 N2 M0 TPS score 98 K-minna KG12 C mutated  2 allergic reaction to Taxol leading to use of Alimta.  3 presumed pseudocyst of pancreas awaiting repeat MRI  Plan: Patient is due to receive her MRI 3/17.  She will get a CAT scan chest abdomen pelvis on 3/28 along with laboratory work CBC CMP.  She will come back in 4 weeks at which time we will plan to begin her.       3/17/24 MRI abdomen w wo contrast and  mrcp  Similar unilocular cystic lesion in the pancreatic neck measuring 3.1 x 2.2 cm communicating with the main pancreatic duct with adjacent downstream 0.6 cm intraductal calculus.   Similar upstream parenchymal atrophy, as well as ectatic main pancreatic ductal dilation measuring up to 0.8 cm with multiple associated dilated sidebranches.  The other previously seen unilocular cystic lesion in the pancreatic body has decreased in size now measuring 1.0 x 0.6 cm, previously 1.2 x 1.1 cm.  No solid enhancing components. These were evaluated with US 11/28/2023 and favored to represent pancreatic pseudocysts with possible underlying chronic pancreatitis.   Multiple subcentimeter cystic lesions in the pancreatic head/neck similar to prior.     3/28/24 CT chest w contrast- not read    The patient notes improved fatigue.  Nausea, vomiting, and chest tenderness have  resolved.  The patient denies denies odynophagia, dysphagia, GERD, gastritis.  She has episodic cough productive of clear or tan phlegm.  She denies hemoptysis.  She denies progressive dyspnea on exertion, shortness of breath, or fever.  She denies chest pain or palpitations.  She is smoking 1/2 ppd and is no longer on nicotine patches.  She is not planning on trying again to quit at this time.      Historical Information   Oncology History   Adenocarcinoma of right lung (HCC)   6/23/2023 Initial Diagnosis    Adenocarcinoma of right lung (HCC)     7/24/2023 Biopsy    Navigational bronchoscopy with EBUS    A.  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     A.-B.  Lung, Right Middle Lobe Bronchial Brushing (Thin-prep and smear preparations):   Conclusive evidence of malignancy.  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     Satisfactory for evaluation.     C.-D.  Lung, Right Middle Lobe Bronchoalveolar Lavage (Thin-prep and cell block preparations):   Conclusive evidence of malignancy.  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     Satisfactory for evaluation.      E.-F.  Lymph Node, level 7 (Thin-prep and smear preparations):   Atypical cellular changes seen.  Rare atypical cells.  Lymphocytes.  Few bronchial cells and pulmonary macrophages.        Satisfactory for evaluation.     G.-H.  Lymph Node, 10R (Thin-prep and smear preparations):   Negative for malignancy.  Lymphocytes.  Aggregates of neutrophils.     Satisfactory for evaluation.     7/24/2023 -  Cancer Staged    Staging form: Lung, AJCC 8th Edition  - Clinical stage from 7/24/2023: Stage IIIB (cT4, cN2, cM0) - Signed by Celina Treviño MD on 12/26/2023  Stage prefix: Initial diagnosis       8/31/2023 - 10/12/2023 Chemotherapy    cyanocobalamin, 1,000 mcg, Intramuscular, Once, 1 of 1 cycle  Administration: 1,000 mcg (8/24/2023)  alteplase (CATHFLO), 2  mg, Intracatheter, Every 1 Minute as needed, 3 of 3 cycles  CISplatin (PLATINOL) with mannitol IVPB, 75 mg/m2 = 129.8 mg (100 % of original dose 75 mg/m2), Intravenous, Once, 3 of 3 cycles  Dose modification: 50 mg/m2 (original dose 75 mg/m2, Cycle 1, Reason: Anticipated Tolerance), 75 mg/m2 (original dose 75 mg/m2, Cycle 1, Reason: Dose modified as per discussion with consulting physician)  Administration: 129.8 mg (8/31/2023), 129.8 mg (9/21/2023), 129.8 mg (10/12/2023)  fosaprepitant (EMEND) IVPB, 150 mg, Intravenous, Once, 3 of 3 cycles  Administration: 150 mg (8/31/2023), 150 mg (9/21/2023), 150 mg (10/12/2023)  nivolumab (OPDIVO) IVPB, 360 mg, Intravenous, Once, 3 of 3 cycles  Administration: 360 mg (8/31/2023), 360 mg (9/21/2023), 360 mg (10/12/2023)  pemetrexed (ALIMTA) chemo infusion, 500 mg/m2 = 865 mg, Intravenous, Once, 3 of 3 cycles  Administration: 865 mg (8/31/2023), 865 mg (9/21/2023), 865 mg (10/12/2023)     1/18/2024 - 3/1/2024 Radiation    Treatments:  Course: C1  Plan ID Energy Fractions Dose per Fraction (cGy) Dose Correction (cGy) Total Dose Delivered (cGy) Elapsed Days   R LUNG MED 6X 30 / 30 200 0 6,000 43    Treatment Dates:  1/18/2024 - 3/1/2024.      1/19/2024 - 3/1/2024 Chemotherapy    alteplase (CATHFLO), 2 mg, Intracatheter, Every 1 Minute as needed, 6 of 6 cycles  CARBOplatin (PARAPLATIN) IVPB (GOG AUC DOSING), 185 mg, Intravenous, Once, 6 of 6 cycles  Administration: 185 mg (1/19/2024), 183.2 mg (1/26/2024), 183.2 mg (2/2/2024), 183.2 mg (2/9/2024), 183.2 mg (2/16/2024), 196.6 mg (3/1/2024)  PACLItaxel (TAXOL) chemo IVPB, 50 mg/m2 = 85.8 mg, Intravenous, Once, 1 of 1 cycle  Administration: 85.8 mg (1/19/2024)  pemetrexed (ALIMTA) chemo infusion, 500 mg/m2 = 860 mg (100 % of original dose 500 mg/m2), Intravenous, Once, 2 of 2 cycles  Dose modification: 500 mg/m2 (original dose 500 mg/m2, Cycle 4)  Administration: 860 mg (2/9/2024), 860 mg (3/1/2024)     4/2/2024 -  Chemotherapy     alteplase (CATHFLO), 2 mg, Intracatheter, Every 1 Minute as needed, 0 of 6 cycles  durvalumab (IMFINZI) IVPB, 1,500 mg (original dose 10 mg/kg), Intravenous, Once, 0 of 6 cycles  Dose modification: 1,500 mg (original dose 10 mg/kg, Cycle 1, Reason: Other (Must fill in a comment), Comment: Will give every 28 days.)         Past Medical History:   Diagnosis Date    Fibromyalgia     Heart murmur     Lumbosacral disc herniation     Lung cancer (HCC)     Psoriasis     Psoriasis     RSD (reflex sympathetic dystrophy)      Past Surgical History:   Procedure Laterality Date    IR PORT PLACEMENT  08/04/2023    LUNG BIOPSY      x2    PARTIAL HYSTERECTOMY         Social History   Social History     Substance and Sexual Activity   Alcohol Use Yes    Alcohol/week: 7.0 standard drinks of alcohol    Types: 7 Glasses of wine per week    Comment: daily     Social History     Substance and Sexual Activity   Drug Use No     Social History     Tobacco Use   Smoking Status Every Day    Current packs/day: 0.50    Average packs/day: 0.5 packs/day for 48.2 years (24.1 ttl pk-yrs)    Types: Cigarettes    Start date: 1976   Smokeless Tobacco Never         Meds/Allergies     Current Outpatient Medications:     amitriptyline (ELAVIL) 25 mg tablet, , Disp: , Rfl:     Ascorbic Acid (vitamin C) 100 MG tablet, Take 100 mg by mouth daily, Disp: , Rfl:     atorvastatin (LIPITOR) 20 mg tablet, Take 20 mg by mouth daily, Disp: , Rfl:     b complex vitamins capsule, Take 1 capsule by mouth daily, Disp: , Rfl:     bismuth subsalicylate (PEPTO BISMOL) 262 MG chewable tablet, Take 1 tablet by mouth every 6 hours for 14 days, Disp: 56 tablet, Rfl: 0    calcipotriene (DOVONEX) 0.005 % cream, Apply topically 2 (two) times a day To affected areas on Saturday and Sunday only, Disp: 120 g, Rfl: 2    DULoxetine (CYMBALTA) 30 mg delayed release capsule, , Disp: , Rfl:     Fluticasone-Salmeterol (Advair) 100-50 mcg/dose inhaler, Inhale 1 puff 2 (two) times a day  Rinse mouth after use., Disp: , Rfl:     folic acid (KP Folic Acid) 1 mg tablet, Take 1 tablet (1 mg total) by mouth daily, Disp: 90 tablet, Rfl: 2    loratadine (CLARITIN) 10 mg tablet, Take 10 mg by mouth daily, Disp: , Rfl:     propranolol (INDERAL) 20 mg/5 mL solution, , Disp: , Rfl:     triamcinolone (KENALOG) 0.1 % cream, Apply topically 2 (two) times a day Affected areas Monday through Friday only, Disp: 453.6 g, Rfl: 2    dexamethasone (DECADRON) 4 mg tablet, Take 1 tablets (4mg) two times a day the day prior to treatment, the same day as treatment and the day after treatment. Repeat that with each treatment cycle., Disp: 6 tablet, Rfl: 2    lidocaine-prilocaine (EMLA) cream, Apply topically as needed for mild pain, Disp: 30 g, Rfl: 0    omeprazole (PriLOSEC) 20 mg delayed release capsule, Take 1 capsule (20 mg total) by mouth 2 (two) times a day Every 12 hours for 14 days (Patient not taking: Reported on 12/26/2023), Disp: 28 capsule, Rfl: 0    ondansetron (ZOFRAN) 4 mg tablet, Take 1 tablet (4 mg total) by mouth every 8 (eight) hours as needed for nausea or vomiting (Patient not taking: Reported on 3/29/2024), Disp: 20 tablet, Rfl: 0  Allergies   Allergen Reactions    Prednisone Palpitations         Review of Systems   Constitutional:  Positive for appetite change and fatigue.   HENT:  Positive for dental problem (dentures and implants).         Ears clogged   Eyes:  Positive for visual disturbance (worsening).        Tearing    Respiratory:  Positive for cough (tan phlegm).         Current smoker 1/2 pack per day   Cardiovascular: Negative.    Gastrointestinal:  Positive for nausea.   Genitourinary: Negative.    Musculoskeletal:  Positive for arthralgias (left knee).   Skin: Negative.    Allergic/Immunologic: Negative.    Neurological: Negative.    Hematological: Negative.    Psychiatric/Behavioral: Negative.            OBJECTIVE:   /64   Pulse 90   Temp 97.6 °F (36.4 °C)   Resp 16   Wt 68.9 kg  (152 lb)   SpO2 95%   BMI 25.92 kg/m²   Karnofsky: 80 - Normal activity with effort; some signs or symptoms of disease    Physical Exam  Vitals and nursing note reviewed.   Constitutional:       General: She is not in acute distress.     Appearance: She is well-developed.   Cardiovascular:      Rate and Rhythm: Normal rate and regular rhythm.   Pulmonary:      Breath sounds: No wheezing, rhonchi or rales.   Abdominal:      Palpations: Abdomen is soft.      Tenderness: There is no abdominal tenderness.   Musculoskeletal:      Right lower leg: No edema.      Left lower leg: No edema.   Lymphadenopathy:      Cervical: No cervical adenopathy.      Upper Body:      Right upper body: No supraclavicular adenopathy.      Left upper body: No supraclavicular adenopathy.   Neurological:      Mental Status: She is alert and oriented to person, place, and time.            RESULTS  Imaging Studies:CT chest w contrast    Result Date: 3/29/2024  Narrative: CT CHEST WITH IV CONTRAST INDICATION: C34.91: Malignant neoplasm of unspecified part of right bronchus or lung J43.9: Emphysema, unspecified R59.0: Localized enlarged lymph nodes. As per review of electronic medical record, patient with history of stage IIIb non-small cell lung cancer initially diagnosed in summer 2023 status post neoadjuvant chemotherapy and radiation therapy completed on 3/1/2024 COMPARISON: PET/CT from 10/18/2023 and 7/3/2023, CT of the chest from 6/13/2023, and abdominal MRI from 3/17/2024. TECHNIQUE: CT examination of the chest was performed. Multiplanar 2D reformatted images were created from the source data. This examination, like all CT scans performed in the [facility], was performed utilizing techniques to minimize radiation dose exposure, including the use of iterative reconstruction and automated exposure control. Radiation dose length product (DLP) for this visit: 231 mGy-cm IV Contrast: 85 mL of iohexol (OMNIPAQUE) FINDINGS:  BRONCHOPULMONARY:  Clear central airways. There is biapical scarring, and mild upper lobe predominant centrilobular and paraseptal emphysematous changes. There has been significant interval decrease in size of the perihilar right middle lobe mass since the initial study from June 2023, and mild decrease in size since the most recent study, a PET/CT from October 2023. It currently measures 2.5 x 4.3 x 3.3 cm, compared to 5.0 x 7.0 x 6.0 cm in June 2023. It measured 4.6 x 3.9 cm on the October 2023 (no craniocaudad dimension available). There is some adjacent atelectasis or scarring adjacent to the mass. There are no new opacities are nodules. PLEURA: Trace right pleural effusion/pleural thickening, including some fluid in the right-sided fissures. No pneumothorax. HEART/GREAT VESSELS: The heart is normal in size. No pericardial effusion. Normal caliber thoracic aorta. Atherosclerotic disease is seen in the thoracic aorta and its major branches. 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 There is a Mediport via the right internal jugular vein with its tip at the superior cavoatrial junction. MEDIASTINUM/LYMPH NODES:  No axillary, hilar or mediastinal lymphadenopathy by size criteria. Previously seen right hilar and mediastinal lymphadenopathy has decreased in size since June 2023. A right hilar lymph node measures 0.7 cm in short axis and a subcarinal lymph node now 0.8 cm in short axis. CHEST WALL AND LOWER NECK:  Unremarkable. VISUALIZED STRUCTURES IN THE UPPER ABDOMEN: There is a partially imaged cystic lesion in the proximal pancreas measuring at least 2.6 cm, increased from 1.9 cm on the 6/13/2023 CT of the chest. There is upstream dilation of the main pancreatic duct up to  8 mm in diameter, increased from approximately 6 mm. There is also a  9 mm cystic lesion in the pancreas more distally (series 2 image 112). These findings were better assessed on the recent abdominal MRI from 3/17/2024 as well as the November 2023 EUS. MUSCULOSKELETAL:  No focal aggressive osseous lesions.     Impression: 1) 4.3 x 3.3 x 2.5 cm right middle lobe perihilar mass, significantly decreased from initial CT from June 2023, and mildly decreased since the October 2023 PET/CT. 2) No new opacities or pulmonary nodules. 3) No thoracic lymphadenopathy by size criteria. Previously seen right hilar and mediastinal lymph nodes have decreased in size, now subcentimeter in short axis. 4) Trace right pleural effusion/pleural thickening, including some fluid in the right-sided fissures. 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 6) Additional findings as above. Workstation performed: [id number]     MRI abdomen w wo contrast and mrcp    Result Date: 3/21/2024  Narrative: MRI OF THE ABDOMEN WITH AND WITHOUT CONTRAST WITH MRCP INDICATION: 68 years / Female. K86.2: Cyst of pancreas. COMPARISON: MR abdomen 11/7/2023 TECHNIQUE: Multiplanar/multisequence MRI of the abdomen with 3D MRCP was performed before and after administration of contrast. IV Contrast: 6 mL of Gadobutrol injection (SINGLE-DOSE) FINDINGS: LOWER CHEST: Known right pulmonary mass partially imaged with adjacent atelectasis. LIVER: Normal in size and configuration. Moderate hepatic steatosis. No suspicious mass. Patent hepatic and portal veins. BILE DUCTS: No intrahepatic or extrahepatic bile duct dilation. Common bile duct is normal in caliber. No choledocholithiasis, biliary stricture or suspicious mass. GALLBLADDER: Normal PANCREAS: Similar unilocular cystic lesion in the pancreatic neck measuring 3.1 x 2.2 cm (series 11 image 75) communicating with the main pancreatic duct. Adjacent downstream intraductal calculus measures 0.7 cm (series 11  image 65) similar to prior. Similar upstream parenchymal atrophy, as well as ectatic main pancreatic ductal dilation measuring up to 0.8 cm (series 11 image 79) with multiple associated dilated sidebranches. The other previously seen unilocular cystic lesion in the pancreatic body has decreased in size now measuring 1.0 x 0.6 cm (series 11 image 71), previously 1.2 x 1.1 cm. Multiple subcentimeter unilocular cystic lesions in the pancreatic head/neck similar to prior. No solid or enhancing components. ADRENAL GLANDS: Unremarkable. SPLEEN: Normal. KIDNEYS/PROXIMAL URETERS: No hydroureteronephrosis. No suspicious renal mass. Subcentimeter left renal cyst. BOWEL: No dilated loops of bowel. PERITONEUM/RETROPERITONEUM: No ascites. LYMPH NODES: No abdominal lymphadenopathy. VESSELS: No aneurysm. ABDOMINAL WALL: Unremarkable BONES: No suspicious osseous lesion.     Impression: Similar unilocular cystic lesion in the pancreatic neck measuring 3.1 x 2.2 cm communicating with the main pancreatic duct with adjacent downstream 0.6 cm intraductal calculus. Similar upstream parenchymal atrophy, as well as ectatic main pancreatic ductal dilation measuring up to 0.8 cm with multiple associated dilated sidebranches. The other previously seen unilocular cystic lesion in the pancreatic body has decreased in size now measuring 1.0 x 0.6 cm, previously 1.2 x 1.1 cm. No solid enhancing components. These were evaluated with US 11/28/2023 and favored to represent pancreatic pseudocysts with possible underlying chronic pancreatitis. Multiple subcentimeter cystic lesions in the pancreatic head/neck similar to prior. Workstation performed: [id number]         Assessment/Plan:  Saskia Alfaro is a 68 y.o. woman with a history of stage IIIB NSCLC status post neoadjuvant chemotherapy, but was not a surgical candidate post treatment, who recently completed a course of definitive radiation on 3/1/24. She is recovering well from radiation.   "    Official report for CT chest is pending.  It will be read before her appointment with Dr. Thrasher on 4/4/24.  She is likely to initiate adjuvant immunotherapy soon after.    I counseled her on smoking cessation.  Follow-up in 4 months.    Celina Treviño MD  3/29/2024,2:35 PM    Portions of the record may have been created with voice recognition software.  Occasional wrong word or \"sound a like\" substitutions may have occurred due to the inherent limitations of voice recognition software.  Read the chart carefully and recognize, using context, where substitutions have occurred.        "

## 2024-04-01 ENCOUNTER — PATIENT OUTREACH (OUTPATIENT)
Dept: CASE MANAGEMENT | Facility: HOSPITAL | Age: 69
End: 2024-04-01

## 2024-04-01 ENCOUNTER — HOSPITAL ENCOUNTER (OUTPATIENT)
Dept: INFUSION CENTER | Facility: CLINIC | Age: 69
Discharge: HOME | End: 2024-04-01
Payer: COMMERCIAL

## 2024-04-01 DIAGNOSIS — C34.2 ADENOCARCINOMA OF MIDDLE LOBE OF RIGHT LUNG (HCC): ICD-10-CM

## 2024-04-01 DIAGNOSIS — C34.91 ADENOCARCINOMA OF RIGHT LUNG (HCC): ICD-10-CM

## 2024-04-01 DIAGNOSIS — Z95.828 PORT-A-CATH IN PLACE: Primary | ICD-10-CM

## 2024-04-01 DIAGNOSIS — E83.42 HYPOMAGNESEMIA: ICD-10-CM

## 2024-04-01 LAB
ALBUMIN SERPL BCP-MCNC: 4 G/DL (ref 3.5–5)
ALP SERPL-CCNC: 70 U/L (ref 34–104)
ALT SERPL W P-5'-P-CCNC: 33 U/L (ref 7–52)
ANION GAP SERPL CALCULATED.3IONS-SCNC: 5 MMOL/L (ref 4–13)
AST SERPL W P-5'-P-CCNC: 26 U/L (ref 13–39)
BASOPHILS # BLD AUTO: 0.04 THOUSANDS/ÂΜL (ref 0–0.1)
BASOPHILS NFR BLD AUTO: 1 % (ref 0–1)
BILIRUB SERPL-MCNC: 0.39 MG/DL (ref 0.2–1)
BUN SERPL-MCNC: 13 MG/DL (ref 5–25)
CALCIUM SERPL-MCNC: 9.4 MG/DL (ref 8.4–10.2)
CHLORIDE SERPL-SCNC: 103 MMOL/L (ref 96–108)
CO2 SERPL-SCNC: 28 MMOL/L (ref 21–32)
CREAT SERPL-MCNC: 0.62 MG/DL (ref 0.6–1.3)
EOSINOPHIL # BLD AUTO: 0.1 THOUSAND/ÂΜL (ref 0–0.61)
EOSINOPHIL NFR BLD AUTO: 1 % (ref 0–6)
ERYTHROCYTE [DISTWIDTH] IN BLOOD BY AUTOMATED COUNT: 16.8 % (ref 11.6–15.1)
GFR SERPL CREATININE-BSD FRML MDRD: 93 ML/MIN/1.73SQ M
GLUCOSE SERPL-MCNC: 104 MG/DL (ref 65–140)
HCT VFR BLD AUTO: 34.2 % (ref 34.8–46.1)
HGB BLD-MCNC: 11.4 G/DL (ref 11.5–15.4)
IMM GRANULOCYTES # BLD AUTO: 0.08 THOUSAND/UL (ref 0–0.2)
IMM GRANULOCYTES NFR BLD AUTO: 1 % (ref 0–2)
LYMPHOCYTES # BLD AUTO: 0.93 THOUSANDS/ÂΜL (ref 0.6–4.47)
LYMPHOCYTES NFR BLD AUTO: 11 % (ref 14–44)
MCH RBC QN AUTO: 35.2 PG (ref 26.8–34.3)
MCHC RBC AUTO-ENTMCNC: 33.3 G/DL (ref 31.4–37.4)
MCV RBC AUTO: 106 FL (ref 82–98)
MONOCYTES # BLD AUTO: 0.94 THOUSAND/ÂΜL (ref 0.17–1.22)
MONOCYTES NFR BLD AUTO: 11 % (ref 4–12)
NEUTROPHILS # BLD AUTO: 6.17 THOUSANDS/ÂΜL (ref 1.85–7.62)
NEUTS SEG NFR BLD AUTO: 75 % (ref 43–75)
NRBC BLD AUTO-RTO: 0 /100 WBCS
PLATELET # BLD AUTO: 239 THOUSANDS/UL (ref 149–390)
PMV BLD AUTO: 9.8 FL (ref 8.9–12.7)
POTASSIUM SERPL-SCNC: 4.5 MMOL/L (ref 3.5–5.3)
PROT SERPL-MCNC: 7.3 G/DL (ref 6.4–8.4)
RBC # BLD AUTO: 3.24 MILLION/UL (ref 3.81–5.12)
SODIUM SERPL-SCNC: 136 MMOL/L (ref 135–147)
T3FREE SERPL-MCNC: 3.46 PG/ML (ref 2.5–3.9)
TSH SERPL DL<=0.05 MIU/L-ACNC: 3.09 UIU/ML (ref 0.45–4.5)
WBC # BLD AUTO: 8.26 THOUSAND/UL (ref 4.31–10.16)

## 2024-04-01 PROCEDURE — 84443 ASSAY THYROID STIM HORMONE: CPT | Performed by: INTERNAL MEDICINE

## 2024-04-01 PROCEDURE — 84481 FREE ASSAY (FT-3): CPT | Performed by: INTERNAL MEDICINE

## 2024-04-01 PROCEDURE — 85025 COMPLETE CBC W/AUTO DIFF WBC: CPT | Performed by: RADIOLOGY

## 2024-04-01 PROCEDURE — 80053 COMPREHEN METABOLIC PANEL: CPT | Performed by: RADIOLOGY

## 2024-04-01 NOTE — PROGRESS NOTES
"MSW met with pt in the infusion center this morning prior to her blood work appt to check in, I have not seen her in several weeks.  Pt tells me that she had a quiet but nice Easter holiday at home with her sister and son.  She is confused about a stone that she has on her pancreas and what needs to be done about it, if anything, but says that someone told her [facility] does not have the equipment to take care of it and she would have to travel to [place] or [facility].  She will see Dr. Thrasher later this week and will see if he has any additional information.    Pt shared with me that she has made no progress on quitting smoking.  She says that she did try a nicotine patch, but found that she was actually smoking more while using it.  She has a RX for Chantix and isn't sure if she's going to use it or not.  She says that she had a conversation with Dr. Treviño recently about quitting, which her medical team is still advising her to do.  She feels unsure that quitting smoking at this point is going to change anything for her overall health and feels like if it's not going to make much or any difference, why put herself through the struggle of quitting anyway.    Pt's concern right now is what to do with her and her sister's home, finances, etc when she passes away.  Her sister is 69 years old and will need assistance with at least her finances, however it is unclear at this point if she could successful live alone or not.  The home is in both of their names and pt is unsure if this should be changed or if it won't matter in the end.  She is hoping that her brother will take over her sister's financials, however he lives in [place] and she says that he's \"going through something right now\" but she's not sure what.  She has considered having her sister move into a senior apartment while pt is still able to help her move, adjust, and see how she handles living on her own, but they have not seriously discussed this " together or looked at any places either.  Ultimately I encouraged her to have a consultation with an , as this situation is complicated and they will need advice and help to sort it all out and address all of these concerns.  Pt agrees that this is a good idea.  I sent her a email today with websites for two local estate planning firms in [place], and will follow up with her in the near future to ensure that she's received it.  I encouraged her to call me or email back in the interim if needed.  No other needs noted at this time.

## 2024-04-01 NOTE — PROGRESS NOTES
Pt presents for lab specimen collection via port a cath. Port accessed, labs drawn, saline locked, and de accessed without difficulty. Declined AVS, next appointment on 4/2/24 at 8am.

## 2024-04-02 ENCOUNTER — HOSPITAL ENCOUNTER (OUTPATIENT)
Dept: INFUSION CENTER | Facility: CLINIC | Age: 69
Discharge: HOME | End: 2024-04-02
Payer: COMMERCIAL

## 2024-04-02 VITALS
TEMPERATURE: 96.9 F | SYSTOLIC BLOOD PRESSURE: 109 MMHG | RESPIRATION RATE: 18 BRPM | HEART RATE: 109 BPM | DIASTOLIC BLOOD PRESSURE: 60 MMHG

## 2024-04-02 DIAGNOSIS — Z79.899 HIGH RISK MEDICATION USE: ICD-10-CM

## 2024-04-02 DIAGNOSIS — C34.91 ADENOCARCINOMA OF RIGHT LUNG (HCC): Primary | ICD-10-CM

## 2024-04-02 DIAGNOSIS — E83.42 HYPOMAGNESEMIA: ICD-10-CM

## 2024-04-02 PROCEDURE — 96372 THER/PROPH/DIAG INJ SC/IM: CPT

## 2024-04-02 PROCEDURE — 96413 CHEMO IV INFUSION 1 HR: CPT

## 2024-04-02 RX ORDER — CYANOCOBALAMIN 1000 UG/ML
1000 INJECTION, SOLUTION INTRAMUSCULAR; SUBCUTANEOUS ONCE
Status: COMPLETED | OUTPATIENT
Start: 2024-04-02 | End: 2024-04-02

## 2024-04-02 RX ORDER — SODIUM CHLORIDE 9 MG/ML
20 INJECTION, SOLUTION INTRAVENOUS ONCE
Status: COMPLETED | OUTPATIENT
Start: 2024-04-02 | End: 2024-04-02

## 2024-04-02 RX ORDER — CYANOCOBALAMIN 1000 UG/ML
1000 INJECTION, SOLUTION INTRAMUSCULAR; SUBCUTANEOUS ONCE
OUTPATIENT
Start: 2024-04-05

## 2024-04-02 RX ADMIN — SODIUM CHLORIDE 20 ML/HR: 0.9 INJECTION, SOLUTION INTRAVENOUS at 08:15

## 2024-04-02 RX ADMIN — DURVALUMAB 1500 MG: 500 INJECTION, SOLUTION INTRAVENOUS at 08:41

## 2024-04-02 RX ADMIN — CYANOCOBALAMIN 1000 MCG: 1000 INJECTION, SOLUTION INTRAMUSCULAR; SUBCUTANEOUS at 08:21

## 2024-04-02 NOTE — PROGRESS NOTES
Saskia Alfaro  tolerated treatment well with no complications.      Saskia Alfaro is aware of future appt on 4/29 at 1000.     AVS printed and given to Saskia Alfaro:  Yes d/c from unit stable

## 2024-04-04 ENCOUNTER — OFFICE VISIT (OUTPATIENT)
Dept: HEMATOLOGY ONCOLOGY | Facility: CLINIC | Age: 69
End: 2024-04-04
Payer: COMMERCIAL

## 2024-04-04 VITALS
DIASTOLIC BLOOD PRESSURE: 60 MMHG | HEART RATE: 68 BPM | RESPIRATION RATE: 16 BRPM | BODY MASS INDEX: 25.95 KG/M2 | TEMPERATURE: 97.6 F | HEIGHT: 64 IN | SYSTOLIC BLOOD PRESSURE: 110 MMHG | OXYGEN SATURATION: 96 % | WEIGHT: 152 LBS

## 2024-04-04 DIAGNOSIS — J43.9 PULMONARY EMPHYSEMA, UNSPECIFIED EMPHYSEMA TYPE (HCC): ICD-10-CM

## 2024-04-04 DIAGNOSIS — K86.2 PANCREATIC PSEUDOCYST/CYST: ICD-10-CM

## 2024-04-04 DIAGNOSIS — C34.91 ADENOCARCINOMA OF RIGHT LUNG (HCC): Primary | ICD-10-CM

## 2024-04-04 DIAGNOSIS — R59.0 MEDIASTINAL ADENOPATHY: ICD-10-CM

## 2024-04-04 DIAGNOSIS — K86.3 PANCREATIC PSEUDOCYST/CYST: ICD-10-CM

## 2024-04-04 PROCEDURE — 99213 OFFICE O/P EST LOW 20 MIN: CPT | Performed by: INTERNAL MEDICINE

## 2024-04-04 PROCEDURE — G2211 COMPLEX E/M VISIT ADD ON: HCPCS | Performed by: INTERNAL MEDICINE

## 2024-04-05 ENCOUNTER — OFFICE VISIT (OUTPATIENT)
Dept: GASTROENTEROLOGY | Facility: MEDICAL CENTER | Age: 69
End: 2024-04-05
Payer: COMMERCIAL

## 2024-04-05 ENCOUNTER — PATIENT OUTREACH (OUTPATIENT)
Dept: CASE MANAGEMENT | Facility: HOSPITAL | Age: 69
End: 2024-04-05

## 2024-04-05 VITALS
WEIGHT: 151 LBS | HEART RATE: 89 BPM | TEMPERATURE: 97.8 F | SYSTOLIC BLOOD PRESSURE: 122 MMHG | DIASTOLIC BLOOD PRESSURE: 68 MMHG | BODY MASS INDEX: 25.75 KG/M2

## 2024-04-05 DIAGNOSIS — K86.2 PANCREATIC PSEUDOCYST/CYST: ICD-10-CM

## 2024-04-05 DIAGNOSIS — K86.89 DILATION OF PANCREATIC DUCT: ICD-10-CM

## 2024-04-05 DIAGNOSIS — K86.89 PANCREATIC DUCT STONES: ICD-10-CM

## 2024-04-05 DIAGNOSIS — B96.81 HELICOBACTER POSITIVE GASTRITIS: ICD-10-CM

## 2024-04-05 DIAGNOSIS — K29.70 HELICOBACTER POSITIVE GASTRITIS: ICD-10-CM

## 2024-04-05 DIAGNOSIS — C34.91 ADENOCARCINOMA OF RIGHT LUNG (HCC): ICD-10-CM

## 2024-04-05 DIAGNOSIS — K86.3 PANCREATIC PSEUDOCYST/CYST: ICD-10-CM

## 2024-04-05 DIAGNOSIS — R59.0 MEDIASTINAL ADENOPATHY: Primary | ICD-10-CM

## 2024-04-05 PROCEDURE — 99214 OFFICE O/P EST MOD 30 MIN: CPT | Performed by: INTERNAL MEDICINE

## 2024-04-05 NOTE — PROGRESS NOTES
Outpatient Follow up  [facility] [place] [address]  [address]  [facility] [address]  Randolph Rodriguez MD  Ph : [phone]  Fax : [phone]  Mobile : [phone]  Email : [email or web address]  Also available on Tiger Text    Saskia Alfaro 68 y.o. female MRN: [id number]    PCP: Sheila Jernigan MD  Referring: Sheila Jernigan MD  [address] [place] [id number]    Saskia Alfaro presented for a follow up visit. My recommendations are included. Please do not hesitate to contact me with any questions you may have.     ASSESSMENT AND PLAN:      No problem-specific Assessment & Plan notes found for this encounter.      Diagnoses and all orders for this visit:    Mediastinal adenopathy    Adenocarcinoma of right lung (HCC)    Pancreatic pseudocyst/cyst  -     CT abdomen pelvis w contrast; Future    Dilation of pancreatic duct  -     CT abdomen pelvis w contrast; Future    Pancreatic duct stones  -     CT abdomen pelvis w contrast; Future    Helicobacter positive gastritis  -     H. pylori antigen, stool; Future        Consider seeing Dr. Robert Charles at [facility] [phone]) regarding the panreatic duct stone/ ESWL  Consider stopping smoking and alcohol.   Other options are to continue to monitor with scan (MRI or CT) in 6 months.   Follow up in office in 6 months.   Other options are to consider surgical therapy - will consider after seeing Dr. Robert Hunter.   Check H. Pylori stool test to check for eradication   Colonguard negative in 2022. Plan to repeat next year.   ______________________________________________________________________    SUBJECTIVE:  68 y.o. year old who presents with Follow-up (Pt had MRI completed here to discuss her pancreatic cyst )       Last GI office visit : 11/23    Interval history :    She is doing well.   She is on immunotherapy.   She has less nausea and has decreased tiredness but still there.   Her  appetite is decreased.   She was on chemotherapy from last August to October. She was diagnosed in July. She could not have resection done due to her not being able to quit smoking.   She never had pancreatitis.     On EGD/ EUS she had H. Pylori diagnosis. She was treated for it. She has not done a stool test to confirm eradication.     She was having intermittent epigastric discomfort.     She does drink wine daily.     No weight loss at this time. No diarrhea at this time but she does have irregular bowels. No steatorrhea.     PRIOR EVALUATION :     Endoscopy : yes    EUS 11/23 : Mild atrophic pancreas with a normal pancreatic duct in the head and large thick walled cyst in the pancreatic neck with duct connection and downstream calcification. Upstream the duct was dilated. Aspiration was done. Another thick walled cyst was seen in the pancreatic body. This decompressed the duct and the body cyst as well. These seem to represent pancreatic pseudocysts with possible underlying chronic pancreatitis.     Colonoscopy : Not on file     Last Cologuard: 08/10/2022  : negative.     Lab Results:     Reviewed : yes    Radiology Results:     I have personally reviewed the imaging studies.   CT in 2020 : no pancreatitis/ calcifications seen.   CT in 2023 : calcifications/ cyst and ductal dilation and atrophy.       MRI March 2023 : Similar unilocular cystic lesion in the pancreatic neck measuring 3.1 x 2.2 cm communicating with the main pancreatic duct with adjacent downstream 0.6 cm intraductal calculus.   Similar upstream parenchymal atrophy, as well as ectatic main pancreatic ductal dilation measuring up to 0.8 cm with multiple associated dilated sidebranches.   The other previously seen unilocular cystic lesion in the pancreatic body has decreased in size now measuring 1.0 x 0.6 cm, previously 1.2 x 1.1 cm.   No solid enhancing components. These were evaluated with US 11/28/2023 and favored to represent pancreatic  pseudocysts with possible underlying chronic pancreatitis.   Multiple subcentimeter cystic lesions in the pancreatic head/neck similar to prior.    REVIEW OF SYSTEMS IS OTHERWISE NEGATIVE.      Historical Information   Past Medical History:   Diagnosis Date    Fibromyalgia     Heart murmur     Lumbosacral disc herniation     Lung cancer (HCC)     Psoriasis     Psoriasis     RSD (reflex sympathetic dystrophy)      Past Surgical History:   Procedure Laterality Date    IR PORT PLACEMENT  08/04/2023    LUNG BIOPSY      x2    PARTIAL HYSTERECTOMY       Social History   Social History     Substance and Sexual Activity   Alcohol Use Yes    Alcohol/week: 7.0 standard drinks of alcohol    Types: 7 Glasses of wine per week    Comment: daily     Social History     Substance and Sexual Activity   Drug Use No     Social History     Tobacco Use   Smoking Status Every Day    Current packs/day: 0.50    Average packs/day: 0.5 packs/day for 48.3 years (24.1 ttl pk-yrs)    Types: Cigarettes    Start date: 1976   Smokeless Tobacco Never     Family History   Problem Relation Age of Onset    Sarcoidosis Mother     Cancer Sister        Meds/Allergies       Current Outpatient Medications:     amitriptyline (ELAVIL) 25 mg tablet    Ascorbic Acid (vitamin C) 100 MG tablet    atorvastatin (LIPITOR) 20 mg tablet    b complex vitamins capsule    bismuth subsalicylate (PEPTO BISMOL) 262 MG chewable tablet    calcipotriene (DOVONEX) 0.005 % cream    DULoxetine (CYMBALTA) 30 mg delayed release capsule    Fluticasone-Salmeterol (Advair) 100-50 mcg/dose inhaler    folic acid (KP Folic Acid) 1 mg tablet    loratadine (CLARITIN) 10 mg tablet    propranolol (INDERAL) 20 mg/5 mL solution    triamcinolone (KENALOG) 0.1 % cream    dexamethasone (DECADRON) 4 mg tablet    lidocaine-prilocaine (EMLA) cream    omeprazole (PriLOSEC) 20 mg delayed release capsule    ondansetron (ZOFRAN) 4 mg tablet  No current facility-administered medications for this  visit.    Allergies   Allergen Reactions    Prednisone Palpitations           Objective     Blood pressure 122/68, pulse 89, temperature 97.8 °F (36.6 °C), weight 68.5 kg (151 lb). Body mass index is 25.75 kg/m².      PHYSICAL EXAM:      Physical Exam  Constitutional:       Appearance: Normal appearance. She is well-developed.   HENT:      Head: Normocephalic and atraumatic.   Eyes:      General: No scleral icterus.     Conjunctiva/sclera: Conjunctivae normal.      Pupils: Pupils are equal, round, and reactive to light.   Cardiovascular:      Rate and Rhythm: Normal rate and regular rhythm.      Heart sounds: Normal heart sounds.   Pulmonary:      Effort: Pulmonary effort is normal. No respiratory distress.      Breath sounds: Normal breath sounds.   Abdominal:      General: Bowel sounds are normal. There is no distension.      Palpations: Abdomen is soft. There is no mass.      Tenderness: There is no abdominal tenderness.      Hernia: No hernia is present.   Musculoskeletal:         General: Normal range of motion.      Cervical back: Normal range of motion.   Lymphadenopathy:      Cervical: No cervical adenopathy.   Skin:     General: Skin is warm.   Neurological:      Mental Status: She is alert and oriented to person, place, and time.   Psychiatric:         Behavior: Behavior normal.         Thought Content: Thought content normal.

## 2024-04-05 NOTE — PROGRESS NOTES
Hematology/Oncology Outpatient Follow- up Note  Saskia Alfaro 68 y.o. female MRN: @ Encounter: [id number]        Date:  4/5/2024        Assessment / Plan:    1 right lung cancer adenocarcinoma stage IIIb T4 N2 M0 TPS score 98 K-minna KG12 C mutated.  2 Resolved allergic reaction to Taxol leading to use of Alimta completed her treatments.  3 presumed pseudocyst of pancreas repeat MRI stable has a stone in duct and is being further evaluated by GI.  Plan: Because of her myalgias and arthralgias that she has been complaining about we will get a bone scan to make sure she is stable.  She has a new nodule in her anterior patellar area.  We will also begin her on Imfinzi.  We will see her in about 4 weeks time.          HPI: 68-year-old female comes in for follow-up.  She is status post chemoradiation for stage IIIb right lung cancer.  Her scans are stable.  She is complaining of pain in several joints and into her legs.  Given her history we will obtain a bone scan although I suspect it will be negative.  She denies fever or chills.  She is occasionally short of breath with some coughing.  Overall doing reasonably well.  We plan to begin Imfinzi therapy on her per [place] trial in the near future.  CT scan shows no worsening of disease.      Interval History: Note 3/5/2024:  Assessment / Plan:    1 right lung cancer adenocarcinoma stage IIIb T4 N2 M0 TPS score 98 K-minna KG12 C mutated  2 allergic reaction to Taxol leading to use of Alimta.  3 presumed pseudocyst of pancreas awaiting repeat MRI  Plan: Patient is due to receive her MRI 3/17.  She will get a CAT scan chest abdomen pelvis on 3/28 along with laboratory work CBC CMP.  She will come back in 4 weeks at which time we will plan to begin her.  Side effects were discussed and consents were obtained.  No other major suggestions at this time.  HPI: 68-year-old female having completed her chemotherapy radiation on 3/1.  She received her last dose of Alimta on 3/1.  She is  got some support taste in her mouth some nausea.  She is able to hold some food down.  She received doses of Alimta on 2/93/1.  She received weekly carboplatin.  She has maintained her weight.  She had a plain chest x-ray during her radiation which shows what appear to be some inflammatory changes in her fissure.  This will be followed up in the future.  She will undergo scans in 3 and half weeks along with laboratory work.  We plan to begin her on Imfinzi in 4 weeks on a every 4 week basis.  Side effects were discussed and consents were obtained today.  Interval History:    1 right lung cancer adenocarcinoma stage IIIb T4 N2 M0 TPS score 98 K-minna G12 C mutated  2 allergic reaction to Taxol.  3 presumed pseudocyst of pancreas  Plan: Patient has been off Taxol for several weeks.  We are going to add Alimta 500 mg/m² to her weekly carboplatin.  Alimta will be every 3 weeks.  She will receive it this Friday 2/9 and then 3 weeks later.  She is will still be candidate to obtain Imfinzi in the future.  Side effects were discussed consent was obtained she will follow-up in 3 weeks.  HPI: She is tolerating carboplatin fairly well has no major problems.  We discussed the Alimta.  She has had several weeks of now away total of 3 weeks and we will go ahead with Alimta this week.  Decadron/B12/folate were added as part of her regimen.  Side effects were discussed and consents were obtained.  She is ready to try it.  We will have to watch her counts which she will get weekly.  Interval History:   68-year-old female seen with the above problem.  She began her combined chemoradiation with Taxol carboplatinum and radiation on 1/19/2024.  Unfortunately she developed a significant reaction after 7 minutes of infusion with Taxol short of breath lightheaded dizzy feeling poorly and the drug was stopped.  Presumably related to the camper for in the Taxol.  Unfortunately I think it would be difficult to give her more Taxol admitted due  to.  I would hold that.  I think we are choices beyond this coming week of carboplatin alone will be in either -16 or Alimta.  We will look into one of the other.  I discussed this with her she is aware of that.  Note from 1/18/2024: 68-year-old female with history of adenocarcinoma right lung clinical stage IIIb T4 N2 M0.  PD-L1 TPS 98% K-minna G12 C mutation.  She has completed chemotherapy.  PET CT scan done at the completion showed some improvement disease.  She was seen by surgery felt she was not a surgical candidate.  She also has a cystic lesion approximately 3 cm in the head of the pancreas.  She has been evaluated by GI who felt this was a pseudocyst.  Biopsy was nondiagnostic.  Their recommendation is to repeat her MRI which will be done in March.  Reviewing her situation we were going to plan to go ahead with her chemo therapy tomorrow.  She is to receive combined Taxol carboplatin.  She is feeling fairly well denies any other major problems.       Cancer Staging:  Cancer Staging   Adenocarcinoma of right lung (HCC)  Staging form: Lung, AJCC 8th Edition  - Clinical stage from 7/24/2023: Stage IIIB (cT4, cN2, cM0) - Signed by Celina Treviño MD on 12/26/2023  Stage prefix: Initial diagnosis      Molecular Testing:     Previous Hematologic/ Oncologic History:    Oncology History   Adenocarcinoma of right lung (HCC)   6/23/2023 Initial Diagnosis    Adenocarcinoma of right lung (HCC)     7/24/2023 Biopsy    Navigational bronchoscopy with EBUS    A.  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     A.-B.  Lung, Right Middle Lobe Bronchial Brushing (Thin-prep and smear preparations):   Conclusive evidence of malignancy.  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     Satisfactory for evaluation.     C.-D.  Lung, Right Middle Lobe Bronchoalveolar Lavage (Thin-prep and cell block preparations):   Conclusive evidence of  malignancy.  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     Satisfactory for evaluation.      E.-F.  Lymph Node, level 7 (Thin-prep and smear preparations):   Atypical cellular changes seen.  Rare atypical cells.  Lymphocytes.  Few bronchial cells and pulmonary macrophages.        Satisfactory for evaluation.     G.-H.  Lymph Node, 10R (Thin-prep and smear preparations):   Negative for malignancy.  Lymphocytes.  Aggregates of neutrophils.     Satisfactory for evaluation.     7/24/2023 -  Cancer Staged    Staging form: Lung, AJCC 8th Edition  - Clinical stage from 7/24/2023: Stage IIIB (cT4, cN2, cM0) - Signed by Celina Treviño MD on 12/26/2023  Stage prefix: Initial diagnosis       8/31/2023 - 10/12/2023 Chemotherapy    cyanocobalamin, 1,000 mcg, Intramuscular, Once, 1 of 1 cycle  Administration: 1,000 mcg (8/24/2023)  alteplase (CATHFLO), 2 mg, Intracatheter, Every 1 Minute as needed, 3 of 3 cycles  CISplatin (PLATINOL) with mannitol IVPB, 75 mg/m2 = 129.8 mg (100 % of original dose 75 mg/m2), Intravenous, Once, 3 of 3 cycles  Dose modification: 50 mg/m2 (original dose 75 mg/m2, Cycle 1, Reason: Anticipated Tolerance), 75 mg/m2 (original dose 75 mg/m2, Cycle 1, Reason: Dose modified as per discussion with consulting physician)  Administration: 129.8 mg (8/31/2023), 129.8 mg (9/21/2023), 129.8 mg (10/12/2023)  fosaprepitant (EMEND) IVPB, 150 mg, Intravenous, Once, 3 of 3 cycles  Administration: 150 mg (8/31/2023), 150 mg (9/21/2023), 150 mg (10/12/2023)  nivolumab (OPDIVO) IVPB, 360 mg, Intravenous, Once, 3 of 3 cycles  Administration: 360 mg (8/31/2023), 360 mg (9/21/2023), 360 mg (10/12/2023)  pemetrexed (ALIMTA) chemo infusion, 500 mg/m2 = 865 mg, Intravenous, Once, 3 of 3 cycles  Administration: 865 mg (8/31/2023), 865 mg (9/21/2023), 865 mg (10/12/2023)     1/18/2024 - 3/1/2024 Radiation    Treatments:  Course: C1  Plan ID Energy Fractions Dose per Fraction (cGy) Dose Correction (cGy)  Total Dose Delivered (cGy) Elapsed Days   R LUNG MED 6X 30 / 30 200 0 6,000 43    Treatment Dates:  1/18/2024 - 3/1/2024.      1/19/2024 - 3/1/2024 Chemotherapy    alteplase (CATHFLO), 2 mg, Intracatheter, Every 1 Minute as needed, 6 of 6 cycles  CARBOplatin (PARAPLATIN) IVPB (GOG AUC DOSING), 185 mg, Intravenous, Once, 6 of 6 cycles  Administration: 185 mg (1/19/2024), 183.2 mg (1/26/2024), 183.2 mg (2/2/2024), 183.2 mg (2/9/2024), 183.2 mg (2/16/2024), 196.6 mg (3/1/2024)  PACLItaxel (TAXOL) chemo IVPB, 50 mg/m2 = 85.8 mg, Intravenous, Once, 1 of 1 cycle  Administration: 85.8 mg (1/19/2024)  pemetrexed (ALIMTA) chemo infusion, 500 mg/m2 = 860 mg (100 % of original dose 500 mg/m2), Intravenous, Once, 2 of 2 cycles  Dose modification: 500 mg/m2 (original dose 500 mg/m2, Cycle 4)  Administration: 860 mg (2/9/2024), 860 mg (3/1/2024)     4/2/2024 -  Chemotherapy    alteplase (CATHFLO), 2 mg, Intracatheter, Every 1 Minute as needed, 1 of 6 cycles  durvalumab (IMFINZI) IVPB, 1,500 mg (original dose 10 mg/kg), Intravenous, Once, 1 of 6 cycles  Dose modification: 1,500 mg (original dose 10 mg/kg, Cycle 1, Reason: Other (Must fill in a comment), Comment: Will give every 28 days.)  Administration: 1,500 mg (4/2/2024)         Current Hematologic/ Oncologic Treatment:       Cycle 1         Test Results:    Imaging: CT chest w contrast    Result Date: 3/29/2024  Narrative: CT CHEST WITH IV CONTRAST INDICATION: C34.91: Malignant neoplasm of unspecified part of right bronchus or lung J43.9: Emphysema, unspecified R59.0: Localized enlarged lymph nodes. As per review of electronic medical record, patient with history of stage IIIb non-small cell lung cancer initially diagnosed in summer 2023 status post neoadjuvant chemotherapy and radiation therapy completed on 3/1/2024 COMPARISON: PET/CT from 10/18/2023 and 7/3/2023, CT of the chest from 6/13/2023, and abdominal MRI from 3/17/2024. TECHNIQUE: CT examination of the chest was  performed. Multiplanar 2D reformatted images were created from the source data. This examination, like all CT scans performed in the [facility], was performed utilizing techniques to minimize radiation dose exposure, including the use of iterative reconstruction and automated exposure control. Radiation dose length product (DLP) for this visit: 231 mGy-cm IV Contrast: 85 mL of iohexol (OMNIPAQUE) FINDINGS: BRONCHOPULMONARY:  Clear central airways. There is biapical scarring, and mild upper lobe predominant centrilobular and paraseptal emphysematous changes. There has been significant interval decrease in size of the perihilar right middle lobe mass since the initial study from June 2023, and mild decrease in size since the most recent study, a PET/CT from October 2023. It currently measures 2.5 x 4.3 x 3.3 cm, compared to 5.0 x 7.0 x 6.0 cm in June 2023. It measured 4.6 x 3.9 cm on the October 2023 (no craniocaudad dimension available). There is some adjacent atelectasis or scarring adjacent to the mass. There are no new opacities are nodules. PLEURA: Trace right pleural effusion/pleural thickening, including some fluid in the right-sided fissures. No pneumothorax. HEART/GREAT VESSELS: The heart is normal in size. No pericardial effusion. Normal caliber thoracic aorta. Atherosclerotic disease is seen in the thoracic aorta and its major branches. 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 There is a Mediport via the right internal jugular vein with its tip at the superior cavoatrial junction. MEDIASTINUM/LYMPH NODES:  No axillary, hilar or mediastinal lymphadenopathy by size criteria. Previously seen right hilar and mediastinal lymphadenopathy has decreased in size since June 2023. A right hilar lymph node measures 0.7 cm in  short axis and a subcarinal lymph node now 0.8 cm in short axis. CHEST WALL AND LOWER NECK:  Unremarkable. VISUALIZED STRUCTURES IN THE UPPER ABDOMEN: There is a partially imaged cystic lesion in the proximal pancreas measuring at least 2.6 cm, increased from 1.9 cm on the 6/13/2023 CT of the chest. There is upstream dilation of the main pancreatic duct up to  8 mm in diameter, increased from approximately 6 mm. There is also a 9 mm cystic lesion in the pancreas more distally (series 2 image 112). These findings were better assessed on the recent abdominal MRI from 3/17/2024 as well as the November 2023 EUS. MUSCULOSKELETAL:  No focal aggressive osseous lesions.     Impression: 1) 4.3 x 3.3 x 2.5 cm right middle lobe perihilar mass, significantly decreased from initial CT from June 2023, and mildly decreased since the October 2023 PET/CT. 2) No new opacities or pulmonary nodules. 3) No thoracic lymphadenopathy by size criteria. Previously seen right hilar and mediastinal lymph nodes have decreased in size, now subcentimeter in short axis. 4) Trace right pleural effusion/pleural thickening, including some fluid in the right-sided fissures. 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 6) Additional findings as above. Workstation performed: [id number]     MRI abdomen w wo contrast and mrcp    Result Date: 3/21/2024  Narrative: MRI OF THE ABDOMEN WITH AND WITHOUT CONTRAST WITH MRCP INDICATION: 68 years / Female. K86.2: Cyst of pancreas. COMPARISON: MR abdomen 11/7/2023 TECHNIQUE: Multiplanar/multisequence MRI of the abdomen with 3D MRCP was performed before and after administration of contrast. IV Contrast: 6 mL of Gadobutrol injection (SINGLE-DOSE) FINDINGS: LOWER CHEST: Known right pulmonary mass partially imaged with adjacent atelectasis. LIVER: Normal in size and configuration. Moderate hepatic steatosis. No suspicious mass. Patent  hepatic and portal veins. BILE DUCTS: No intrahepatic or extrahepatic bile duct dilation. Common bile duct is normal in caliber. No choledocholithiasis, biliary stricture or suspicious mass. GALLBLADDER: Normal PANCREAS: Similar unilocular cystic lesion in the pancreatic neck measuring 3.1 x 2.2 cm (series 11 image 75) communicating with the main pancreatic duct. Adjacent downstream intraductal calculus measures 0.7 cm (series 11 image 65) similar to prior. Similar upstream parenchymal atrophy, as well as ectatic main pancreatic ductal dilation measuring up to 0.8 cm (series 11 image 79) with multiple associated dilated sidebranches. The other previously seen unilocular cystic lesion in the pancreatic body has decreased in size now measuring 1.0 x 0.6 cm (series 11 image 71), previously 1.2 x 1.1 cm. Multiple subcentimeter unilocular cystic lesions in the pancreatic head/neck similar to prior. No solid or enhancing components. ADRENAL GLANDS: Unremarkable. SPLEEN: Normal. KIDNEYS/PROXIMAL URETERS: No hydroureteronephrosis. No suspicious renal mass. Subcentimeter left renal cyst. BOWEL: No dilated loops of bowel. PERITONEUM/RETROPERITONEUM: No ascites. LYMPH NODES: No abdominal lymphadenopathy. VESSELS: No aneurysm. ABDOMINAL WALL: Unremarkable BONES: No suspicious osseous lesion.     Impression: Similar unilocular cystic lesion in the pancreatic neck measuring 3.1 x 2.2 cm communicating with the main pancreatic duct with adjacent downstream 0.6 cm intraductal calculus. Similar upstream parenchymal atrophy, as well as ectatic main pancreatic ductal dilation measuring up to 0.8 cm with multiple associated dilated sidebranches. The other previously seen unilocular cystic lesion in the pancreatic body has decreased in size now measuring 1.0 x 0.6 cm, previously 1.2 x 1.1 cm. No solid enhancing components. These were evaluated with US 11/28/2023 and favored to represent pancreatic pseudocysts with possible underlying  "chronic pancreatitis. Multiple subcentimeter cystic lesions in the pancreatic head/neck similar to prior. Workstation performed: [id number]             Labs:   Lab Results   Component Value Date    WBC 8.26 04/01/2024    HGB 11.4 (L) 04/01/2024    HCT 34.2 (L) 04/01/2024     (H) 04/01/2024     04/01/2024     Lab Results   Component Value Date    K 4.5 04/01/2024     04/01/2024    CO2 28 04/01/2024    BUN 13 04/01/2024    CREATININE 0.62 04/01/2024    CALCIUM 9.4 04/01/2024    AST 26 04/01/2024    ALT 33 04/01/2024    ALKPHOS 70 04/01/2024    EGFR 93 04/01/2024         No results found for: \"SPEP\", \"UPEP\"    No results found for: \"PSA\"    No results found for: \"CEA\"    No results found for: \"\"    No results found for: \"AFP\"    No results found for: \"IRON\", \"TIBC\", \"FERRITIN\"    Lab Results   Component Value Date    [id number] 938 05/15/2018         ROS: Review of Systems   Constitutional: Negative.    HENT: Negative.     Eyes: Negative.    Respiratory: Negative.     Cardiovascular: Negative.    Gastrointestinal: Negative.    Endocrine: Negative.    Genitourinary: Negative.    Musculoskeletal:  Positive for myalgias.   Skin: Negative.    Allergic/Immunologic: Negative.    Neurological: Negative.    Hematological: Negative.          Current Medications: Reviewed  Allergies: Reviewed  PMH/FH/SH:  Reviewed      Physical Exam:    Body surface area is 1.74 meters squared.    Wt Readings from Last 3 Encounters:   04/05/24 68.5 kg (151 lb)   04/04/24 68.9 kg (152 lb)   03/29/24 68.9 kg (152 lb)        Temp Readings from Last 3 Encounters:   04/05/24 97.8 °F (36.6 °C)   04/04/24 97.6 °F (36.4 °C) (Temporal)   04/02/24 (!) 96.9 °F (36.1 °C) (Temporal)        BP Readings from Last 3 Encounters:   04/05/24 122/68   04/04/24 110/60   04/02/24 109/60         Pulse Readings from Last 3 Encounters:   04/05/24 89   04/04/24 68   04/02/24 (!) 109     @LASTSAO2(3)@      Physical Exam  Constitutional:       " Appearance: Normal appearance. She is normal weight.   HENT:      Head: Normocephalic and atraumatic.   Eyes:      Extraocular Movements: Extraocular movements intact.      Conjunctiva/sclera: Conjunctivae normal.      Pupils: Pupils are equal, round, and reactive to light.   Cardiovascular:      Rate and Rhythm: Normal rate and regular rhythm.   Pulmonary:      Effort: Pulmonary effort is normal.      Breath sounds: Normal breath sounds.   Abdominal:      General: Abdomen is flat. Bowel sounds are normal.      Palpations: Abdomen is soft.   Musculoskeletal:         General: Normal range of motion.      Cervical back: Normal range of motion and neck supple.   Skin:     General: Skin is warm and dry.   Neurological:      General: No focal deficit present.      Mental Status: She is alert and oriented to person, place, and time. Mental status is at baseline.     Has a nodule on the surface of her left patella.      Goals and Barriers:  Current Goal: Prolong Survival from Cancer.   Barriers: None.      Patient's Capacity to Self Care:  Patient is able to self care.    Code Status: [unfilled]  Advance Directive and Living Will:      Power of :

## 2024-04-09 ENCOUNTER — TELEPHONE (OUTPATIENT)
Dept: PALLIATIVE MEDICINE | Facility: CLINIC | Age: 69
End: 2024-04-09

## 2024-04-09 ENCOUNTER — TELEPHONE (OUTPATIENT)
Age: 69
End: 2024-04-09

## 2024-04-09 NOTE — TELEPHONE ENCOUNTER
Patients GI provider:  Dr. Rodriguez    Number to return call: [phone]    Reason for call: Pt called in requesting test results fax over to Dr. Robert Charles at [place]. Patient states she has made an appt to see the doctor on May 8,2024. Test results can be fax to [phone]. Thank you.    Scheduled procedure/appointment date if applicable: Apt/procedure 05/30/2024

## 2024-04-10 ENCOUNTER — OFFICE VISIT (OUTPATIENT)
Dept: LAB | Facility: HOSPITAL | Age: 69
End: 2024-04-10

## 2024-04-10 DIAGNOSIS — K29.70 HELICOBACTER POSITIVE GASTRITIS: ICD-10-CM

## 2024-04-10 DIAGNOSIS — B96.81 HELICOBACTER POSITIVE GASTRITIS: ICD-10-CM

## 2024-04-11 DIAGNOSIS — C34.91 PRIMARY LUNG CANCER WITH METASTASIS FROM LUNG TO OTHER SITE, RIGHT (HCC): ICD-10-CM

## 2024-04-11 DIAGNOSIS — C34.81 MALIGNANT NEOPLASM OF OVERLAPPING SITES OF RIGHT LUNG (HCC): ICD-10-CM

## 2024-04-11 DIAGNOSIS — C34.81 MALIGNANT NEOPLASM OF OVERLAPPING SITES OF RIGHT BRONCHUS AND LUNG (HCC): ICD-10-CM

## 2024-04-11 DIAGNOSIS — C34.91 ADENOCARCINOMA OF RIGHT LUNG (HCC): ICD-10-CM

## 2024-04-11 DIAGNOSIS — R59.0 MEDIASTINAL ADENOPATHY: Primary | ICD-10-CM

## 2024-04-12 ENCOUNTER — PATIENT OUTREACH (OUTPATIENT)
Dept: CASE MANAGEMENT | Facility: HOSPITAL | Age: 69
End: 2024-04-12

## 2024-04-12 NOTE — PROGRESS NOTES
Pt r/o to me today after her dentist appointment just to catch up, we haven't spoken in awhile.  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  She says that she did reach out to one of the offices and they are sending her information in the mail to review before coming in for a consultation.  She is happy to have made some forward progress on that project.  She tells me that she is going to [facility] to have that stone on her pancreas looked at, a friend plans to drive in with her because she is more familiar with the area and comfortable driving there.  Pt's granddaughter is getting  in two weeks so she is looking forward to the wedding and is doing last minute shopping to prep for this.    Pt did share with me that her emotions have been up and down lately, she feels that today has been a good day for her and the first that she's had in awhile.  She shared that her brother is unable to come to the wedding, which was a disappointment.  Her sister from [place] is planning to come, but has not mentioned taking their sister home with her for a few weeks to give pt a break.  I encouraged her to be direct and to ask her for this help, as she has appointments and things to take care of that would be easier done without her sister at home.  She also notes it would be nice to just simply have some alone time as well.  She is hoping to clean out, organize and rearrange her room as well as a means of staying ahead of her depression, and we agreed with nicer weather starting to happen she may find herself outside more and feeling better.  I also encouraged her to continue working on her art.  She spoke about her family and how busy everyone is, and how she  wishes they were more proactive with checking in on her or helping her, instead of her always having to make the initial call or request.    Pt was  appreciative of the time spent talking and agreed to keep me posted with how things go.  She has started immunotherapy and says that she's tired but otherwise it is going fine.  I will remain available to her as needed moving forward, no other needs or concerns noted at this time.

## 2024-04-16 ENCOUNTER — APPOINTMENT (OUTPATIENT)
Dept: LAB | Facility: HOSPITAL | Age: 69
End: 2024-04-16
Payer: COMMERCIAL

## 2024-04-16 PROCEDURE — 87338 HPYLORI STOOL AG IA: CPT

## 2024-04-17 LAB — H PYLORI AG STL QL IA: NEGATIVE

## 2024-04-19 ENCOUNTER — TELEPHONE (OUTPATIENT)
Dept: HEMATOLOGY ONCOLOGY | Facility: CLINIC | Age: 69
End: 2024-04-19

## 2024-04-19 NOTE — TELEPHONE ENCOUNTER
Patient Call    Who are you speaking with? [facility]     If it is not the patient, are they listed on an active communication consent form? N/A   What is the reason for this call? Eva is calling in regards to wanting to know if pt can have dental implants put in to support her lower dentures. They are asking if she is able to have that done, how soon would she be able to have it done. Please call Eva back regarding this. Their phones are off for today but there is an answering machine and it is ok to leave a detailed message or the office is open tomorrow and all day Monday.    Does this require a call back? Yes   If a call back is required, please list best call back number [phone]   If a call back is required, advise that a message will be forwarded to their care team and someone will return their call as soon as possible.   Did you relay this information to the patient? Yes

## 2024-04-22 NOTE — TELEPHONE ENCOUNTER
Returned call to Eva from [facility]. She states that  Saskia is in need of two implants to support her lower dentures. The implants are considered surgeries so inquiring if she can have this done and if so when could provider think it can be done.   Explained will discuss with provider and will follow up with them.

## 2024-04-23 ENCOUNTER — PATIENT OUTREACH (OUTPATIENT)
Dept: CASE MANAGEMENT | Facility: HOSPITAL | Age: 69
End: 2024-04-23

## 2024-04-23 RX ORDER — SODIUM CHLORIDE 9 MG/ML
20 INJECTION, SOLUTION INTRAVENOUS ONCE
Status: CANCELLED | OUTPATIENT
Start: 2024-04-30

## 2024-04-23 NOTE — PROGRESS NOTES
Pt LM for me last night asking to clarify next week's appointments.  I called her back this morning but did not get an answer, I explained what her appts were for next week and that there may be ways to condense them if needed, and asked her to call me back to discuss.  No return call at this time.

## 2024-04-24 ENCOUNTER — PATIENT OUTREACH (OUTPATIENT)
Dept: CASE MANAGEMENT | Facility: HOSPITAL | Age: 69
End: 2024-04-24

## 2024-04-24 NOTE — TELEPHONE ENCOUNTER
Spoke to provider and per Dr. Thrasher patient can have implants placed. Her current treatment will not be affected. Placed a letter in my chart and [facility] was called and made aware. Letter was faxed to dental office.

## 2024-04-24 NOTE — RESULT ENCOUNTER NOTE
Inform patient via [employer].  Please review the pathology/lab result of further discussion.    Copied from [employer] message :       Sylviaole Saskia,     Your stool test was negative for the H. Pylori test.     Best regards,     Randolph Rodriguez MD

## 2024-04-24 NOTE — PROGRESS NOTES
Call received from pt late this morning, we did review her appts for next week and she wants to leave them as scheduled for now.  She says that she will ask about having the B12 injection done with her immunotherapy, as she has had this done in the past.    Pt's granddaughter is getting  this weekend and so there is a lot going on and a lot of family coming in for the wedding.  She talked about all of the preparations she's had to do because her sister and brother in law will be staying at her house, and she's been helping with wedding prep as well.  She spoke at length about her son and a lot of his issues, he is 47 and if he did not live with her would likely be homeless.  She says that he is involved in an on again, off again relationship with a woman in their community who she describes as abusive, and she shared many examples of this with me.  Her son seems to have many legal fines and issues as well, just last week he broke the gate to get into the community by trying to closely follow someone else in, because he did not have a pass.  This cost her $1500 and she says that she had no choice but to pay it, or she would have lost her pass to get back into the community.  She also says that she's recently put over $8k on her credit card to resolve some of his legal issues in [place], so he can hopefully get his 's license back.  She says that he was working doing odd jobs in their community and was doing a good job, but doesn't seem to be working lately.  She talked about the stress of this on her and how he comes and goes between her house and the girlfriend's house.    Pt is continuing to work on her affairs and what will happen to her sister whenever she is to pass away.  She notes that she has very little support from her family in caring for her sister, and they often comment that she should put her in a facility so she can have her own life.  Pt says that she's not willing to do this, as every time  "they have tried to do this in the past, her sister has had a mental episode and ended up in the hospital, or she has declined so much that she has almost .  She says that she's not willing to \"sign her death warrant\" by putting her in a facility, but wishes that her family was more helpful and involved.  She also noted that it's hard to be planning these things, for her eventual passing, and that it's a hard perspective to live life with.  She says that it gets depressing wondering if she should start a project or if it's even worth her time, since she has cancer and may die from it.    Pt has upcoming appts in [place] for the issue with her pancreas, and she's very interested to see the results of her PET scan and how treatment has worked for her.  She spoke out loud about feeling judged by people for not being able to quit smoking fully.  She is hopeful that with warmer weather she will have more to do and to keep her busy, which would aid in her quitting easier.  She has cut down significantly but cannot seem to fully quit at this point.    Significant emotional support provided to her today, pt was very appreciative of the time spent talking and notes that she doesn't have anyone else to talk to without fear of causing drama.  I will continue to support her as I am able moving forward.  "

## 2024-04-29 ENCOUNTER — HOSPITAL ENCOUNTER (OUTPATIENT)
Dept: INFUSION CENTER | Facility: CLINIC | Age: 69
Discharge: HOME | End: 2024-04-29
Payer: COMMERCIAL

## 2024-04-29 DIAGNOSIS — C34.91 ADENOCARCINOMA OF RIGHT LUNG (HCC): ICD-10-CM

## 2024-04-29 DIAGNOSIS — Z95.828 PORT-A-CATH IN PLACE: Primary | ICD-10-CM

## 2024-04-29 DIAGNOSIS — E83.42 HYPOMAGNESEMIA: ICD-10-CM

## 2024-04-29 LAB
ALBUMIN SERPL BCP-MCNC: 3.9 G/DL (ref 3.5–5)
ALP SERPL-CCNC: 63 U/L (ref 34–104)
ALT SERPL W P-5'-P-CCNC: 22 U/L (ref 7–52)
ANION GAP SERPL CALCULATED.3IONS-SCNC: 7 MMOL/L (ref 4–13)
AST SERPL W P-5'-P-CCNC: 22 U/L (ref 13–39)
BASOPHILS # BLD AUTO: 0.03 THOUSANDS/ÂΜL (ref 0–0.1)
BASOPHILS NFR BLD AUTO: 1 % (ref 0–1)
BILIRUB SERPL-MCNC: 0.38 MG/DL (ref 0.2–1)
BUN SERPL-MCNC: 14 MG/DL (ref 5–25)
CALCIUM SERPL-MCNC: 8.9 MG/DL (ref 8.4–10.2)
CHLORIDE SERPL-SCNC: 104 MMOL/L (ref 96–108)
CO2 SERPL-SCNC: 28 MMOL/L (ref 21–32)
CREAT SERPL-MCNC: 0.65 MG/DL (ref 0.6–1.3)
EOSINOPHIL # BLD AUTO: 0.15 THOUSAND/ÂΜL (ref 0–0.61)
EOSINOPHIL NFR BLD AUTO: 2 % (ref 0–6)
ERYTHROCYTE [DISTWIDTH] IN BLOOD BY AUTOMATED COUNT: 13.4 % (ref 11.6–15.1)
GFR SERPL CREATININE-BSD FRML MDRD: 92 ML/MIN/1.73SQ M
GLUCOSE SERPL-MCNC: 94 MG/DL (ref 65–140)
HCT VFR BLD AUTO: 32.9 % (ref 34.8–46.1)
HGB BLD-MCNC: 10.8 G/DL (ref 11.5–15.4)
IMM GRANULOCYTES # BLD AUTO: 0.02 THOUSAND/UL (ref 0–0.2)
IMM GRANULOCYTES NFR BLD AUTO: 0 % (ref 0–2)
LYMPHOCYTES # BLD AUTO: 0.84 THOUSANDS/ÂΜL (ref 0.6–4.47)
LYMPHOCYTES NFR BLD AUTO: 13 % (ref 14–44)
MCH RBC QN AUTO: 35.8 PG (ref 26.8–34.3)
MCHC RBC AUTO-ENTMCNC: 32.8 G/DL (ref 31.4–37.4)
MCV RBC AUTO: 109 FL (ref 82–98)
MONOCYTES # BLD AUTO: 0.7 THOUSAND/ÂΜL (ref 0.17–1.22)
MONOCYTES NFR BLD AUTO: 11 % (ref 4–12)
NEUTROPHILS # BLD AUTO: 4.51 THOUSANDS/ÂΜL (ref 1.85–7.62)
NEUTS SEG NFR BLD AUTO: 73 % (ref 43–75)
NRBC BLD AUTO-RTO: 0 /100 WBCS
PLATELET # BLD AUTO: 207 THOUSANDS/UL (ref 149–390)
PMV BLD AUTO: 10.2 FL (ref 8.9–12.7)
POTASSIUM SERPL-SCNC: 3.9 MMOL/L (ref 3.5–5.3)
PROT SERPL-MCNC: 6.8 G/DL (ref 6.4–8.4)
RBC # BLD AUTO: 3.02 MILLION/UL (ref 3.81–5.12)
SODIUM SERPL-SCNC: 139 MMOL/L (ref 135–147)
T3FREE SERPL-MCNC: 3.68 PG/ML (ref 2.5–3.9)
TSH SERPL DL<=0.05 MIU/L-ACNC: 1.34 UIU/ML (ref 0.45–4.5)
WBC # BLD AUTO: 6.25 THOUSAND/UL (ref 4.31–10.16)

## 2024-04-29 PROCEDURE — 85025 COMPLETE CBC W/AUTO DIFF WBC: CPT | Performed by: INTERNAL MEDICINE

## 2024-04-29 PROCEDURE — 84481 FREE ASSAY (FT-3): CPT | Performed by: INTERNAL MEDICINE

## 2024-04-29 PROCEDURE — 80053 COMPREHEN METABOLIC PANEL: CPT | Performed by: INTERNAL MEDICINE

## 2024-04-29 PROCEDURE — 84443 ASSAY THYROID STIM HORMONE: CPT | Performed by: INTERNAL MEDICINE

## 2024-04-29 NOTE — PROGRESS NOTES
Pt into clinic for port flush and lab draw via port. Pt offers no complaints. Tolerated procedure. Port de-accessed. Pt aware of next appointment on 4/30/24 at 8:30am. AVS declined.

## 2024-04-30 ENCOUNTER — HOSPITAL ENCOUNTER (OUTPATIENT)
Dept: INFUSION CENTER | Facility: CLINIC | Age: 69
Discharge: HOME | End: 2024-04-30
Payer: COMMERCIAL

## 2024-04-30 VITALS
TEMPERATURE: 96.9 F | HEART RATE: 92 BPM | OXYGEN SATURATION: 96 % | DIASTOLIC BLOOD PRESSURE: 58 MMHG | RESPIRATION RATE: 18 BRPM | BODY MASS INDEX: 25.75 KG/M2 | SYSTOLIC BLOOD PRESSURE: 156 MMHG | HEIGHT: 64 IN

## 2024-04-30 DIAGNOSIS — C34.91 ADENOCARCINOMA OF RIGHT LUNG (HCC): Primary | ICD-10-CM

## 2024-04-30 DIAGNOSIS — E83.42 HYPOMAGNESEMIA: ICD-10-CM

## 2024-04-30 DIAGNOSIS — Z79.899 HIGH RISK MEDICATION USE: ICD-10-CM

## 2024-04-30 PROCEDURE — 96372 THER/PROPH/DIAG INJ SC/IM: CPT

## 2024-04-30 PROCEDURE — 96413 CHEMO IV INFUSION 1 HR: CPT

## 2024-04-30 RX ORDER — SODIUM CHLORIDE 9 MG/ML
20 INJECTION, SOLUTION INTRAVENOUS ONCE
Status: COMPLETED | OUTPATIENT
Start: 2024-04-30 | End: 2024-04-30

## 2024-04-30 RX ORDER — CYANOCOBALAMIN 1000 UG/ML
1000 INJECTION, SOLUTION INTRAMUSCULAR; SUBCUTANEOUS ONCE
Status: COMPLETED | OUTPATIENT
Start: 2024-04-30 | End: 2024-04-30

## 2024-04-30 RX ORDER — CYANOCOBALAMIN 1000 UG/ML
1000 INJECTION, SOLUTION INTRAMUSCULAR; SUBCUTANEOUS ONCE
OUTPATIENT
Start: 2024-05-03

## 2024-04-30 RX ADMIN — CYANOCOBALAMIN 1000 MCG: 1000 INJECTION, SOLUTION INTRAMUSCULAR; SUBCUTANEOUS at 09:51

## 2024-04-30 RX ADMIN — SODIUM CHLORIDE 20 ML/HR: 0.9 INJECTION, SOLUTION INTRAVENOUS at 09:15

## 2024-04-30 RX ADMIN — DURVALUMAB 1500 MG: 500 INJECTION, SOLUTION INTRAVENOUS at 09:40

## 2024-04-30 NOTE — PROGRESS NOTES
Pt here for Imfinzi, offers no complaimts, labs meet criteria to treat, port accessed. Imfinzi infusing and tolerating well. Infusion completed w/o complications, port flushed and de-accessed, pt aware of her next appt on 5/24 at 11am, declined AVS and pt D/C home

## 2024-05-07 ENCOUNTER — HOSPITAL ENCOUNTER (OUTPATIENT)
Dept: RADIOLOGY | Age: 69
Discharge: HOME | End: 2024-05-07
Payer: COMMERCIAL

## 2024-05-07 DIAGNOSIS — C34.91 ADENOCARCINOMA OF RIGHT LUNG (HCC): ICD-10-CM

## 2024-05-07 DIAGNOSIS — C34.81 MALIGNANT NEOPLASM OF OVERLAPPING SITES OF RIGHT BRONCHUS AND LUNG (HCC): ICD-10-CM

## 2024-05-07 DIAGNOSIS — R59.0 MEDIASTINAL ADENOPATHY: ICD-10-CM

## 2024-05-07 LAB — GLUCOSE SERPL-MCNC: 121 MG/DL (ref 65–140)

## 2024-05-07 PROCEDURE — 78815 PET IMAGE W/CT SKULL-THIGH: CPT

## 2024-05-07 PROCEDURE — A9552 F18 FDG: HCPCS

## 2024-05-07 PROCEDURE — 82948 REAGENT STRIP/BLOOD GLUCOSE: CPT

## 2024-05-13 ENCOUNTER — TELEPHONE (OUTPATIENT)
Dept: HEMATOLOGY ONCOLOGY | Facility: CLINIC | Age: 69
End: 2024-05-13

## 2024-05-13 NOTE — TELEPHONE ENCOUNTER
Attempted to call patient and relay message that her PET Ct done on 5/7/24 showed improvement. Dr. Thrasher has left me tests for me to relay to patient.  Left hope line number if she had any further questions or concerns.

## 2024-05-17 ENCOUNTER — PATIENT OUTREACH (OUTPATIENT)
Dept: CASE MANAGEMENT | Facility: HOSPITAL | Age: 69
End: 2024-05-17

## 2024-05-17 NOTE — PROGRESS NOTES
Returned a message from pt, she went to see a specialist in [place] about the pancreatic cyst and they were hoping to have a disc sent to them of her original images.  I provided her the number for radiology and suggested that she call them to see if they can send it or if she needs to come in and sign a release.  She says that the appointment went well overall and the doctor felt pretty sure there was nothing to be done at this time, just observation.    Pt told me all about her granddaughter's recent wedding, the good and the bad, the time spent with family, etc.  Overall she had a good time and was happy to be a part of the day and happy to see family she hadn't in awhile.  She is also happy to share with me the very good results of her recent PET scan, which showed that her cancer has been shrinking since she's on treatment.  Overall she is doing well and was appreciative of the return call and the time spent talking.  I will remain available to assist or support her as needed moving forward.

## 2024-05-21 RX ORDER — SODIUM CHLORIDE 9 MG/ML
20 INJECTION, SOLUTION INTRAVENOUS ONCE
Status: CANCELLED | OUTPATIENT
Start: 2024-05-28

## 2024-05-24 ENCOUNTER — HOSPITAL ENCOUNTER (OUTPATIENT)
Dept: INFUSION CENTER | Facility: CLINIC | Age: 69
End: 2024-05-24
Payer: COMMERCIAL

## 2024-05-24 DIAGNOSIS — Z95.828 PORT-A-CATH IN PLACE: Primary | ICD-10-CM

## 2024-05-24 DIAGNOSIS — C34.2 ADENOCARCINOMA OF MIDDLE LOBE OF RIGHT LUNG (HCC): ICD-10-CM

## 2024-05-24 LAB
ALBUMIN SERPL BCP-MCNC: 3.9 G/DL (ref 3.5–5)
ALP SERPL-CCNC: 64 U/L (ref 34–104)
ALT SERPL W P-5'-P-CCNC: 28 U/L (ref 7–52)
ANION GAP SERPL CALCULATED.3IONS-SCNC: 6 MMOL/L (ref 4–13)
AST SERPL W P-5'-P-CCNC: 25 U/L (ref 13–39)
BASOPHILS # BLD AUTO: 0.04 THOUSANDS/ÂΜL (ref 0–0.1)
BASOPHILS NFR BLD AUTO: 1 % (ref 0–1)
BILIRUB SERPL-MCNC: 0.4 MG/DL (ref 0.2–1)
BUN SERPL-MCNC: 12 MG/DL (ref 5–25)
CALCIUM SERPL-MCNC: 9 MG/DL (ref 8.4–10.2)
CHLORIDE SERPL-SCNC: 105 MMOL/L (ref 96–108)
CO2 SERPL-SCNC: 28 MMOL/L (ref 21–32)
CREAT SERPL-MCNC: 0.72 MG/DL (ref 0.6–1.3)
EOSINOPHIL # BLD AUTO: 0.1 THOUSAND/ÂΜL (ref 0–0.61)
EOSINOPHIL NFR BLD AUTO: 2 % (ref 0–6)
ERYTHROCYTE [DISTWIDTH] IN BLOOD BY AUTOMATED COUNT: 11.8 % (ref 11.6–15.1)
GFR SERPL CREATININE-BSD FRML MDRD: 86 ML/MIN/1.73SQ M
GLUCOSE SERPL-MCNC: 87 MG/DL (ref 65–140)
HCT VFR BLD AUTO: 35.6 % (ref 34.8–46.1)
HGB BLD-MCNC: 11.7 G/DL (ref 11.5–15.4)
IMM GRANULOCYTES # BLD AUTO: 0.04 THOUSAND/UL (ref 0–0.2)
IMM GRANULOCYTES NFR BLD AUTO: 1 % (ref 0–2)
LYMPHOCYTES # BLD AUTO: 0.88 THOUSANDS/ÂΜL (ref 0.6–4.47)
LYMPHOCYTES NFR BLD AUTO: 15 % (ref 14–44)
MCH RBC QN AUTO: 35.6 PG (ref 26.8–34.3)
MCHC RBC AUTO-ENTMCNC: 32.9 G/DL (ref 31.4–37.4)
MCV RBC AUTO: 108 FL (ref 82–98)
MONOCYTES # BLD AUTO: 0.61 THOUSAND/ÂΜL (ref 0.17–1.22)
MONOCYTES NFR BLD AUTO: 11 % (ref 4–12)
NEUTROPHILS # BLD AUTO: 4.14 THOUSANDS/ÂΜL (ref 1.85–7.62)
NEUTS SEG NFR BLD AUTO: 70 % (ref 43–75)
NRBC BLD AUTO-RTO: 0 /100 WBCS
PLATELET # BLD AUTO: 234 THOUSANDS/UL (ref 149–390)
PMV BLD AUTO: 9.8 FL (ref 8.9–12.7)
POTASSIUM SERPL-SCNC: 4.2 MMOL/L (ref 3.5–5.3)
PROT SERPL-MCNC: 7.2 G/DL (ref 6.4–8.4)
RBC # BLD AUTO: 3.29 MILLION/UL (ref 3.81–5.12)
SODIUM SERPL-SCNC: 139 MMOL/L (ref 135–147)
WBC # BLD AUTO: 5.81 THOUSAND/UL (ref 4.31–10.16)

## 2024-05-24 PROCEDURE — 85025 COMPLETE CBC W/AUTO DIFF WBC: CPT | Performed by: RADIOLOGY

## 2024-05-24 PROCEDURE — 80053 COMPREHEN METABOLIC PANEL: CPT | Performed by: RADIOLOGY

## 2024-05-24 NOTE — PROGRESS NOTES
Pt presents for lab specimen collection via port a cath. Port accessed, labs drawn, saline locked, and de accessed without difficulty. Declined AVS, next appointment on 5/28/24 at 8:30am.

## 2024-05-28 ENCOUNTER — HOSPITAL ENCOUNTER (OUTPATIENT)
Dept: INFUSION CENTER | Facility: CLINIC | Age: 69
Discharge: HOME | End: 2024-05-28
Payer: COMMERCIAL

## 2024-05-28 VITALS
DIASTOLIC BLOOD PRESSURE: 60 MMHG | HEART RATE: 88 BPM | SYSTOLIC BLOOD PRESSURE: 124 MMHG | TEMPERATURE: 96.9 F | RESPIRATION RATE: 18 BRPM

## 2024-05-28 DIAGNOSIS — C34.91 ADENOCARCINOMA OF RIGHT LUNG (HCC): ICD-10-CM

## 2024-05-28 DIAGNOSIS — Z79.899 HIGH RISK MEDICATION USE: ICD-10-CM

## 2024-05-28 DIAGNOSIS — E83.42 HYPOMAGNESEMIA: Primary | ICD-10-CM

## 2024-05-28 RX ORDER — SODIUM CHLORIDE 9 MG/ML
20 INJECTION, SOLUTION INTRAVENOUS ONCE
Status: COMPLETED | OUTPATIENT
Start: 2024-05-28 | End: 2024-05-28

## 2024-05-28 RX ORDER — CYANOCOBALAMIN 1000 UG/ML
1000 INJECTION, SOLUTION INTRAMUSCULAR; SUBCUTANEOUS ONCE
Status: COMPLETED | OUTPATIENT
Start: 2024-05-28 | End: 2024-05-28

## 2024-05-28 RX ORDER — CYANOCOBALAMIN 1000 UG/ML
1000 INJECTION, SOLUTION INTRAMUSCULAR; SUBCUTANEOUS ONCE
OUTPATIENT
Start: 2024-06-25

## 2024-05-28 RX ADMIN — CYANOCOBALAMIN 1000 MCG: 1000 INJECTION, SOLUTION INTRAMUSCULAR; SUBCUTANEOUS at 09:44

## 2024-05-28 RX ADMIN — SODIUM CHLORIDE 20 ML/HR: 0.9 INJECTION, SOLUTION INTRAVENOUS at 09:15

## 2024-05-28 RX ADMIN — DURVALUMAB 1500 MG: 500 INJECTION, SOLUTION INTRAVENOUS at 09:35

## 2024-05-28 NOTE — PROGRESS NOTES
Saskia Alfaro  presents for imfinzi infusion reports fatigue and nausea managed with zofran. Pttolerated treatment well with no complications.      Saskia Alfaro is aware of future appt on 6/24 at 1000.     AVS printed and given to Saskia Alfaro:  Yes d/c from unit stable

## 2024-05-30 ENCOUNTER — OFFICE VISIT (OUTPATIENT)
Dept: GASTROENTEROLOGY | Facility: CLINIC | Age: 69
End: 2024-05-30
Payer: COMMERCIAL

## 2024-05-30 VITALS
TEMPERATURE: 96.5 F | OXYGEN SATURATION: 100 % | HEART RATE: 80 BPM | BODY MASS INDEX: 26.5 KG/M2 | HEIGHT: 64 IN | WEIGHT: 155.2 LBS

## 2024-05-30 DIAGNOSIS — K86.89 PANCREATIC DUCT STONES: ICD-10-CM

## 2024-05-30 DIAGNOSIS — K86.2 PANCREATIC PSEUDOCYST/CYST: Primary | ICD-10-CM

## 2024-05-30 DIAGNOSIS — K86.3 PANCREATIC PSEUDOCYST/CYST: Primary | ICD-10-CM

## 2024-05-30 PROCEDURE — 99213 OFFICE O/P EST LOW 20 MIN: CPT | Performed by: PHYSICIAN ASSISTANT

## 2024-05-30 RX ORDER — AMOXICILLIN 500 MG/1
CAPSULE ORAL
COMMUNITY
Start: 2024-04-09

## 2024-05-30 NOTE — PROGRESS NOTES
[facility] - Outpatient Follow-up Note  Saskia Alfaro 68 y.o. female MRN: [id number]  Encounter: [id number]          ASSESSMENT AND PLAN:      1. Pancreatic pseudocyst/cyst  2. Pancreatic duct stones  Cyst first noted on PET/CT in July of last year  She was referred for EUS as resection for her NSCLC was being considered    EUS on 11/28/23 with atrophic pancreas, dilated pancreatic duct in the body, cyst measuring 20 x 26 mm in the neck of the pancreas which was aspirated, large calcification was noted downstream from the cyst and upstream the duct was dilated; another anechoic cyst was noted with connection to the pancreatic ducts in the body of the pancreas, there were dilated pancreatic duct sidebranches likely recommending pancreatic pseudocyst with likely underlying chronic pancreatitis    MRI on March 25, 2024 with a similar cystic lesion of the pancreatic neck measuring 3.1 x 2.2 cm communicating with the main pancreatic duct and adjacent downstream 0.6 cm intraductal calculus along with pancreatic atrophy and ectatic main pancreatic ductal dilation measuring 0.8 cm with multiple dilated sidebranches, cystic lesion of the pancreatic body which seemingly decreased in size to 1 x 0.6 cm    She followed up with Dr. Rodriguez after her MRI and due to the pancreatic stones he advised evaluation at either [facility] in [facility] or [facility] in [place].    She saw Dr. Hunter at [facility] in [facility] on May 8 who strongly advised monitoring and not pursuing lithotripsy at this time    Again encourage patient to avoid smoking and drinking alcohol    She is feeling well at present    She is scheduled for a follow-up CT scan in October and appointment with Dr. Rodriguez in November    ______________________________________________________________________    SUBJECTIVE: 68-year-old female with non-small cell lung cancer currently on immunotherapy as well as chronic pancreatitis,  pancreatic pseudocyst and pancreatic duct stones who presents for routine follow-up.  Overall she is actually feeling quite well.  Although the immunotherapy does give her nausea and fatigue she seems to be tolerating it.  Unfortunately, she became a 9 candidate for surgical resection of her cancer when she could not quit smoking.  After her last appointment here she did follow-up with Dr. Garcia and had an endoscopic ultrasound on November 28 of last year.  This noted an atrophic pancreas, dilated pancreatic duct in the body, cyst measuring 20 x 26 mm in the neck of the pancreas which was aspirated,tin large calcification noted downstream from the cyst and upstream the duct was dilated, another anechoic cyst was noted with connection to the pancreatic duct in the body of the pancreas, there were dilated pancreatic duct sidebranches likely representing pancreatic pseudocyst with underlying chronic pancreatitis.  A follow-up MRI was performed in March of this year noting a similar appearance of the cystic lesion in the pancreatic neck measuring 3.1 x 2.2 cm with downstream 0.6 cm intraductal calculus and pancreatic atrophy.  He advised following up in either [place] or [place] for consideration of lithotripsy of the stones.  She saw Dr. Barcenas at [facility] on May 8.  Although he did not have the actual images to review he was able to see reports and mostly advised monitoring at this time.  She is relatively asymptomatic from these issues.  She is scheduled for a CT scan in October.      REVIEW OF SYSTEMS IS OTHERWISE NEGATIVE.      Historical Information   Past Medical History:   Diagnosis Date    Fibromyalgia     Heart murmur     Lumbosacral disc herniation     Lung cancer (HCC)     Psoriasis     Psoriasis     RSD (reflex sympathetic dystrophy)      Past Surgical History:   Procedure Laterality Date    IR PORT PLACEMENT  08/04/2023    LUNG BIOPSY      x2    PARTIAL HYSTERECTOMY       Social History   Social  "History     Substance and Sexual Activity   Alcohol Use Yes    Alcohol/week: 7.0 standard drinks of alcohol    Types: 7 Glasses of wine per week    Comment: daily     Social History     Substance and Sexual Activity   Drug Use No     Social History     Tobacco Use   Smoking Status Every Day    Current packs/day: 0.50    Average packs/day: 0.5 packs/day for 48.4 years (24.2 ttl pk-yrs)    Types: Cigarettes    Start date: 1976   Smokeless Tobacco Never     Family History   Problem Relation Age of Onset    Sarcoidosis Mother     Cancer Sister        Meds/Allergies       Current Outpatient Medications:     amitriptyline (ELAVIL) 25 mg tablet    amoxicillin (AMOXIL) 500 mg capsule    Ascorbic Acid (vitamin C) 100 MG tablet    atorvastatin (LIPITOR) 20 mg tablet    b complex vitamins capsule    calcipotriene (DOVONEX) 0.005 % cream    DULoxetine (CYMBALTA) 30 mg delayed release capsule    Fluticasone-Salmeterol (Advair) 100-50 mcg/dose inhaler    folic acid (KP Folic Acid) 1 mg tablet    loratadine (CLARITIN) 10 mg tablet    ondansetron (ZOFRAN) 4 mg tablet    propranolol (INDERAL) 20 mg/5 mL solution    triamcinolone (KENALOG) 0.1 % cream    bismuth subsalicylate (PEPTO BISMOL) 262 MG chewable tablet    omeprazole (PriLOSEC) 20 mg delayed release capsule  No current facility-administered medications for this visit.    Facility-Administered Medications Ordered in Other Visits:     alteplase (CATHFLO) injection 2 mg, 2 mg, Intracatheter, Q1MIN PRN    Allergies   Allergen Reactions    Prednisone Palpitations           Objective     Pulse 80, temperature (!) 96.5 °F (35.8 °C), temperature source Temporal, height 5' 4\" (1.626 m), weight 70.4 kg (155 lb 3.2 oz), SpO2 100%. Body mass index is 26.64 kg/m².      PHYSICAL EXAM:      General Appearance:   Alert, cooperative, no distress   HEENT:   Normocephalic, atraumatic, anicteric.     Neck:  Supple, symmetrical, trachea midline   Lungs:   Clear to auscultation bilaterally; no " rales, rhonchi or wheezing; respirations unlabored    Heart::   Regular rate and rhythm; no murmur, rub, or gallop.   Abdomen:   Soft, non-tender, non-distended; normal bowel sounds; no masses, no organomegaly    Genitalia:   Deferred    Rectal:   Deferred    Extremities:  No cyanosis, clubbing or edema    Pulses:  2+ and symmetric    Skin:  No jaundice, rashes, or lesions    Lymph nodes:  No palpable cervical lymphadenopathy        Lab Results:   No visits with results within 1 Day(s) from this visit.   Latest known visit with results is:   Hospital Outpatient Visit on 05/24/2024   Component Date Value    WBC 05/24/2024 5.81     RBC 05/24/2024 3.29 (L)     Hemoglobin 05/24/2024 11.7     Hematocrit 05/24/2024 35.6     MCV 05/24/2024 108 (H)     MCH 05/24/2024 35.6 (H)     MCHC 05/24/2024 32.9     RDW 05/24/2024 11.8     MPV 05/24/2024 9.8     Platelets 05/24/2024 234     nRBC 05/24/2024 0     Segmented % 05/24/2024 70     Immature Grans % 05/24/2024 1     Lymphocytes % 05/24/2024 15     Monocytes % 05/24/2024 11     Eosinophils Relative 05/24/2024 2     Basophils Relative 05/24/2024 1     Absolute Neutrophils 05/24/2024 4.14     Absolute Immature Grans 05/24/2024 0.04     Absolute Lymphocytes 05/24/2024 0.88     Absolute Monocytes 05/24/2024 0.61     Eosinophils Absolute 05/24/2024 0.10     Basophils Absolute 05/24/2024 0.04     Sodium 05/24/2024 139     Potassium 05/24/2024 4.2     Chloride 05/24/2024 105     CO2 05/24/2024 28     ANION GAP 05/24/2024 6     BUN 05/24/2024 12     Creatinine 05/24/2024 0.72     Glucose 05/24/2024 87     Calcium 05/24/2024 9.0     AST 05/24/2024 25     ALT 05/24/2024 28     Alkaline Phosphatase 05/24/2024 64     Total Protein 05/24/2024 7.2     Albumin 05/24/2024 3.9     Total Bilirubin 05/24/2024 0.40     eGFR 05/24/2024 86          Radiology Results:   NM PET CT skull base to mid thigh    Result Date: 5/7/2024  Narrative: PET/CT SCAN INDICATION: C34.91: Malignant neoplasm of  unspecified part of right bronchus or lung R59.0: Localized enlarged lymph nodes C34.81: Malignant neoplasm of overlapping sites of right bronchus and lung. Follow-up status post chemo and radiation to right lung. MODIFIER: PS COMPARISON: CT chest 03/28/2024, MRI abdomen 03/17/2024, NM PET/CT 10/19/2023, NM PET/CT 07/03/2023 CELL TYPE: Pulmonary adenocarcinoma TECHNIQUE:   8.3 mCi F-18-FDG administered IV. Multiplanar attenuation corrected and non attenuation corrected PET images are available for interpretation, and contiguous, low dose, axial CT sections were obtained from the skull base through the femurs. Intravenous contrast material was not utilized. This examination, like all CT scans performed in the [facility], was performed utilizing techniques to minimize radiation dose exposure, including the use of iterative reconstruction and automated exposure control. Fasting serum glucose: 121 mg/dl FINDINGS: VISUALIZED BRAIN: No acute abnormalities are seen. HEAD/NECK: There is a physiologic distribution of FDG. No FDG avid cervical adenopathy is seen. CT images: Unremarkable. CHEST: Continued interval decrease in FDG avidity of the right middle lobe pulmonary lesion, now with SUV max of 2.5 (previously 3.9). This mass measures approximately 3.0 x 3.5 cm (previously 3.9 x 4.4 cm) and continues to demonstrate central photopenia with FDG activity primarily around the periphery which may be indicative of central necrosis and/or fibrosis. No FDG activity seen within the mediastinal lymph nodes. CT images: Right-sided chest port with tip terminating in the lower SVC. Biapical pleural-parenchymal changes. Bibasilar subsegmental atelectasis with right-sided pleural thickening. Mild coronary artery calcification. Atherosclerotic vascular calcification of the thoracic aorta and proximal great vessels. ABDOMEN: No FDG avid soft tissue lesions are seen. CT images: Redemonstrated pancreatic head/neck cystic  lesion measuring approximately 2.3 x 2.0 cm (previously 2.9 x 2.2 cm). See MRI abdomen 3/17/2024 for better characterization. Atherosclerotic vascular calcifications. Hepatic steatosis. PELVIS: No FDG avid soft tissue lesions are seen. CT images: Postsurgical changes of hysterectomy. OSSEOUS STRUCTURES: No FDG avid lesions are seen. CT images: Spinal degenerative changes.     Impression: 1. Overall findings suggestive of continued treatment response with decreased size and FDG avidity of the right middle lobe lesion, now with SUV max of 2.5 (previously 3.9) and measuring up to 3.5 cm (previously 4.4 cm). 2. No new or worsening FDG avid lesions within the head/neck, chest, abdomen, or pelvis. 3. Redemonstrated pancreatic head/neck cystic lesion measuring up to 2.3 cm. See MRI abdomen 03/17/2024 for better characterization. Resident: CRISTÓBAL STOVER I, the attending radiologist, have reviewed the images and agree with the final report above. Workstation performed: [id number]   Answers submitted by the patient for this visit:  Abdominal Pain Questionnaire (Submitted on 3/21/2024)  Chief Complaint: Abdominal pain  Chronicity: new  Onset: 1 to 4 weeks ago  Onset quality: undetermined  Frequency: intermittently  Progression since onset: unchanged  Pain - numeric: 5/10  Pain quality: cramping, a sensation of fullness  Radiates to: does not radiate  anorexia: Yes  flatus: Yes  Aggravated by: eating  Relieved by: recumbency  Diagnostic workup: CT scan, GI consult

## 2024-06-04 ENCOUNTER — OFFICE VISIT (OUTPATIENT)
Dept: HEMATOLOGY ONCOLOGY | Facility: CLINIC | Age: 69
End: 2024-06-04
Payer: COMMERCIAL

## 2024-06-04 VITALS
DIASTOLIC BLOOD PRESSURE: 72 MMHG | HEIGHT: 66 IN | OXYGEN SATURATION: 96 % | TEMPERATURE: 97.5 F | HEART RATE: 111 BPM | WEIGHT: 153.5 LBS | SYSTOLIC BLOOD PRESSURE: 110 MMHG | BODY MASS INDEX: 24.67 KG/M2

## 2024-06-04 DIAGNOSIS — K86.2 PANCREATIC PSEUDOCYST/CYST: ICD-10-CM

## 2024-06-04 DIAGNOSIS — C34.91 ADENOCARCINOMA OF RIGHT LUNG (HCC): Primary | ICD-10-CM

## 2024-06-04 DIAGNOSIS — L40.9 PSORIASIS: ICD-10-CM

## 2024-06-04 DIAGNOSIS — K86.3 PANCREATIC PSEUDOCYST/CYST: ICD-10-CM

## 2024-06-04 PROCEDURE — 99214 OFFICE O/P EST MOD 30 MIN: CPT | Performed by: INTERNAL MEDICINE

## 2024-06-04 PROCEDURE — G2211 COMPLEX E/M VISIT ADD ON: HCPCS | Performed by: INTERNAL MEDICINE

## 2024-06-04 NOTE — PROGRESS NOTES
"  Hematology/Oncology Outpatient Follow- up Note  Saskia Alfaro 68 y.o. female MRN: @ Encounter: [id number]        Date:  6/4/2024        Assessment / Plan:    1 right lung cancer adenocarcinoma stage IIIb T4 N2 M0 TPS score 98 K-minna G12 C+ mutated.  2 resolved allergic reaction to Taxol completing Taxol now on Imfinzi.  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  Plan she will continue her Imfinzi.  She is doing well with that.  She will return in 12 weeks.  She is followed regular by gastroenterology as well.  Her scans were stable.  Will repeat them every 3 to 6 months at this time.      HPI: 68-year-old female here for follow-up.  She is doing well.  Her scans show improvement in her adenocarcinoma.  She remains with a pseudocyst.  There is a question of removing stones from the pancreatic duct.  It was elected not to do so.  She was seen in consultation [facility] and their gastroenterology department.  Of note her psoriasis is acting up probably related to immunotherapy.  She says it is tolerable.  She had a bone scan that was negative.  She is on Imfinzi and tolerating it fairly well other than the psoriasis.  She is maintaining weight she has no abdominal pain she is not short of breath at rest.      Interval History: Note from 4/5/2024 \"  Assessment / Plan:    1 right lung cancer adenocarcinoma stage IIIb T4 N2 M0 TPS score 98 K-minna KG12 C mutated.  2 Resolved allergic reaction to Taxol leading to use of Alimta completed her treatments.  3 presumed pseudocyst of pancreas repeat MRI stable has a stone in duct and is being further evaluated by GI.  Plan: Because of her myalgias and arthralgias that she has been complaining about we will get a bone scan to make sure she is stable.  She has a new nodule in her anterior patellar area.  We will also begin her on Imfinzi.  We will see her in about " 4 weeks time.  HPI: 68-year-old female comes in for follow-up.  She is status post chemoradiation for stage IIIb right lung cancer.  Her scans are stable.  She is complaining of pain in several joints and into her legs.  Given her history we will obtain a bone scan although I suspect it will be negative.  She denies fever or chills.  She is occasionally short of breath with some coughing.  Overall doing reasonably well.  We plan to begin Imfinzi therapy on her per [place] trial in the near future.  CT scan shows no worsening of disease.  Interval History: Note 3/5/2024:  Assessment / Plan:    1 right lung cancer adenocarcinoma stage IIIb T4 N2 M0 TPS score 98 K-minna KG12 C mutated  2 allergic reaction to Taxol leading to use of Alimta.  3 presumed pseudocyst of pancreas awaiting repeat MRI  Plan: Patient is due to receive her MRI 3/17.  She will get a CAT scan chest abdomen pelvis on 3/28 along with laboratory work CBC CMP.  She will come back in 4 weeks at which time we will plan to begin her.  Side effects were discussed and consents were obtained.  No other major suggestions at this time.  HPI: 68-year-old female having completed her chemotherapy radiation on 3/1.  She received her last dose of Alimta on 3/1.  She is got some support taste in her mouth some nausea.  She is able to hold some food down.  She received doses of Alimta on 2/93/1.  She received weekly carboplatin.  She has maintained her weight.  She had a plain chest x-ray during her radiation which shows what appear to be some inflammatory changes in her fissure.  This will be followed up in the future.  She will undergo scans in 3 and half weeks along with laboratory work.  We plan to begin her on Imfinzi in 4 weeks on a every 4 week basis.  Side effects were discussed and consents were obtained today.  Interval History:    1 right lung cancer adenocarcinoma stage IIIb T4 N2 M0 TPS score 98 K-minna G12 C mutated  2 allergic reaction to Taxol.  3  presumed pseudocyst of pancreas  Plan: Patient has been off Taxol for several weeks.  We are going to add Alimta 500 mg/m² to her weekly carboplatin.  Alimta will be every 3 weeks.  She will receive it this Friday 2/9 and then 3 weeks later.  She is will still be candidate to obtain Imfinzi in the future.  Side effects were discussed consent was obtained she will follow-up in 3 weeks.  HPI: She is tolerating carboplatin fairly well has no major problems.  We discussed the Alimta.  She has had several weeks of now away total of 3 weeks and we will go ahead with Alimta this week.  Decadron/B12/folate were added as part of her regimen.  Side effects were discussed and consents were obtained.  She is ready to try it.  We will have to watch her counts which she will get weekly.  Interval History:   68-year-old female seen with the above problem.  She began her combined chemoradiation with Taxol carboplatinum and radiation on 1/19/2024.  Unfortunately she developed a significant reaction after 7 minutes of infusion with Taxol short of breath lightheaded dizzy feeling poorly and the drug was stopped.  Presumably related to the camper for in the Taxol.  Unfortunately I think it would be difficult to give her more Taxol admitted due to.  I would hold that.  I think we are choices beyond this coming week of carboplatin alone will be in either -16 or Alimta.  We will look into one of the other.  I discussed this with her she is aware of that.  Note from 1/18/2024: 68-year-old female with history of adenocarcinoma right lung clinical stage IIIb T4 N2 M0.  PD-L1 TPS 98% K-minna G12 C mutation.  She has completed chemotherapy.  PET CT scan done at the completion showed some improvement disease.  She was seen by surgery felt she was not a surgical candidate.  She also has a cystic lesion approximately 3 cm in the head of the pancreas.  She has been evaluated by GI who felt this was a pseudocyst.  Biopsy was nondiagnostic.  Their  recommendation is to repeat her MRI which will be done in March.  Reviewing her situation we were going to plan to go ahead with her chemo therapy tomorrow.  She is to receive combined Taxol carboplatin.  She is feeling fairly well denies any other major problems.      Cancer Staging:  Cancer Staging   Adenocarcinoma of right lung (HCC)  Staging form: Lung, AJCC 8th Edition  - Clinical stage from 7/24/2023: Stage IIIB (cT4, cN2, cM0) - Signed by Celina Treviño MD on 12/26/2023  Stage prefix: Initial diagnosis      Molecular Testing:     Previous Hematologic/ Oncologic History:    Oncology History   Adenocarcinoma of right lung (HCC)   6/23/2023 Initial Diagnosis    Adenocarcinoma of right lung (HCC)     7/24/2023 Biopsy    Navigational bronchoscopy with EBUS    A.  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     A.-B.  Lung, Right Middle Lobe Bronchial Brushing (Thin-prep and smear preparations):   Conclusive evidence of malignancy.  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     Satisfactory for evaluation.     C.-D.  Lung, Right Middle Lobe Bronchoalveolar Lavage (Thin-prep and cell block preparations):   Conclusive evidence of malignancy.  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     Satisfactory for evaluation.      E.-F.  Lymph Node, level 7 (Thin-prep and smear preparations):   Atypical cellular changes seen.  Rare atypical cells.  Lymphocytes.  Few bronchial cells and pulmonary macrophages.        Satisfactory for evaluation.     G.-H.  Lymph Node, 10R (Thin-prep and smear preparations):   Negative for malignancy.  Lymphocytes.  Aggregates of neutrophils.     Satisfactory for evaluation.     7/24/2023 -  Cancer Staged    Staging form: Lung, AJCC 8th Edition  - Clinical stage from 7/24/2023: Stage IIIB (cT4, cN2, cM0) - Signed by Celina Treviño MD on 12/26/2023  Stage prefix: Initial diagnosis        8/31/2023 - 10/12/2023 Chemotherapy    cyanocobalamin, 1,000 mcg, Intramuscular, Once, 1 of 1 cycle  Administration: 1,000 mcg (8/24/2023)  alteplase (CATHFLO), 2 mg, Intracatheter, Every 1 Minute as needed, 3 of 3 cycles  CISplatin (PLATINOL) with mannitol IVPB, 75 mg/m2 = 129.8 mg (100 % of original dose 75 mg/m2), Intravenous, Once, 3 of 3 cycles  Dose modification: 50 mg/m2 (original dose 75 mg/m2, Cycle 1, Reason: Anticipated Tolerance), 75 mg/m2 (original dose 75 mg/m2, Cycle 1, Reason: Dose modified as per discussion with consulting physician)  Administration: 129.8 mg (8/31/2023), 129.8 mg (9/21/2023), 129.8 mg (10/12/2023)  fosaprepitant (EMEND) IVPB, 150 mg, Intravenous, Once, 3 of 3 cycles  Administration: 150 mg (8/31/2023), 150 mg (9/21/2023), 150 mg (10/12/2023)  nivolumab (OPDIVO) IVPB, 360 mg, Intravenous, Once, 3 of 3 cycles  Administration: 360 mg (8/31/2023), 360 mg (9/21/2023), 360 mg (10/12/2023)  pemetrexed (ALIMTA) chemo infusion, 500 mg/m2 = 865 mg, Intravenous, Once, 3 of 3 cycles  Administration: 865 mg (8/31/2023), 865 mg (9/21/2023), 865 mg (10/12/2023)     1/18/2024 - 3/1/2024 Radiation    Treatments:  Course: C1  Plan ID Energy Fractions Dose per Fraction (cGy) Dose Correction (cGy) Total Dose Delivered (cGy) Elapsed Days   R LUNG MED 6X 30 / 30 200 0 6,000 43    Treatment Dates:  1/18/2024 - 3/1/2024.      1/19/2024 - 3/1/2024 Chemotherapy    alteplase (CATHFLO), 2 mg, Intracatheter, Every 1 Minute as needed, 6 of 6 cycles  CARBOplatin (PARAPLATIN) IVPB (GOG AUC DOSING), 185 mg, Intravenous, Once, 6 of 6 cycles  Administration: 185 mg (1/19/2024), 183.2 mg (1/26/2024), 183.2 mg (2/2/2024), 183.2 mg (2/9/2024), 183.2 mg (2/16/2024), 196.6 mg (3/1/2024)  PACLItaxel (TAXOL) chemo IVPB, 50 mg/m2 = 85.8 mg, Intravenous, Once, 1 of 1 cycle  Administration: 85.8 mg (1/19/2024)  pemetrexed (ALIMTA) chemo infusion, 500 mg/m2 = 860 mg (100 % of original dose 500 mg/m2), Intravenous, Once, 2 of  2 cycles  Dose modification: 500 mg/m2 (original dose 500 mg/m2, Cycle 4)  Administration: 860 mg (2/9/2024), 860 mg (3/1/2024)     4/2/2024 -  Chemotherapy    alteplase (CATHFLO), 2 mg, Intracatheter, Every 1 Minute as needed, 3 of 6 cycles  durvalumab (IMFINZI) IVPB, 1,500 mg (original dose 10 mg/kg), Intravenous, Once, 3 of 6 cycles  Dose modification: 1,500 mg (original dose 10 mg/kg, Cycle 1, Reason: Other (Must fill in a comment), Comment: Will give every 28 days.)  Administration: 1,500 mg (4/2/2024), 1,500 mg (5/28/2024), 1,500 mg (4/30/2024)         Current Hematologic/ Oncologic Treatment:       Cycle 1         Test Results:    Imaging: NM PET CT skull base to mid thigh    Result Date: 5/7/2024  Narrative: PET/CT SCAN INDICATION: C34.91: Malignant neoplasm of unspecified part of right bronchus or lung R59.0: Localized enlarged lymph nodes C34.81: Malignant neoplasm of overlapping sites of right bronchus and lung. Follow-up status post chemo and radiation to right lung. MODIFIER: PS COMPARISON: CT chest 03/28/2024, MRI abdomen 03/17/2024, NM PET/CT 10/19/2023, NM PET/CT 07/03/2023 CELL TYPE: Pulmonary adenocarcinoma TECHNIQUE:   8.3 mCi F-18-FDG administered IV. Multiplanar attenuation corrected and non attenuation corrected PET images are available for interpretation, and contiguous, low dose, axial CT sections were obtained from the skull base through the femurs. Intravenous contrast material was not utilized. This examination, like all CT scans performed in the [facility], was performed utilizing techniques to minimize radiation dose exposure, including the use of iterative reconstruction and automated exposure control. Fasting serum glucose: 121 mg/dl FINDINGS: VISUALIZED BRAIN: No acute abnormalities are seen. HEAD/NECK: There is a physiologic distribution of FDG. No FDG avid cervical adenopathy is seen. CT images: Unremarkable. CHEST: Continued interval decrease in FDG avidity of the  right middle lobe pulmonary lesion, now with SUV max of 2.5 (previously 3.9). This mass measures approximately 3.0 x 3.5 cm (previously 3.9 x 4.4 cm) and continues to demonstrate central photopenia with FDG activity primarily around the periphery which may be indicative of central necrosis and/or fibrosis. No FDG activity seen within the mediastinal lymph nodes. CT images: Right-sided chest port with tip terminating in the lower SVC. Biapical pleural-parenchymal changes. Bibasilar subsegmental atelectasis with right-sided pleural thickening. Mild coronary artery calcification. Atherosclerotic vascular calcification of the thoracic aorta and proximal great vessels. ABDOMEN: No FDG avid soft tissue lesions are seen. CT images: Redemonstrated pancreatic head/neck cystic lesion measuring approximately 2.3 x 2.0 cm (previously 2.9 x 2.2 cm). See MRI abdomen 3/17/2024 for better characterization. Atherosclerotic vascular calcifications. Hepatic steatosis. PELVIS: No FDG avid soft tissue lesions are seen. CT images: Postsurgical changes of hysterectomy. OSSEOUS STRUCTURES: No FDG avid lesions are seen. CT images: Spinal degenerative changes.     Impression: 1. Overall findings suggestive of continued treatment response with decreased size and FDG avidity of the right middle lobe lesion, now with SUV max of 2.5 (previously 3.9) and measuring up to 3.5 cm (previously 4.4 cm). 2. No new or worsening FDG avid lesions within the head/neck, chest, abdomen, or pelvis. 3. Redemonstrated pancreatic head/neck cystic lesion measuring up to 2.3 cm. See MRI abdomen 03/17/2024 for better characterization. Resident: CRISTÓBAL STOVER I, the attending radiologist, have reviewed the images and agree with the final report above. Workstation performed: [id number]             Labs:   Lab Results   Component Value Date    WBC 5.81 05/24/2024    HGB 11.7 05/24/2024    HCT 35.6 05/24/2024     (H) 05/24/2024     05/24/2024     Lab  "Results   Component Value Date    K 4.2 05/24/2024     05/24/2024    CO2 28 05/24/2024    BUN 12 05/24/2024    CREATININE 0.72 05/24/2024    CALCIUM 9.0 05/24/2024    AST 25 05/24/2024    ALT 28 05/24/2024    ALKPHOS 64 05/24/2024    EGFR 86 05/24/2024         No results found for: \"SPEP\", \"UPEP\"    No results found for: \"PSA\"    No results found for: \"CEA\"    No results found for: \"\"    No results found for: \"AFP\"    No results found for: \"IRON\", \"TIBC\", \"FERRITIN\"    Lab Results   Component Value Date    FLZROVSP10 938 05/15/2018         ROS: Review of Systems   Constitutional: Negative.    HENT: Negative.     Eyes: Negative.    Respiratory: Negative.     Cardiovascular: Negative.    Gastrointestinal: Negative.    Endocrine: Negative.    Genitourinary: Negative.    Musculoskeletal: Negative.    Skin:  Positive for rash.   Allergic/Immunologic: Negative.    Neurological: Negative.    Hematological: Negative.      Psoriasis is acting up primarily on her elbows.    Current Medications: Reviewed  Allergies: Reviewed  PMH/FH/SH:  Reviewed      Physical Exam:    Body surface area is 1.79 meters squared.    Wt Readings from Last 3 Encounters:   06/04/24 69.6 kg (153 lb 8 oz)   05/30/24 70.4 kg (155 lb 3.2 oz)   04/05/24 68.5 kg (151 lb)        Temp Readings from Last 3 Encounters:   06/04/24 97.5 °F (36.4 °C)   05/30/24 (!) 96.5 °F (35.8 °C) (Temporal)   05/28/24 (!) 96.9 °F (36.1 °C) (Temporal)        BP Readings from Last 3 Encounters:   06/04/24 110/72   05/28/24 124/60   04/30/24 156/58         Pulse Readings from Last 3 Encounters:   06/04/24 (!) 111   05/30/24 80   05/28/24 88     @LASTSAO2(3)@      Physical Exam  Constitutional:       Appearance: Normal appearance. She is normal weight.   HENT:      Head: Normocephalic and atraumatic.   Eyes:      Extraocular Movements: Extraocular movements intact.      Conjunctiva/sclera: Conjunctivae normal.      Pupils: Pupils are equal, round, and reactive to " light.   Cardiovascular:      Rate and Rhythm: Normal rate and regular rhythm.      Heart sounds: Normal heart sounds.   Pulmonary:      Effort: Pulmonary effort is normal.      Breath sounds: Normal breath sounds.   Abdominal:      General: Abdomen is flat. Bowel sounds are normal.      Palpations: Abdomen is soft.   Musculoskeletal:         General: Normal range of motion.      Cervical back: Normal range of motion and neck supple.   Skin:     General: Skin is warm and dry.   Neurological:      General: No focal deficit present.      Mental Status: She is alert and oriented to person, place, and time. Mental status is at baseline.     Rash on both elbows consistent with psoriasis.  Plaque-like with scaling.      Goals and Barriers:  Current Goal: Prolong Survival from Cancer.   Barriers: None.      Patient's Capacity to Self Care:  Patient is able to self care.    Code Status: [unfilled]  Advance Directive and Living Will:      Power of :

## 2024-06-19 RX ORDER — SODIUM CHLORIDE 9 MG/ML
20 INJECTION, SOLUTION INTRAVENOUS ONCE
Status: CANCELLED | OUTPATIENT
Start: 2024-06-25

## 2024-06-24 ENCOUNTER — HOSPITAL ENCOUNTER (OUTPATIENT)
Dept: INFUSION CENTER | Facility: CLINIC | Age: 69
Discharge: HOME | End: 2024-06-24
Payer: COMMERCIAL

## 2024-06-24 DIAGNOSIS — Z95.828 PORT-A-CATH IN PLACE: Primary | ICD-10-CM

## 2024-06-24 DIAGNOSIS — C34.91 ADENOCARCINOMA OF RIGHT LUNG (HCC): ICD-10-CM

## 2024-06-24 DIAGNOSIS — E83.42 HYPOMAGNESEMIA: ICD-10-CM

## 2024-06-24 LAB
ALBUMIN SERPL BCG-MCNC: 4 G/DL (ref 3.5–5)
ALP SERPL-CCNC: 68 U/L (ref 34–104)
ALT SERPL W P-5'-P-CCNC: 44 U/L (ref 7–52)
ANION GAP SERPL CALCULATED.3IONS-SCNC: 6 MMOL/L (ref 4–13)
AST SERPL W P-5'-P-CCNC: 32 U/L (ref 13–39)
BASOPHILS # BLD AUTO: 0.03 THOUSANDS/ÂΜL (ref 0–0.1)
BASOPHILS NFR BLD AUTO: 0 % (ref 0–1)
BILIRUB SERPL-MCNC: 0.36 MG/DL (ref 0.2–1)
BUN SERPL-MCNC: 9 MG/DL (ref 5–25)
CALCIUM SERPL-MCNC: 9.4 MG/DL (ref 8.4–10.2)
CHLORIDE SERPL-SCNC: 101 MMOL/L (ref 96–108)
CO2 SERPL-SCNC: 29 MMOL/L (ref 21–32)
CREAT SERPL-MCNC: 0.7 MG/DL (ref 0.6–1.3)
EOSINOPHIL # BLD AUTO: 0.12 THOUSAND/ÂΜL (ref 0–0.61)
EOSINOPHIL NFR BLD AUTO: 2 % (ref 0–6)
ERYTHROCYTE [DISTWIDTH] IN BLOOD BY AUTOMATED COUNT: 11.2 % (ref 11.6–15.1)
GFR SERPL CREATININE-BSD FRML MDRD: 89 ML/MIN/1.73SQ M
GLUCOSE SERPL-MCNC: 102 MG/DL (ref 65–140)
HCT VFR BLD AUTO: 38.6 % (ref 34.8–46.1)
HGB BLD-MCNC: 12.6 G/DL (ref 11.5–15.4)
IMM GRANULOCYTES # BLD AUTO: 0.03 THOUSAND/UL (ref 0–0.2)
IMM GRANULOCYTES NFR BLD AUTO: 0 % (ref 0–2)
LYMPHOCYTES # BLD AUTO: 0.81 THOUSANDS/ÂΜL (ref 0.6–4.47)
LYMPHOCYTES NFR BLD AUTO: 11 % (ref 14–44)
MCH RBC QN AUTO: 33.9 PG (ref 26.8–34.3)
MCHC RBC AUTO-ENTMCNC: 32.6 G/DL (ref 31.4–37.4)
MCV RBC AUTO: 104 FL (ref 82–98)
MONOCYTES # BLD AUTO: 0.7 THOUSAND/ÂΜL (ref 0.17–1.22)
MONOCYTES NFR BLD AUTO: 9 % (ref 4–12)
NEUTROPHILS # BLD AUTO: 5.85 THOUSANDS/ÂΜL (ref 1.85–7.62)
NEUTS SEG NFR BLD AUTO: 78 % (ref 43–75)
NRBC BLD AUTO-RTO: 0 /100 WBCS
PLATELET # BLD AUTO: 225 THOUSANDS/UL (ref 149–390)
PMV BLD AUTO: 10.5 FL (ref 8.9–12.7)
POTASSIUM SERPL-SCNC: 3.9 MMOL/L (ref 3.5–5.3)
PROT SERPL-MCNC: 7.3 G/DL (ref 6.4–8.4)
RBC # BLD AUTO: 3.72 MILLION/UL (ref 3.81–5.12)
SODIUM SERPL-SCNC: 136 MMOL/L (ref 135–147)
T3FREE SERPL-MCNC: 3.5 PG/ML (ref 2.5–3.9)
TSH SERPL DL<=0.05 MIU/L-ACNC: 1.73 UIU/ML (ref 0.45–4.5)
WBC # BLD AUTO: 7.54 THOUSAND/UL (ref 4.31–10.16)

## 2024-06-24 PROCEDURE — 84481 FREE ASSAY (FT-3): CPT | Performed by: INTERNAL MEDICINE

## 2024-06-24 PROCEDURE — 85025 COMPLETE CBC W/AUTO DIFF WBC: CPT | Performed by: INTERNAL MEDICINE

## 2024-06-24 PROCEDURE — 84443 ASSAY THYROID STIM HORMONE: CPT | Performed by: INTERNAL MEDICINE

## 2024-06-24 PROCEDURE — 80053 COMPREHEN METABOLIC PANEL: CPT | Performed by: INTERNAL MEDICINE

## 2024-06-24 NOTE — PROGRESS NOTES
Patient to clinic for port flush and lab draw via port, offers no complaints today. Tolerated procedure without complications, aware of next appointment on 6/25/24 at 9 am, port de-accessed and flushed. AVS declined.

## 2024-06-25 ENCOUNTER — HOSPITAL ENCOUNTER (OUTPATIENT)
Dept: INFUSION CENTER | Facility: CLINIC | Age: 69
Discharge: HOME | End: 2024-06-25
Payer: COMMERCIAL

## 2024-06-25 VITALS
SYSTOLIC BLOOD PRESSURE: 126 MMHG | TEMPERATURE: 96 F | DIASTOLIC BLOOD PRESSURE: 62 MMHG | RESPIRATION RATE: 18 BRPM | HEART RATE: 85 BPM

## 2024-06-25 DIAGNOSIS — E83.42 HYPOMAGNESEMIA: Primary | ICD-10-CM

## 2024-06-25 DIAGNOSIS — Z79.899 HIGH RISK MEDICATION USE: ICD-10-CM

## 2024-06-25 DIAGNOSIS — C34.91 ADENOCARCINOMA OF RIGHT LUNG (HCC): ICD-10-CM

## 2024-06-25 RX ORDER — CYANOCOBALAMIN 1000 UG/ML
1000 INJECTION, SOLUTION INTRAMUSCULAR; SUBCUTANEOUS ONCE
Status: COMPLETED | OUTPATIENT
Start: 2024-06-25 | End: 2024-06-25

## 2024-06-25 RX ORDER — CYANOCOBALAMIN 1000 UG/ML
1000 INJECTION, SOLUTION INTRAMUSCULAR; SUBCUTANEOUS ONCE
OUTPATIENT
Start: 2024-06-28

## 2024-06-25 RX ORDER — SODIUM CHLORIDE 9 MG/ML
20 INJECTION, SOLUTION INTRAVENOUS ONCE
Status: COMPLETED | OUTPATIENT
Start: 2024-06-25 | End: 2024-06-25

## 2024-06-25 RX ADMIN — DURVALUMAB 1500 MG: 500 INJECTION, SOLUTION INTRAVENOUS at 10:09

## 2024-06-25 RX ADMIN — CYANOCOBALAMIN 1000 MCG: 1000 INJECTION, SOLUTION INTRAMUSCULAR; SUBCUTANEOUS at 10:03

## 2024-06-25 RX ADMIN — SODIUM CHLORIDE 20 ML/HR: 0.9 INJECTION, SOLUTION INTRAVENOUS at 09:30

## 2024-06-25 NOTE — PROGRESS NOTES
Pt to clinic for Imfinzi and B12 injection, offers no complaints today, tolerated infusion and B12 in L arm without complications, aware of next appointment on 6/28/24 at 0930, port de-accessed, avs declined.

## 2024-06-26 ENCOUNTER — TELEPHONE (OUTPATIENT)
Dept: RADIATION ONCOLOGY | Facility: CLINIC | Age: 69
End: 2024-06-26

## 2024-06-26 NOTE — TELEPHONE ENCOUNTER
Eva from [facility] called requesting a medical clearance letter for patient to have 2-4 dental implants.     Per Eva can be reached at      Fax number is .      Thank you!

## 2024-06-28 NOTE — TELEPHONE ENCOUNTER
Clearance note completed and sent to patient via My Chart and faxed to Dr. Lorenzana's office ([phone]).

## 2024-07-16 RX ORDER — SODIUM CHLORIDE 9 MG/ML
20 INJECTION, SOLUTION INTRAVENOUS ONCE
OUTPATIENT
Start: 2024-07-23

## 2024-07-22 ENCOUNTER — HOSPITAL ENCOUNTER (OUTPATIENT)
Dept: INFUSION CENTER | Facility: CLINIC | Age: 69
Discharge: HOME | End: 2024-07-22
Payer: COMMERCIAL

## 2024-07-22 DIAGNOSIS — C34.91 ADENOCARCINOMA OF RIGHT LUNG (HCC): ICD-10-CM

## 2024-07-22 DIAGNOSIS — E83.42 HYPOMAGNESEMIA: ICD-10-CM

## 2024-07-22 DIAGNOSIS — Z95.828 PORT-A-CATH IN PLACE: Primary | ICD-10-CM

## 2024-07-22 LAB
ALBUMIN SERPL BCG-MCNC: 3.8 G/DL (ref 3.5–5)
ALP SERPL-CCNC: 70 U/L (ref 34–104)
ALT SERPL W P-5'-P-CCNC: 37 U/L (ref 7–52)
ANION GAP SERPL CALCULATED.3IONS-SCNC: 8 MMOL/L (ref 4–13)
AST SERPL W P-5'-P-CCNC: 26 U/L (ref 13–39)
BILIRUB SERPL-MCNC: 0.57 MG/DL (ref 0.2–1)
BUN SERPL-MCNC: 16 MG/DL (ref 5–25)
CALCIUM SERPL-MCNC: 9.2 MG/DL (ref 8.4–10.2)
CHLORIDE SERPL-SCNC: 101 MMOL/L (ref 96–108)
CO2 SERPL-SCNC: 27 MMOL/L (ref 21–32)
CREAT SERPL-MCNC: 0.73 MG/DL (ref 0.6–1.3)
GFR SERPL CREATININE-BSD FRML MDRD: 84 ML/MIN/1.73SQ M
GLUCOSE SERPL-MCNC: 114 MG/DL (ref 65–140)
POTASSIUM SERPL-SCNC: 4.1 MMOL/L (ref 3.5–5.3)
PROT SERPL-MCNC: 7.2 G/DL (ref 6.4–8.4)
SODIUM SERPL-SCNC: 136 MMOL/L (ref 135–147)
T3FREE SERPL-MCNC: 3.61 PG/ML (ref 2.5–3.9)
TSH SERPL DL<=0.05 MIU/L-ACNC: 1.99 UIU/ML (ref 0.45–4.5)

## 2024-07-22 PROCEDURE — 84481 FREE ASSAY (FT-3): CPT | Performed by: INTERNAL MEDICINE

## 2024-07-22 PROCEDURE — 80053 COMPREHEN METABOLIC PANEL: CPT | Performed by: INTERNAL MEDICINE

## 2024-07-22 PROCEDURE — 84443 ASSAY THYROID STIM HORMONE: CPT | Performed by: INTERNAL MEDICINE

## 2024-07-22 NOTE — PROGRESS NOTES
Pt presents for lab specimen collection via port a cath. Port accessed, labs drawn, saline locked and de accessed without difficulty. Declined AVS, next appointment 7/23 9am.

## 2024-07-23 ENCOUNTER — HOSPITAL ENCOUNTER (OUTPATIENT)
Dept: INFUSION CENTER | Facility: CLINIC | Age: 69
Discharge: HOME | End: 2024-07-23
Payer: COMMERCIAL

## 2024-07-23 ENCOUNTER — OFFICE VISIT (OUTPATIENT)
Dept: HEMATOLOGY ONCOLOGY | Facility: CLINIC | Age: 69
End: 2024-07-23
Payer: COMMERCIAL

## 2024-07-23 ENCOUNTER — TELEPHONE (OUTPATIENT)
Dept: HEMATOLOGY ONCOLOGY | Facility: CLINIC | Age: 69
End: 2024-07-23

## 2024-07-23 VITALS
RESPIRATION RATE: 18 BRPM | HEART RATE: 103 BPM | SYSTOLIC BLOOD PRESSURE: 128 MMHG | DIASTOLIC BLOOD PRESSURE: 77 MMHG | TEMPERATURE: 96.2 F

## 2024-07-23 VITALS
HEIGHT: 66 IN | SYSTOLIC BLOOD PRESSURE: 106 MMHG | HEART RATE: 113 BPM | BODY MASS INDEX: 25.55 KG/M2 | WEIGHT: 159 LBS | DIASTOLIC BLOOD PRESSURE: 66 MMHG | OXYGEN SATURATION: 95 %

## 2024-07-23 DIAGNOSIS — C34.91 ADENOCARCINOMA OF RIGHT LUNG (HCC): ICD-10-CM

## 2024-07-23 DIAGNOSIS — C34.91 ADENOCARCINOMA OF RIGHT LUNG (HCC): Primary | ICD-10-CM

## 2024-07-23 DIAGNOSIS — Z79.899 HIGH RISK MEDICATION USE: Primary | ICD-10-CM

## 2024-07-23 DIAGNOSIS — Z79.899 HIGH RISK MEDICATION USE: ICD-10-CM

## 2024-07-23 DIAGNOSIS — C34.2 ADENOCARCINOMA OF MIDDLE LOBE OF RIGHT LUNG (HCC): ICD-10-CM

## 2024-07-23 DIAGNOSIS — L40.9 PSORIASIS: ICD-10-CM

## 2024-07-23 DIAGNOSIS — E83.42 HYPOMAGNESEMIA: ICD-10-CM

## 2024-07-23 LAB
BASOPHILS # BLD AUTO: 0.03 THOUSANDS/ÂΜL (ref 0–0.1)
BASOPHILS NFR BLD AUTO: 0 % (ref 0–1)
EOSINOPHIL # BLD AUTO: 0.19 THOUSAND/ÂΜL (ref 0–0.61)
EOSINOPHIL NFR BLD AUTO: 3 % (ref 0–6)
ERYTHROCYTE [DISTWIDTH] IN BLOOD BY AUTOMATED COUNT: 11.6 % (ref 11.6–15.1)
HCT VFR BLD AUTO: 37.7 % (ref 34.8–46.1)
HGB BLD-MCNC: 12.5 G/DL (ref 11.5–15.4)
IMM GRANULOCYTES # BLD AUTO: 0.05 THOUSAND/UL (ref 0–0.2)
IMM GRANULOCYTES NFR BLD AUTO: 1 % (ref 0–2)
LYMPHOCYTES # BLD AUTO: 1.12 THOUSANDS/ÂΜL (ref 0.6–4.47)
LYMPHOCYTES NFR BLD AUTO: 15 % (ref 14–44)
MCH RBC QN AUTO: 33.3 PG (ref 26.8–34.3)
MCHC RBC AUTO-ENTMCNC: 33.2 G/DL (ref 31.4–37.4)
MCV RBC AUTO: 101 FL (ref 82–98)
MONOCYTES # BLD AUTO: 0.62 THOUSAND/ÂΜL (ref 0.17–1.22)
MONOCYTES NFR BLD AUTO: 9 % (ref 4–12)
NEUTROPHILS # BLD AUTO: 5.3 THOUSANDS/ÂΜL (ref 1.85–7.62)
NEUTS SEG NFR BLD AUTO: 72 % (ref 43–75)
NRBC BLD AUTO-RTO: 0 /100 WBCS
PLATELET # BLD AUTO: 232 THOUSANDS/UL (ref 149–390)
PMV BLD AUTO: 9.9 FL (ref 8.9–12.7)
RBC # BLD AUTO: 3.75 MILLION/UL (ref 3.81–5.12)
WBC # BLD AUTO: 7.31 THOUSAND/UL (ref 4.31–10.16)

## 2024-07-23 PROCEDURE — G2211 COMPLEX E/M VISIT ADD ON: HCPCS | Performed by: INTERNAL MEDICINE

## 2024-07-23 PROCEDURE — 85025 COMPLETE CBC W/AUTO DIFF WBC: CPT | Performed by: INTERNAL MEDICINE

## 2024-07-23 PROCEDURE — 99213 OFFICE O/P EST LOW 20 MIN: CPT | Performed by: INTERNAL MEDICINE

## 2024-07-23 NOTE — TELEPHONE ENCOUNTER
Medication time out/change to orders)    Date patient scheduled: 7/23/25    Original medication ordered: Imfinzi    New Medication ordered: n/a    Infusion RN notified patient- Courtney    Patient 's psoriasis is worse than has been.  is made aware and he does not want to proceed with medication. Please hold treatment today and he will see her in office for evaluation.

## 2024-07-23 NOTE — PROGRESS NOTES
Pt to clinic for imfinzi. Stat labs drawn via port. Pt c/o worsening psoriasis on scalp, ears, elbows,a nd hands since starting imfinzi. Pt uses topical ointments for psoriasis, but not much improvement noted. Pt states she does not see anyone for psoriasis at this time because it hasn't been this bad. Spoke with Roxana Reid RN who stated per Dr. Thrasher pt not to receive imfinizi today and pt to report to MD office for further evaluation. Time out performed. Pt informed and verbalized understanding. Port de-accessed. AVS declined. Per Roxana, office to call with when pt should return for treatment to update pt's schedule.

## 2024-07-29 ENCOUNTER — OFFICE VISIT (OUTPATIENT)
Dept: RADIATION ONCOLOGY | Facility: CLINIC | Age: 69
End: 2024-07-29
Attending: RADIOLOGY
Payer: COMMERCIAL

## 2024-07-29 VITALS
RESPIRATION RATE: 18 BRPM | SYSTOLIC BLOOD PRESSURE: 118 MMHG | HEART RATE: 102 BPM | WEIGHT: 154 LBS | OXYGEN SATURATION: 96 % | TEMPERATURE: 97.2 F | DIASTOLIC BLOOD PRESSURE: 64 MMHG | BODY MASS INDEX: 24.86 KG/M2

## 2024-07-29 DIAGNOSIS — C34.91 ADENOCARCINOMA OF RIGHT LUNG (HCC): Primary | ICD-10-CM

## 2024-07-29 PROCEDURE — 99214 OFFICE O/P EST MOD 30 MIN: CPT | Performed by: RADIOLOGY

## 2024-07-29 PROCEDURE — G2211 COMPLEX E/M VISIT ADD ON: HCPCS | Performed by: RADIOLOGY

## 2024-07-29 NOTE — PROGRESS NOTES
Saskia Alfaro 1955 is a 69 y.o. female with a history of stage IIIB NSCLC status post neoadjuvant chemotherapy, but was not a surgical candidate post treatment, who recently completed a course of definitive radiation on 3/1/24. She was last seen 3/29/24 and presents today for follow up.       24 PET CT  1. Overall findings suggestive of continued treatment response with decreased size and FDG avidity of the right middle lobe lesion, now with SUV max of 2.5 (previously 3.9) and measuring up to 3.5 cm (previously 4.4 cm).  2. No new or worsening FDG avid lesions within the head/neck, chest, abdomen, or pelvis.  3. Redemonstrated pancreatic head/neck cystic lesion measuring up to 2.3 cm. See MRI abdomen 2024 for better characterization.      24 Dr. Thrasher  1 right lung cancer adenocarcinoma stage IIIb T4 N2 M0 TPS score 98 K-minna G12 C+ mutated.  2 resolved allergic reaction to Taxol completing Taxol now on Imfinzi.  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  Plan she will continue her Imfinzi.  She is doing well with that.  She will return in 12 weeks.  She is followed regular by gastroenterology as well.  Her scans were stable.  Will repeat them every 3 to 6 months at this time.      24 Dr. 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  If she is stable we will plan her treatment in 4 weeks.  We will skip this treatment till she is seen.      Upcomin24 Dermatology  24 Infusion  9/10/24 Dr. Thrasher  10/7/24 CT  11/15/24 GI      Oncology History    Adenocarcinoma of right lung (HCC)   6/23/2023 Initial Diagnosis    Adenocarcinoma of right lung (HCC)     7/24/2023 Biopsy    Navigational bronchoscopy with EBUS    A.  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     A.-B.  Lung, Right Middle Lobe Bronchial Brushing (Thin-prep and smear preparations):   Conclusive evidence of malignancy.  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     Satisfactory for evaluation.     C.-D.  Lung, Right Middle Lobe Bronchoalveolar Lavage (Thin-prep and cell block preparations):   Conclusive evidence of malignancy.  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     Satisfactory for evaluation.      E.-F.  Lymph Node, level 7 (Thin-prep and smear preparations):   Atypical cellular changes seen.  Rare atypical cells.  Lymphocytes.  Few bronchial cells and pulmonary macrophages.        Satisfactory for evaluation.     G.-H.  Lymph Node, 10R (Thin-prep and smear preparations):   Negative for malignancy.  Lymphocytes.  Aggregates of neutrophils.     Satisfactory for evaluation.     7/24/2023 -  Cancer Staged    Staging form: Lung, AJCC 8th Edition  - Clinical stage from 7/24/2023: Stage IIIB (cT4, cN2, cM0) - Signed by Celina Treviño MD on 12/26/2023  Stage prefix: Initial diagnosis       8/31/2023 - 10/12/2023 Chemotherapy    cyanocobalamin, 1,000 mcg, Intramuscular, Once, 1 of 1 cycle  Administration: 1,000 mcg (8/24/2023)  alteplase (CATHFLO), 2 mg, Intracatheter, Every 1 Minute as needed, 3 of 3 cycles  CISplatin (PLATINOL) with mannitol IVPB, 75 mg/m2 = 129.8 mg (100 % of original dose 75 mg/m2), Intravenous, Once, 3 of 3 cycles  Dose modification: 50 mg/m2 (original dose 75 mg/m2, Cycle 1, Reason: Anticipated Tolerance), 75 mg/m2 (original dose 75 mg/m2, Cycle 1, Reason: Dose modified as per discussion with consulting physician)  Administration: 129.8 mg (8/31/2023), 129.8 mg  (9/21/2023), 129.8 mg (10/12/2023)  fosaprepitant (EMEND) IVPB, 150 mg, Intravenous, Once, 3 of 3 cycles  Administration: 150 mg (8/31/2023), 150 mg (9/21/2023), 150 mg (10/12/2023)  nivolumab (OPDIVO) IVPB, 360 mg, Intravenous, Once, 3 of 3 cycles  Administration: 360 mg (8/31/2023), 360 mg (9/21/2023), 360 mg (10/12/2023)  pemetrexed (ALIMTA) chemo infusion, 500 mg/m2 = 865 mg, Intravenous, Once, 3 of 3 cycles  Administration: 865 mg (8/31/2023), 865 mg (9/21/2023), 865 mg (10/12/2023)     1/18/2024 - 3/1/2024 Radiation    Treatments:  Course: C1  Plan ID Energy Fractions Dose per Fraction (cGy) Dose Correction (cGy) Total Dose Delivered (cGy) Elapsed Days   R LUNG MED 6X 30 / 30 200 0 6,000 43    Treatment Dates:  1/18/2024 - 3/1/2024.      1/19/2024 - 3/1/2024 Chemotherapy    alteplase (CATHFLO), 2 mg, Intracatheter, Every 1 Minute as needed, 6 of 6 cycles  CARBOplatin (PARAPLATIN) IVPB (GOG AUC DOSING), 185 mg, Intravenous, Once, 6 of 6 cycles  Administration: 185 mg (1/19/2024), 183.2 mg (1/26/2024), 183.2 mg (2/2/2024), 183.2 mg (2/9/2024), 183.2 mg (2/16/2024), 196.6 mg (3/1/2024)  PACLItaxel (TAXOL) chemo IVPB, 50 mg/m2 = 85.8 mg, Intravenous, Once, 1 of 1 cycle  Administration: 85.8 mg (1/19/2024)  pemetrexed (ALIMTA) chemo infusion, 500 mg/m2 = 860 mg (100 % of original dose 500 mg/m2), Intravenous, Once, 2 of 2 cycles  Dose modification: 500 mg/m2 (original dose 500 mg/m2, Cycle 4)  Administration: 860 mg (2/9/2024), 860 mg (3/1/2024)     4/2/2024 -  Chemotherapy    alteplase (CATHFLO), 2 mg, Intracatheter, Every 1 Minute as needed, 4 of 6 cycles  durvalumab (IMFINZI) IVPB, 1,500 mg (original dose 10 mg/kg), Intravenous, Once, 4 of 6 cycles  Dose modification: 1,500 mg (original dose 10 mg/kg, Cycle 1, Reason: Other (Must fill in a comment), Comment: Will give every 28 days.)  Administration: 1,500 mg (4/2/2024), 1,500 mg (5/28/2024), 1,500 mg (6/25/2024), 1,500 mg (4/30/2024)         Review of  "Systems:  Review of Systems   Constitutional:  Positive for fatigue (tired in the afternoon).   HENT:  Positive for postnasal drip.    Eyes: Negative.    Respiratory:  Positive for cough (occasional in the morning). Negative for shortness of breath.         Smoking 1/2 PPD   Cardiovascular: Negative.    Gastrointestinal: Negative.    Endocrine: Negative.    Genitourinary: Negative.    Musculoskeletal:  Positive for arthralgias.   Skin:  Positive for rash (psoriasis on hands, arms, legs (seeing Derm on 8/7)).   Allergic/Immunologic: Negative.    Neurological:  Positive for weakness (generalized \"deconditioned\") and headaches (mild occasional). Negative for dizziness and numbness.   Hematological: Negative.    Psychiatric/Behavioral: Negative.         Clinical Trial: no      Health Maintenance   Topic Date Due    Hepatitis C Screening  Never done    Medicare Annual Wellness Visit (AWV)  Never done    Pneumococcal Vaccine: 65+ Years (1 of 2 - PCV) Never done    Hepatitis A Vaccine (1 of 2 - Risk 2-dose series) Never done    Osteoporosis Screening  Never done    RSV Vaccine Age 60+ Years (1 - 1-dose 60+ series) Never done    Zoster Vaccine (2 of 2) 12/04/2020    Fall Risk  Never done    Urinary Incontinence Screening  Never done    Breast Cancer Screening: Mammogram  10/21/2022    COVID-19 Vaccine (4 - 2023-24 season) 09/01/2023    Influenza Vaccine (1) 09/01/2024    BMI: Adult  07/23/2025    Depression Screening  07/29/2025    Colorectal Cancer Screening  08/10/2025    RSV Vaccine age 0-20 Months  Aged Out    HIB Vaccine  Aged Out    IPV Vaccine  Aged Out    Meningococcal ACWY Vaccine  Aged Out    HPV Vaccine  Aged Out    Lung Cancer Screening  Discontinued     Patient Active Problem List   Diagnosis    Pulmonary emphysema, unspecified emphysema type (HCC)    Adenocarcinoma of right lung (HCC)    Mediastinal adenopathy    Port-A-Cath in place    Hypomagnesemia    Pancreatic pseudocyst/cyst    Allergic reaction caused " by a drug    High risk medication use    Dilation of pancreatic duct    Pancreatic duct stones    Helicobacter positive gastritis    Psoriasis     Past Medical History:   Diagnosis Date    Fibromyalgia     Heart murmur     Lumbosacral disc herniation     Lung cancer (HCC)     Psoriasis     Psoriasis     RSD (reflex sympathetic dystrophy)      Past Surgical History:   Procedure Laterality Date    IR PORT PLACEMENT  08/04/2023    LUNG BIOPSY      x2    PARTIAL HYSTERECTOMY       Family History   Problem Relation Age of Onset    Sarcoidosis Mother     Cancer Sister      Social History     Socioeconomic History    Marital status:      Spouse name: Not on file    Number of children: Not on file    Years of education: Not on file    Highest education level: Not on file   Occupational History    Not on file   Tobacco Use    Smoking status: Every Day     Current packs/day: 0.50     Average packs/day: 0.5 packs/day for 48.6 years (24.3 ttl pk-yrs)     Types: Cigarettes     Start date: 1976    Smokeless tobacco: Never   Vaping Use    Vaping status: Never Used   Substance and Sexual Activity    Alcohol use: Yes     Alcohol/week: 7.0 standard drinks of alcohol     Types: 7 Glasses of wine per week     Comment: daily    Drug use: No    Sexual activity: Not on file   Other Topics Concern    Not on file   Social History Narrative    Not on file     Social Determinants of Health     Financial Resource Strain: Not on file   Food Insecurity: Not on file   Transportation Needs: Not on file   Physical Activity: Not on file   Stress: Not on file   Social Connections: Not on file   Intimate Partner Violence: Not on file   Housing Stability: Not on file       Current Outpatient Medications:     amitriptyline (ELAVIL) 25 mg tablet, , Disp: , Rfl:     amoxicillin (AMOXIL) 500 mg capsule, Prn dental procedure, Disp: , Rfl:     Ascorbic Acid (vitamin C) 100 MG tablet, Take 100 mg by mouth daily, Disp: , Rfl:     atorvastatin (LIPITOR)  20 mg tablet, Take 20 mg by mouth daily, Disp: , Rfl:     b complex vitamins capsule, Take 1 capsule by mouth daily, Disp: , Rfl:     calcipotriene (DOVONEX) 0.005 % cream, Apply topically 2 (two) times a day To affected areas on Saturday and Sunday only, Disp: 120 g, Rfl: 2    DULoxetine (CYMBALTA) 30 mg delayed release capsule, , Disp: , Rfl:     Fluticasone-Salmeterol (Advair) 100-50 mcg/dose inhaler, Inhale 1 puff 2 (two) times a day Rinse mouth after use., Disp: , Rfl:     folic acid (KP Folic Acid) 1 mg tablet, Take 1 tablet (1 mg total) by mouth daily, Disp: 90 tablet, Rfl: 2    ondansetron (ZOFRAN) 4 mg tablet, Take 1 tablet (4 mg total) by mouth every 8 (eight) hours as needed for nausea or vomiting, Disp: 20 tablet, Rfl: 0    propranolol (INDERAL) 20 mg/5 mL solution, , Disp: , Rfl:     triamcinolone (KENALOG) 0.1 % cream, Apply topically 2 (two) times a day Affected areas Monday through Friday only, Disp: 453.6 g, Rfl: 2    bismuth subsalicylate (PEPTO BISMOL) 262 MG chewable tablet, Take 1 tablet by mouth every 6 hours for 14 days (Patient not taking: Reported on 5/30/2024), Disp: 56 tablet, Rfl: 0    loratadine (CLARITIN) 10 mg tablet, Take 10 mg by mouth daily (Patient not taking: Reported on 7/29/2024), Disp: , Rfl:     omeprazole (PriLOSEC) 20 mg delayed release capsule, Take 1 capsule (20 mg total) by mouth 2 (two) times a day Every 12 hours for 14 days (Patient not taking: Reported on 12/26/2023), Disp: 28 capsule, Rfl: 0  Allergies   Allergen Reactions    Prednisone Palpitations     Vitals:    07/29/24 1143   BP: 118/64   BP Location: Right arm   Pulse: 102   Resp: 18   Temp: (!) 97.2 °F (36.2 °C)   TempSrc: Temporal   SpO2: 96%   Weight: 69.9 kg (154 lb)

## 2024-07-29 NOTE — LETTER
August 5, 2024     Jesse Thrasher MD  [address] [facility] [id number]    Patient: Saskia Alfaro   YOB: 1955   Date of Visit: 7/29/2024       Dear Dr. Thrasehr:    Thank you for referring Saskia Alfaro to me for evaluation. Below are my notes for this consultation.    If you have questions, please do not hesitate to call me. I look forward to following your patient along with you.         Sincerely,        Celina Treviño MD        CC: No Recipients    Celina Treviño MD  8/5/2024 10:42 AM  Sign when Signing Visit  Follow-up - Radiation Oncology   Saskia Alfaro 1955 69 y.o. female [id number]      History of Present Illness   Cancer Staging   Adenocarcinoma of right lung (HCC)  Staging form: Lung, AJCC 8th Edition  - Clinical stage from 7/24/2023: Stage IIIB (cT4, cN2, cM0) - Signed by Celina Treviño MD on 12/26/2023  Stage prefix: Initial diagnosis      Saskia Alfaro is a 69 y.o. year old female with a history of with a history of stage IIIB NSCLC status post neoadjuvant chemotherapy, but was not a surgical candidate post treatment, who recently completed a course of definitive radiation on 3/1/24. She was last seen 3/29/24 and presents today for follow up.      5/7/24 PET CT  1. Overall findings suggestive of continued treatment response with decreased size and FDG avidity of the right middle lobe lesion, now with SUV max of 2.5 (previously 3.9) and measuring up to 3.5 cm (previously 4.4 cm).  2. No new or worsening FDG avid lesions within the head/neck, chest, abdomen, or pelvis.  3. Redemonstrated pancreatic head/neck cystic lesion measuring up to 2.3 cm. See MRI abdomen 03/17/2024 for better characterization.     6/4/24 Dr. Thrasher  1 right lung cancer adenocarcinoma stage IIIb T4 N2 M0 TPS score 98 K-minna G12 C+ mutated.  2 resolved allergic reaction to Taxol completing Taxol now on Imfinzi.  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  Plan she will continue her Imfinzi.  She is doing well with that.  She will return in 12 weeks.  She is followed regular by gastroenterology as well.  Her scans were stable.  Will repeat them every 3 to 6 months at this time.     24 Dr. 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  If she is stable we will plan her treatment in 4 weeks.  We will skip this treatment till she is seen.     The patient has no new respiratory related complaints.  She denies progressive cough, hemoptysis, fever.  The patient has baseline cough.  She denies new shortness of breath or dyspnea on exertion.  She denies chest pain or palpitations.      She continue smoking, but cut back to 1/2 ppd.    She denies dysphagia, odynophagia, or GERD.    She is planned for CT abdomen and pelvis for follow-up pancreatic cyst.  No chest CT imaging is scheduled at this time.    The patient complains of significant psoriatic flare.  She notes that immunotherapy has been held at this time.    Upcomin24 Dermatology  24 Infusion  9/10/24 Dr. Thrasher  10/7/24 CT  11/15/24 GI    Historical Information   Oncology History   Adenocarcinoma of right lung (HCC)   2023 Initial Diagnosis    Adenocarcinoma of right lung (HCC)     2023 Biopsy    Navigational bronchoscopy with EBUS    A.  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     A.-B.  Lung, Right Middle Lobe Bronchial Brushing (Thin-prep and smear preparations):   Conclusive evidence of malignancy.  Non-small cell carcinoma compatible with a pulmonary  adenocarcinoma.     Satisfactory for evaluation.     C.-D.  Lung, Right Middle Lobe Bronchoalveolar Lavage (Thin-prep and cell block preparations):   Conclusive evidence of malignancy.  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     Satisfactory for evaluation.      E.-F.  Lymph Node, level 7 (Thin-prep and smear preparations):   Atypical cellular changes seen.  Rare atypical cells.  Lymphocytes.  Few bronchial cells and pulmonary macrophages.        Satisfactory for evaluation.     G.-H.  Lymph Node, 10R (Thin-prep and smear preparations):   Negative for malignancy.  Lymphocytes.  Aggregates of neutrophils.     Satisfactory for evaluation.     7/24/2023 -  Cancer Staged    Staging form: Lung, AJCC 8th Edition  - Clinical stage from 7/24/2023: Stage IIIB (cT4, cN2, cM0) - Signed by Celina Treviño MD on 12/26/2023  Stage prefix: Initial diagnosis       8/31/2023 - 10/12/2023 Chemotherapy    cyanocobalamin, 1,000 mcg, Intramuscular, Once, 1 of 1 cycle  Administration: 1,000 mcg (8/24/2023)  alteplase (CATHFLO), 2 mg, Intracatheter, Every 1 Minute as needed, 3 of 3 cycles  CISplatin (PLATINOL) with mannitol IVPB, 75 mg/m2 = 129.8 mg (100 % of original dose 75 mg/m2), Intravenous, Once, 3 of 3 cycles  Dose modification: 50 mg/m2 (original dose 75 mg/m2, Cycle 1, Reason: Anticipated Tolerance), 75 mg/m2 (original dose 75 mg/m2, Cycle 1, Reason: Dose modified as per discussion with consulting physician)  Administration: 129.8 mg (8/31/2023), 129.8 mg (9/21/2023), 129.8 mg (10/12/2023)  fosaprepitant (EMEND) IVPB, 150 mg, Intravenous, Once, 3 of 3 cycles  Administration: 150 mg (8/31/2023), 150 mg (9/21/2023), 150 mg (10/12/2023)  nivolumab (OPDIVO) IVPB, 360 mg, Intravenous, Once, 3 of 3 cycles  Administration: 360 mg (8/31/2023), 360 mg (9/21/2023), 360 mg (10/12/2023)  pemetrexed (ALIMTA) chemo infusion, 500 mg/m2 = 865 mg, Intravenous, Once, 3 of 3 cycles  Administration: 865 mg (8/31/2023), 865  mg (9/21/2023), 865 mg (10/12/2023)     1/18/2024 - 3/1/2024 Radiation    Treatments:  Course: C1  Plan ID Energy Fractions Dose per Fraction (cGy) Dose Correction (cGy) Total Dose Delivered (cGy) Elapsed Days   R LUNG MED 6X 30 / 30 200 0 6,000 43    Treatment Dates:  1/18/2024 - 3/1/2024.      1/19/2024 - 3/1/2024 Chemotherapy    alteplase (CATHFLO), 2 mg, Intracatheter, Every 1 Minute as needed, 6 of 6 cycles  CARBOplatin (PARAPLATIN) IVPB (GO AUC DOSING), 185 mg, Intravenous, Once, 6 of 6 cycles  Administration: 185 mg (1/19/2024), 183.2 mg (1/26/2024), 183.2 mg (2/2/2024), 183.2 mg (2/9/2024), 183.2 mg (2/16/2024), 196.6 mg (3/1/2024)  PACLItaxel (TAXOL) chemo IVPB, 50 mg/m2 = 85.8 mg, Intravenous, Once, 1 of 1 cycle  Administration: 85.8 mg (1/19/2024)  pemetrexed (ALIMTA) chemo infusion, 500 mg/m2 = 860 mg (100 % of original dose 500 mg/m2), Intravenous, Once, 2 of 2 cycles  Dose modification: 500 mg/m2 (original dose 500 mg/m2, Cycle 4)  Administration: 860 mg (2/9/2024), 860 mg (3/1/2024)     4/2/2024 -  Chemotherapy    alteplase (CATHFLO), 2 mg, Intracatheter, Every 1 Minute as needed, 4 of 6 cycles  durvalumab (IMFINZI) IVPB, 1,500 mg (original dose 10 mg/kg), Intravenous, Once, 4 of 6 cycles  Dose modification: 1,500 mg (original dose 10 mg/kg, Cycle 1, Reason: Other (Must fill in a comment), Comment: Will give every 28 days.)  Administration: 1,500 mg (4/2/2024), 1,500 mg (5/28/2024), 1,500 mg (6/25/2024), 1,500 mg (4/30/2024)         Past Medical History:   Diagnosis Date   • Fibromyalgia    • Heart murmur    • Lumbosacral disc herniation    • Lung cancer (HCC)    • Psoriasis    • Psoriasis    • RSD (reflex sympathetic dystrophy)      Past Surgical History:   Procedure Laterality Date   • IR PORT PLACEMENT  08/04/2023   • LUNG BIOPSY      x2   • PARTIAL HYSTERECTOMY         Social History   Social History     Substance and Sexual Activity   Alcohol Use Yes   • Alcohol/week: 7.0 standard drinks of  alcohol   • Types: 7 Glasses of wine per week    Comment: daily     Social History     Substance and Sexual Activity   Drug Use No     Social History     Tobacco Use   Smoking Status Every Day   • Current packs/day: 0.50   • Average packs/day: 0.5 packs/day for 48.6 years (24.3 ttl pk-yrs)   • Types: Cigarettes   • Start date: 1976   Smokeless Tobacco Never         Meds/Allergies     Current Outpatient Medications:   •  amitriptyline (ELAVIL) 25 mg tablet, , Disp: , Rfl:   •  amoxicillin (AMOXIL) 500 mg capsule, Prn dental procedure, Disp: , Rfl:   •  Ascorbic Acid (vitamin C) 100 MG tablet, Take 100 mg by mouth daily, Disp: , Rfl:   •  atorvastatin (LIPITOR) 20 mg tablet, Take 20 mg by mouth daily, Disp: , Rfl:   •  b complex vitamins capsule, Take 1 capsule by mouth daily, Disp: , Rfl:   •  calcipotriene (DOVONEX) 0.005 % cream, Apply topically 2 (two) times a day To affected areas on Saturday and Sunday only, Disp: 120 g, Rfl: 2  •  DULoxetine (CYMBALTA) 30 mg delayed release capsule, , Disp: , Rfl:   •  Fluticasone-Salmeterol (Advair) 100-50 mcg/dose inhaler, Inhale 1 puff 2 (two) times a day Rinse mouth after use., Disp: , Rfl:   •  folic acid (KP Folic Acid) 1 mg tablet, Take 1 tablet (1 mg total) by mouth daily, Disp: 90 tablet, Rfl: 2  •  ondansetron (ZOFRAN) 4 mg tablet, Take 1 tablet (4 mg total) by mouth every 8 (eight) hours as needed for nausea or vomiting, Disp: 20 tablet, Rfl: 0  •  propranolol (INDERAL) 20 mg/5 mL solution, , Disp: , Rfl:   •  triamcinolone (KENALOG) 0.1 % cream, Apply topically 2 (two) times a day Affected areas Monday through Friday only, Disp: 453.6 g, Rfl: 2  •  bismuth subsalicylate (PEPTO BISMOL) 262 MG chewable tablet, Take 1 tablet by mouth every 6 hours for 14 days (Patient not taking: Reported on 5/30/2024), Disp: 56 tablet, Rfl: 0  •  loratadine (CLARITIN) 10 mg tablet, Take 10 mg by mouth daily (Patient not taking: Reported on 7/29/2024), Disp: , Rfl:   •  omeprazole  "(PriLOSEC) 20 mg delayed release capsule, Take 1 capsule (20 mg total) by mouth 2 (two) times a day Every 12 hours for 14 days (Patient not taking: Reported on 12/26/2023), Disp: 28 capsule, Rfl: 0  Allergies   Allergen Reactions   • Prednisone Palpitations         Review of Systems   Constitutional:  Positive for fatigue (tired in the afternoon).   HENT:  Positive for postnasal drip.    Eyes: Negative.    Respiratory:  Positive for cough (occasional in the morning). Negative for shortness of breath.         Smoking 1/2 PPD   Cardiovascular: Negative.    Gastrointestinal: Negative.    Endocrine: Negative.    Genitourinary: Negative.    Musculoskeletal:  Positive for arthralgias.   Skin:  Positive for rash (psoriasis on hands, arms, legs (seeing Derm on 8/7)).   Allergic/Immunologic: Negative.    Neurological:  Positive for weakness (generalized \"deconditioned\") and headaches (mild occasional). Negative for dizziness and numbness.   Hematological: Negative.    Psychiatric/Behavioral: Negative.            OBJECTIVE:   /64 (BP Location: Right arm)   Pulse 102   Temp (!) 97.2 °F (36.2 °C) (Temporal)   Resp 18   Wt 69.9 kg (154 lb)   SpO2 96%   BMI 24.86 kg/m²   Karnofsky: 90 - Able to carry on normal activity; minor signs or symptoms of disease     Physical Exam  Vitals and nursing note reviewed.   Constitutional:       General: She is not in acute distress.     Appearance: She is well-developed.   Cardiovascular:      Rate and Rhythm: Normal rate and regular rhythm.   Pulmonary:      Breath sounds: No wheezing, rhonchi or rales.      Comments: Decreased breath sounds bilaterally.  Abdominal:      Palpations: Abdomen is soft.      Tenderness: There is no abdominal tenderness.   Musculoskeletal:      Right lower leg: No edema.      Left lower leg: No edema.   Lymphadenopathy:      Cervical: No cervical adenopathy.      Upper Body:      Right upper body: No supraclavicular adenopathy.      Left upper body: No " "supraclavicular adenopathy.   Skin:     Comments: She has psoriatic patches over chest, bilateral external elbow and lower extremity regions.   Neurological:      Mental Status: She is alert and oriented to person, place, and time.            Assessment/Plan:  Saskia Alfaro is a 69 y.o.  woman with a history of stage IIIB NSCLC status post neoadjuvant chemotherapy, but was not a surgical candidate post treatment.  She completed definitive chemoradiation on 3/1/24 and was maintained on Imfimzi until recently.     I reviewed PET imaging results personally, which demonstrated good response without progression of disease.      Primary Lung cancer  -I will plan to add CT chest to abdomen pelvis imaging in October.  Will discuss with Dr. Thrasher if interim CT chest is desired.  -Imfinzi on hold for psoriatic flare.  Per medical records will be considered for restarting after Dermatology evaluation and mangement  -Follow-up after CT imaging in about 3 months     Tobacco abuse   -I counseled her on smoking cessation.  She is trying to cut back, but not ready to quit.    Celina Treviño MD  7/31/2024,11:11 AM    Portions of the record may have been created with voice recognition software.  Occasional wrong word or \"sound a like\" substitutions may have occurred due to the inherent limitations of voice recognition software.  Read the chart carefully and recognize, using context, where substitutions have occurred.           "

## 2024-07-31 NOTE — PROGRESS NOTES
Follow-up - Radiation Oncology   Saskia Alfaro 1955 69 y.o. female [id number]      History of Present Illness   Cancer Staging   Adenocarcinoma of right lung (HCC)  Staging form: Lung, AJCC 8th Edition  - Clinical stage from 7/24/2023: Stage IIIB (cT4, cN2, cM0) - Signed by Celina Treviño MD on 12/26/2023  Stage prefix: Initial diagnosis      Saskia Alfaro is a 69 y.o. year old female with a history of with a history of stage IIIB NSCLC status post neoadjuvant chemotherapy, but was not a surgical candidate post treatment, who recently completed a course of definitive radiation on 3/1/24. She was last seen 3/29/24 and presents today for follow up.      5/7/24 PET CT  1. Overall findings suggestive of continued treatment response with decreased size and FDG avidity of the right middle lobe lesion, now with SUV max of 2.5 (previously 3.9) and measuring up to 3.5 cm (previously 4.4 cm).  2. No new or worsening FDG avid lesions within the head/neck, chest, abdomen, or pelvis.  3. Redemonstrated pancreatic head/neck cystic lesion measuring up to 2.3 cm. See MRI abdomen 03/17/2024 for better characterization.     6/4/24 Dr. Thrahser  1 right lung cancer adenocarcinoma stage IIIb T4 N2 M0 TPS score 98 K-minna G12 C+ mutated.  2 resolved allergic reaction to Taxol completing Taxol now on Imfinzi.  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  Plan she will continue her Imfinzi.  She is doing well with that.  She will return in 12 weeks.  She is followed regular by gastroenterology as well.  Her scans were stable.  Will repeat them every 3 to 6 months at this time.     7/23/24 Dr. 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  If she is stable we will plan her treatment in 4 weeks.  We will skip this treatment till she is seen.     The patient has no new respiratory related complaints.  She denies progressive cough, hemoptysis, fever.  The patient has baseline cough.  She denies new shortness of breath or dyspnea on exertion.  She denies chest pain or palpitations.      She continue smoking, but cut back to 1/2 ppd.    She denies dysphagia, odynophagia, or GERD.    She is planned for CT abdomen and pelvis for follow-up pancreatic cyst.  No chest CT imaging is scheduled at this time.    The patient complains of significant psoriatic flare.  She notes that immunotherapy has been held at this time.    Upcomin24 Dermatology  24 Infusion  9/10/24 Dr. Thrasher  10/7/24 CT  11/15/24 GI    Historical Information   Oncology History   Adenocarcinoma of right lung (HCC)   2023 Initial Diagnosis    Adenocarcinoma of right lung (HCC)     2023 Biopsy    Navigational bronchoscopy with EBUS    A.  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     A.-B.  Lung, Right Middle Lobe Bronchial Brushing (Thin-prep and smear preparations):   Conclusive evidence of malignancy.  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     Satisfactory for evaluation.     C.-D.  Lung, Right Middle Lobe Bronchoalveolar Lavage (Thin-prep and cell block preparations):   Conclusive evidence of malignancy.  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     Satisfactory for evaluation.      E.-F.  Lymph Node, level 7 (Thin-prep and smear preparations):   Atypical cellular changes seen.  Rare atypical cells.  Lymphocytes.  Few bronchial cells and pulmonary macrophages.        Satisfactory for evaluation.     G.-H.  Lymph Node,  10R (Thin-prep and smear preparations):   Negative for malignancy.  Lymphocytes.  Aggregates of neutrophils.     Satisfactory for evaluation.     7/24/2023 -  Cancer Staged    Staging form: Lung, AJCC 8th Edition  - Clinical stage from 7/24/2023: Stage IIIB (cT4, cN2, cM0) - Signed by Celina Treviño MD on 12/26/2023  Stage prefix: Initial diagnosis       8/31/2023 - 10/12/2023 Chemotherapy    cyanocobalamin, 1,000 mcg, Intramuscular, Once, 1 of 1 cycle  Administration: 1,000 mcg (8/24/2023)  alteplase (CATHFLO), 2 mg, Intracatheter, Every 1 Minute as needed, 3 of 3 cycles  CISplatin (PLATINOL) with mannitol IVPB, 75 mg/m2 = 129.8 mg (100 % of original dose 75 mg/m2), Intravenous, Once, 3 of 3 cycles  Dose modification: 50 mg/m2 (original dose 75 mg/m2, Cycle 1, Reason: Anticipated Tolerance), 75 mg/m2 (original dose 75 mg/m2, Cycle 1, Reason: Dose modified as per discussion with consulting physician)  Administration: 129.8 mg (8/31/2023), 129.8 mg (9/21/2023), 129.8 mg (10/12/2023)  fosaprepitant (EMEND) IVPB, 150 mg, Intravenous, Once, 3 of 3 cycles  Administration: 150 mg (8/31/2023), 150 mg (9/21/2023), 150 mg (10/12/2023)  nivolumab (OPDIVO) IVPB, 360 mg, Intravenous, Once, 3 of 3 cycles  Administration: 360 mg (8/31/2023), 360 mg (9/21/2023), 360 mg (10/12/2023)  pemetrexed (ALIMTA) chemo infusion, 500 mg/m2 = 865 mg, Intravenous, Once, 3 of 3 cycles  Administration: 865 mg (8/31/2023), 865 mg (9/21/2023), 865 mg (10/12/2023)     1/18/2024 - 3/1/2024 Radiation    Treatments:  Course: C1  Plan ID Energy Fractions Dose per Fraction (cGy) Dose Correction (cGy) Total Dose Delivered (cGy) Elapsed Days   R LUNG MED 6X 30 / 30 200 0 6,000 43    Treatment Dates:  1/18/2024 - 3/1/2024.      1/19/2024 - 3/1/2024 Chemotherapy    alteplase (CATHFLO), 2 mg, Intracatheter, Every 1 Minute as needed, 6 of 6 cycles  CARBOplatin (PARAPLATIN) IVPB ([facility] AUC DOSING), 185 mg, Intravenous, Once, 6 of 6 cycles  Administration: 185 mg  (1/19/2024), 183.2 mg (1/26/2024), 183.2 mg (2/2/2024), 183.2 mg (2/9/2024), 183.2 mg (2/16/2024), 196.6 mg (3/1/2024)  PACLItaxel (TAXOL) chemo IVPB, 50 mg/m2 = 85.8 mg, Intravenous, Once, 1 of 1 cycle  Administration: 85.8 mg (1/19/2024)  pemetrexed (ALIMTA) chemo infusion, 500 mg/m2 = 860 mg (100 % of original dose 500 mg/m2), Intravenous, Once, 2 of 2 cycles  Dose modification: 500 mg/m2 (original dose 500 mg/m2, Cycle 4)  Administration: 860 mg (2/9/2024), 860 mg (3/1/2024)     4/2/2024 -  Chemotherapy    alteplase (CATHFLO), 2 mg, Intracatheter, Every 1 Minute as needed, 4 of 6 cycles  durvalumab (IMFINZI) IVPB, 1,500 mg (original dose 10 mg/kg), Intravenous, Once, 4 of 6 cycles  Dose modification: 1,500 mg (original dose 10 mg/kg, Cycle 1, Reason: Other (Must fill in a comment), Comment: Will give every 28 days.)  Administration: 1,500 mg (4/2/2024), 1,500 mg (5/28/2024), 1,500 mg (6/25/2024), 1,500 mg (4/30/2024)         Past Medical History:   Diagnosis Date    Fibromyalgia     Heart murmur     Lumbosacral disc herniation     Lung cancer (HCC)     Psoriasis     Psoriasis     RSD (reflex sympathetic dystrophy)      Past Surgical History:   Procedure Laterality Date    IR PORT PLACEMENT  08/04/2023    LUNG BIOPSY      x2    PARTIAL HYSTERECTOMY         Social History   Social History     Substance and Sexual Activity   Alcohol Use Yes    Alcohol/week: 7.0 standard drinks of alcohol    Types: 7 Glasses of wine per week    Comment: daily     Social History     Substance and Sexual Activity   Drug Use No     Social History     Tobacco Use   Smoking Status Every Day    Current packs/day: 0.50    Average packs/day: 0.5 packs/day for 48.6 years (24.3 ttl pk-yrs)    Types: Cigarettes    Start date: 1976   Smokeless Tobacco Never         Meds/Allergies     Current Outpatient Medications:     amitriptyline (ELAVIL) 25 mg tablet, , Disp: , Rfl:     amoxicillin (AMOXIL) 500 mg capsule, Prn dental procedure, Disp: , Rfl:      Ascorbic Acid (vitamin C) 100 MG tablet, Take 100 mg by mouth daily, Disp: , Rfl:     atorvastatin (LIPITOR) 20 mg tablet, Take 20 mg by mouth daily, Disp: , Rfl:     b complex vitamins capsule, Take 1 capsule by mouth daily, Disp: , Rfl:     calcipotriene (DOVONEX) 0.005 % cream, Apply topically 2 (two) times a day To affected areas on Saturday and Sunday only, Disp: 120 g, Rfl: 2    DULoxetine (CYMBALTA) 30 mg delayed release capsule, , Disp: , Rfl:     Fluticasone-Salmeterol (Advair) 100-50 mcg/dose inhaler, Inhale 1 puff 2 (two) times a day Rinse mouth after use., Disp: , Rfl:     folic acid (KP Folic Acid) 1 mg tablet, Take 1 tablet (1 mg total) by mouth daily, Disp: 90 tablet, Rfl: 2    ondansetron (ZOFRAN) 4 mg tablet, Take 1 tablet (4 mg total) by mouth every 8 (eight) hours as needed for nausea or vomiting, Disp: 20 tablet, Rfl: 0    propranolol (INDERAL) 20 mg/5 mL solution, , Disp: , Rfl:     triamcinolone (KENALOG) 0.1 % cream, Apply topically 2 (two) times a day Affected areas Monday through Friday only, Disp: 453.6 g, Rfl: 2    bismuth subsalicylate (PEPTO BISMOL) 262 MG chewable tablet, Take 1 tablet by mouth every 6 hours for 14 days (Patient not taking: Reported on 5/30/2024), Disp: 56 tablet, Rfl: 0    loratadine (CLARITIN) 10 mg tablet, Take 10 mg by mouth daily (Patient not taking: Reported on 7/29/2024), Disp: , Rfl:     omeprazole (PriLOSEC) 20 mg delayed release capsule, Take 1 capsule (20 mg total) by mouth 2 (two) times a day Every 12 hours for 14 days (Patient not taking: Reported on 12/26/2023), Disp: 28 capsule, Rfl: 0  Allergies   Allergen Reactions    Prednisone Palpitations         Review of Systems   Constitutional:  Positive for fatigue (tired in the afternoon).   HENT:  Positive for postnasal drip.    Eyes: Negative.    Respiratory:  Positive for cough (occasional in the morning). Negative for shortness of breath.         Smoking 1/2 PPD   Cardiovascular: Negative.   "  Gastrointestinal: Negative.    Endocrine: Negative.    Genitourinary: Negative.    Musculoskeletal:  Positive for arthralgias.   Skin:  Positive for rash (psoriasis on hands, arms, legs (seeing Derm on 8/7)).   Allergic/Immunologic: Negative.    Neurological:  Positive for weakness (generalized \"deconditioned\") and headaches (mild occasional). Negative for dizziness and numbness.   Hematological: Negative.    Psychiatric/Behavioral: Negative.            OBJECTIVE:   /64 (BP Location: Right arm)   Pulse 102   Temp (!) 97.2 °F (36.2 °C) (Temporal)   Resp 18   Wt 69.9 kg (154 lb)   SpO2 96%   BMI 24.86 kg/m²   Karnofsky: 90 - Able to carry on normal activity; minor signs or symptoms of disease     Physical Exam  Vitals and nursing note reviewed.   Constitutional:       General: She is not in acute distress.     Appearance: She is well-developed.   Cardiovascular:      Rate and Rhythm: Normal rate and regular rhythm.   Pulmonary:      Breath sounds: No wheezing, rhonchi or rales.      Comments: Decreased breath sounds bilaterally.  Abdominal:      Palpations: Abdomen is soft.      Tenderness: There is no abdominal tenderness.   Musculoskeletal:      Right lower leg: No edema.      Left lower leg: No edema.   Lymphadenopathy:      Cervical: No cervical adenopathy.      Upper Body:      Right upper body: No supraclavicular adenopathy.      Left upper body: No supraclavicular adenopathy.   Skin:     Comments: She has psoriatic patches over chest, bilateral external elbow and lower extremity regions.   Neurological:      Mental Status: She is alert and oriented to person, place, and time.            Assessment/Plan:  Saskia Alfaro is a 69 y.o.  woman with a history of stage IIIB NSCLC status post neoadjuvant chemotherapy, but was not a surgical candidate post treatment.  She completed definitive chemoradiation on 3/1/24 and was maintained on Imfimzi until recently.     I reviewed PET imaging results personally, " "which demonstrated good response without progression of disease.      Primary Lung cancer  -I will plan to add CT chest to abdomen pelvis imaging in October.  Will discuss with Dr. Thrasher if interim CT chest is desired.  -Imfinzi on hold for psoriatic flare.  Per medical records will be considered for restarting after Dermatology evaluation and mangement  -Follow-up after CT imaging in about 3 months     Tobacco abuse   -I counseled her on smoking cessation.  She is trying to cut back, but not ready to quit.    Celina Treviño MD  7/31/2024,11:11 AM    Portions of the record may have been created with voice recognition software.  Occasional wrong word or \"sound a like\" substitutions may have occurred due to the inherent limitations of voice recognition software.  Read the chart carefully and recognize, using context, where substitutions have occurred.        "

## 2024-08-07 ENCOUNTER — OFFICE VISIT (OUTPATIENT)
Dept: DERMATOLOGY | Facility: CLINIC | Age: 69
End: 2024-08-07
Payer: COMMERCIAL

## 2024-08-07 VITALS — BODY MASS INDEX: 24.75 KG/M2 | WEIGHT: 154 LBS | HEIGHT: 66 IN | TEMPERATURE: 97.6 F

## 2024-08-07 DIAGNOSIS — L40.9 PSORIASIS: Primary | ICD-10-CM

## 2024-08-07 PROCEDURE — 99214 OFFICE O/P EST MOD 30 MIN: CPT | Performed by: DERMATOLOGY

## 2024-08-07 RX ORDER — CLOBETASOL PROPIONATE 0.5 MG/G
OINTMENT TOPICAL 2 TIMES DAILY
Qty: 60 G | Refills: 5 | Status: CANCELLED | OUTPATIENT
Start: 2024-08-07

## 2024-08-07 RX ORDER — HALOBETASOL PROPIONATE 0.05 %
OINTMENT (GRAM) TOPICAL 2 TIMES DAILY
Qty: 50 G | Refills: 1 | Status: SHIPPED | OUTPATIENT
Start: 2024-08-07

## 2024-08-07 NOTE — PATIENT INSTRUCTIONS
PSORIASIS    Assessment plan:  Based on a thorough discussion of this condition and the management approach to it (including a comprehensive discussion of the known risks, side effects and potential benefits of treatment), the patient (family) agrees to implement the following specific plan:  Possibly flared from Durvalumab   Can try to treat psoriasis with topicals and/or try again for Otezla   If rash is not tolerable , would recommend patient to discuss with Dr. Thrasher about other cancer therapeutic alternatives   Can continue to use  over the counter    Prescribed Clobetasol 0.05% ointment to be taken twice a day as needed to affected areas   Can try using Zyrtec or benadryl, over the counter, for itching                Psoriasis is a chronic inflammatory condition that causes the body to make new skin cells in days rather than weeks. As these cells pile up on the surface of the skin, you may see thick, scaly patches of thickened skin.   Psoriasis affects 2-4% of males and females. It can start at any age including childhood, with peaks of onset at 15-25 years and 50-60 years. It tends to persist lifelong, fluctuating in extent and severity. It is particularly common in Caucasians but may affect people of any race. About one-third of patients with psoriasis have family members with psoriasis.  Psoriasis is multifactorial. It is classified as an immune-mediated inflammatory disease (IMID). Genetic factors are important and influence the type of psoriasis and response to treatment.      What are the signs and symptoms of psoriasis?     There are many different types of psoriasis that each have present uniquely. The types of psoriasis include:     Plaque psoriasis: About 80% to 90% of people who have psoriasis develop this type. When plaque psoriasis appears, you may see:  Plaque psoriasis usually presents with symmetrically distributed, red, scaly plaques with well-defined edges. The scale is typically silvery  white, except in skin folds where the plaques often appear shiny and they may have a moist peeling surface. The most common sites are scalp, elbows and knees, but any part of the skin can be involved. The plaques are usually very persistent without treatment.  Itch is mostly mild but may be severe in some patients, leading to scratching and lichenification (thickened leathery skin with increased skin markings). Painful skin cracks or fissures may occur.  When psoriatic plaques clear up, they may leave brown or pale marks that can be expected to fade over several months.     Guttate psoriasis: When someone gets this type of psoriasis, you often see tiny bumps appear on the skin quite suddenly. The bumps tend to cover much of the torso, legs, and arms. Sometimes, the bumps also develop on the face, scalp, and ears. No matter where they appear, the bumps tend to be:   Small and scaly  [place]-colored to pink  Temporary, clearing in a few weeks or months without treatment  When guttate psoriasis clears, it may never return. Why this happens is still a bit of a mystery. Guttate psoriasis tends to develop in children and young adults who've had an infection, such as strep throat. It's possible that when the infection clears so does guttate psoriasis.  It's also possible to have:  Guttate psoriasis for life  See the guttate psoriasis clear and plaque psoriasis develop later in life  Plaque psoriasis when you develop guttate psoriasis  There's no way to predict what will happen after the first flare-up of guttate psoriasis clears.     Inverse psoriasis: This type of psoriasis develops in areas where skin touches skin, such as the armpits, genitals, and crease of the buttocks. Where the inverse psoriasis appears, you're likely to notice:  Smooth, red patches of skin that look raw  Little, if any, silvery-white coating  Sore or painful skin  Other names for this type of psoriasis are intertriginous psoriasis or flexural  psoriasis.  Pustular psoriasis: This type of psoriasis causes pus-filled bumps that usually appear only on the feet and hands. While the pus-filled bumps may look like an infection, the skin is not infected. The bumps don't contain bacteria or anything else that could cause an infection.  Where pustular psoriasis appears, you tend to notice:  Red, swollen skin that is dotted with pus-filled bumps  Extremely sore or painful skin  Brown dots (and sometimes scale) appear as the pus-filled bumps dry  Pustular psoriasis can make just about any activity that requires your hands or feet, such as typing or walking, unbearably painful.     Pustular psoriasis (generalized): Serious and life-threatening, this rare type of psoriasis causes pus-filled bumps to develop on much of the skin. Also called von Zumbusch psoriasis, a flare-up causes this sequence of events:  Skin on most of the body suddenly turns dry, red, and tender.  Within hours, pus-filled bumps cover most of the skin.  Often within a day, the pus-filled bumps break open and pools of pus leak onto the skin.  As the pus dries (usually within 24 to 48 hours), the skin dries out and peels (as shown in this picture).  When the dried skin peels off, you see a smooth, glazed surface.  In a few days or weeks, you may see a new crop of pus-filled bumps covering most of the skin, as the cycle repeats itself.  Anyone with pustular psoriasis also feels very sick, and may develop a fever, headache, muscle weakness, and other symptoms. Medical care is often necessary to save the person's life.     Erythrodermic psoriasis: Serious and life-threatening, this type of psoriasis requires immediate medical care. When someone develops erythrodermic psoriasis, you may notice:  Skin on most of the body looks burnt  Chills, fever, and the person looks extremely ill  Muscle weakness, a rapid pulse, and severe itch  The person may also be unable to keep warm, so hypothermia can set in  quickly.  Most people who develop this type of psoriasis already have another type of psoriasis. Before developing erythrodermic psoriasis, they often notice that their psoriasis is worsening or not improving with treatment. If you notice either of these happening, see a board-certified dermatologist.     Nails     Nail psoriasis: With any type of psoriasis, you may see changes to your fingernails or toenails. About half of the people who have plaque psoriasis see signs of psoriasis on their fingernails at some point2.  When psoriasis affects the nails, you may notice:  Tiny dents in your nails (called “nail pits”)  White, yellow, or brown discoloration under one or more nails  Crumbling, rough nails  A nail lifting up so that it's no longer attached  Buildup of skin cells beneath one or more nails, which lifts up the nail  Treatment and proper nail care can help you control nail psoriasis.     Psoriatic arthritis: If you have psoriasis, it's important to pay attention to your joints. Some people who have psoriasis develop a type of arthritis called psoriatic arthritis. This is more likely to occur if you have severe psoriasis.  Most people notice psoriasis on their skin years before they develop psoriatic arthritis. It's also possible to get psoriatic arthritis before psoriasis, but this is less common.  When psoriatic arthritis develops, the signs can be subtle. At first, you may notice:  A swollen and tender joint, especially in a finger or toe  Heel pain  Swelling on the back of your leg, just above your heel  Stiffness in the morning that fades during the day  Like psoriasis, psoriatic arthritis cannot be cured. Treatment can prevent psoriatic arthritis from worsening, which is important. Allowed to progress, psoriatic arthritis can become disabling.     Diagnosis and treatment of psoriasis   Psoriasis is usually diagnosed by clinical features, and skin biopsy if necessary.   It is important to decrease factors  that aggravate psoriasis. These include treating streptococcal infections, minimizing skin injuries, avoiding sun exposure if it exacerbates psoriasis, smoking, alcohol usage, decreasing stress, and maintaining an optimal body weight. Certain medications such as lithium, beta blockers, antimalarials, and NSAIDs have also been implicated. Suddenly stopping oral steroids or strong topical steroids can cause rebound disease.      There are many categories of psoriasis treatments available.      Topical therapy  Mild psoriasis is generally treated with topical agents alone. Which treatment is selected may depend on body site, extent and severity of psoriasis.  Emollients  Coal tar preparations  Dithranol  Salicylic acid  Vitamin D analogue (calcipotriol)  Topical corticosteroids  Calcineurin inhibitor (tacrolimus, pimecrolimus)  Phototherapy  Most psoriasis centres offer phototherapy with ultraviolet (UV) radiation, often in combination with topical or systemic agents. Types of phototherapy include  Narrowband UVB  Broadband UVB  Photochemotherapy (PUVA)  Targeted phototherapy  Systemic therapy  Moderate to severe psoriasis warrants treatment with a systemic agent and/or phototherapy. The most common treatments are:  Methotrexate  Ciclosporin  Acitretin  Other medicines occasionally used for psoriasis include:  Mycophenolate  Apremilast  Hydroxyurea  Azathioprine  6-mercaptopurine  Systemic corticosteroids are best avoided due to a risk of severe withdrawal flare of psoriasis and adverse effects.  Biologics or targeted therapies are reserved for conventional treatment-resistant severe psoriasis, mainly because of expense, as side effects compare favorably with other systemic agents. These include:  Anti-tumour necrosis factor-alpha antagonists (anti-TNF?) infliximab, adalimumab and etanercept  The interleukin (IL)-12/23 antagonist ustekinumab  IL-17 antagonists such as secukinumab  Many other monoclonal antibodies are  under investigation in the treatment of psoriasis.

## 2024-08-07 NOTE — LETTER
August 8, 2024     Jesse Thrasher MD  [address] [facility] [id number]    Patient: Saskia Alfaro   YOB: 1955   Date of Visit: 8/7/2024       Dear Dr. Thrasher:    Thank you for referring Saskia Alfaro to me for evaluation. Below are my notes for this consultation.    If you have questions, please do not hesitate to call me. I look forward to following your patient along with you.         Sincerely,        Sarita Harper MD        CC: No Recipients

## 2024-08-07 NOTE — PROGRESS NOTES
"[facility] Note     Patient Name: Saskia Alfaro  Encounter Date: 8/7/24     Have you been cared for by a [facility] Dermatologist in the last 3 years and, if so, which description applies to you?    Yes.  I have been here within the last 3 years, and my medical history has NOT changed since that time.  I am FEMALE/of child-bearing potential.    REVIEW OF SYSTEMS:  Have you recently had or currently have any of the following? No changes in my recent health.   PAST MEDICAL HISTORY:  Have you personally ever had or currently have any of the following?  If \"YES,\" then please provide more detail. No changes in my medical history.   HISTORY OF IMMUNOSUPPRESSION: Do you have a history of any of the following:  Systemic Immunosuppression such as Diabetes, Biologic or Immunotherapy, Chemotherapy, Organ Transplantation, Bone Marrow Transplantation?  No     Answering \"YES\" requires the addition of the dotphrase \"IMMUNOSUPPRESSED\" as the first diagnosis of the patient's visit.   FAMILY HISTORY:  Any \"first degree relatives\" (parent, brother, sister, or child) with the following?    No changes in my family's known health.   PATIENT EXPERIENCE:    Do you want the Dermatologist to perform a COMPLETE skin exam today including a clinical examination under the \"bra and underwear\" areas?  NO  If necessary, do we have your permission to call and leave a detailed message on your Preferred Phone number that includes your specific medical information?  Yes      Allergies   Allergen Reactions    Prednisone Palpitations      Current Outpatient Medications:     amitriptyline (ELAVIL) 25 mg tablet, , Disp: , Rfl:     amoxicillin (AMOXIL) 500 mg capsule, Prn dental procedure, Disp: , Rfl:     Ascorbic Acid (vitamin C) 100 MG tablet, Take 100 mg by mouth daily, Disp: , Rfl:     atorvastatin (LIPITOR) 20 mg tablet, Take 20 mg by mouth daily, Disp: , Rfl:     b complex vitamins capsule, Take 1 capsule by mouth daily, Disp: , Rfl: "     bismuth subsalicylate (PEPTO BISMOL) 262 MG chewable tablet, Take 1 tablet by mouth every 6 hours for 14 days (Patient not taking: Reported on 5/30/2024), Disp: 56 tablet, Rfl: 0    calcipotriene (DOVONEX) 0.005 % cream, Apply topically 2 (two) times a day To affected areas on Saturday and Sunday only, Disp: 120 g, Rfl: 2    DULoxetine (CYMBALTA) 30 mg delayed release capsule, , Disp: , Rfl:     Fluticasone-Salmeterol (Advair) 100-50 mcg/dose inhaler, Inhale 1 puff 2 (two) times a day Rinse mouth after use., Disp: , Rfl:     folic acid (KP Folic Acid) 1 mg tablet, Take 1 tablet (1 mg total) by mouth daily, Disp: 90 tablet, Rfl: 2    loratadine (CLARITIN) 10 mg tablet, Take 10 mg by mouth daily (Patient not taking: Reported on 7/29/2024), Disp: , Rfl:     omeprazole (PriLOSEC) 20 mg delayed release capsule, Take 1 capsule (20 mg total) by mouth 2 (two) times a day Every 12 hours for 14 days (Patient not taking: Reported on 12/26/2023), Disp: 28 capsule, Rfl: 0    ondansetron (ZOFRAN) 4 mg tablet, Take 1 tablet (4 mg total) by mouth every 8 (eight) hours as needed for nausea or vomiting, Disp: 20 tablet, Rfl: 0    propranolol (INDERAL) 20 mg/5 mL solution, , Disp: , Rfl:     triamcinolone (KENALOG) 0.1 % cream, Apply topically 2 (two) times a day Affected areas Monday through Friday only, Disp: 453.6 g, Rfl: 2          Whom besides the patient is providing clinical information about today's encounter?   NO ADDITIONAL HISTORIAN (patient alone provided history)    Physical Exam and Assessment/Plan by Diagnosis:  CHIEF COMPLAINT    69 year old male or female patient presents today for Yearly Follow Up.  Patient has a No history of skin cancer     PSORIASIS     Physical Exam:  Anatomic Location Affected: hands, elbows and knees  Morphological Description:  psoriatic pink plaques   Severity: moderate  Body Percent Affected: 6%  Pertinent Positives: Patient reports knee joint pain and heels   Pertinent Negatives:      Additional History of Present Condition:  Diagnosed about 45 years ago. Currently using triamcinolone 0.1% cream which is not working to full satisfaction. Currently on hands, elbows and knees      Assessment plan:  Based on a thorough discussion of this condition and the management approach to it (including a comprehensive discussion of the known risks, side effects and potential benefits of treatment), the patient (family) agrees to implement the following specific plan:  Possibly flared from Durvalumab   Can try to treat psoriasis with topicals and/or try again for Otezla   If rash is not tolerable , would recommend patient to discuss with Dr. Thrasher about other cancer therapeutic alternatives. Patient prefers to have Dr. Thrasher change durvalumab   Can continue to use  over the counter    Prescribed halobetasol 0.05% ointment to be taken twice a day as needed to affected areas   Can try using Zyrtec or benadryl, over the counter, for itching                Psoriasis is a chronic inflammatory condition that causes the body to make new skin cells in days rather than weeks. As these cells pile up on the surface of the skin, you may see thick, scaly patches of thickened skin.   Psoriasis affects 2-4% of males and females. It can start at any age including childhood, with peaks of onset at 15-25 years and 50-60 years. It tends to persist lifelong, fluctuating in extent and severity. It is particularly common in Caucasians but may affect people of any race. About one-third of patients with psoriasis have family members with psoriasis.  Psoriasis is multifactorial. It is classified as an immune-mediated inflammatory disease (IMID). Genetic factors are important and influence the type of psoriasis and response to treatment.      What are the signs and symptoms of psoriasis?     There are many different types of psoriasis that each have present uniquely. The types of psoriasis include:     Plaque psoriasis: About 80%  to 90% of people who have psoriasis develop this type. When plaque psoriasis appears, you may see:  Plaque psoriasis usually presents with symmetrically distributed, red, scaly plaques with well-defined edges. The scale is typically silvery white, except in skin folds where the plaques often appear shiny and they may have a moist peeling surface. The most common sites are scalp, elbows and knees, but any part of the skin can be involved. The plaques are usually very persistent without treatment.  Itch is mostly mild but may be severe in some patients, leading to scratching and lichenification (thickened leathery skin with increased skin markings). Painful skin cracks or fissures may occur.  When psoriatic plaques clear up, they may leave brown or pale marks that can be expected to fade over several months.     Guttate psoriasis: When someone gets this type of psoriasis, you often see tiny bumps appear on the skin quite suddenly. The bumps tend to cover much of the torso, legs, and arms. Sometimes, the bumps also develop on the face, scalp, and ears. No matter where they appear, the bumps tend to be:   Small and scaly  [place]-colored to pink  Temporary, clearing in a few weeks or months without treatment  When guttate psoriasis clears, it may never return. Why this happens is still a bit of a mystery. Guttate psoriasis tends to develop in children and young adults who've had an infection, such as strep throat. It's possible that when the infection clears so does guttate psoriasis.  It's also possible to have:  Guttate psoriasis for life  See the guttate psoriasis clear and plaque psoriasis develop later in life  Plaque psoriasis when you develop guttate psoriasis  There's no way to predict what will happen after the first flare-up of guttate psoriasis clears.     Inverse psoriasis: This type of psoriasis develops in areas where skin touches skin, such as the armpits, genitals, and crease of the buttocks. Where the  inverse psoriasis appears, you're likely to notice:  Smooth, red patches of skin that look raw  Little, if any, silvery-white coating  Sore or painful skin  Other names for this type of psoriasis are intertriginous psoriasis or flexural psoriasis.  Pustular psoriasis: This type of psoriasis causes pus-filled bumps that usually appear only on the feet and hands. While the pus-filled bumps may look like an infection, the skin is not infected. The bumps don't contain bacteria or anything else that could cause an infection.  Where pustular psoriasis appears, you tend to notice:  Red, swollen skin that is dotted with pus-filled bumps  Extremely sore or painful skin  Brown dots (and sometimes scale) appear as the pus-filled bumps dry  Pustular psoriasis can make just about any activity that requires your hands or feet, such as typing or walking, unbearably painful.     Pustular psoriasis (generalized): Serious and life-threatening, this rare type of psoriasis causes pus-filled bumps to develop on much of the skin. Also called von Zumbusch psoriasis, a flare-up causes this sequence of events:  Skin on most of the body suddenly turns dry, red, and tender.  Within hours, pus-filled bumps cover most of the skin.  Often within a day, the pus-filled bumps break open and pools of pus leak onto the skin.  As the pus dries (usually within 24 to 48 hours), the skin dries out and peels (as shown in this picture).  When the dried skin peels off, you see a smooth, glazed surface.  In a few days or weeks, you may see a new crop of pus-filled bumps covering most of the skin, as the cycle repeats itself.  Anyone with pustular psoriasis also feels very sick, and may develop a fever, headache, muscle weakness, and other symptoms. Medical care is often necessary to save the person's life.     Erythrodermic psoriasis: Serious and life-threatening, this type of psoriasis requires immediate medical care. When someone develops erythrodermic  psoriasis, you may notice:  Skin on most of the body looks burnt  Chills, fever, and the person looks extremely ill  Muscle weakness, a rapid pulse, and severe itch  The person may also be unable to keep warm, so hypothermia can set in quickly.  Most people who develop this type of psoriasis already have another type of psoriasis. Before developing erythrodermic psoriasis, they often notice that their psoriasis is worsening or not improving with treatment. If you notice either of these happening, see a board-certified dermatologist.     Nails     Nail psoriasis: With any type of psoriasis, you may see changes to your fingernails or toenails. About half of the people who have plaque psoriasis see signs of psoriasis on their fingernails at some point2.  When psoriasis affects the nails, you may notice:  Tiny dents in your nails (called “nail pits”)  White, yellow, or brown discoloration under one or more nails  Crumbling, rough nails  A nail lifting up so that it's no longer attached  Buildup of skin cells beneath one or more nails, which lifts up the nail  Treatment and proper nail care can help you control nail psoriasis.     Psoriatic arthritis: If you have psoriasis, it's important to pay attention to your joints. Some people who have psoriasis develop a type of arthritis called psoriatic arthritis. This is more likely to occur if you have severe psoriasis.  Most people notice psoriasis on their skin years before they develop psoriatic arthritis. It's also possible to get psoriatic arthritis before psoriasis, but this is less common.  When psoriatic arthritis develops, the signs can be subtle. At first, you may notice:  A swollen and tender joint, especially in a finger or toe  Heel pain  Swelling on the back of your leg, just above your heel  Stiffness in the morning that fades during the day  Like psoriasis, psoriatic arthritis cannot be cured. Treatment can prevent psoriatic arthritis from worsening, which is  important. Allowed to progress, psoriatic arthritis can become disabling.     Diagnosis and treatment of psoriasis   Psoriasis is usually diagnosed by clinical features, and skin biopsy if necessary.   It is important to decrease factors that aggravate psoriasis. These include treating streptococcal infections, minimizing skin injuries, avoiding sun exposure if it exacerbates psoriasis, smoking, alcohol usage, decreasing stress, and maintaining an optimal body weight. Certain medications such as lithium, beta blockers, antimalarials, and NSAIDs have also been implicated. Suddenly stopping oral steroids or strong topical steroids can cause rebound disease.      There are many categories of psoriasis treatments available.      Topical therapy  Mild psoriasis is generally treated with topical agents alone. Which treatment is selected may depend on body site, extent and severity of psoriasis.  Emollients  [place]al tar preparations  Dithranol  Salicylic acid  Vitamin D analogue (calcipotriol)  Topical corticosteroids  Calcineurin inhibitor (tacrolimus, pimecrolimus)  Phototherapy  Most psoriasis centres offer phototherapy with ultraviolet (UV) radiation, often in combination with topical or systemic agents. Types of phototherapy include  Narrowband UVB  Broadband UVB  Photochemotherapy (PUVA)  Targeted phototherapy  Systemic therapy  Moderate to severe psoriasis warrants treatment with a systemic agent and/or phototherapy. The most common treatments are:  Methotrexate  Ciclosporin  Acitretin  Other medicines occasionally used for psoriasis include:  Mycophenolate  Apremilast  Hydroxyurea  Azathioprine  6-mercaptopurine  Systemic corticosteroids are best avoided due to a risk of severe withdrawal flare of psoriasis and adverse effects.  Biologics or targeted therapies are reserved for conventional treatment-resistant severe psoriasis, mainly because of expense, as side effects compare favorably with other systemic agents.  These include:  Anti-tumour necrosis factor-alpha antagonists (anti-TNF?) infliximab, adalimumab and etanercept  The interleukin (IL)-12/23 antagonist ustekinumab  IL-17 antagonists such as secukinumab  Many other monoclonal antibodies are under investigation in the treatment of psoriasis.       Scribe Attestation      I,:  Suzanne Howard MA am acting as a scribe while in the presence of the attending physician.:       I,:  Sarita Harper MD personally performed the services described in this documentation    as scribed in my presence.:

## 2024-08-16 NOTE — PROGRESS NOTES
Dr. Doan is holding remaining taxol today per Lashawn Del Valle RN. Ok to give carboplatin  
Pt arrived today for chemo treatment. 1st time receiving taxol & carbo. Taxol started @ 0941 &stopped @ 0948 d/t tightness in throat & SOB. Hypersensitivity protocol initiated. Messaged Lashawn Del Valle RN awaiting to hear back. @ 1009 pt c/o tingling in fingertips like pins & needles & feeling lightheaded. VS stable @ present  
Pt received carboplatin infusion w/out any adverse effects. Informed pt of appt w/Dr. Thrasher on Monday after radiation. AVS given.   
  Colostomy/Ileostomy/Ileal Conduit: No       Date of Last BM: 8/19/24    Intake/Output Summary (Last 24 hours) at 8/16/2024 1453  Last data filed at 8/16/2024 1047  Gross per 24 hour   Intake 490 ml   Output --   Net 490 ml     I/O last 3 completed shifts:  In: 840 [P.O.:840]  Out: -     Safety Concerns:     None    Impairments/Disabilities:      None    Nutrition Therapy:  Current Nutrition Therapy:   - Oral Diet:  General    Routes of Feeding: Oral  Liquids: No Restrictions  Daily Fluid Restriction: no  Last Modified Barium Swallow with Video (Video Swallowing Test): not done    Treatments at the Time of Hospital Discharge:   Respiratory Treatments: na  Oxygen Therapy:  is not on home oxygen therapy.  Ventilator:    - No ventilator support    Rehab Therapies: Occupational Therapy  Weight Bearing Status/Restrictions: No weight bearing restrictions  Other Medical Equipment (for information only, NOT a DME order):  independent  Other Treatments: na    Patient's personal belongings (please select all that are sent with patient):  None    RN SIGNATURE:  Electronically signed by Laurel Sheridan RN on 8/19/24 at 11:15 AM EDT    CASE MANAGEMENT/SOCIAL WORK SECTION    Inpatient Status Date: 8/16/2024    Readmission Risk Assessment Score:  Readmission Risk              Risk of Unplanned Readmission:  8         Discharging to Facility/ Agency   Name: [facility]  Address: [address] [place] [address]   Phone: [phone]  Fax: [phone]    / signature: Electronically signed by LAXMI Parker, LSW on 8/16/2024 at 2:56 PM      PHYSICIAN SECTION    Prognosis: Good    Condition at Discharge: Stable    Rehab Potential (if transferring to Rehab): Good    Recommended Labs or Other Treatments After Discharge: Continue lisinopril and monitor blood pressure. Continue vitamin B12 supplement. EEG to be done outpatient. Will need close neurology and PCP follow up.    Physician

## 2024-08-19 ENCOUNTER — TELEPHONE (OUTPATIENT)
Dept: HEMATOLOGY ONCOLOGY | Facility: CLINIC | Age: 69
End: 2024-08-19

## 2024-08-19 DIAGNOSIS — C34.91 ADENOCARCINOMA OF RIGHT LUNG (HCC): ICD-10-CM

## 2024-08-19 DIAGNOSIS — Z79.899 HIGH RISK MEDICATION USE: Primary | ICD-10-CM

## 2024-08-19 DIAGNOSIS — E83.42 HYPOMAGNESEMIA: Primary | ICD-10-CM

## 2024-08-19 RX ORDER — CYANOCOBALAMIN 1000 UG/ML
1000 INJECTION, SOLUTION INTRAMUSCULAR; SUBCUTANEOUS ONCE
Status: CANCELLED | OUTPATIENT
Start: 2024-08-20

## 2024-08-19 NOTE — TELEPHONE ENCOUNTER
MO existing pt    Pt will be deferred until 9/20 per provider- orders changed and spoke with Laurie in infusion. Please leave pt on the schedule for her b12 injection only for tomorrow. ty

## 2024-08-19 NOTE — TELEPHONE ENCOUNTER
Patient was called today to follow up on skin issues that she developed with her treatment and flare up with psoriasis. She was showing up to be treated tomorrow however confirmed with Dr. Thrasher that he wants her treatments held until he sees her again on 9/10. She states that her itching is very mild and the psoriasis has calmed down for the most part however she is still getting flare ups. She is using an ointment that was ordered and she is holding off on Otezla for the time being. She was made aware that her treatment would continue to be on hold until she sees provider and she is fine with this. She was also due for her b12 injection so made her aware that we can give that only and she is agreeable to this. Called Laurie in infusion and made them aware. Telephone encounter will be sent.    Medication time out/change to orders    Date patient scheduled: 8/20    Original medication ordered: Imfinzi and b12    New Medication ordered: n/a    Office RN notified patient? yes  Called and spoke with Laurie in the infusion dept and made them aware.

## 2024-08-19 NOTE — PROGRESS NOTES
MED TIME OUT with Roxana Reid RN per Dr. Thrasher pt chemo is to be deferred until 9/20. Pt is only to get B12 tomorrow 8/20 9am. Pt already aware per Roxana.

## 2024-08-20 ENCOUNTER — PATIENT OUTREACH (OUTPATIENT)
Dept: CASE MANAGEMENT | Facility: HOSPITAL | Age: 69
End: 2024-08-20

## 2024-08-20 ENCOUNTER — HOSPITAL ENCOUNTER (OUTPATIENT)
Dept: INFUSION CENTER | Facility: CLINIC | Age: 69
Discharge: HOME | End: 2024-08-20

## 2024-08-20 ENCOUNTER — HOSPITAL ENCOUNTER (OUTPATIENT)
Dept: INFUSION CENTER | Facility: CLINIC | Age: 69
Discharge: HOME | End: 2024-08-20
Payer: COMMERCIAL

## 2024-08-20 DIAGNOSIS — Z79.899 HIGH RISK MEDICATION USE: ICD-10-CM

## 2024-08-20 DIAGNOSIS — C34.91 ADENOCARCINOMA OF RIGHT LUNG (HCC): Primary | ICD-10-CM

## 2024-08-20 PROCEDURE — 96372 THER/PROPH/DIAG INJ SC/IM: CPT

## 2024-08-20 RX ORDER — CYANOCOBALAMIN 1000 UG/ML
1000 INJECTION, SOLUTION INTRAMUSCULAR; SUBCUTANEOUS ONCE
OUTPATIENT
Start: 2024-09-17

## 2024-08-20 RX ORDER — CYANOCOBALAMIN 1000 UG/ML
1000 INJECTION, SOLUTION INTRAMUSCULAR; SUBCUTANEOUS ONCE
Status: COMPLETED | OUTPATIENT
Start: 2024-08-20 | End: 2024-08-20

## 2024-08-20 RX ADMIN — CYANOCOBALAMIN 1000 MCG: 1000 INJECTION, SOLUTION INTRAMUSCULAR; SUBCUTANEOUS at 09:21

## 2024-08-20 NOTE — PROGRESS NOTES
Pt had LM for me last week while I was OOO, I attempted to reach her this morning x2 today to check in but she did not answer and her phone VM is full, so I could not leave a message.   Will try to reach her again if no return call is received.

## 2024-08-20 NOTE — PROGRESS NOTES
Patient here in clinic for B12 injection. Offers no complaints at this time. Tolerated injection in Right deltoid with no complications.   AVS printed.   Aware of next appointment on 9/20/24 at 10am.   Walked out of clinic with no complications.

## 2024-08-27 NOTE — PROGRESS NOTES
Hematology/Oncology Outpatient Follow- up Note  Saskia Alfaro 69 y.o. female MRN: @ Encounter: [id number]        Date:  7/23/2024        Assessment / Plan:    1 right lung cancer adenocarcinoma stage IIIb T4 N2 M0 TBS score 98 K-minna G12 C+ mutated  2 resolved allergic reaction to Taxol now on Imfinzi  3 pseudocyst of pancreas being followed by GI regularly  4 psoriasis primarily affecting arms and upper thighs which she  noticed increased since starting her treatment  Plan: She will be seen by dermatology 8/7/2024.  If she is stable we will plan her treatment in 4 weeks.  We will skip this treatment till she is seen.        HPI: 69-year-old female on Imfinzi for stage IIIb lung cancer.  She also pseudocyst of pancreas.  Psoriasis primarily affecting arms and upper thighs.  She feels it has gotten worse since she started her immunotherapy.  She was seen today and we elected to hold her Imfinzi.  She will be seen by dermatology.   has seen her previously.  She was kind enough to fit her into the schedule August 7 that will be before her delay in treatment which will be 4 weeks.  Otherwise she has been doing well.  The patches are primarily on her hands on both elbows on her left knee.  She does not have much on her trunk fortunately.  Is not diffuse.      Interval History:    Assessment / Plan:    1 right lung cancer adenocarcinoma stage IIIb T4 N2 M0 TPS score 98 K-minna G12 C+ mutated.  2 resolved allergic reaction to Taxol completing Taxol now on Imfinzi.  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  Plan she will continue her Imfinzi.  She is doing well with that.  She will return in 12 weeks.  She is followed regular by gastroenterology as well.  Her scans were stable.  Will repeat them every 3 to 6 months at this time.  HPI: 68-year-old female here for follow-up.  She is doing well.   "Her scans show improvement in her adenocarcinoma.  She remains with a pseudocyst.  There is a question of removing stones from the pancreatic duct.  It was elected not to do so.  She was seen in consultation [place] and their gastroenterology department.  Of note her psoriasis is acting up probably related to immunotherapy.  She says it is tolerable.  She had a bone scan that was negative.  She is on Imfinzi and tolerating it fairly well other than the psoriasis.  She is maintaining weight she has no abdominal pain she is not short of breath at rest.  Interval History: Note from 4/5/2024 \"  Assessment / Plan:    1 right lung cancer adenocarcinoma stage IIIb T4 N2 M0 TPS score 98 K-minna KG12 C mutated.  2 Resolved allergic reaction to Taxol leading to use of Alimta completed her treatments.  3 presumed pseudocyst of pancreas repeat MRI stable has a stone in duct and is being further evaluated by GI.  Plan: Because of her myalgias and arthralgias that she has been complaining about we will get a bone scan to make sure she is stable.  She has a new nodule in her anterior patellar area.  We will also begin her on Imfinzi.  We will see her in about 4 weeks time.  HPI: 68-year-old female comes in for follow-up.  She is status post chemoradiation for stage IIIb right lung cancer.  Her scans are stable.  She is complaining of pain in several joints and into her legs.  Given her history we will obtain a bone scan although I suspect it will be negative.  She denies fever or chills.  She is occasionally short of breath with some coughing.  Overall doing reasonably well.  We plan to begin Imfinzi therapy on her per [place] trial in the near future.  CT scan shows no worsening of disease.  Interval History: Note 3/5/2024:  Assessment / Plan:    1 right lung cancer adenocarcinoma stage IIIb T4 N2 M0 TPS score 98 K-minna KG12 C mutated  2 allergic reaction to Taxol leading to use of Alimta.  3 presumed pseudocyst of pancreas awaiting " R temporal mass repeat MRI  Plan: Patient is due to receive her MRI 3/17.  She will get a CAT scan chest abdomen pelvis on 3/28 along with laboratory work CBC CMP.  She will come back in 4 weeks at which time we will plan to begin her.  Side effects were discussed and consents were obtained.  No other major suggestions at this time.  HPI: 68-year-old female having completed her chemotherapy radiation on 3/1.  She received her last dose of Alimta on 3/1.  She is got some support taste in her mouth some nausea.  She is able to hold some food down.  She received doses of Alimta on 2/93/1.  She received weekly carboplatin.  She has maintained her weight.  She had a plain chest x-ray during her radiation which shows what appear to be some inflammatory changes in her fissure.  This will be followed up in the future.  She will undergo scans in 3 and half weeks along with laboratory work.  We plan to begin her on Imfinzi in 4 weeks on a every 4 week basis.  Side effects were discussed and consents were obtained today.  Interval History:    1 right lung cancer adenocarcinoma stage IIIb T4 N2 M0 TPS score 98 K-minna G12 C mutated  2 allergic reaction to Taxol.  3 presumed pseudocyst of pancreas  Plan: Patient has been off Taxol for several weeks.  We are going to add Alimta 500 mg/m² to her weekly carboplatin.  Alimta will be every 3 weeks.  She will receive it this Friday 2/9 and then 3 weeks later.  She is will still be candidate to obtain Imfinzi in the future.  Side effects were discussed consent was obtained she will follow-up in 3 weeks.  HPI: She is tolerating carboplatin fairly well has no major problems.  We discussed the Alimta.  She has had several weeks of now away total of 3 weeks and we will go ahead with Alimta this week.  Decadron/B12/folate were added as part of her regimen.  Side effects were discussed and consents were obtained.  She is ready to try it.  We will have to watch her counts which she will get weekly.  Interval  History:   68-year-old female seen with the above problem.  She began her combined chemoradiation with Taxol carboplatinum and radiation on 1/19/2024.  Unfortunately she developed a significant reaction after 7 minutes of infusion with Taxol short of breath lightheaded dizzy feeling poorly and the drug was stopped.  Presumably related to the camper for in the Taxol.  Unfortunately I think it would be difficult to give her more Taxol admitted due to.  I would hold that.  I think we are choices beyond this coming week of carboplatin alone will be in either -16 or Alimta.  We will look into one of the other.  I discussed this with her she is aware of that.  Note from 1/18/2024: 68-year-old female with history of adenocarcinoma right lung clinical stage IIIb T4 N2 M0.  PD-L1 TPS 98% K-minna G12 C mutation.  She has completed chemotherapy.  PET CT scan done at the completion showed some improvement disease.  She was seen by surgery felt she was not a surgical candidate.  She also has a cystic lesion approximately 3 cm in the head of the pancreas.  She has been evaluated by GI who felt this was a pseudocyst.  Biopsy was nondiagnostic.  Their recommendation is to repeat her MRI which will be done in March.  Reviewing her situation we were going to plan to go ahead with her chemo therapy tomorrow.  She is to receive combined Taxol carboplatin.  She is feeling fairly well denies any other major problems.      Cancer Staging:  Cancer Staging   Adenocarcinoma of right lung (HCC)  Staging form: Lung, AJCC 8th Edition  - Clinical stage from 7/24/2023: Stage IIIB (cT4, cN2, cM0) - Signed by Celina Treviño MD on 12/26/2023  Stage prefix: Initial diagnosis      Molecular Testing:     Previous Hematologic/ Oncologic History:    Oncology History Overview Note   with a history of stage IIIB NSCLC status post neoadjuvant chemotherapy, but was not a surgical candidate post treatment, who recently completed a course of definitive radiation  Tumor embolization Biopsy for apical mass lung biopsy on 3/1/24. She was last seen 3/29/24 and presents today for follow up.       24 PET CT  1. Overall findings suggestive of continued treatment response with decreased size and FDG avidity of the right middle lobe lesion, now with SUV max of 2.5 (previously 3.9) and measuring up to 3.5 cm (previously 4.4 cm).  2. No new or worsening FDG avid lesions within the head/neck, chest, abdomen, or pelvis.  3. Redemonstrated pancreatic head/neck cystic lesion measuring up to 2.3 cm. See MRI abdomen 2024 for better characterization.      24 Dr. Thrasher  1 right lung cancer adenocarcinoma stage IIIb T4 N2 M0 TPS score 98 K-minna G12 C+ mutated.  2 resolved allergic reaction to Taxol completing Taxol now on Imfinzi.  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  Plan she will continue her Imfinzi.  She is doing well with that.  She will return in 12 weeks.  She is followed regular by gastroenterology as well.  Her scans were stable.  Will repeat them every 3 to 6 months at this time.      Upcomin24 Infusion  9/10/24 Dr. Thrasher  10/7/24 CT  11/15/24 GI     Adenocarcinoma of right lung (HCC)   2023 Initial Diagnosis    Adenocarcinoma of right lung (HCC)     2023 Biopsy    Navigational bronchoscopy with EBUS    A.  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     A.-B.  Lung, Right Middle Lobe Bronchial Brushing (Thin-prep and smear preparations):   Conclusive evidence of malignancy.  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     Satisfactory for evaluation.     C.-D.  Lung, Right Middle Lobe Bronchoalveolar Lavage (Thin-prep and cell block preparations):   Conclusive evidence of malignancy.  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     Satisfactory for evaluation.      E.-F.   Lymph Node, level 7 (Thin-prep and smear preparations):   Atypical cellular changes seen.  Rare atypical cells.  Lymphocytes.  Few bronchial cells and pulmonary macrophages.        Satisfactory for evaluation.     G.-H.  Lymph Node, 10R (Thin-prep and smear preparations):   Negative for malignancy.  Lymphocytes.  Aggregates of neutrophils.     Satisfactory for evaluation.     7/24/2023 -  Cancer Staged    Staging form: Lung, AJCC 8th Edition  - Clinical stage from 7/24/2023: Stage IIIB (cT4, cN2, cM0) - Signed by Celina Treviño MD on 12/26/2023  Stage prefix: Initial diagnosis       8/31/2023 - 10/12/2023 Chemotherapy    cyanocobalamin, 1,000 mcg, Intramuscular, Once, 1 of 1 cycle  Administration: 1,000 mcg (8/24/2023)  alteplase (CATHFLO), 2 mg, Intracatheter, Every 1 Minute as needed, 3 of 3 cycles  CISplatin (PLATINOL) with mannitol IVPB, 75 mg/m2 = 129.8 mg (100 % of original dose 75 mg/m2), Intravenous, Once, 3 of 3 cycles  Dose modification: 50 mg/m2 (original dose 75 mg/m2, Cycle 1, Reason: Anticipated Tolerance), 75 mg/m2 (original dose 75 mg/m2, Cycle 1, Reason: Dose modified as per discussion with consulting physician)  Administration: 129.8 mg (8/31/2023), 129.8 mg (9/21/2023), 129.8 mg (10/12/2023)  fosaprepitant (EMEND) IVPB, 150 mg, Intravenous, Once, 3 of 3 cycles  Administration: 150 mg (8/31/2023), 150 mg (9/21/2023), 150 mg (10/12/2023)  nivolumab (OPDIVO) IVPB, 360 mg, Intravenous, Once, 3 of 3 cycles  Administration: 360 mg (8/31/2023), 360 mg (9/21/2023), 360 mg (10/12/2023)  pemetrexed (ALIMTA) chemo infusion, 500 mg/m2 = 865 mg, Intravenous, Once, 3 of 3 cycles  Administration: 865 mg (8/31/2023), 865 mg (9/21/2023), 865 mg (10/12/2023)     1/18/2024 - 3/1/2024 Radiation    Treatments:  Course: C1  Plan ID Energy Fractions Dose per Fraction (cGy) Dose Correction (cGy) Total Dose Delivered (cGy) Elapsed Days   R LUNG MED 6X 30 / 30 200 0 6,000 43    Treatment Dates:  1/18/2024 - 3/1/2024.     "  1/19/2024 - 3/1/2024 Chemotherapy    alteplase (CATHFLO), 2 mg, Intracatheter, Every 1 Minute as needed, 6 of 6 cycles  CARBOplatin (PARAPLATIN) IVPB (GOG AUC DOSING), 185 mg, Intravenous, Once, 6 of 6 cycles  Administration: 185 mg (1/19/2024), 183.2 mg (1/26/2024), 183.2 mg (2/2/2024), 183.2 mg (2/9/2024), 183.2 mg (2/16/2024), 196.6 mg (3/1/2024)  PACLItaxel (TAXOL) chemo IVPB, 50 mg/m2 = 85.8 mg, Intravenous, Once, 1 of 1 cycle  Administration: 85.8 mg (1/19/2024)  pemetrexed (ALIMTA) chemo infusion, 500 mg/m2 = 860 mg (100 % of original dose 500 mg/m2), Intravenous, Once, 2 of 2 cycles  Dose modification: 500 mg/m2 (original dose 500 mg/m2, Cycle 4)  Administration: 860 mg (2/9/2024), 860 mg (3/1/2024)     4/2/2024 -  Chemotherapy    alteplase (CATHFLO), 2 mg, Intracatheter, Every 1 Minute as needed, 4 of 6 cycles  durvalumab (IMFINZI) IVPB, 1,500 mg (original dose 10 mg/kg), Intravenous, Once, 4 of 6 cycles  Dose modification: 1,500 mg (original dose 10 mg/kg, Cycle 1, Reason: Other (Must fill in a comment), Comment: Will give every 28 days.)  Administration: 1,500 mg (4/2/2024), 1,500 mg (5/28/2024), 1,500 mg (6/25/2024), 1,500 mg (4/30/2024)         Current Hematologic/ Oncologic Treatment:       Cycle 1         Test Results:    Imaging: No results found.          Labs:   Lab Results   Component Value Date    WBC 7.31 07/23/2024    HGB 12.5 07/23/2024    HCT 37.7 07/23/2024     (H) 07/23/2024     07/23/2024     Lab Results   Component Value Date    K 4.1 07/22/2024     07/22/2024    CO2 27 07/22/2024    BUN 16 07/22/2024    CREATININE 0.73 07/22/2024    CALCIUM 9.2 07/22/2024    AST 26 07/22/2024    ALT 37 07/22/2024    ALKPHOS 70 07/22/2024    EGFR 84 07/22/2024         No results found for: \"SPEP\", \"UPEP\"    No results found for: \"PSA\"    No results found for: \"CEA\"    No results found for: \"\"    No results found for: \"AFP\"    No results found for: \"IRON\", \"TIBC\", " "\"FERRITIN\"    Lab Results   Component Value Date    [id number] 05/15/2018         ROS: Review of Systems   Constitutional: Negative.    HENT: Negative.     Eyes: Negative.    Respiratory: Negative.     Cardiovascular: Negative.    Endocrine: Negative.    Genitourinary: Negative.    Musculoskeletal: Negative.    Skin:  Positive for rash.   Allergic/Immunologic: Negative.    Neurological: Negative.    Hematological: Negative.      Psoriatic patches on elbows around her hands and in her left knee.    Current Medications: Reviewed  Allergies: Reviewed  PMH/FH/SH:  Reviewed      Physical Exam:    Body surface area is 1.81 meters squared.    Wt Readings from Last 3 Encounters:   07/23/24 72.1 kg (159 lb)   06/04/24 69.6 kg (153 lb 8 oz)   05/30/24 70.4 kg (155 lb 3.2 oz)        Temp Readings from Last 3 Encounters:   07/23/24 (!) 96.2 °F (35.7 °C) (Temporal)   06/25/24 (!) 96 °F (35.6 °C) (Temporal)   06/04/24 97.5 °F (36.4 °C)        BP Readings from Last 3 Encounters:   07/23/24 106/66   07/23/24 128/77   06/25/24 126/62         Pulse Readings from Last 3 Encounters:   07/23/24 (!) 113   07/23/24 103   06/25/24 85     @LASTSAO2(3)@      Physical Exam  Constitutional:       Appearance: Normal appearance. She is normal weight.   HENT:      Head: Normocephalic and atraumatic.   Eyes:      Extraocular Movements: Extraocular movements intact.      Conjunctiva/sclera: Conjunctivae normal.      Pupils: Pupils are equal, round, and reactive to light.   Cardiovascular:      Rate and Rhythm: Normal rate and regular rhythm.      Heart sounds: Normal heart sounds.   Pulmonary:      Effort: Pulmonary effort is normal.      Breath sounds: Normal breath sounds.   Abdominal:      General: Abdomen is flat. Bowel sounds are normal.      Palpations: Abdomen is soft.   Musculoskeletal:         General: Normal range of motion.      Cervical back: Normal range of motion and neck supple.   Skin:     General: Skin is dry.   Neurological: "      General: No focal deficit present.      Mental Status: She is alert and oriented to person, place, and time. Mental status is at baseline.     Psoriatic patches of both elbows some abnormalities around her fingers and patch on her left knee      Goals and Barriers:  Current Goal: Prolong Survival from Cancer.   Barriers: None.      Patient's Capacity to Self Care:  Patient is able to self care.    Code Status: [unfilled]  Advance Directive and Living Will:      Power of :

## 2024-09-10 ENCOUNTER — TELEPHONE (OUTPATIENT)
Dept: HEMATOLOGY ONCOLOGY | Facility: CLINIC | Age: 69
End: 2024-09-10

## 2024-09-10 ENCOUNTER — OFFICE VISIT (OUTPATIENT)
Dept: HEMATOLOGY ONCOLOGY | Facility: CLINIC | Age: 69
End: 2024-09-10
Payer: COMMERCIAL

## 2024-09-10 VITALS
SYSTOLIC BLOOD PRESSURE: 118 MMHG | RESPIRATION RATE: 18 BRPM | HEIGHT: 66 IN | HEART RATE: 88 BPM | TEMPERATURE: 97.3 F | OXYGEN SATURATION: 97 % | BODY MASS INDEX: 25.88 KG/M2 | WEIGHT: 161 LBS | DIASTOLIC BLOOD PRESSURE: 70 MMHG

## 2024-09-10 DIAGNOSIS — E83.42 HYPOMAGNESEMIA: Primary | ICD-10-CM

## 2024-09-10 DIAGNOSIS — L40.9 PSORIASIS: ICD-10-CM

## 2024-09-10 DIAGNOSIS — C34.91 ADENOCARCINOMA OF RIGHT LUNG (HCC): ICD-10-CM

## 2024-09-10 DIAGNOSIS — C34.91 ADENOCARCINOMA OF RIGHT LUNG (HCC): Primary | ICD-10-CM

## 2024-09-10 PROCEDURE — 99214 OFFICE O/P EST MOD 30 MIN: CPT | Performed by: INTERNAL MEDICINE

## 2024-09-10 PROCEDURE — G2211 COMPLEX E/M VISIT ADD ON: HCPCS | Performed by: INTERNAL MEDICINE

## 2024-09-10 NOTE — TELEPHONE ENCOUNTER
Mo   Existing  Please Defer her treatment until new dates of 10/18 that she is already scheduled for.    She has not had her port flushed and provider does want labs. Can we please bring her in later this week or next for all labs ordered by Dr. Thrasher. TY

## 2024-09-10 NOTE — PROGRESS NOTES
Patient seen in office today by provider and her psoriasis continues. Provider will be deferring her treatment until he sees her again. Pt has not had port flushed and will need labs. Message will be sent to infusion. Plan deferred until after she sees  on 10/9

## 2024-09-11 NOTE — PROGRESS NOTES
Hematology/Oncology Outpatient Follow- up Note  Saskia Alfaro 69 y.o. female MRN: @ Encounter: [id number]        Date:  9/11/2024        Assessment / Plan:    1 right lung cancer adenocarcinoma [id number] TBS score 98 K-minna G12 C+ mutation  2 resolved allergic reaction to Taxol  3 pseudocyst of pancreas stable  4 psoriasis primarily affecting arms and upper thighs which she noticed increasing since starting her treatment for which we have stopped.  Plan: She will get a CT scan of her chest and be seen here in October.  I will try and communicate with dermatology.  Long-term prognosis guarded.        HPI: 69-year-old female with stage III lung was on Imfinzi.  She developed problems with psoriasis.  She had patches on her hands her elbows her knees.  They were worsening.  Itchy.  I had her see dermatology.  They we stopped the medication.  She has not received it since July.  The patches are somewhat better.  She has some nodularity in her hands.  And her knees are slightly better.  At this point the question is whether we can resume the Imfinzi immunotherapy.  I am a little reluctant to do some concern about flares I will speak with dermatology regarding this.  Otherwise she is stable and doing fairly well.  She is due for a CAT scan of her chest in October 2024 and we will see her after that.    Interval History:    Assessment / Plan: Note from 7/23/2024:  1 right lung cancer adenocarcinoma stage IIIb T4 N2 M0 TBS score 98 K-minna G12 C+ mutated  2 resolved allergic reaction to Taxol now on Imfinzi  3 pseudocyst of pancreas being followed by GI regularly  4 psoriasis primarily affecting arms and upper thighs which she  noticed increased since starting her treatment  Plan: She will be seen by dermatology 8/7/2024.  If she is stable we will plan her treatment in 4 weeks.  We will skip this treatment till she is seen.  HPI: 69-year-old female on Imfinzi for stage IIIb lung cancer.  She also pseudocyst of pancreas.   "Psoriasis primarily affecting arms and upper thighs.  She feels it has gotten worse since she started her immunotherapy.  She was seen today and we elected to hold her Imfinzi.  She will be seen by dermatology.   has seen her previously.  She was kind enough to fit her into the schedule August 7 that will be before her delay in treatment which will be 4 weeks.  Otherwise she has been doing well.  The patches are primarily on her hands on both elbows on her left knee.  She does not have much on her trunk fortunately.  Is not diffuse.  Interval History:    Assessment / Plan:    1 right lung cancer adenocarcinoma stage IIIb T4 N2 M0 TPS score 98 K-minna G12 C+ mutated.  2 resolved allergic reaction to Taxol completing Taxol now on Imfinzi.  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  Plan she will continue her Imfinzi.  She is doing well with that.  She will return in 12 weeks.  She is followed regular by gastroenterology as well.  Her scans were stable.  Will repeat them every 3 to 6 months at this time.  HPI: 68-year-old female here for follow-up.  She is doing well.  Her scans show improvement in her adenocarcinoma.  She remains with a pseudocyst.  There is a question of removing stones from the pancreatic duct.  It was elected not to do so.  She was seen in [facility] and their gastroenterology department.  Of note her psoriasis is acting up probably related to immunotherapy.  She says it is tolerable.  She had a bone scan that was negative.  She is on Imfinzi and tolerating it fairly well other than the psoriasis.  She is maintaining weight she has no abdominal pain she is not short of breath at rest.  Interval History: Note from 4/5/2024 \"  Assessment / Plan:    1 right lung cancer adenocarcinoma stage IIIb T4 N2 M0 TPS score 98 K-minna KG12 C mutated.  2 Resolved allergic " reaction to Taxol leading to use of Alimta completed her treatments.  3 presumed pseudocyst of pancreas repeat MRI stable has a stone in duct and is being further evaluated by GI.  Plan: Because of her myalgias and arthralgias that she has been complaining about we will get a bone scan to make sure she is stable.  She has a new nodule in her anterior patellar area.  We will also begin her on Imfinzi.  We will see her in about 4 weeks time.  HPI: 68-year-old female comes in for follow-up.  She is status post chemoradiation for stage IIIb right lung cancer.  Her scans are stable.  She is complaining of pain in several joints and into her legs.  Given her history we will obtain a bone scan although I suspect it will be negative.  She denies fever or chills.  She is occasionally short of breath with some coughing.  Overall doing reasonably well.  We plan to begin Imfinzi therapy on her per [place] trial in the near future.  CT scan shows no worsening of disease.  Interval History: Note 3/5/2024:  Assessment / Plan:    1 right lung cancer adenocarcinoma stage IIIb T4 N2 M0 TPS score 98 K-minna KG12 C mutated  2 allergic reaction to Taxol leading to use of Alimta.  3 presumed pseudocyst of pancreas awaiting repeat MRI  Plan: Patient is due to receive her MRI 3/17.  She will get a CAT scan chest abdomen pelvis on 3/28 along with laboratory work CBC CMP.  She will come back in 4 weeks at which time we will plan to begin her.  Side effects were discussed and consents were obtained.  No other major suggestions at this time.  HPI: 68-year-old female having completed her chemotherapy radiation on 3/1.  She received her last dose of Alimta on 3/1.  She is got some support taste in her mouth some nausea.  She is able to hold some food down.  She received doses of Alimta on 2/93/1.  She received weekly carboplatin.  She has maintained her weight.  She had a plain chest x-ray during her radiation which shows what appear to be some  inflammatory changes in her fissure.  This will be followed up in the future.  She will undergo scans in 3 and half weeks along with laboratory work.  We plan to begin her on Imfinzi in 4 weeks on a every 4 week basis.  Side effects were discussed and consents were obtained today.  Interval History:    1 right lung cancer adenocarcinoma stage IIIb T4 N2 M0 TPS score 98 K-minna G12 C mutated  2 allergic reaction to Taxol.  3 presumed pseudocyst of pancreas  Plan: Patient has been off Taxol for several weeks.  We are going to add Alimta 500 mg/m² to her weekly carboplatin.  Alimta will be every 3 weeks.  She will receive it this Friday 2/9 and then 3 weeks later.  She is will still be candidate to obtain Imfinzi in the future.  Side effects were discussed consent was obtained she will follow-up in 3 weeks.  HPI: She is tolerating carboplatin fairly well has no major problems.  We discussed the Alimta.  She has had several weeks of now away total of 3 weeks and we will go ahead with Alimta this week.  Decadron/B12/folate were added as part of her regimen.  Side effects were discussed and consents were obtained.  She is ready to try it.  We will have to watch her counts which she will get weekly.  Interval History:   68-year-old female seen with the above problem.  She began her combined chemoradiation with Taxol carboplatinum and radiation on 1/19/2024.  Unfortunately she developed a significant reaction after 7 minutes of infusion with Taxol short of breath lightheaded dizzy feeling poorly and the drug was stopped.  Presumably related to the camper for in the Taxol.  Unfortunately I think it would be difficult to give her more Taxol admitted due to.  I would hold that.  I think we are choices beyond this coming week of carboplatin alone will be in either -16 or Alimta.  We will look into one of the other.  I discussed this with her she is aware of that.  Note from 1/18/2024: 68-year-old female with history of  adenocarcinoma right lung clinical stage IIIb T4 N2 M0.  PD-L1 TPS 98% K-minna G12 C mutation.  She has completed chemotherapy.  PET CT scan done at the completion showed some improvement disease.  She was seen by surgery felt she was not a surgical candidate.  She also has a cystic lesion approximately 3 cm in the head of the pancreas.  She has been evaluated by GI who felt this was a pseudocyst.  Biopsy was nondiagnostic.  Their recommendation is to repeat her MRI which will be done in March.  Reviewing her situation we were going to plan to go ahead with her chemo therapy tomorrow.  She is to receive combined Taxol carboplatin.  She is feeling fairly well denies any other major problems.      Cancer Staging:  Cancer Staging   Adenocarcinoma of right lung (HCC)  Staging form: Lung, AJCC 8th Edition  - Clinical stage from 7/24/2023: Stage IIIB (cT4, cN2, cM0) - Signed by Celina Treviño MD on 12/26/2023  Stage prefix: Initial diagnosis      Molecular Testing:     Previous Hematologic/ Oncologic History:    Oncology History   Adenocarcinoma of right lung (HCC)   6/23/2023 Initial Diagnosis    Adenocarcinoma of right lung (HCC)     7/24/2023 Biopsy    Navigational bronchoscopy with EBUS    A.  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     A.-B.  Lung, Right Middle Lobe Bronchial Brushing (Thin-prep and smear preparations):   Conclusive evidence of malignancy.  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     Satisfactory for evaluation.     C.-D.  Lung, Right Middle Lobe Bronchoalveolar Lavage (Thin-prep and cell block preparations):   Conclusive evidence of malignancy.  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     Satisfactory for evaluation.      E.-F.  Lymph Node, level 7 (Thin-prep and smear preparations):   Atypical cellular changes seen.  Rare atypical cells.  Lymphocytes.  Few bronchial cells and pulmonary  macrophages.        Satisfactory for evaluation.     G.-H.  Lymph Node, 10R (Thin-prep and smear preparations):   Negative for malignancy.  Lymphocytes.  Aggregates of neutrophils.     Satisfactory for evaluation.     7/24/2023 -  Cancer Staged    Staging form: Lung, AJCC 8th Edition  - Clinical stage from 7/24/2023: Stage IIIB (cT4, cN2, cM0) - Signed by Celina Treviño MD on 12/26/2023  Stage prefix: Initial diagnosis       8/31/2023 - 10/12/2023 Chemotherapy    cyanocobalamin, 1,000 mcg, Intramuscular, Once, 1 of 1 cycle  Administration: 1,000 mcg (8/24/2023)  alteplase (CATHFLO), 2 mg, Intracatheter, Every 1 Minute as needed, 3 of 3 cycles  CISplatin (PLATINOL) with mannitol IVPB, 75 mg/m2 = 129.8 mg (100 % of original dose 75 mg/m2), Intravenous, Once, 3 of 3 cycles  Dose modification: 50 mg/m2 (original dose 75 mg/m2, Cycle 1, Reason: Anticipated Tolerance), 75 mg/m2 (original dose 75 mg/m2, Cycle 1, Reason: Dose modified as per discussion with consulting physician)  Administration: 129.8 mg (8/31/2023), 129.8 mg (9/21/2023), 129.8 mg (10/12/2023)  fosaprepitant (EMEND) IVPB, 150 mg, Intravenous, Once, 3 of 3 cycles  Administration: 150 mg (8/31/2023), 150 mg (9/21/2023), 150 mg (10/12/2023)  nivolumab (OPDIVO) IVPB, 360 mg, Intravenous, Once, 3 of 3 cycles  Administration: 360 mg (8/31/2023), 360 mg (9/21/2023), 360 mg (10/12/2023)  pemetrexed (ALIMTA) chemo infusion, 500 mg/m2 = 865 mg, Intravenous, Once, 3 of 3 cycles  Administration: 865 mg (8/31/2023), 865 mg (9/21/2023), 865 mg (10/12/2023)     1/18/2024 - 3/1/2024 Radiation    Treatments:  Course: C1  Plan ID Energy Fractions Dose per Fraction (cGy) Dose Correction (cGy) Total Dose Delivered (cGy) Elapsed Days   R LUNG MED 6X 30 / 30 200 0 6,000 43    Treatment Dates:  1/18/2024 - 3/1/2024.      1/19/2024 - 3/1/2024 Chemotherapy    alteplase (CATHFLO), 2 mg, Intracatheter, Every 1 Minute as needed, 6 of 6 cycles  CARBOplatin (PARAPLATIN) IVPB (GOG AUC  DOSING), 185 mg, Intravenous, Once, 6 of 6 cycles  Administration: 185 mg (1/19/2024), 183.2 mg (1/26/2024), 183.2 mg (2/2/2024), 183.2 mg (2/9/2024), 183.2 mg (2/16/2024), 196.6 mg (3/1/2024)  PACLItaxel (TAXOL) chemo IVPB, 50 mg/m2 = 85.8 mg, Intravenous, Once, 1 of 1 cycle  Administration: 85.8 mg (1/19/2024)  pemetrexed (ALIMTA) chemo infusion, 500 mg/m2 = 860 mg (100 % of original dose 500 mg/m2), Intravenous, Once, 2 of 2 cycles  Dose modification: 500 mg/m2 (original dose 500 mg/m2, Cycle 4)  Administration: 860 mg (2/9/2024), 860 mg (3/1/2024)     4/2/2024 -  Chemotherapy    alteplase (CATHFLO), 2 mg, Intracatheter, Every 1 Minute as needed, 5 of 6 cycles  durvalumab (IMFINZI) IVPB, 1,500 mg (original dose 10 mg/kg), Intravenous, Once, 5 of 6 cycles  Dose modification: 1,500 mg (original dose 10 mg/kg, Cycle 1, Reason: Other (Must fill in a comment), Comment: Will give every 28 days.)  Administration: 1,500 mg (4/2/2024), 1,500 mg (5/28/2024), 1,500 mg (6/25/2024), 1,500 mg (4/30/2024)         Current Hematologic/ Oncologic Treatment:       Cycle 1         Test Results:    Imaging: DXA BONE DENSITY SCAN STANDARD 2 SITES    Result Date: 8/15/2024  Narrative: PROCEDURE: DXA bone mineral density examination was performed with a Hologic scanner. CLINICAL HISTORY: 69 year old postmenopausal female that requires a bone density assessment. COMPARISON STUDY: 10/21/2021 BONE DENSITY: The lumbar spine was examined from L1 - L4. In total, the bone mineral density is T-SCORE 1.1 standard deviations below the predicted peak bone mass and is MD 89% of normal. The lumbar spine measurement is BMD 0.928 g/cm2.   The left hip was examined in several regions. In total, the bone mineral density is T-SCORE 1.2 standard deviations below the predicted peak bone mass and is MD 85% of normal. The total hip measurement is BMD 0.796 g/cm2.   The femoral neck measurement is T-SCORE 1.8 standard deviations below the predicted peak bone  mass and is LA 77% of normal. The femoral neck measurement is BMD 0.651 g/cm2. A valid comparison for the lumbar spine and left hip cannot be made due to dissimilar scan types or analysis methods when compared to the baseline study from 10/21/2021. WHO Fracture Assessment Tool (FRAX) estimates 10 year fracture risk as follows: Major Osteoporotic Fracture: 11.0% Hip Fracture: 2.5% Interpretation: The patient has osteopenia as determined by WHO criteria. Based on the results of the patient's bone density assessment, the risk of future fracture increases approximately two fold for each 1.0 SD decrease in T-score. Assessment: A repeat bone density assessment should be considered in 2 years. The National Osteoporosis Foundation Recommends That FDA Approved Medical Therapies Be Considered In Postmenopausal Women And Men Age Greater Than Or Equal To 50 Years With The Followin.  Hip Or Vertebral Fracture; 2.  T-Score Of Less Than Or Equal To -2.5 At The Spine Or Hip; 3.  Osteopenia And 10-Year Fracture Risk Probability By Frax Of Greater than Or Equal to 20% For Major Osteoporotic Fracture and Greater than Or Equal to 3% For Hip Fracture.    Comment: All Treatment Decisions, Including Pharmacologic Treatment, Require Clinical Judgment and Consideration Of Individual Patient Factors, Including Patient Preferences, Comorbidities, Previous Drug Use And Risk Factors not captured in the Frax Model, e.g. Fragility, Falls, Vitamin D Deficiency, Increased Bone turnover, and Interval Significant Decline In Bone Mineral Density. Workstation:[id number]            Labs:   Lab Results   Component Value Date    WBC 7.31 2024    HGB 12.5 2024    HCT 37.7 2024     (H) 2024     2024     Lab Results   Component Value Date    K 4.1 2024     2024    CO2 27 2024    BUN 16 2024    CREATININE 0.73 2024    CALCIUM 9.2 2024    AST 26 2024    ALT 37  "07/22/2024    ALKPHOS 70 07/22/2024    EGFR 84 07/22/2024         No results found for: \"SPEP\", \"UPEP\"    No results found for: \"PSA\"    No results found for: \"CEA\"    No results found for: \"\"    No results found for: \"AFP\"    No results found for: \"IRON\", \"TIBC\", \"FERRITIN\"    Lab Results   Component Value Date    [id number] 05/15/2018         ROS: Review of Systems   Constitutional: Negative.    HENT: Negative.     Eyes: Negative.    Respiratory: Negative.     Cardiovascular: Negative.    Gastrointestinal: Negative.    Endocrine: Negative.    Genitourinary: Negative.    Musculoskeletal: Negative.    Skin: Negative.    Allergic/Immunologic: Negative.    Neurological: Negative.    Hematological: Negative.          Current Medications: Reviewed  Allergies: Reviewed  PMH/FH/SH:  Reviewed      Physical Exam:    Body surface area is 1.82 meters squared.    Wt Readings from Last 3 Encounters:   09/10/24 73 kg (161 lb)   08/07/24 69.9 kg (154 lb)   07/29/24 69.9 kg (154 lb)        Temp Readings from Last 3 Encounters:   09/10/24 (!) 97.3 °F (36.3 °C) (Temporal)   08/07/24 97.6 °F (36.4 °C)   07/29/24 (!) 97.2 °F (36.2 °C) (Temporal)        BP Readings from Last 3 Encounters:   09/10/24 118/70   07/29/24 118/64   07/23/24 128/77         Pulse Readings from Last 3 Encounters:   09/10/24 88   07/29/24 102   07/23/24 103     @LASTSAO2(3)@      Physical Exam  Constitutional:       Appearance: Normal appearance. She is normal weight.   HENT:      Head: Normocephalic and atraumatic.   Eyes:      Extraocular Movements: Extraocular movements intact.      Conjunctiva/sclera: Conjunctivae normal.      Pupils: Pupils are equal, round, and reactive to light.   Cardiovascular:      Rate and Rhythm: Normal rate and regular rhythm.      Heart sounds: Normal heart sounds.   Pulmonary:      Effort: Pulmonary effort is normal.      Breath sounds: Normal breath sounds.   Abdominal:      General: Abdomen is flat. Bowel sounds are " normal.      Palpations: Abdomen is soft.   Musculoskeletal:         General: Normal range of motion.      Cervical back: Normal range of motion and neck supple.   Skin:     General: Skin is warm and dry.   Neurological:      General: No focal deficit present.      Mental Status: She is alert and oriented to person, place, and time. Mental status is at baseline.     Has patches on her elbows.  Has some nodularity on her hands and the palms.  Has some patches on her knees.      Goals and Barriers:  Current Goal: Prolong Survival from Cancer.   Barriers: None.      Patient's Capacity to Self Care:  Patient is able to self care.    Code Status: [unfilled]  Advance Directive and Living Will:      Power of :

## 2024-10-02 ENCOUNTER — TELEPHONE (OUTPATIENT)
Dept: HEMATOLOGY ONCOLOGY | Facility: CLINIC | Age: 69
End: 2024-10-02

## 2024-10-02 NOTE — TELEPHONE ENCOUNTER
Attempted to call pt about labs and remind them to get them done before appt but mailbox to phone # on file was full. Will try a 2nd attempt

## 2024-10-07 ENCOUNTER — HOSPITAL ENCOUNTER (OUTPATIENT)
Dept: CT IMAGING | Facility: HOSPITAL | Age: 69
Discharge: HOME | End: 2024-10-07
Attending: INTERNAL MEDICINE
Payer: COMMERCIAL

## 2024-10-07 DIAGNOSIS — K86.89 PANCREATIC DUCT STONES: ICD-10-CM

## 2024-10-07 DIAGNOSIS — K86.89 DILATION OF PANCREATIC DUCT: ICD-10-CM

## 2024-10-07 DIAGNOSIS — C34.91 ADENOCARCINOMA OF RIGHT LUNG (HCC): ICD-10-CM

## 2024-10-07 DIAGNOSIS — K86.2 PANCREATIC PSEUDOCYST/CYST: ICD-10-CM

## 2024-10-07 DIAGNOSIS — K86.3 PANCREATIC PSEUDOCYST/CYST: ICD-10-CM

## 2024-10-07 PROCEDURE — 71260 CT THORAX DX C+: CPT

## 2024-10-07 PROCEDURE — 74177 CT ABD & PELVIS W/CONTRAST: CPT

## 2024-10-07 RX ADMIN — IOHEXOL 120 ML: 350 INJECTION, SOLUTION INTRAVENOUS at 13:39

## 2024-10-09 ENCOUNTER — HOSPITAL ENCOUNTER (OUTPATIENT)
Dept: INFUSION CENTER | Facility: CLINIC | Age: 69
Discharge: HOME | End: 2024-10-09
Payer: COMMERCIAL

## 2024-10-09 ENCOUNTER — OFFICE VISIT (OUTPATIENT)
Dept: HEMATOLOGY ONCOLOGY | Facility: CLINIC | Age: 69
End: 2024-10-09
Payer: COMMERCIAL

## 2024-10-09 ENCOUNTER — TELEPHONE (OUTPATIENT)
Dept: HEMATOLOGY ONCOLOGY | Facility: CLINIC | Age: 69
End: 2024-10-09

## 2024-10-09 VITALS
RESPIRATION RATE: 18 BRPM | HEART RATE: 71 BPM | DIASTOLIC BLOOD PRESSURE: 72 MMHG | WEIGHT: 164 LBS | OXYGEN SATURATION: 99 % | SYSTOLIC BLOOD PRESSURE: 118 MMHG | TEMPERATURE: 97.4 F | HEIGHT: 66 IN | BODY MASS INDEX: 26.36 KG/M2

## 2024-10-09 DIAGNOSIS — C34.91 ADENOCARCINOMA OF RIGHT LUNG (HCC): Primary | ICD-10-CM

## 2024-10-09 DIAGNOSIS — C34.91 ADENOCARCINOMA OF RIGHT LUNG (HCC): ICD-10-CM

## 2024-10-09 DIAGNOSIS — R91.8 MULTIPLE LUNG NODULES ON CT: ICD-10-CM

## 2024-10-09 DIAGNOSIS — L29.9 PRURITUS, UNSPECIFIED: ICD-10-CM

## 2024-10-09 DIAGNOSIS — Z79.899 HIGH RISK MEDICATION USE: ICD-10-CM

## 2024-10-09 DIAGNOSIS — L40.9 PSORIASIS: ICD-10-CM

## 2024-10-09 DIAGNOSIS — K86.2 PANCREATIC PSEUDOCYST/CYST: ICD-10-CM

## 2024-10-09 DIAGNOSIS — C34.2 ADENOCARCINOMA OF MIDDLE LOBE OF RIGHT LUNG (HCC): ICD-10-CM

## 2024-10-09 DIAGNOSIS — K86.3 PANCREATIC PSEUDOCYST/CYST: ICD-10-CM

## 2024-10-09 DIAGNOSIS — Z79.899 HIGH RISK MEDICATION USE: Primary | ICD-10-CM

## 2024-10-09 DIAGNOSIS — Z95.828 PORT-A-CATH IN PLACE: Primary | ICD-10-CM

## 2024-10-09 LAB
ALBUMIN SERPL BCG-MCNC: 3.8 G/DL (ref 3.5–5)
ALP SERPL-CCNC: 66 U/L (ref 34–104)
ALT SERPL W P-5'-P-CCNC: 30 U/L (ref 7–52)
ANION GAP SERPL CALCULATED.3IONS-SCNC: 8 MMOL/L (ref 4–13)
AST SERPL W P-5'-P-CCNC: 21 U/L (ref 13–39)
BASOPHILS # BLD AUTO: 0.04 THOUSANDS/ΜL (ref 0–0.1)
BASOPHILS NFR BLD AUTO: 1 % (ref 0–1)
BILIRUB SERPL-MCNC: 0.4 MG/DL (ref 0.2–1)
BUN SERPL-MCNC: 8 MG/DL (ref 5–25)
CALCIUM SERPL-MCNC: 9.5 MG/DL (ref 8.4–10.2)
CHLORIDE SERPL-SCNC: 102 MMOL/L (ref 96–108)
CO2 SERPL-SCNC: 27 MMOL/L (ref 21–32)
CREAT SERPL-MCNC: 0.57 MG/DL (ref 0.6–1.3)
EOSINOPHIL # BLD AUTO: 0.14 THOUSAND/ΜL (ref 0–0.61)
EOSINOPHIL NFR BLD AUTO: 2 % (ref 0–6)
ERYTHROCYTE [DISTWIDTH] IN BLOOD BY AUTOMATED COUNT: 12.3 % (ref 11.6–15.1)
GFR SERPL CREATININE-BSD FRML MDRD: 95 ML/MIN/1.73SQ M
GLUCOSE SERPL-MCNC: 91 MG/DL (ref 65–140)
HCT VFR BLD AUTO: 36.2 % (ref 34.8–46.1)
HGB BLD-MCNC: 11.7 G/DL (ref 11.5–15.4)
IMM GRANULOCYTES # BLD AUTO: 0.05 THOUSAND/UL (ref 0–0.2)
IMM GRANULOCYTES NFR BLD AUTO: 1 % (ref 0–2)
LYMPHOCYTES # BLD AUTO: 1.11 THOUSANDS/ΜL (ref 0.6–4.47)
LYMPHOCYTES NFR BLD AUTO: 14 % (ref 14–44)
MCH RBC QN AUTO: 32.2 PG (ref 26.8–34.3)
MCHC RBC AUTO-ENTMCNC: 32.3 G/DL (ref 31.4–37.4)
MCV RBC AUTO: 100 FL (ref 82–98)
MONOCYTES # BLD AUTO: 0.71 THOUSAND/ΜL (ref 0.17–1.22)
MONOCYTES NFR BLD AUTO: 9 % (ref 4–12)
NEUTROPHILS # BLD AUTO: 6.06 THOUSANDS/ΜL (ref 1.85–7.62)
NEUTS SEG NFR BLD AUTO: 73 % (ref 43–75)
NRBC BLD AUTO-RTO: 0 /100 WBCS
PLATELET # BLD AUTO: 265 THOUSANDS/UL (ref 149–390)
PMV BLD AUTO: 9.4 FL (ref 8.9–12.7)
POTASSIUM SERPL-SCNC: 3.7 MMOL/L (ref 3.5–5.3)
PROT SERPL-MCNC: 7.1 G/DL (ref 6.4–8.4)
RBC # BLD AUTO: 3.63 MILLION/UL (ref 3.81–5.12)
SODIUM SERPL-SCNC: 137 MMOL/L (ref 135–147)
TSH SERPL DL<=0.05 MIU/L-ACNC: 1.5 UIU/ML (ref 0.45–4.5)
WBC # BLD AUTO: 8.11 THOUSAND/UL (ref 4.31–10.16)

## 2024-10-09 PROCEDURE — 36593 DECLOT VASCULAR DEVICE: CPT

## 2024-10-09 PROCEDURE — 85025 COMPLETE CBC W/AUTO DIFF WBC: CPT | Performed by: RADIOLOGY

## 2024-10-09 PROCEDURE — 80053 COMPREHEN METABOLIC PANEL: CPT | Performed by: RADIOLOGY

## 2024-10-09 PROCEDURE — 96372 THER/PROPH/DIAG INJ SC/IM: CPT

## 2024-10-09 PROCEDURE — 99213 OFFICE O/P EST LOW 20 MIN: CPT | Performed by: INTERNAL MEDICINE

## 2024-10-09 PROCEDURE — 84443 ASSAY THYROID STIM HORMONE: CPT | Performed by: INTERNAL MEDICINE

## 2024-10-09 PROCEDURE — G2211 COMPLEX E/M VISIT ADD ON: HCPCS | Performed by: INTERNAL MEDICINE

## 2024-10-09 RX ORDER — CYANOCOBALAMIN 1000 UG/ML
1000 INJECTION, SOLUTION INTRAMUSCULAR; SUBCUTANEOUS ONCE
Status: CANCELLED | OUTPATIENT
Start: 2024-10-09

## 2024-10-09 RX ORDER — CYANOCOBALAMIN 1000 UG/ML
1000 INJECTION, SOLUTION INTRAMUSCULAR; SUBCUTANEOUS ONCE
Status: COMPLETED | OUTPATIENT
Start: 2024-10-09 | End: 2024-10-09

## 2024-10-09 RX ORDER — CYANOCOBALAMIN 1000 UG/ML
1000 INJECTION, SOLUTION INTRAMUSCULAR; SUBCUTANEOUS ONCE
Status: CANCELLED | OUTPATIENT
Start: 2024-11-06 | Stop reason: HOSPADM

## 2024-10-09 RX ADMIN — ALTEPLASE 2 MG: 2.2 INJECTION, POWDER, LYOPHILIZED, FOR SOLUTION INTRAVENOUS at 15:16

## 2024-10-09 RX ADMIN — CYANOCOBALAMIN 1000 MCG: 1000 INJECTION, SOLUTION INTRAMUSCULAR; SUBCUTANEOUS at 16:18

## 2024-10-09 NOTE — TELEPHONE ENCOUNTER
MO   Existing  Please cancel patient and only schedule b12 and port flushes for now. Plan for chemo is on hold. ty

## 2024-10-09 NOTE — PROGRESS NOTES
Patient also received B12 injection today .  Tolerated injection in the left deltoid.  AVS declined.  Next appt is 11/6 @ 130 pm.  Per rex,  imfinzi is on hold for now due to a recent psoriasis flare up.

## 2024-10-09 NOTE — PROGRESS NOTES
Patient arrives to infusion center for port maintenance with labs. Patient offers no complaints. Port accessed, port not offering blood return. Per patient port was not offering blood return on Monday during her CT scan. TPA instilled today. Blood return noted after 45 minutes. Labs collected, Port de-accessed.    Next appointment: pt is currently scheduling b-12's and port flushes.    As per Dr. Thrasher, pt will not receive treatment next week.

## 2024-10-09 NOTE — PROGRESS NOTES
Hematology/Oncology Outpatient Follow- up Note  Saskia Alfaro 69 y.o. female MRN: @ Encounter: [id number]        Date:  10/9/2024        Assessment / Plan:    1 right lung cancer adenocarcinoma T4 N2 M0 TBS score 98 K-minna G12 C+ mutation  2 Resolved allergic reaction to Taxol  3 pseudocyst of pancreas stable on most recent scans  4 new lung nodules on her CAT scan cannot rule out metastatic lesions  5 psoriasis primarily affecting arms hands upper thighs somewhat increased although stabilizing we had stopped her Imfinzi as a result  Plan: I will review the scans with radiation.  She remains off the treatments.  She will come back in 10 to 14 days.          HPI: 69-year-old female here for follow-up.  She has right lung cancer adenocarcinoma [id number] TBS score 98 K-minna G 12C plot positive fortunately she had developed the onset of worsening significantly of psoriasis and still has problems in her arms upper thighs and hands.  We had to stop the treatment it improved slightly but still a problem.  I asked her to really discussed this with dermatology.  Unfortunately her CAT scan shows multiple small lung nodules all less than 5 mm but conclude in clusters and there is concern whether this is metastatic.  She feels fairly well.  I will try reviewed these films with radiation and see what their thoughts are.  In terms of other treatment she is K-minna G12 C+ and may be a candidate for treatment regarding that.  The concern regarding Keytruda is due to worsening of her psoriasis.      Interval History: Note from 9/11/2024:  Assessment / Plan:    1 right lung cancer adenocarcinoma [id number] TBS score 98 K-minna G12 C+ mutation  2 resolved allergic reaction to Taxol  3 pseudocyst of pancreas stable  4 psoriasis primarily affecting arms and upper thighs which she noticed increasing since starting her treatment for which we have stopped.  Plan: She will get a CT scan of her chest and be seen here in October.  I will try and  communicate with dermatology.  Long-term prognosis guarded.  HPI: 69-year-old female with stage III lung was on Imfinzi.  She developed problems with psoriasis.  She had patches on her hands her elbows her knees.  They were worsening.  Itchy.  I had her see dermatology.  They we stopped the medication.  She has not received it since July.  The patches are somewhat better.  She has some nodularity in her hands.  And her knees are slightly better.  At this point the question is whether we can resume the Imfinzi immunotherapy.  I am a little reluctant to do some concern about flares I will speak with dermatology regarding this.  Otherwise she is stable and doing fairly well.  She is due for a CAT scan of her chest in October 2024 and we will see her after that.  Interval History:    Assessment / Plan: Note from 7/23/2024:  1 right lung cancer adenocarcinoma stage IIIb T4 N2 M0 TBS score 98 K-minna G12 C+ mutated  2 resolved allergic reaction to Taxol now on Imfinzi  3 pseudocyst of pancreas being followed by GI regularly  4 psoriasis primarily affecting arms and upper thighs which she  noticed increased since starting her treatment  Plan: She will be seen by dermatology 8/7/2024.  If she is stable we will plan her treatment in 4 weeks.  We will skip this treatment till she is seen.  HPI: 69-year-old female on Imfinzi for stage IIIb lung cancer.  She also pseudocyst of pancreas.  Psoriasis primarily affecting arms and upper thighs.  She feels it has gotten worse since she started her immunotherapy.  She was seen today and we elected to hold her Imfinzi.  She will be seen by dermatology.   has seen her previously.  She was kind enough to fit her into the schedule August 7 that will be before her delay in treatment which will be 4 weeks.  Otherwise she has been doing well.  The patches are primarily on her hands on both elbows on her left knee.  She does not have much on her trunk fortunately.  Is not  "diffuse.  Interval History:    Assessment / Plan:    1 right lung cancer adenocarcinoma stage IIIb T4 N2 M0 TPS score 98 K-minna G12 C+ mutated.  2 resolved allergic reaction to Taxol completing Taxol now on Imfinzi.  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  Plan she will continue her Imfinzi.  She is doing well with that.  She will return in 12 weeks.  She is followed regular by gastroenterology as well.  Her scans were stable.  Will repeat them every 3 to 6 months at this time.  HPI: 68-year-old female here for follow-up.  She is doing well.  Her scans show improvement in her adenocarcinoma.  She remains with a pseudocyst.  There is a question of removing stones from the pancreatic duct.  It was elected not to do so.  She was seen in [facility] and their gastroenterology department.  Of note her psoriasis is acting up probably related to immunotherapy.  She says it is tolerable.  She had a bone scan that was negative.  She is on Imfinzi and tolerating it fairly well other than the psoriasis.  She is maintaining weight she has no abdominal pain she is not short of breath at rest.  Interval History: Note from 4/5/2024 \"  Assessment / Plan:    1 right lung cancer adenocarcinoma stage IIIb T4 N2 M0 TPS score 98 K-minna KG12 C mutated.  2 Resolved allergic reaction to Taxol leading to use of Alimta completed her treatments.  3 presumed pseudocyst of pancreas repeat MRI stable has a stone in duct and is being further evaluated by GI.  Plan: Because of her myalgias and arthralgias that she has been complaining about we will get a bone scan to make sure she is stable.  She has a new nodule in her anterior patellar area.  We will also begin her on Imfinzi.  We will see her in about 4 weeks time.  HPI: 68-year-old female comes in for follow-up.  She is status post chemoradiation for stage IIIb " right lung cancer.  Her scans are stable.  She is complaining of pain in several joints and into her legs.  Given her history we will obtain a bone scan although I suspect it will be negative.  She denies fever or chills.  She is occasionally short of breath with some coughing.  Overall doing reasonably well.  We plan to begin Imfinzi therapy on her per [place] trial in the near future.  CT scan shows no worsening of disease.  Interval History: Note 3/5/2024:  Assessment / Plan:    1 right lung cancer adenocarcinoma stage IIIb T4 N2 M0 TPS score 98 K-minna KG12 C mutated  2 allergic reaction to Taxol leading to use of Alimta.  3 presumed pseudocyst of pancreas awaiting repeat MRI  Plan: Patient is due to receive her MRI 3/17.  She will get a CAT scan chest abdomen pelvis on 3/28 along with laboratory work CBC CMP.  She will come back in 4 weeks at which time we will plan to begin her.  Side effects were discussed and consents were obtained.  No other major suggestions at this time.  HPI: 68-year-old female having completed her chemotherapy radiation on 3/1.  She received her last dose of Alimta on 3/1.  She is got some support taste in her mouth some nausea.  She is able to hold some food down.  She received doses of Alimta on 2/93/1.  She received weekly carboplatin.  She has maintained her weight.  She had a plain chest x-ray during her radiation which shows what appear to be some inflammatory changes in her fissure.  This will be followed up in the future.  She will undergo scans in 3 and half weeks along with laboratory work.  We plan to begin her on Imfinzi in 4 weeks on a every 4 week basis.  Side effects were discussed and consents were obtained today.  Interval History:    1 right lung cancer adenocarcinoma stage IIIb T4 N2 M0 TPS score 98 K-minna G12 C mutated  2 allergic reaction to Taxol.  3 presumed pseudocyst of pancreas  Plan: Patient has been off Taxol for several weeks.  We are going to add Alimta 500  mg/m² to her weekly carboplatin.  Alimta will be every 3 weeks.  She will receive it this Friday 2/9 and then 3 weeks later.  She is will still be candidate to obtain Imfinzi in the future.  Side effects were discussed consent was obtained she will follow-up in 3 weeks.  HPI: She is tolerating carboplatin fairly well has no major problems.  We discussed the Alimta.  She has had several weeks of now away total of 3 weeks and we will go ahead with Alimta this week.  Decadron/B12/folate were added as part of her regimen.  Side effects were discussed and consents were obtained.  She is ready to try it.  We will have to watch her counts which she will get weekly.  Interval History:   68-year-old female seen with the above problem.  She began her combined chemoradiation with Taxol carboplatinum and radiation on 1/19/2024.  Unfortunately she developed a significant reaction after 7 minutes of infusion with Taxol short of breath lightheaded dizzy feeling poorly and the drug was stopped.  Presumably related to the camper for in the Taxol.  Unfortunately I think it would be difficult to give her more Taxol admitted due to.  I would hold that.  I think we are choices beyond this coming week of carboplatin alone will be in either -16 or Alimta.  We will look into one of the other.  I discussed this with her she is aware of that.  Note from 1/18/2024: 68-year-old female with history of adenocarcinoma right lung clinical stage IIIb T4 N2 M0.  PD-L1 TPS 98% K-minna G12 C mutation.  She has completed chemotherapy.  PET CT scan done at the completion showed some improvement disease.  She was seen by surgery felt she was not a surgical candidate.  She also has a cystic lesion approximately 3 cm in the head of the pancreas.  She has been evaluated by GI who felt this was a pseudocyst.  Biopsy was nondiagnostic.  Their recommendation is to repeat her MRI which will be done in March.  Reviewing her situation we were going to plan to go  ahead with her chemo therapy tomorrow.  She is to receive combined Taxol carboplatin.  She is feeling fairly well denies any other major problems.      Cancer Staging:  Cancer Staging   Adenocarcinoma of right lung (HCC)  Staging form: Lung, AJCC 8th Edition  - Clinical stage from 7/24/2023: Stage IIIB (cT4, cN2, cM0) - Signed by Celina Treviño MD on 12/26/2023  Stage prefix: Initial diagnosis      Molecular Testing:     Previous Hematologic/ Oncologic History:    Oncology History Overview Note   with a history of with a history of stage IIIB NSCLC status post neoadjuvant chemotherapy, but was not a surgical candidate post treatment, who recently completed a course of definitive radiation on 3/1/24. She was last seen 7/29/24 and presents today for follow up.       9/10/24 Dr. Thrasher  1 right lung cancer adenocarcinoma [id number] TBS score 98 K-minna G12 C+ mutation  2 resolved allergic reaction to Taxol  3 pseudocyst of pancreas stable  4 psoriasis primarily affecting arms and upper thighs which she noticed increasing since starting her treatment for which we have stopped.  Plan: She will get a CT scan of her chest and be seen here in October.  I will try and communicate with dermatology.  Long-term prognosis guarded.      10/7/24 CT chest w contrast        10/9/24 Dr. Thrasher     Adenocarcinoma of right lung (HCC)   6/23/2023 Initial Diagnosis    Adenocarcinoma of right lung (HCC)     7/24/2023 Biopsy    Navigational bronchoscopy with EBUS    A.  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     A.-B.  Lung, Right Middle Lobe Bronchial Brushing (Thin-prep and smear preparations):   Conclusive evidence of malignancy.  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     Satisfactory for evaluation.     C.-D.  Lung, Right Middle Lobe Bronchoalveolar Lavage (Thin-prep and cell block preparations):   Conclusive evidence of malignancy.  Non-small cell  carcinoma compatible with a pulmonary adenocarcinoma.     Satisfactory for evaluation.      E.-F.  Lymph Node, level 7 (Thin-prep and smear preparations):   Atypical cellular changes seen.  Rare atypical cells.  Lymphocytes.  Few bronchial cells and pulmonary macrophages.        Satisfactory for evaluation.     G.-H.  Lymph Node, 10R (Thin-prep and smear preparations):   Negative for malignancy.  Lymphocytes.  Aggregates of neutrophils.     Satisfactory for evaluation.     7/24/2023 -  Cancer Staged    Staging form: Lung, AJCC 8th Edition  - Clinical stage from 7/24/2023: Stage IIIB (cT4, cN2, cM0) - Signed by Celina Treviño MD on 12/26/2023  Stage prefix: Initial diagnosis       8/31/2023 - 10/12/2023 Chemotherapy    cyanocobalamin, 1,000 mcg, Intramuscular, Once, 1 of 1 cycle  Administration: 1,000 mcg (8/24/2023)  alteplase (CATHFLO), 2 mg, Intracatheter, Every 1 Minute as needed, 3 of 3 cycles  CISplatin (PLATINOL) with mannitol IVPB, 75 mg/m2 = 129.8 mg (100 % of original dose 75 mg/m2), Intravenous, Once, 3 of 3 cycles  Dose modification: 50 mg/m2 (original dose 75 mg/m2, Cycle 1, Reason: Anticipated Tolerance), 75 mg/m2 (original dose 75 mg/m2, Cycle 1, Reason: Dose modified as per discussion with consulting physician)  Administration: 129.8 mg (8/31/2023), 129.8 mg (9/21/2023), 129.8 mg (10/12/2023)  fosaprepitant (EMEND) IVPB, 150 mg, Intravenous, Once, 3 of 3 cycles  Administration: 150 mg (8/31/2023), 150 mg (9/21/2023), 150 mg (10/12/2023)  nivolumab (OPDIVO) IVPB, 360 mg, Intravenous, Once, 3 of 3 cycles  Administration: 360 mg (8/31/2023), 360 mg (9/21/2023), 360 mg (10/12/2023)  pemetrexed (ALIMTA) chemo infusion, 500 mg/m2 = 865 mg, Intravenous, Once, 3 of 3 cycles  Administration: 865 mg (8/31/2023), 865 mg (9/21/2023), 865 mg (10/12/2023)     1/18/2024 - 3/1/2024 Radiation    Treatments:  Course: C1  Plan ID Energy Fractions Dose per Fraction (cGy) Dose Correction (cGy) Total Dose Delivered (cGy)  Elapsed Days   R LUNG MED 6X 30 / 30 200 0 6,000 43    Treatment Dates:  1/18/2024 - 3/1/2024.      1/19/2024 - 3/1/2024 Chemotherapy    alteplase (CATHFLO), 2 mg, Intracatheter, Every 1 Minute as needed, 6 of 6 cycles  CARBOplatin (PARAPLATIN) IVPB (GOG AUC DOSING), 185 mg, Intravenous, Once, 6 of 6 cycles  Administration: 185 mg (1/19/2024), 183.2 mg (1/26/2024), 183.2 mg (2/2/2024), 183.2 mg (2/9/2024), 183.2 mg (2/16/2024), 196.6 mg (3/1/2024)  PACLItaxel (TAXOL) chemo IVPB, 50 mg/m2 = 85.8 mg, Intravenous, Once, 1 of 1 cycle  Administration: 85.8 mg (1/19/2024)  pemetrexed (ALIMTA) chemo infusion, 500 mg/m2 = 860 mg (100 % of original dose 500 mg/m2), Intravenous, Once, 2 of 2 cycles  Dose modification: 500 mg/m2 (original dose 500 mg/m2, Cycle 4)  Administration: 860 mg (2/9/2024), 860 mg (3/1/2024)     4/2/2024 -  Chemotherapy    alteplase (CATHFLO), 2 mg, Intracatheter, Every 1 Minute as needed, 5 of 6 cycles  durvalumab (IMFINZI) IVPB, 1,500 mg (original dose 10 mg/kg), Intravenous, Once, 5 of 6 cycles  Dose modification: 1,500 mg (original dose 10 mg/kg, Cycle 1, Reason: Other (Must fill in a comment), Comment: Will give every 28 days.)  Administration: 1,500 mg (4/2/2024), 1,500 mg (5/28/2024), 1,500 mg (6/25/2024), 1,500 mg (4/30/2024)         Current Hematologic/ Oncologic Treatment:       Cycle 1         Test Results:    Imaging: CT chest w contrast    Result Date: 10/8/2024  Narrative: CT CHEST WITH IV CONTRAST INDICATION: C34.91: Malignant neoplasm of unspecified part of right bronchus or lung. Patient has history of stage IIIB NSCLC status post neoadjuvant chemotherapy, but was not a surgical candidate post treatment, who completed a course of definitive radiation on 3/1/24. COMPARISON: CT chest 3/28/2024, PET/CT 5/7/2024 TECHNIQUE: CT examination of the chest was performed. Multiplanar 2D reformatted images were created from the source data. This examination, like all CT scans performed in the .  [facility], was performed utilizing techniques to minimize radiation dose exposure, including the use of iterative reconstruction and automated exposure control. Radiation dose length product (DLP) for this visit: 213 mGy-cm IV Contrast: 120 mL of iohexol (OMNIPAQUE) FINDINGS: LUNGS: 3.2 x 2.8 cm right middle lobe lesion, previously 3.5 x 3.0 cm as per PET/CT report. Development of mild consolidative changes and bronchiectasis adjacent to the lesion in the posterior right upper lobe, anterior right lower lobe and also involving the right middle lobe, most suggestive of radiation pneumonitis. -4 mm juxtapleural nodular density medial left upper lobe (3/90), not clearly visualized previously. -2-3 mm left upper lobe nodule (3/71), not clearly evident previously. -2 mm left upper lobe nodules (3/69, 3/86 and 3/100), not clearly evident previously. -3 mm (3/75) and 2 mm left lower lobe nodules (3/86), not seen previously. -3 mm right upper lobe nodule (3/62), not seen previously. Another 3 mm right upper lobe nodule series 3/58), also likely new. -New 2 mm right upper lobe nodule (3/77). Mild biapical pleural-parenchymal scarring. Mild emphysema. Mild dependent basilar hypoventilatory changes. AIRWAYS: No significant central filling defect. Obscuration of the lateral right middle lobe segmental bronchus peripherally again noted. PLEURA: Trace, loculated right pleural effusion and/or pleural thickening, similar. HEART/GREAT VESSELS: Heart is unremarkable for patient's age. Atherosclerotic changes thoracic aorta and coronary arteries. No thoracic aortic aneurysm. 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 MEDIASTINUM AND ARDEN: Shotty mediastinal lymph nodes, similar to the prior study. CHEST WALL AND LOWER NECK: Right chest wall Port-A-Cath with tip near the cavoatrial junction. VISUALIZED STRUCTURES IN THE UPPER ABDOMEN: Diffuse fatty infiltration of the liver. Please see  separate report of CT of abdomen and pelvis also on this date regarding pancreatic cystic lesion and duct dilatation with parenchymal calcifications. OSSEOUS STRUCTURES: No acute fracture or destructive osseous lesion. Degenerative change of the spine.     Impression: 1. Interval development of small bilateral pulmonary nodules, concerning for metastases. 2. Slight decrease in right middle lobe mass. 3. Interval development of mild consolidation and bronchiectasis in the right lung adjacent to the above mass, favored to represent radiation pneumonitis. Correlate clinically for infection. 4. Report of CT abdomen and pelvis to follow separately. Workstation performed: [id number]     CT abdomen pelvis w contrast    Result Date: 10/8/2024  Narrative: CT ABDOMEN AND PELVIS WITH IV CONTRAST INDICATION: K86.2: Cyst of pancreas. K86.3: Pseudocyst of pancreas. K86.89: Other specified diseases of pancreas. The patient underwent endoscopic ultrasound November 2023 COMPARISON: CT abdomen pelvis 6/11/2020, PET/CT 5/7/2024 and MRI abdomen 3/17/2024 TECHNIQUE: CT examination of the abdomen and pelvis was performed. Arterial phase images of the abdomen were also obtained. Multiplanar 2D reformatted images were created from the source data. This examination, like all CT scans performed in the [facility], was performed utilizing techniques to minimize radiation dose exposure, including the use of iterative reconstruction and automated exposure control. Radiation dose length product (DLP) for this visit: 947 mGy-cm IV Contrast: 120 mL of iohexol (OMNIPAQUE) Enteric Contrast: Not administered. FINDINGS: ABDOMEN LOWER CHEST: Please see send report of CT chest also on this date regarding opacity in the right middle and lower lobes. Trace right pleural effusion and/or thickening. LIVER/BILIARY TREE: Enlarged with diffuse fatty infiltration and mild fatty sparing adjacent to the gallbladder fossa. No suspicious hepatic  lesions or biliary dilatation. GALLBLADDER: No calcified gallstones. No pericholecystic inflammatory change. SPLEEN: Unremarkable. PANCREAS: Similar unilocular cystic lesion in the pancreatic head/neck measuring 2.8 x 2.5 cm (series 11 image 75), communicating with the main pancreatic duct. Previously this measured about 3.1 x 2.2 cm. Calcification in the pancreatic head immediately  caudal to the cystic structure, presumably representing intraductal calculus again identified measuring about 8 mm. Atrophy of the pancreatic body/tail again noted. Upstream dilatation of the main pancreatic duct measuring up to 0.8 cm as before with parenchymal calcifications redemonstrated. Another unilocular cystic lesion in the pancreatic body measures about 8 mm, previously 1.0 cm. ADRENAL GLANDS: Unremarkable. KIDNEYS/URETERS: No hydronephrosis or urinary tract calculi. Subcentimeter hypoattenuating renal lesion(s), too small to characterize but statistically likely benign, which do not warrant follow-up (Radiology June 2019). STOMACH AND BOWEL: Evaluation of the stomach is limited by underdistention.  No focal pathology seen within limitations. Small bowel is normal caliber. Sigmoid diverticulosis without diverticulitis. APPENDIX: No findings to suggest appendicitis. ABDOMINOPELVIC CAVITY: No ascites. No pneumoperitoneum. No enlarged lymphadenopathy. VESSELS: Atherosclerotic changes abdominal aorta without evidence of aneurysm. Moderate atherosclerotic narrowing of the origin of the celiac artery best seen on sagittal imaging. PELVIS REPRODUCTIVE ORGANS: Supracervical hysterectomy suggested. No adnexal masses. URINARY BLADDER: Unremarkable. ABDOMINAL WALL/INGUINAL REGIONS: Tiny fat-containing umbilical hernia. BONES: No acute fracture or suspicious osseous lesion. Subcentimeter lucency left iliac bone (3/111), chronically stable and likely benign. Degenerative changes of the spine.     Impression: 1. Grossly stable unilocular  "cystic lesion in the pancreatic neck measuring 2.8 x 2.5 cm, communicating with the main pancreatic duct with upstream duct dilatation and suspected downstream intraductal calculus. No mural nodularity. 2. Diffuse fatty liver infiltration. 3. Sigmoid diverticulosis. 4. Please see separate report of CT chest also on this date. Workstation performed: [id number]             Labs:   Lab Results   Component Value Date    WBC 8.11 10/09/2024    HGB 11.7 10/09/2024    HCT 36.2 10/09/2024     (H) 10/09/2024     10/09/2024     Lab Results   Component Value Date    K 3.7 10/09/2024     10/09/2024    CO2 27 10/09/2024    BUN 8 10/09/2024    CREATININE 0.57 (L) 10/09/2024    CALCIUM 9.5 10/09/2024    AST 21 10/09/2024    ALT 30 10/09/2024    ALKPHOS 66 10/09/2024    EGFR 95 10/09/2024         No results found for: \"SPEP\", \"UPEP\"    No results found for: \"PSA\"    No results found for: \"CEA\"    No results found for: \"\"    No results found for: \"AFP\"    No results found for: \"IRON\", \"TIBC\", \"FERRITIN\"    Lab Results   Component Value Date    [id number] 938 05/15/2018         ROS: Review of Systems   Constitutional: Negative.    HENT: Negative.     Eyes: Negative.    Respiratory: Negative.     Cardiovascular: Negative.    Gastrointestinal: Negative.    Endocrine: Negative.    Genitourinary: Negative.    Musculoskeletal: Negative.    Skin: Negative.    Allergic/Immunologic: Negative.    Neurological: Negative.    Hematological: Negative.      Her skin shows psoriatic patches on her hands on her knees on her upper thighs and on her abdominal and back areas.    Current Medications: Reviewed  Allergies: Reviewed  PMH/FH/SH:  Reviewed      Physical Exam:    Body surface area is 1.84 meters squared.    Wt Readings from Last 3 Encounters:   10/09/24 74.4 kg (164 lb)   09/10/24 73 kg (161 lb)   08/07/24 69.9 kg (154 lb)        Temp Readings from Last 3 Encounters:   10/09/24 (!) 97.4 °F (36.3 °C) (Temporal) "   09/10/24 (!) 97.3 °F (36.3 °C) (Temporal)   08/07/24 97.6 °F (36.4 °C)        BP Readings from Last 3 Encounters:   10/09/24 118/72   09/10/24 118/70   07/29/24 118/64         Pulse Readings from Last 3 Encounters:   10/09/24 71   09/10/24 88   07/29/24 102     @LASTSAO2(3)@      Physical Exam      Goals and Barriers:  Current Goal: Prolong Survival from Cancer.   Barriers: None.      Patient's Capacity to Self Care:  Patient is able to self care.    Code Status: [unfilled]  Advance Directive and Living Will:      Power of :

## 2024-10-16 NOTE — RESULT ENCOUNTER NOTE
Inform patient via [employer].  Please review the pathology/lab result of further discussion.    Copied from [employer] message :       Shai Kruger,     Your CT scan shows similar size of the cyst and stones in the pancreatic duct. No other concerning findings. We should schedule a meeting in the office to discuss next steps and see what the outcome was from yoru meeting with Dr. Robert Hunter.     Best regards,     Randolph Rodriguez MD

## 2024-10-21 ENCOUNTER — OFFICE VISIT (OUTPATIENT)
Dept: RADIATION ONCOLOGY | Facility: CLINIC | Age: 69
End: 2024-10-21
Attending: RADIOLOGY
Payer: COMMERCIAL

## 2024-10-21 ENCOUNTER — PATIENT OUTREACH (OUTPATIENT)
Dept: CASE MANAGEMENT | Facility: HOSPITAL | Age: 69
End: 2024-10-21

## 2024-10-21 VITALS
TEMPERATURE: 97.6 F | BODY MASS INDEX: 26.15 KG/M2 | DIASTOLIC BLOOD PRESSURE: 78 MMHG | OXYGEN SATURATION: 98 % | SYSTOLIC BLOOD PRESSURE: 120 MMHG | RESPIRATION RATE: 16 BRPM | HEART RATE: 91 BPM | WEIGHT: 162 LBS

## 2024-10-21 DIAGNOSIS — C34.91 ADENOCARCINOMA OF RIGHT LUNG (HCC): ICD-10-CM

## 2024-10-21 DIAGNOSIS — C34.2 ADENOCARCINOMA OF MIDDLE LOBE OF RIGHT LUNG (HCC): Primary | ICD-10-CM

## 2024-10-21 PROCEDURE — G0463 HOSPITAL OUTPT CLINIC VISIT: HCPCS | Performed by: RADIOLOGY

## 2024-10-21 PROCEDURE — 99213 OFFICE O/P EST LOW 20 MIN: CPT | Performed by: RADIOLOGY

## 2024-10-21 PROCEDURE — G2211 COMPLEX E/M VISIT ADD ON: HCPCS | Performed by: RADIOLOGY

## 2024-10-21 PROCEDURE — 99211 OFF/OP EST MAY X REQ PHY/QHP: CPT | Performed by: RADIOLOGY

## 2024-10-21 RX ORDER — OMEGA-3S/DHA/EPA/FISH OIL/D3 300MG-1000
400 CAPSULE ORAL DAILY
COMMUNITY

## 2024-10-21 NOTE — PROGRESS NOTES
CHANA met with pt in the Rad Onc office this afternoon after receiving a VM from her late in the day last Friday, letting me know that her cancer has progressed.  She tells me today that apparently the progression is small and she is unsure yet of the treatment plans.  She has psoriasis all over her body and showed me her hands, which are broken out in a dry, red scaly rash.  She says that it can be painful at times.  She tells me that she has not followed through with making a will or doing any planning for her sister and her future care, we have discussed this at length in the past.  She did speak to her sister in [place] about who will take both her dog and her sister in after she passes, and her sister was very vocal about taking the dog but silent about taking their sister.  They do have a brother as well who lives out of state.  She is worried that everything will fall on her daughter when she passes.  I have again encouraged her to make a consultation appt with an  to discuss these things however I believe her care decisions would fall to her living brother and sister.  We discussed that they would either agree to take her in, or have her placed in a facility.  Pt says that she has asked her sister what she would want if/when that happens and there was no real conclusion to that conversation.  She has looked into getting her in a senior apartment, however wait lists are long and she does not qualify for any financial assistance based on her income.  She says that rent is very expensive right now and someone would still have to help her manage her money.  Ideally she would like to get her set up in an apartment, see how she does and help her with any issues as they arise, but that does not seem like a realistic option at this point.    At this point, pt's appointment with her physician was ready to begin.  We agreed to reconnect tomorrow by phone to discuss further.  
No

## 2024-10-21 NOTE — PROGRESS NOTES
Saskia Alfaro 1955 is a 69 y.o. female with a history of with a history of stage IIIB NSCLC status post neoadjuvant chemotherapy, but was not a surgical candidate post treatment, who recently completed a course of definitive radiation on 3/1/24. She was last seen 7/29/24 and presents today for follow up.       9/10/24 Dr. Thrasher  1 right lung cancer adenocarcinoma [id number] TBS score 98 K-minna G12 C+ mutation  2 resolved allergic reaction to Taxol  3 pseudocyst of pancreas stable  4 psoriasis primarily affecting arms and upper thighs which she noticed increasing since starting her treatment for which we have stopped.  Plan: She will get a CT scan of her chest and be seen here in October.  I will try and communicate with dermatology.  Long-term prognosis guarded.      10/7/24 CT chest w contrast  1. Interval development of small bilateral pulmonary nodules, concerning for metastases.   2. Slight decrease in right middle lobe mass.   3. Interval development of mild consolidation and bronchiectasis in the right lung adjacent to the above mass, favored to represent radiation pneumonitis. Correlate clinically for infection.   4. Report of CT abdomen and pelvis to follow separately.      10/7/24 CT A/P w contrast  1. Grossly stable unilocular cystic lesion in the pancreatic neck measuring 2.8 x 2.5 cm, communicating with the main pancreatic duct with upstream duct dilatation and suspected downstream intraductal calculus. No mural nodularity.   2. Diffuse fatty liver infiltration.   3. Sigmoid diverticulosis.   4. Please see separate report of CT chest also on this date.      10/9/24 Dr. Thrasher  1 right lung cancer adenocarcinoma T4 N2 M0 TBS score 98 K-minna G12 C+ mutation  2 Resolved allergic reaction to Taxol  3 pseudocyst of pancreas stable on most recent scans  4 new lung nodules on her CAT scan cannot rule out metastatic lesions  5 psoriasis primarily affecting arms hands upper thighs somewhat increased although  stabilizing we had stopped her Imfinzi as a result  Plan: I will review the scans with radiation.  She remains off the treatments.  She will come back in 10 to 14 days.      Upcoming:  10/22/24 Dr. Thrasher  11/6/24 Infusion    Follow up visit     Oncology History   Adenocarcinoma of right lung (HCC)   6/23/2023 Initial Diagnosis    Adenocarcinoma of right lung (HCC)     7/24/2023 Biopsy    Navigational bronchoscopy with EBUS    A.  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     A.-B.  Lung, Right Middle Lobe Bronchial Brushing (Thin-prep and smear preparations):   Conclusive evidence of malignancy.  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     Satisfactory for evaluation.     C.-D.  Lung, Right Middle Lobe Bronchoalveolar Lavage (Thin-prep and cell block preparations):   Conclusive evidence of malignancy.  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     Satisfactory for evaluation.      E.-F.  Lymph Node, level 7 (Thin-prep and smear preparations):   Atypical cellular changes seen.  Rare atypical cells.  Lymphocytes.  Few bronchial cells and pulmonary macrophages.        Satisfactory for evaluation.     G.-H.  Lymph Node, 10R (Thin-prep and smear preparations):   Negative for malignancy.  Lymphocytes.  Aggregates of neutrophils.     Satisfactory for evaluation.     7/24/2023 -  Cancer Staged    Staging form: Lung, AJCC 8th Edition  - Clinical stage from 7/24/2023: Stage IIIB (cT4, cN2, cM0) - Signed by Celina Treviño MD on 12/26/2023  Stage prefix: Initial diagnosis       8/31/2023 - 10/12/2023 Chemotherapy    cyanocobalamin, 1,000 mcg, Intramuscular, Once, 1 of 1 cycle  Administration: 1,000 mcg (8/24/2023)  alteplase (CATHFLO), 2 mg, Intracatheter, Every 1 Minute as needed, 3 of 3 cycles  CISplatin (PLATINOL) with mannitol IVPB, 75 mg/m2 = 129.8 mg (100 % of original dose 75 mg/m2), Intravenous, Once, 3 of 3  cycles  Dose modification: 50 mg/m2 (original dose 75 mg/m2, Cycle 1, Reason: Anticipated Tolerance), 75 mg/m2 (original dose 75 mg/m2, Cycle 1, Reason: Dose modified as per discussion with consulting physician)  Administration: 129.8 mg (8/31/2023), 129.8 mg (9/21/2023), 129.8 mg (10/12/2023)  fosaprepitant (EMEND) IVPB, 150 mg, Intravenous, Once, 3 of 3 cycles  Administration: 150 mg (8/31/2023), 150 mg (9/21/2023), 150 mg (10/12/2023)  nivolumab (OPDIVO) IVPB, 360 mg, Intravenous, Once, 3 of 3 cycles  Administration: 360 mg (8/31/2023), 360 mg (9/21/2023), 360 mg (10/12/2023)  pemetrexed (ALIMTA) chemo infusion, 500 mg/m2 = 865 mg, Intravenous, Once, 3 of 3 cycles  Administration: 865 mg (8/31/2023), 865 mg (9/21/2023), 865 mg (10/12/2023)     1/18/2024 - 3/1/2024 Radiation    Treatments:  Course: C1  Plan ID Energy Fractions Dose per Fraction (cGy) Dose Correction (cGy) Total Dose Delivered (cGy) Elapsed Days   R LUNG MED 6X 30 / 30 200 0 6,000 43    Treatment Dates:  1/18/2024 - 3/1/2024.      1/19/2024 - 3/1/2024 Chemotherapy    alteplase (CATHFLO), 2 mg, Intracatheter, Every 1 Minute as needed, 6 of 6 cycles  CARBOplatin (PARAPLATIN) IVPB (GOG AUC DOSING), 185 mg, Intravenous, Once, 6 of 6 cycles  Administration: 185 mg (1/19/2024), 183.2 mg (1/26/2024), 183.2 mg (2/2/2024), 183.2 mg (2/9/2024), 183.2 mg (2/16/2024), 196.6 mg (3/1/2024)  PACLItaxel (TAXOL) chemo IVPB, 50 mg/m2 = 85.8 mg, Intravenous, Once, 1 of 1 cycle  Administration: 85.8 mg (1/19/2024)  pemetrexed (ALIMTA) chemo infusion, 500 mg/m2 = 860 mg (100 % of original dose 500 mg/m2), Intravenous, Once, 2 of 2 cycles  Dose modification: 500 mg/m2 (original dose 500 mg/m2, Cycle 4)  Administration: 860 mg (2/9/2024), 860 mg (3/1/2024)     4/2/2024 -  Chemotherapy    alteplase (CATHFLO), 2 mg, Intracatheter, Every 1 Minute as needed, 5 of 6 cycles  durvalumab (IMFINZI) IVPB, 1,500 mg (original dose 10 mg/kg), Intravenous, Once, 5 of 6 cycles  Dose  modification: 1,500 mg (original dose 10 mg/kg, Cycle 1, Reason: Other (Must fill in a comment), Comment: Will give every 28 days.)  Administration: 1,500 mg (4/2/2024), 1,500 mg (5/28/2024), 1,500 mg (6/25/2024), 1,500 mg (4/30/2024)         Review of Systems:  Review of Systems   Respiratory:  Positive for cough (tan phlegm). Negative for chest tightness, shortness of breath and wheezing.         Everyday smoker 1/2 pack per day   Neurological:  Positive for light-headedness (since yesterday) and headaches. Negative for tremors and weakness.   Psychiatric/Behavioral:  Negative for suicidal ideas.         Feels depressed       Clinical Trial: no    Pregnancy test needed:  not applicable        Health Maintenance   Topic Date Due    Hepatitis C Screening  Never done    Medicare Annual Wellness Visit (AWV)  Never done    Pneumococcal Vaccine: 65+ Years (1 of 2 - PCV) Never done    BMI: Followup Plan  Never done    Hepatitis A Vaccine (1 of 2 - Risk 2-dose series) Never done    Osteoporosis Screening  Never done    RSV Vaccine Age 60+ Years (1 - 1-dose 60+ series) Never done    Zoster Vaccine (2 of 2) 12/04/2020    Fall Risk  Never done    Urinary Incontinence Screening  Never done    Breast Cancer Screening: Mammogram  10/21/2022    Influenza Vaccine (1) 09/01/2024    COVID-19 Vaccine (4 - 2023-24 season) 09/01/2024    Depression Screening  07/29/2025    Colorectal Cancer Screening  08/10/2025    BMI: Adult  10/09/2025    RSV Vaccine age 0-20 Months  Aged Out    HIB Vaccine  Aged Out    IPV Vaccine  Aged Out    Meningococcal ACWY Vaccine  Aged Out    HPV Vaccine  Aged Out    Lung Cancer Screening  Discontinued     Patient Active Problem List   Diagnosis    Pulmonary emphysema, unspecified emphysema type (HCC)    Adenocarcinoma of right lung (HCC)    Mediastinal adenopathy    Port-A-Cath in place    Hypomagnesemia    Pancreatic pseudocyst/cyst    Allergic reaction caused by a drug    High risk medication use     Dilation of pancreatic duct    Pancreatic duct stones    Helicobacter positive gastritis    Psoriasis    Multiple lung nodules on CT     Past Medical History:   Diagnosis Date    Fibromyalgia     Heart murmur     Lumbosacral disc herniation     Lung cancer (HCC)     Psoriasis     Psoriasis     RSD (reflex sympathetic dystrophy)      Past Surgical History:   Procedure Laterality Date    IR PORT PLACEMENT  08/04/2023    LUNG BIOPSY      x2    PARTIAL HYSTERECTOMY       Family History   Problem Relation Age of Onset    Sarcoidosis Mother     Cancer Sister      Social History     Socioeconomic History    Marital status:      Spouse name: Not on file    Number of children: Not on file    Years of education: Not on file    Highest education level: Not on file   Occupational History    Not on file   Tobacco Use    Smoking status: Every Day     Current packs/day: 0.50     Average packs/day: 0.5 packs/day for 48.8 years (24.4 ttl pk-yrs)     Types: Cigarettes     Start date: 1976    Smokeless tobacco: Never   Vaping Use    Vaping status: Never Used   Substance and Sexual Activity    Alcohol use: Yes     Alcohol/week: 7.0 standard drinks of alcohol     Types: 7 Glasses of wine per week     Comment: daily    Drug use: No    Sexual activity: Not on file   Other Topics Concern    Not on file   Social History Narrative    Not on file     Social Determinants of Health     Financial Resource Strain: Not on file   Food Insecurity: Not on file   Transportation Needs: Not on file   Physical Activity: Not on file   Stress: Not on file   Social Connections: Not on file   Intimate Partner Violence: Not on file   Housing Stability: Not on file       Current Outpatient Medications:     amitriptyline (ELAVIL) 25 mg tablet, , Disp: , Rfl:     amoxicillin (AMOXIL) 500 mg capsule, Prn dental procedure, Disp: , Rfl:     Ascorbic Acid (vitamin C) 100 MG tablet, Take 100 mg by mouth daily, Disp: , Rfl:     atorvastatin (LIPITOR) 20 mg  tablet, Take 20 mg by mouth daily, Disp: , Rfl:     b complex vitamins capsule, Take 1 capsule by mouth daily, Disp: , Rfl:     bismuth subsalicylate (PEPTO BISMOL) 262 MG chewable tablet, Take 1 tablet by mouth every 6 hours for 14 days (Patient not taking: Reported on 5/30/2024), Disp: 56 tablet, Rfl: 0    calcipotriene (DOVONEX) 0.005 % cream, Apply topically 2 (two) times a day To affected areas on Saturday and Sunday only, Disp: 120 g, Rfl: 2    DULoxetine (CYMBALTA) 30 mg delayed release capsule, , Disp: , Rfl:     Fluticasone-Salmeterol (Advair) 100-50 mcg/dose inhaler, Inhale 1 puff 2 (two) times a day Rinse mouth after use., Disp: , Rfl:     folic acid (KP Folic Acid) 1 mg tablet, Take 1 tablet (1 mg total) by mouth daily, Disp: 90 tablet, Rfl: 2    halobetasol (ULTRAVATE) 0.05 % ointment, Apply topically 2 (two) times a day, Disp: 50 g, Rfl: 1    loratadine (CLARITIN) 10 mg tablet, Take 10 mg by mouth daily (Patient not taking: Reported on 7/29/2024), Disp: , Rfl:     omeprazole (PriLOSEC) 20 mg delayed release capsule, Take 1 capsule (20 mg total) by mouth 2 (two) times a day Every 12 hours for 14 days (Patient not taking: Reported on 12/26/2023), Disp: 28 capsule, Rfl: 0    ondansetron (ZOFRAN) 4 mg tablet, Take 1 tablet (4 mg total) by mouth every 8 (eight) hours as needed for nausea or vomiting, Disp: 20 tablet, Rfl: 0    propranolol (INDERAL) 20 mg/5 mL solution, , Disp: , Rfl:     triamcinolone (KENALOG) 0.1 % cream, Apply topically 2 (two) times a day Affected areas Monday through Friday only, Disp: 453.6 g, Rfl: 2  Allergies   Allergen Reactions    Prednisone Palpitations     There were no vitals filed for this visit.

## 2024-10-21 NOTE — PROGRESS NOTES
Follow-up - Radiation Oncology   Saskia Alfaro 1955 69 y.o. female [id number]      History of Present Illness   Cancer Staging   Adenocarcinoma of right lung (HCC)  Staging form: Lung, AJCC 8th Edition  - Clinical stage from 7/24/2023: Stage IIIB (cT4, cN2, cM0) - Signed by Celina Treviño MD on 12/26/2023  Stage prefix: Initial diagnosis      Saskia Alfaro is a 69 y.o. woman with a history of with a history of stage IIIB NSCLC status post neoadjuvant chemotherapy, but was not a surgical candidate post treatment, who recently completed a course of definitive radiation on 3/1/24. She was last seen 7/29/24 and presents today for follow up.      9/10/24 Dr. Thrasher  1 right lung cancer adenocarcinoma [id number] TBS score 98 K-minna G12 C+ mutation  2 resolved allergic reaction to Taxol  3 pseudocyst of pancreas stable  4 psoriasis primarily affecting arms and upper thighs which she noticed increasing since starting her treatment for which we have stopped.  Plan: She will get a CT scan of her chest and be seen here in October.  I will try and communicate with dermatology.  Long-term prognosis guarded.     10/7/24 CT chest w contrast  1. Interval development of small bilateral pulmonary nodules, concerning for metastases.   2. Slight decrease in right middle lobe mass.   3. Interval development of mild consolidation and bronchiectasis in the right lung adjacent to the above mass, favored to represent radiation pneumonitis. Correlate clinically for infection.   4. Report of CT abdomen and pelvis to follow separately.     10/7/24 CT A/P w contrast  1. Grossly stable unilocular cystic lesion in the pancreatic neck measuring 2.8 x 2.5 cm, communicating with the main pancreatic duct with upstream duct dilatation and suspected downstream intraductal calculus. No mural nodularity.   2. Diffuse fatty liver infiltration.   3. Sigmoid diverticulosis.   4. Please see separate report of CT chest also on this date.     10/9/24 Dr. Thrasher  1  right lung cancer adenocarcinoma T4 N2 M0 TBS score 98 K-minna G12 C+ mutation  2 Resolved allergic reaction to Taxol  3 pseudocyst of pancreas stable on most recent scans  4 new lung nodules on her CAT scan cannot rule out metastatic lesions  5 psoriasis primarily affecting arms hands upper thighs somewhat increased although stabilizing we had stopped her Imfinzi as a result  Plan: I will review the scans with radiation.  She remains off the treatments.  She will come back in 10 to 14 days.       The patient complains of episodes of dizziness and headaches. She notes some difficulty with ambulation when this occurs. This has been infrequent, but bothersome as they do not respond to hydration or rest.  Symptoms eventually resolve spontaneously.  She has not tried otc analgesics.  She denies nausea, vomiting, focal numbness or weakness, difficulty with gait or speech.      She has episodic cough that can be severe.  Improved with Mucinex.  She denies shortness of breath at rest.  She denies fever or progressive dyspnea on exertion.  She denies chest pain.  She has history of periodic palpitations that is rate controlled.    She is smoking 1/2 ppd.    Upcoming:  10/22/24 Dr. Thrasher  11/6/24 Infusion      Historical Information   Oncology History   Adenocarcinoma of right lung (HCC)   6/23/2023 Initial Diagnosis    Adenocarcinoma of right lung (HCC)     7/24/2023 Biopsy    Navigational bronchoscopy with EBUS    A.  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     A.-B.  Lung, Right Middle Lobe Bronchial Brushing (Thin-prep and smear preparations):   Conclusive evidence of malignancy.  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     Satisfactory for evaluation.     C.-D.  Lung, Right Middle Lobe Bronchoalveolar Lavage (Thin-prep and cell block preparations):   Conclusive evidence of malignancy.  Non-small cell carcinoma compatible with a  pulmonary adenocarcinoma.     Satisfactory for evaluation.      E.-F.  Lymph Node, level 7 (Thin-prep and smear preparations):   Atypical cellular changes seen.  Rare atypical cells.  Lymphocytes.  Few bronchial cells and pulmonary macrophages.        Satisfactory for evaluation.     G.-H.  Lymph Node, 10R (Thin-prep and smear preparations):   Negative for malignancy.  Lymphocytes.  Aggregates of neutrophils.     Satisfactory for evaluation.     7/24/2023 -  Cancer Staged    Staging form: Lung, AJCC 8th Edition  - Clinical stage from 7/24/2023: Stage IIIB (cT4, cN2, cM0) - Signed by Celina Treviño MD on 12/26/2023  Stage prefix: Initial diagnosis       8/31/2023 - 10/12/2023 Chemotherapy    cyanocobalamin, 1,000 mcg, Intramuscular, Once, 1 of 1 cycle  Administration: 1,000 mcg (8/24/2023)  alteplase (CATHFLO), 2 mg, Intracatheter, Every 1 Minute as needed, 3 of 3 cycles  CISplatin (PLATINOL) with mannitol IVPB, 75 mg/m2 = 129.8 mg (100 % of original dose 75 mg/m2), Intravenous, Once, 3 of 3 cycles  Dose modification: 50 mg/m2 (original dose 75 mg/m2, Cycle 1, Reason: Anticipated Tolerance), 75 mg/m2 (original dose 75 mg/m2, Cycle 1, Reason: Dose modified as per discussion with consulting physician)  Administration: 129.8 mg (8/31/2023), 129.8 mg (9/21/2023), 129.8 mg (10/12/2023)  fosaprepitant (EMEND) IVPB, 150 mg, Intravenous, Once, 3 of 3 cycles  Administration: 150 mg (8/31/2023), 150 mg (9/21/2023), 150 mg (10/12/2023)  nivolumab (OPDIVO) IVPB, 360 mg, Intravenous, Once, 3 of 3 cycles  Administration: 360 mg (8/31/2023), 360 mg (9/21/2023), 360 mg (10/12/2023)  pemetrexed (ALIMTA) chemo infusion, 500 mg/m2 = 865 mg, Intravenous, Once, 3 of 3 cycles  Administration: 865 mg (8/31/2023), 865 mg (9/21/2023), 865 mg (10/12/2023)     1/18/2024 - 3/1/2024 Radiation    Treatments:  Course: C1  Plan ID Energy Fractions Dose per Fraction (cGy) Dose Correction (cGy) Total Dose Delivered (cGy) Elapsed Days   R LUNG MED 6X 30 /  30 200 0 6,000 43    Treatment Dates:  1/18/2024 - 3/1/2024.      1/19/2024 - 3/1/2024 Chemotherapy    alteplase (CATHFLO), 2 mg, Intracatheter, Every 1 Minute as needed, 6 of 6 cycles  CARBOplatin (PARAPLATIN) IVPB (GOG AUC DOSING), 185 mg, Intravenous, Once, 6 of 6 cycles  Administration: 185 mg (1/19/2024), 183.2 mg (1/26/2024), 183.2 mg (2/2/2024), 183.2 mg (2/9/2024), 183.2 mg (2/16/2024), 196.6 mg (3/1/2024)  PACLItaxel (TAXOL) chemo IVPB, 50 mg/m2 = 85.8 mg, Intravenous, Once, 1 of 1 cycle  Administration: 85.8 mg (1/19/2024)  pemetrexed (ALIMTA) chemo infusion, 500 mg/m2 = 860 mg (100 % of original dose 500 mg/m2), Intravenous, Once, 2 of 2 cycles  Dose modification: 500 mg/m2 (original dose 500 mg/m2, Cycle 4)  Administration: 860 mg (2/9/2024), 860 mg (3/1/2024)     4/2/2024 -  Chemotherapy    alteplase (CATHFLO), 2 mg, Intracatheter, Every 1 Minute as needed, 5 of 6 cycles  durvalumab (IMFINZI) IVPB, 1,500 mg (original dose 10 mg/kg), Intravenous, Once, 5 of 6 cycles  Dose modification: 1,500 mg (original dose 10 mg/kg, Cycle 1, Reason: Other (Must fill in a comment), Comment: Will give every 28 days.)  Administration: 1,500 mg (4/2/2024), 1,500 mg (5/28/2024), 1,500 mg (6/25/2024), 1,500 mg (4/30/2024)         Past Medical History:   Diagnosis Date    Fibromyalgia     Heart murmur     Lumbosacral disc herniation     Lung cancer (HCC)     Osteopenia     Psoriasis     Psoriasis     RSD (reflex sympathetic dystrophy)      Past Surgical History:   Procedure Laterality Date    IR PORT PLACEMENT  08/04/2023    LUNG BIOPSY      x2    PARTIAL HYSTERECTOMY         Social History   Social History     Substance and Sexual Activity   Alcohol Use Yes    Alcohol/week: 14.0 standard drinks of alcohol    Types: 14 Glasses of wine per week    Comment: 2 drinks daily     Social History     Substance and Sexual Activity   Drug Use No     Social History     Tobacco Use   Smoking Status Every Day    Current packs/day: 0.50     Average packs/day: 0.5 packs/day for 48.8 years (24.4 ttl pk-yrs)    Types: Cigarettes    Start date: 1976   Smokeless Tobacco Never         Meds/Allergies     Current Outpatient Medications:     amitriptyline (ELAVIL) 25 mg tablet, , Disp: , Rfl:     Ascorbic Acid (vitamin C) 100 MG tablet, Take 100 mg by mouth daily, Disp: , Rfl:     atorvastatin (LIPITOR) 20 mg tablet, Take 20 mg by mouth daily, Disp: , Rfl:     calcipotriene (DOVONEX) 0.005 % cream, Apply topically 2 (two) times a day To affected areas on Saturday and Sunday only, Disp: 120 g, Rfl: 2    cholecalciferol (VITAMIN D3) 400 units tablet, Take 400 Units by mouth daily, Disp: , Rfl:     DULoxetine (CYMBALTA) 30 mg delayed release capsule, , Disp: , Rfl:     Fluticasone-Salmeterol (Advair) 100-50 mcg/dose inhaler, Inhale 1 puff 2 (two) times a day Rinse mouth after use., Disp: , Rfl:     folic acid (KP Folic Acid) 1 mg tablet, Take 1 tablet (1 mg total) by mouth daily, Disp: 90 tablet, Rfl: 2    halobetasol (ULTRAVATE) 0.05 % ointment, Apply topically 2 (two) times a day, Disp: 50 g, Rfl: 1    propranolol (INDERAL) 20 mg/5 mL solution, , Disp: , Rfl:     triamcinolone (KENALOG) 0.1 % cream, Apply topically 2 (two) times a day Affected areas Monday through Friday only, Disp: 453.6 g, Rfl: 2    amoxicillin (AMOXIL) 500 mg capsule, Prn dental procedure (Patient not taking: Reported on 10/21/2024), Disp: , Rfl:     b complex vitamins capsule, Take 1 capsule by mouth daily (Patient not taking: Reported on 10/21/2024), Disp: , Rfl:     bismuth subsalicylate (PEPTO BISMOL) 262 MG chewable tablet, Take 1 tablet by mouth every 6 hours for 14 days (Patient not taking: Reported on 5/30/2024), Disp: 56 tablet, Rfl: 0    loratadine (CLARITIN) 10 mg tablet, Take 10 mg by mouth daily (Patient not taking: Reported on 7/29/2024), Disp: , Rfl:     omeprazole (PriLOSEC) 20 mg delayed release capsule, Take 1 capsule (20 mg total) by mouth 2 (two) times a day Every 12  hours for 14 days (Patient not taking: Reported on 12/26/2023), Disp: 28 capsule, Rfl: 0    ondansetron (ZOFRAN) 4 mg tablet, Take 1 tablet (4 mg total) by mouth every 8 (eight) hours as needed for nausea or vomiting (Patient not taking: Reported on 10/21/2024), Disp: 20 tablet, Rfl: 0  Allergies   Allergen Reactions    Prednisone Palpitations         Review of Systems   Respiratory:  Positive for cough (tan phlegm). Negative for chest tightness, shortness of breath and wheezing.         Everyday smoker 1/2 pack per day   Neurological:  Positive for light-headedness (since yesterday) and headaches. Negative for tremors and weakness.   Psychiatric/Behavioral:  Negative for suicidal ideas.         Feels depressed       OBJECTIVE:   /78   Pulse 91   Temp 97.6 °F (36.4 °C)   Resp 16   Wt 73.5 kg (162 lb)   SpO2 98%   BMI 26.15 kg/m²   Karnofsky: 80 - Normal activity with effort; some signs or symptoms of disease    Physical Exam  Vitals and nursing note reviewed.   Constitutional:       General: She is not in acute distress.     Appearance: She is well-developed.   Cardiovascular:      Rate and Rhythm: Normal rate and regular rhythm.   Pulmonary:      Breath sounds: No wheezing, rhonchi or rales.   Abdominal:      Palpations: Abdomen is soft.      Tenderness: There is no abdominal tenderness.   Musculoskeletal:      Right lower leg: No edema.      Left lower leg: No edema.   Lymphadenopathy:      Cervical: No cervical adenopathy.      Upper Body:      Right upper body: No supraclavicular adenopathy.      Left upper body: No supraclavicular adenopathy.   Neurological:      Mental Status: She is alert and oriented to person, place, and time.      Cranial Nerves: Cranial nerves 2-12 are intact.      Sensory: Sensation is intact.      Motor: Motor function is intact.      Gait: Gait normal.      Comments: Speech is fluent.  Able to follow complex commands without difficulty.            RESULTS  Imaging  Studies:CT chest w contrast    Result Date: 10/8/2024  Narrative: CT CHEST WITH IV CONTRAST INDICATION: C34.91: Malignant neoplasm of unspecified part of right bronchus or lung. Patient has history of stage IIIB NSCLC status post neoadjuvant chemotherapy, but was not a surgical candidate post treatment, who completed a course of definitive radiation on 3/1/24. COMPARISON: CT chest 3/28/2024, PET/CT 5/7/2024 TECHNIQUE: CT examination of the chest was performed. Multiplanar 2D reformatted images were created from the source data. This examination, like all CT scans performed in the [facility], was performed utilizing techniques to minimize radiation dose exposure, including the use of iterative reconstruction and automated exposure control. Radiation dose length product (DLP) for this visit: 213 mGy-cm IV Contrast: 120 mL of iohexol (OMNIPAQUE) FINDINGS: LUNGS: 3.2 x 2.8 cm right middle lobe lesion, previously 3.5 x 3.0 cm as per PET/CT report. Development of mild consolidative changes and bronchiectasis adjacent to the lesion in the posterior right upper lobe, anterior right lower lobe and also involving the right middle lobe, most suggestive of radiation pneumonitis. -4 mm juxtapleural nodular density medial left upper lobe (3/90), not clearly visualized previously. -2-3 mm left upper lobe nodule (3/71), not clearly evident previously. -2 mm left upper lobe nodules (3/69, 3/86 and 3/100), not clearly evident previously. -3 mm (3/75) and 2 mm left lower lobe nodules (3/86), not seen previously. -3 mm right upper lobe nodule (3/62), not seen previously. Another 3 mm right upper lobe nodule series 3/58), also likely new. -New 2 mm right upper lobe nodule (3/77). Mild biapical pleural-parenchymal scarring. Mild emphysema. Mild dependent basilar hypoventilatory changes. AIRWAYS: No significant central filling defect. Obscuration of the lateral right middle lobe segmental bronchus peripherally again  noted. PLEURA: Trace, loculated right pleural effusion and/or pleural thickening, similar. HEART/GREAT VESSELS: Heart is unremarkable for patient's age. Atherosclerotic changes thoracic aorta and coronary arteries. No thoracic aortic aneurysm. 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 MEDIASTINUM AND ARDEN: Shotty mediastinal lymph nodes, similar to the prior study. CHEST WALL AND LOWER NECK: Right chest wall Port-A-Cath with tip near the cavoatrial junction. VISUALIZED STRUCTURES IN THE UPPER ABDOMEN: Diffuse fatty infiltration of the liver. Please see separate report of CT of abdomen and pelvis also on this date regarding pancreatic cystic lesion and duct dilatation with parenchymal calcifications. OSSEOUS STRUCTURES: No acute fracture or destructive osseous lesion. Degenerative change of the spine.     Impression: 1. Interval development of small bilateral pulmonary nodules, concerning for metastases. 2. Slight decrease in right middle lobe mass. 3. Interval development of mild consolidation and bronchiectasis in the right lung adjacent to the above mass, favored to represent radiation pneumonitis. Correlate clinically for infection. 4. Report of CT abdomen and pelvis to follow separately. Workstation performed: [id number]     CT abdomen pelvis w contrast    Result Date: 10/8/2024  Narrative: CT ABDOMEN AND PELVIS WITH IV CONTRAST INDICATION: K86.2: Cyst of pancreas. K86.3: Pseudocyst of pancreas. K86.89: Other specified diseases of pancreas. The patient underwent endoscopic ultrasound November 2023 COMPARISON: CT abdomen pelvis 6/11/2020, PET/CT 5/7/2024 and MRI abdomen 3/17/2024 TECHNIQUE: CT examination of the abdomen and pelvis was performed. Arterial phase images of the abdomen were also obtained. Multiplanar 2D reformatted images were created from the source data. This examination, like all CT scans performed in the [facility], was performed utilizing  techniques to minimize radiation dose exposure, including the use of iterative reconstruction and automated exposure control. Radiation dose length product (DLP) for this visit: 947 mGy-cm IV Contrast: 120 mL of iohexol (OMNIPAQUE) Enteric Contrast: Not administered. FINDINGS: ABDOMEN LOWER CHEST: Please see send report of CT chest also on this date regarding opacity in the right middle and lower lobes. Trace right pleural effusion and/or thickening. LIVER/BILIARY TREE: Enlarged with diffuse fatty infiltration and mild fatty sparing adjacent to the gallbladder fossa. No suspicious hepatic lesions or biliary dilatation. GALLBLADDER: No calcified gallstones. No pericholecystic inflammatory change. SPLEEN: Unremarkable. PANCREAS: Similar unilocular cystic lesion in the pancreatic head/neck measuring 2.8 x 2.5 cm (series 11 image 75), communicating with the main pancreatic duct. Previously this measured about 3.1 x 2.2 cm. Calcification in the pancreatic head immediately  caudal to the cystic structure, presumably representing intraductal calculus again identified measuring about 8 mm. Atrophy of the pancreatic body/tail again noted. Upstream dilatation of the main pancreatic duct measuring up to 0.8 cm as before with parenchymal calcifications redemonstrated. Another unilocular cystic lesion in the pancreatic body measures about 8 mm, previously 1.0 cm. ADRENAL GLANDS: Unremarkable. KIDNEYS/URETERS: No hydronephrosis or urinary tract calculi. Subcentimeter hypoattenuating renal lesion(s), too small to characterize but statistically likely benign, which do not warrant follow-up (Radiology June 2019). STOMACH AND BOWEL: Evaluation of the stomach is limited by underdistention.  No focal pathology seen within limitations. Small bowel is normal caliber. Sigmoid diverticulosis without diverticulitis. APPENDIX: No findings to suggest appendicitis. ABDOMINOPELVIC CAVITY: No ascites. No pneumoperitoneum. No enlarged  lymphadenopathy. VESSELS: Atherosclerotic changes abdominal aorta without evidence of aneurysm. Moderate atherosclerotic narrowing of the origin of the celiac artery best seen on sagittal imaging. PELVIS REPRODUCTIVE ORGANS: Supracervical hysterectomy suggested. No adnexal masses. URINARY BLADDER: Unremarkable. ABDOMINAL WALL/INGUINAL REGIONS: Tiny fat-containing umbilical hernia. BONES: No acute fracture or suspicious osseous lesion. Subcentimeter lucency left iliac bone (3/111), chronically stable and likely benign. Degenerative changes of the spine.     Impression: 1. Grossly stable unilocular cystic lesion in the pancreatic neck measuring 2.8 x 2.5 cm, communicating with the main pancreatic duct with upstream duct dilatation and suspected downstream intraductal calculus. No mural nodularity. 2. Diffuse fatty liver infiltration. 3. Sigmoid diverticulosis. 4. Please see separate report of CT chest also on this date. Workstation performed: [id number]           Assessment/Plan:   Saskia Alfaro is a 69 y.o. woman with a history of stage IIIB NSCLC status post neoadjuvant chemotherapy, but was not a surgical candidate post treatment, who recently completed a course of definitive radiation on 3/1/24. She had about 2 months of immunotherapy, but halted due to severe psoriatic outbreak.      I reviewed chest CT imaging with Radiology.  The new, small bilateral pulmonary nodules are concerning, but not definitive.  The scattered and new nature is also worrisome, but not definitive.  Given the small size, there are two options that I feel would be reasonable: 1) repeat chest CT in 1 month (2 months from last CT) to assess if lesions wax or wane.  If continue, then likely metastatic and pursue systemic therapy.    2) Initiate systemic therapy and follow-up imaging.      Ultimately, she and Medical Oncology will need to make the decision on which direction to take at this time.    She has some strange headaches and dizziness  "episodes.  I recommend brain MRI for evaluation, but her neurologic examination is benign.      Follow-up by phone to review brain MRI as suspicion is low.  I counseled her for smoking cessation, but she is not ready.  Follow-up 6 months.  We will see her sooner should the need arise.    Celina Treviño MD  10/21/2024,5:44 PM    Portions of the record may have been created with voice recognition software.  Occasional wrong word or \"sound a like\" substitutions may have occurred due to the inherent limitations of voice recognition software.  Read the chart carefully and recognize, using context, where substitutions have occurred.        "

## 2024-10-22 ENCOUNTER — OFFICE VISIT (OUTPATIENT)
Dept: HEMATOLOGY ONCOLOGY | Facility: CLINIC | Age: 69
End: 2024-10-22
Payer: COMMERCIAL

## 2024-10-22 VITALS
OXYGEN SATURATION: 96 % | HEART RATE: 82 BPM | WEIGHT: 162.5 LBS | SYSTOLIC BLOOD PRESSURE: 108 MMHG | BODY MASS INDEX: 26.12 KG/M2 | TEMPERATURE: 97.9 F | HEIGHT: 66 IN | DIASTOLIC BLOOD PRESSURE: 72 MMHG

## 2024-10-22 DIAGNOSIS — R11.2 CHEMOTHERAPY INDUCED NAUSEA AND VOMITING: ICD-10-CM

## 2024-10-22 DIAGNOSIS — C34.91 ADENOCARCINOMA OF RIGHT LUNG (HCC): ICD-10-CM

## 2024-10-22 DIAGNOSIS — C34.91 ADENOCARCINOMA OF RIGHT LUNG (HCC): Primary | ICD-10-CM

## 2024-10-22 DIAGNOSIS — R91.8 MULTIPLE LUNG NODULES ON CT: ICD-10-CM

## 2024-10-22 DIAGNOSIS — T45.1X5A CHEMOTHERAPY INDUCED NAUSEA AND VOMITING: ICD-10-CM

## 2024-10-22 PROCEDURE — G2211 COMPLEX E/M VISIT ADD ON: HCPCS | Performed by: INTERNAL MEDICINE

## 2024-10-22 PROCEDURE — 99214 OFFICE O/P EST MOD 30 MIN: CPT | Performed by: INTERNAL MEDICINE

## 2024-10-22 NOTE — PROGRESS NOTES
Hematology/Oncology Outpatient Follow- up Note  Saskia Alfaro 69 y.o. female MRN: @ Encounter: [id number]        Date:  10/22/2024        Assessment / Plan:    1 right lung adenocarcinoma T4 N2 M0 TBS score 98 K-minna G12 C+ mutation  2 resolved allergic reaction to Taxol  3 pseudocyst of pancreas stable on most recent scans  4 new lung nodules on her CAT scan possible metastatic disease  5 psoriasis affecting arms hands upper thighs back increased and remains off Imfinzi  Plan: The scans were reviewed she does have what looks like new no lung nodules which could be metastatic.  Discussion was elected to repeat those in 1 month to see if the scan showing worsening disease.  In the meantime we will look into getting her sotorasib.  She will get a CBC and CMP every 3 weeks follow-up in 5 weeks.  She will seek dermatologic follow-up for her psoriasis which needs to be better treated at this time.  If it can be under fair control she could be considered again for immunosuppressive therapy in the future.  She would do sotorasib at 960 mg daily with CBC and CMP at the start and every 3 weeks.      HPI: 69-year-old comes in for follow-up.  She has new lung nodules.  These appear to probably be metastatic.  They are too small to biopsy and PET CT scan probably would not be of benefit due to size in terms of distinguishing them.  She also has psoriasis which is significantly gotten worse.  She needs to go back to dermatology and be considered for further intervention.  Because of this we are fairly hesitant to give her any immunosuppression until the psoriasis is treated and under somewhat better control.  She is aware of the new lung nodules.  She has a K-minna G 12C mutation.  She would be candidate to be considered for sotorasib or adagrasib.  They are both in oral forms.  They both cause GI and liver dysfunction.  We will offer positive side effects are being discussed and consents will be obtained we will try and obtain the  medication.  She will require a CBC and CMP every 3 weeks.  We will repeat her CAT scan in 1 month to see if the lung nodules are changing.  She is not short of breath at rest feels fairly well.  Her psoriasis includes plaques in her back thighs upper tibia areas both knees both elbows both hands both forearms.    Interval History: Note from 10/9/2024:  Assessment / Plan:    1 right lung cancer adenocarcinoma T4 N2 M0 TBS score 98 K-minna G12 C+ mutation  2 Resolved allergic reaction to Taxol  3 pseudocyst of pancreas stable on most recent scans  4 new lung nodules on her CAT scan cannot rule out metastatic lesions  5 psoriasis primarily affecting arms hands upper thighs somewhat increased although stabilizing we had stopped her Imfinzi as a result  Plan: I will review the scans with radiation.  She remains off the treatments.  She will come back in 10 to 14 days.  HPI: 69-year-old female here for follow-up.  She has right lung cancer adenocarcinoma [id number] TBS score 98 K-minna G 12C plot positive fortunately she had developed the onset of worsening significantly of psoriasis and still has problems in her arms upper thighs and hands.  We had to stop the treatment it improved slightly but still a problem.  I asked her to really discussed this with dermatology.  Unfortunately her CAT scan shows multiple small lung nodules all less than 5 mm but conclude in clusters and there is concern whether this is metastatic.  She feels fairly well.  I will try reviewed these films with radiation and see what their thoughts are.  In terms of other treatment she is K-minna G12 C+ and may be a candidate for treatment regarding that.  The concern regarding Keytruda is due to worsening of her psoriasis.  Interval History: Note from 9/11/2024:  Assessment / Plan:    1 right lung cancer adenocarcinoma [id number] TBS score 98 K-minna G12 C+ mutation  2 resolved allergic reaction to Taxol  3 pseudocyst of pancreas stable  4 psoriasis primarily  affecting arms and upper thighs which she noticed increasing since starting her treatment for which we have stopped.  Plan: She will get a CT scan of her chest and be seen here in October.  I will try and communicate with dermatology.  Long-term prognosis guarded.  HPI: 69-year-old female with stage III lung was on Imfinzi.  She developed problems with psoriasis.  She had patches on her hands her elbows her knees.  They were worsening.  Itchy.  I had her see dermatology.  They we stopped the medication.  She has not received it since July.  The patches are somewhat better.  She has some nodularity in her hands.  And her knees are slightly better.  At this point the question is whether we can resume the Imfinzi immunotherapy.  I am a little reluctant to do some concern about flares I will speak with dermatology regarding this.  Otherwise she is stable and doing fairly well.  She is due for a CAT scan of her chest in October 2024 and we will see her after that.  Interval History:    Assessment / Plan: Note from 7/23/2024:  1 right lung cancer adenocarcinoma stage IIIb T4 N2 M0 TBS score 98 K-minna G12 C+ mutated  2 resolved allergic reaction to Taxol now on Imfinzi  3 pseudocyst of pancreas being followed by GI regularly  4 psoriasis primarily affecting arms and upper thighs which she  noticed increased since starting her treatment  Plan: She will be seen by dermatology 8/7/2024.  If she is stable we will plan her treatment in 4 weeks.  We will skip this treatment till she is seen.  HPI: 69-year-old female on Imfinzi for stage IIIb lung cancer.  She also pseudocyst of pancreas.  Psoriasis primarily affecting arms and upper thighs.  She feels it has gotten worse since she started her immunotherapy.  She was seen today and we elected to hold her Imfinzi.  She will be seen by dermatology.   has seen her previously.  She was kind enough to fit her into the schedule August 7 that will be before her delay in treatment  "which will be 4 weeks.  Otherwise she has been doing well.  The patches are primarily on her hands on both elbows on her left knee.  She does not have much on her trunk fortunately.  Is not diffuse.  Interval History:    Assessment / Plan:    1 right lung cancer adenocarcinoma stage IIIb T4 N2 M0 TPS score 98 K-minna G12 C+ mutated.  2 resolved allergic reaction to Taxol completing Taxol now on Imfinzi.  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  Plan she will continue her Imfinzi.  She is doing well with that.  She will return in 12 weeks.  She is followed regular by gastroenterology as well.  Her scans were stable.  Will repeat them every 3 to 6 months at this time.  HPI: 68-year-old female here for follow-up.  She is doing well.  Her scans show improvement in her adenocarcinoma.  She remains with a pseudocyst.  There is a question of removing stones from the pancreatic duct.  It was elected not to do so.  She was seen in [facility] and their gastroenterology department.  Of note her psoriasis is acting up probably related to immunotherapy.  She says it is tolerable.  She had a bone scan that was negative.  She is on Imfinzi and tolerating it fairly well other than the psoriasis.  She is maintaining weight she has no abdominal pain she is not short of breath at rest.  Interval History: Note from 4/5/2024 \"  Assessment / Plan:    1 right lung cancer adenocarcinoma stage IIIb T4 N2 M0 TPS score 98 K-minna KG12 C mutated.  2 Resolved allergic reaction to Taxol leading to use of Alimta completed her treatments.  3 presumed pseudocyst of pancreas repeat MRI stable has a stone in duct and is being further evaluated by GI.  Plan: Because of her myalgias and arthralgias that she has been complaining about we will get a bone scan to make sure she is stable.  She has a new nodule in her anterior " patellar area.  We will also begin her on Imfinzi.  We will see her in about 4 weeks time.  HPI: 68-year-old female comes in for follow-up.  She is status post chemoradiation for stage IIIb right lung cancer.  Her scans are stable.  She is complaining of pain in several joints and into her legs.  Given her history we will obtain a bone scan although I suspect it will be negative.  She denies fever or chills.  She is occasionally short of breath with some coughing.  Overall doing reasonably well.  We plan to begin Imfinzi therapy on her per [place] trial in the near future.  CT scan shows no worsening of disease.  Interval History: Note 3/5/2024:  Assessment / Plan:    1 right lung cancer adenocarcinoma stage IIIb T4 N2 M0 TPS score 98 K-minna KG12 C mutated  2 allergic reaction to Taxol leading to use of Alimta.  3 presumed pseudocyst of pancreas awaiting repeat MRI  Plan: Patient is due to receive her MRI 3/17.  She will get a CAT scan chest abdomen pelvis on 3/28 along with laboratory work CBC CMP.  She will come back in 4 weeks at which time we will plan to begin her.  Side effects were discussed and consents were obtained.  No other major suggestions at this time.  HPI: 68-year-old female having completed her chemotherapy radiation on 3/1.  She received her last dose of Alimta on 3/1.  She is got some support taste in her mouth some nausea.  She is able to hold some food down.  She received doses of Alimta on 2/93/1.  She received weekly carboplatin.  She has maintained her weight.  She had a plain chest x-ray during her radiation which shows what appear to be some inflammatory changes in her fissure.  This will be followed up in the future.  She will undergo scans in 3 and half weeks along with laboratory work.  We plan to begin her on Imfinzi in 4 weeks on a every 4 week basis.  Side effects were discussed and consents were obtained today.  Interval History:    1 right lung cancer adenocarcinoma stage IIIb T4 N2  M0 TPS score 98 K-minna G12 C mutated  2 allergic reaction to Taxol.  3 presumed pseudocyst of pancreas  Plan: Patient has been off Taxol for several weeks.  We are going to add Alimta 500 mg/m² to her weekly carboplatin.  Alimta will be every 3 weeks.  She will receive it this Friday 2/9 and then 3 weeks later.  She is will still be candidate to obtain Imfinzi in the future.  Side effects were discussed consent was obtained she will follow-up in 3 weeks.  HPI: She is tolerating carboplatin fairly well has no major problems.  We discussed the Alimta.  She has had several weeks of now away total of 3 weeks and we will go ahead with Alimta this week.  Decadron/B12/folate were added as part of her regimen.  Side effects were discussed and consents were obtained.  She is ready to try it.  We will have to watch her counts which she will get weekly.  Interval History:   68-year-old female seen with the above problem.  She began her combined chemoradiation with Taxol carboplatinum and radiation on 1/19/2024.  Unfortunately she developed a significant reaction after 7 minutes of infusion with Taxol short of breath lightheaded dizzy feeling poorly and the drug was stopped.  Presumably related to the camper for in the Taxol.  Unfortunately I think it would be difficult to give her more Taxol admitted due to.  I would hold that.  I think we are choices beyond this coming week of carboplatin alone will be in either -16 or Alimta.  We will look into one of the other.  I discussed this with her she is aware of that.  Note from 1/18/2024: 68-year-old female with history of adenocarcinoma right lung clinical stage IIIb T4 N2 M0.  PD-L1 TPS 98% K-minna G12 C mutation.  She has completed chemotherapy.  PET CT scan done at the completion showed some improvement disease.  She was seen by surgery felt she was not a surgical candidate.  She also has a cystic lesion approximately 3 cm in the head of the pancreas.  She has been evaluated by  GI who felt this was a pseudocyst.  Biopsy was nondiagnostic.  Their recommendation is to repeat her MRI which will be done in March.  Reviewing her situation we were going to plan to go ahead with her chemo therapy tomorrow.  She is to receive combined Taxol carboplatin.  She is feeling fairly well denies any other major problems.      Cancer Staging:  Cancer Staging   Adenocarcinoma of right lung (HCC)  Staging form: Lung, AJCC 8th Edition  - Clinical stage from 7/24/2023: Stage IIIB (cT4, cN2, cM0) - Signed by Celina Treviño MD on 12/26/2023  Stage prefix: Initial diagnosis      Molecular Testing:     Previous Hematologic/ Oncologic History:    Oncology History   Adenocarcinoma of right lung (HCC)   6/23/2023 Initial Diagnosis    Adenocarcinoma of right lung (HCC)     7/24/2023 Biopsy    Navigational bronchoscopy with EBUS    A.  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     A.-B.  Lung, Right Middle Lobe Bronchial Brushing (Thin-prep and smear preparations):   Conclusive evidence of malignancy.  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     Satisfactory for evaluation.     C.-D.  Lung, Right Middle Lobe Bronchoalveolar Lavage (Thin-prep and cell block preparations):   Conclusive evidence of malignancy.  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     Satisfactory for evaluation.      E.-F.  Lymph Node, level 7 (Thin-prep and smear preparations):   Atypical cellular changes seen.  Rare atypical cells.  Lymphocytes.  Few bronchial cells and pulmonary macrophages.        Satisfactory for evaluation.     G.-H.  Lymph Node, 10R (Thin-prep and smear preparations):   Negative for malignancy.  Lymphocytes.  Aggregates of neutrophils.     Satisfactory for evaluation.     7/24/2023 -  Cancer Staged    Staging form: Lung, AJCC 8th Edition  - Clinical stage from 7/24/2023: Stage IIIB (cT4, cN2, cM0) - Signed by Celina  MD Hudson on 12/26/2023  Stage prefix: Initial diagnosis       8/31/2023 - 10/12/2023 Chemotherapy    cyanocobalamin, 1,000 mcg, Intramuscular, Once, 1 of 1 cycle  Administration: 1,000 mcg (8/24/2023)  alteplase (CATHFLO), 2 mg, Intracatheter, Every 1 Minute as needed, 3 of 3 cycles  CISplatin (PLATINOL) with mannitol IVPB, 75 mg/m2 = 129.8 mg (100 % of original dose 75 mg/m2), Intravenous, Once, 3 of 3 cycles  Dose modification: 50 mg/m2 (original dose 75 mg/m2, Cycle 1, Reason: Anticipated Tolerance), 75 mg/m2 (original dose 75 mg/m2, Cycle 1, Reason: Dose modified as per discussion with consulting physician)  Administration: 129.8 mg (8/31/2023), 129.8 mg (9/21/2023), 129.8 mg (10/12/2023)  fosaprepitant (EMEND) IVPB, 150 mg, Intravenous, Once, 3 of 3 cycles  Administration: 150 mg (8/31/2023), 150 mg (9/21/2023), 150 mg (10/12/2023)  nivolumab (OPDIVO) IVPB, 360 mg, Intravenous, Once, 3 of 3 cycles  Administration: 360 mg (8/31/2023), 360 mg (9/21/2023), 360 mg (10/12/2023)  pemetrexed (ALIMTA) chemo infusion, 500 mg/m2 = 865 mg, Intravenous, Once, 3 of 3 cycles  Administration: 865 mg (8/31/2023), 865 mg (9/21/2023), 865 mg (10/12/2023)     1/18/2024 - 3/1/2024 Radiation    Treatments:  Course: C1  Plan ID Energy Fractions Dose per Fraction (cGy) Dose Correction (cGy) Total Dose Delivered (cGy) Elapsed Days   R LUNG MED 6X 30 / 30 200 0 6,000 43    Treatment Dates:  1/18/2024 - 3/1/2024.      1/19/2024 - 3/1/2024 Chemotherapy    alteplase (CATHFLO), 2 mg, Intracatheter, Every 1 Minute as needed, 6 of 6 cycles  CARBOplatin (PARAPLATIN) IVPB (GOG AUC DOSING), 185 mg, Intravenous, Once, 6 of 6 cycles  Administration: 185 mg (1/19/2024), 183.2 mg (1/26/2024), 183.2 mg (2/2/2024), 183.2 mg (2/9/2024), 183.2 mg (2/16/2024), 196.6 mg (3/1/2024)  PACLItaxel (TAXOL) chemo IVPB, 50 mg/m2 = 85.8 mg, Intravenous, Once, 1 of 1 cycle  Administration: 85.8 mg (1/19/2024)  pemetrexed (ALIMTA) chemo infusion, 500 mg/m2 = 860 mg  (100 % of original dose 500 mg/m2), Intravenous, Once, 2 of 2 cycles  Dose modification: 500 mg/m2 (original dose 500 mg/m2, Cycle 4)  Administration: 860 mg (2/9/2024), 860 mg (3/1/2024)     4/2/2024 - 6/25/2024 Chemotherapy    alteplase (CATHFLO), 2 mg, Intracatheter, Every 1 Minute as needed, 5 of 6 cycles  durvalumab (IMFINZI) IVPB, 1,500 mg (original dose 10 mg/kg), Intravenous, Once, 5 of 6 cycles  Dose modification: 1,500 mg (original dose 10 mg/kg, Cycle 1, Reason: Other (Must fill in a comment), Comment: Will give every 28 days.)  Administration: 1,500 mg (4/2/2024), 1,500 mg (5/28/2024), 1,500 mg (6/25/2024), 1,500 mg (4/30/2024)         Current Hematologic/ Oncologic Treatment:       Cycle 1         Test Results:    Imaging: CT chest w contrast    Result Date: 10/8/2024  Narrative: CT CHEST WITH IV CONTRAST INDICATION: C34.91: Malignant neoplasm of unspecified part of right bronchus or lung. Patient has history of stage IIIB NSCLC status post neoadjuvant chemotherapy, but was not a surgical candidate post treatment, who completed a course of definitive radiation on 3/1/24. COMPARISON: CT chest 3/28/2024, PET/CT 5/7/2024 TECHNIQUE: CT examination of the chest was performed. Multiplanar 2D reformatted images were created from the source data. This examination, like all CT scans performed in the [facility], was performed utilizing techniques to minimize radiation dose exposure, including the use of iterative reconstruction and automated exposure control. Radiation dose length product (DLP) for this visit: 213 mGy-cm IV Contrast: 120 mL of iohexol (OMNIPAQUE) FINDINGS: LUNGS: 3.2 x 2.8 cm right middle lobe lesion, previously 3.5 x 3.0 cm as per PET/CT report. Development of mild consolidative changes and bronchiectasis adjacent to the lesion in the posterior right upper lobe, anterior right lower lobe and also involving the right middle lobe, most suggestive of radiation pneumonitis. -4 mm  juxtapleural nodular density medial left upper lobe (3/90), not clearly visualized previously. -2-3 mm left upper lobe nodule (3/71), not clearly evident previously. -2 mm left upper lobe nodules (3/69, 3/86 and 3/100), not clearly evident previously. -3 mm (3/75) and 2 mm left lower lobe nodules (3/86), not seen previously. -3 mm right upper lobe nodule (3/62), not seen previously. Another 3 mm right upper lobe nodule series 3/58), also likely new. -New 2 mm right upper lobe nodule (3/77). Mild biapical pleural-parenchymal scarring. Mild emphysema. Mild dependent basilar hypoventilatory changes. AIRWAYS: No significant central filling defect. Obscuration of the lateral right middle lobe segmental bronchus peripherally again noted. PLEURA: Trace, loculated right pleural effusion and/or pleural thickening, similar. HEART/GREAT VESSELS: Heart is unremarkable for patient's age. Atherosclerotic changes thoracic aorta and coronary arteries. No thoracic aortic aneurysm. 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 MEDIASTINUM AND ARDEN: Shotty mediastinal lymph nodes, similar to the prior study. CHEST WALL AND LOWER NECK: Right chest wall Port-A-Cath with tip near the cavoatrial junction. VISUALIZED STRUCTURES IN THE UPPER ABDOMEN: Diffuse fatty infiltration of the liver. Please see separate report of CT of abdomen and pelvis also on this date regarding pancreatic cystic lesion and duct dilatation with parenchymal calcifications. OSSEOUS STRUCTURES: No acute fracture or destructive osseous lesion. Degenerative change of the spine.     Impression: 1. Interval development of small bilateral pulmonary nodules, concerning for metastases. 2. Slight decrease in right middle lobe mass. 3. Interval development of mild consolidation and bronchiectasis in the right lung adjacent to the above mass, favored to represent radiation pneumonitis. Correlate clinically for infection. 4. Report of CT abdomen  and pelvis to follow separately. Workstation performed: [id number]     CT abdomen pelvis w contrast    Result Date: 10/8/2024  Narrative: CT ABDOMEN AND PELVIS WITH IV CONTRAST INDICATION: K86.2: Cyst of pancreas. K86.3: Pseudocyst of pancreas. K86.89: Other specified diseases of pancreas. The patient underwent endoscopic ultrasound November 2023 COMPARISON: CT abdomen pelvis 6/11/2020, PET/CT 5/7/2024 and MRI abdomen 3/17/2024 TECHNIQUE: CT examination of the abdomen and pelvis was performed. Arterial phase images of the abdomen were also obtained. Multiplanar 2D reformatted images were created from the source data. This examination, like all CT scans performed in the [facility], was performed utilizing techniques to minimize radiation dose exposure, including the use of iterative reconstruction and automated exposure control. Radiation dose length product (DLP) for this visit: 947 mGy-cm IV Contrast: 120 mL of iohexol (OMNIPAQUE) Enteric Contrast: Not administered. FINDINGS: ABDOMEN LOWER CHEST: Please see send report of CT chest also on this date regarding opacity in the right middle and lower lobes. Trace right pleural effusion and/or thickening. LIVER/BILIARY TREE: Enlarged with diffuse fatty infiltration and mild fatty sparing adjacent to the gallbladder fossa. No suspicious hepatic lesions or biliary dilatation. GALLBLADDER: No calcified gallstones. No pericholecystic inflammatory change. SPLEEN: Unremarkable. PANCREAS: Similar unilocular cystic lesion in the pancreatic head/neck measuring 2.8 x 2.5 cm (series 11 image 75), communicating with the main pancreatic duct. Previously this measured about 3.1 x 2.2 cm. Calcification in the pancreatic head immediately  caudal to the cystic structure, presumably representing intraductal calculus again identified measuring about 8 mm. Atrophy of the pancreatic body/tail again noted. Upstream dilatation of the main pancreatic duct measuring up to 0.8  cm as before with parenchymal calcifications redemonstrated. Another unilocular cystic lesion in the pancreatic body measures about 8 mm, previously 1.0 cm. ADRENAL GLANDS: Unremarkable. KIDNEYS/URETERS: No hydronephrosis or urinary tract calculi. Subcentimeter hypoattenuating renal lesion(s), too small to characterize but statistically likely benign, which do not warrant follow-up (Radiology June 2019). STOMACH AND BOWEL: Evaluation of the stomach is limited by underdistention.  No focal pathology seen within limitations. Small bowel is normal caliber. Sigmoid diverticulosis without diverticulitis. APPENDIX: No findings to suggest appendicitis. ABDOMINOPELVIC CAVITY: No ascites. No pneumoperitoneum. No enlarged lymphadenopathy. VESSELS: Atherosclerotic changes abdominal aorta without evidence of aneurysm. Moderate atherosclerotic narrowing of the origin of the celiac artery best seen on sagittal imaging. PELVIS REPRODUCTIVE ORGANS: Supracervical hysterectomy suggested. No adnexal masses. URINARY BLADDER: Unremarkable. ABDOMINAL WALL/INGUINAL REGIONS: Tiny fat-containing umbilical hernia. BONES: No acute fracture or suspicious osseous lesion. Subcentimeter lucency left iliac bone (3/111), chronically stable and likely benign. Degenerative changes of the spine.     Impression: 1. Grossly stable unilocular cystic lesion in the pancreatic neck measuring 2.8 x 2.5 cm, communicating with the main pancreatic duct with upstream duct dilatation and suspected downstream intraductal calculus. No mural nodularity. 2. Diffuse fatty liver infiltration. 3. Sigmoid diverticulosis. 4. Please see separate report of CT chest also on this date. Workstation performed: [id number]             Labs:   Lab Results   Component Value Date    WBC 8.11 10/09/2024    HGB 11.7 10/09/2024    HCT 36.2 10/09/2024     (H) 10/09/2024     10/09/2024     Lab Results   Component Value Date    K 3.7 10/09/2024     10/09/2024    CO2  "27 10/09/2024    BUN 8 10/09/2024    CREATININE 0.57 (L) 10/09/2024    CALCIUM 9.5 10/09/2024    AST 21 10/09/2024    ALT 30 10/09/2024    ALKPHOS 66 10/09/2024    EGFR 95 10/09/2024         No results found for: \"SPEP\", \"UPEP\"    No results found for: \"PSA\"    No results found for: \"CEA\"    No results found for: \"\"    No results found for: \"AFP\"    No results found for: \"IRON\", \"TIBC\", \"FERRITIN\"    Lab Results   Component Value Date    [id number] 938 05/15/2018         ROS: Review of Systems   Constitutional:  Positive for fatigue.   HENT: Negative.     Eyes: Negative.    Respiratory: Negative.     Cardiovascular: Negative.    Gastrointestinal: Negative.    Endocrine: Negative.    Genitourinary: Negative.    Musculoskeletal: Negative.    Skin:  Positive for rash.   Allergic/Immunologic: Negative.    Neurological: Negative.    Hematological: Negative.      Patient skin has major psoriatic plaques.    Current Medications: Reviewed  Allergies: Reviewed  PMH/FH/SH:  Reviewed      Physical Exam:    Body surface area is 1.83 meters squared.    Wt Readings from Last 3 Encounters:   10/22/24 73.7 kg (162 lb 8 oz)   10/21/24 73.5 kg (162 lb)   10/09/24 74.4 kg (164 lb)        Temp Readings from Last 3 Encounters:   10/22/24 97.9 °F (36.6 °C) (Temporal)   10/21/24 97.6 °F (36.4 °C)   10/09/24 (!) 97.4 °F (36.3 °C) (Temporal)        BP Readings from Last 3 Encounters:   10/22/24 108/72   10/21/24 120/78   10/09/24 118/72         Pulse Readings from Last 3 Encounters:   10/22/24 82   10/21/24 91   10/09/24 71     @LASTSAO2(3)@      Physical Exam  Vitals reviewed.   Constitutional:       Appearance: Normal appearance. She is normal weight.   HENT:      Head: Normocephalic and atraumatic.   Eyes:      Extraocular Movements: Extraocular movements intact.      Conjunctiva/sclera: Conjunctivae normal.      Pupils: Pupils are equal, round, and reactive to light.   Cardiovascular:      Rate and Rhythm: Normal rate and regular " rhythm.      Heart sounds: Normal heart sounds.   Pulmonary:      Effort: Pulmonary effort is normal.      Breath sounds: Normal breath sounds.   Abdominal:      General: Abdomen is flat. Bowel sounds are normal.      Palpations: Abdomen is soft.   Musculoskeletal:         General: Normal range of motion.      Cervical back: Normal range of motion and neck supple.   Skin:     General: Skin is warm and dry.   Neurological:      General: No focal deficit present.      Mental Status: She is alert and oriented to person, place, and time. Mental status is at baseline.     She has multiple psoriatic plaques on her back elbows hands with cracking and redness along with upper thighs both knees and upper tibial areas.      Goals and Barriers:  Current Goal: Prolong Survival from Cancer.   Barriers: None.      Patient's Capacity to Self Care:  Patient is able to self care.    Code Status: [unfilled]  Advance Directive and Living Will:      Power of :

## 2024-10-22 NOTE — PROGRESS NOTES
Patient was provided education on Sotorasib (Lumakras). All education sheets were printed from [employer] and most common side effects were discussed.     Discussed sheet provided  regarding when to call our office including   Temp of 100.4 and instructed not to take tylenol   Medication for nausea is not helping despite taking as ordered   Diarrhea is experienced and imodium doesn't resolve the issue.    Constipation for more than three days and taking stool softener and not bowel movement.  Encouraged questions and informing office of any concerns during treatment.     Educated regarding fluid intake and reinforeced that 6-8 cups of fluid is recommended.  Lab work needed is discussed to be done prior to starting and every 3 weeks afterwards.Patient is asked to please call us when she knows medication is being delivered so we can coordinate start of treatment. She continues with flare up of psoriasis and will continue using creams she has available for this.  Consent is obtained.

## 2024-10-23 ENCOUNTER — PATIENT OUTREACH (OUTPATIENT)
Dept: CASE MANAGEMENT | Facility: HOSPITAL | Age: 69
End: 2024-10-23

## 2024-10-23 ENCOUNTER — DOCUMENTATION (OUTPATIENT)
Dept: HEMATOLOGY ONCOLOGY | Facility: CLINIC | Age: 69
End: 2024-10-23

## 2024-10-23 ENCOUNTER — DOCUMENTATION (OUTPATIENT)
Age: 69
End: 2024-10-23

## 2024-10-23 DIAGNOSIS — C34.91 ADENOCARCINOMA OF RIGHT LUNG (HCC): Primary | ICD-10-CM

## 2024-10-23 RX ORDER — SOTORASIB 320 MG/1
960 TABLET, COATED ORAL DAILY
Qty: 90 TABLET | Refills: 5 | Status: SHIPPED | OUTPATIENT
Start: 2024-10-23

## 2024-10-23 RX ORDER — ONDANSETRON 4 MG/1
8 TABLET, FILM COATED ORAL EVERY 8 HOURS PRN
Qty: 20 TABLET | Refills: 0 | Status: SHIPPED | OUTPATIENT
Start: 2024-10-23

## 2024-10-23 RX ORDER — FOLIC ACID 1 MG/1
1 TABLET ORAL DAILY
Qty: 90 TABLET | Refills: 2 | Status: SHIPPED | OUTPATIENT
Start: 2024-10-23

## 2024-10-23 NOTE — PROGRESS NOTES
Received request from clinical for patient to start on Lumakras 960mg daily. Auth has been submitted via cover my meds and this is pending and marked urgent    (Key: [id number])    BIN:       [id number]  PCN:      [id number]  ID:          [id number]  GRP:      [id number]

## 2024-10-23 NOTE — PROGRESS NOTES
Outreached PT to discuss financial assistance opportunities. PT did not answer. Voicemail was left detailing the reason for the call, this writer's contact information, and a high-level overview of options.       Johanny Barreto MPH  Phone: [phone]  Email: [email or web address]

## 2024-10-25 ENCOUNTER — PATIENT OUTREACH (OUTPATIENT)
Dept: CASE MANAGEMENT | Facility: HOSPITAL | Age: 69
End: 2024-10-25

## 2024-10-25 ENCOUNTER — TELEPHONE (OUTPATIENT)
Age: 69
End: 2024-10-25

## 2024-10-25 NOTE — TELEPHONE ENCOUNTER
Pt is calling to check the status of her medication Lumakras to see if its covered.    Pt would like a call back

## 2024-10-25 NOTE — PROGRESS NOTES
Call out to pt this morning to follow up on our conversation earlier this week.  She says that she was recommended an oral chemo to start taking but is nervous that not many people have been on it and what the side effects will be like.  She has the packet of information from her appointment earlier this week and plans to sit down and read through it today.    Pt continues to struggle with her family dynamics and shared many stories of her relationships with her sisters throughout their lives.  She is continuing to grieve her niece and spoke about her life before she did and how chaotic it was with her drug use.  She says that she looked at her niece like her own daughter and it's been hard to grieve for her and not feel like she's stepping on her sister's toes.  She gave me updates on her son and his relationship as well.    Pt is continuing to struggle with her situation caring for her sister at home and the lack of support or help she gets from her siblings.  She continues to stress about what will happen to her sister when she dies and what the best move is for her at this point or in the future.  I have again encouraged her to speak to attorneys to see what her options are, and press her brother and sister to step up and take some ownership of the situation.   She is heavily considering selling her home, as the utilities and the [employer] fees are only increasing and it's making things difficult financially, but she also realizes that rent is very expensive and she will have to have somewhere stable for them both to live if she wants to sell.  She spoke generally about her monthly budget and if she could manage on her own financially.    I provided much support and supportive listening to pt today and have encouraged her to reach out to me as needed moving forward.

## 2024-10-30 ENCOUNTER — TELEPHONE (OUTPATIENT)
Dept: SURGICAL ONCOLOGY | Facility: CLINIC | Age: 69
End: 2024-10-30

## 2024-10-30 NOTE — TELEPHONE ENCOUNTER
10/28/24 OOO  Patient Calls message was forwarded to me and I followed up on 10/29:    Natalya Renee; Hematology & Oncology Pod Medication Prior Authorizations; Johanny Barreto; Oncology Financial Advocacy2 days ago       Yes. Per email this was approved and high co pay and this was sent to Oncology Finance. Johanny responded and is aware and working on the free drug. All is documented in email with [employer].     Sly Tinajero; Hematology & Oncology Pod Medication Prior Authorizations; Johanny Barreto; Oncology Financial Advocacy2 days ago     JK  Was this authorization approved?     Natalya Renee; Hematology & Oncology Pod Medication Prior Authorizations; Johanny Barreto5 days ago       Routing to Johanny Renee routed conversation to Hematology & Oncology Pod Medication Prior Authorizations5 days ago     [facility] [place] routed conversation to Oncology Financial Advocacy5 days ago     [facility] [place]5 days ago       Pt is calling to check the status of her medication Lumakras to see if its covered.     Pt would like a call back        I would contact the patient for her financial information and Medicare ID to submit to [employer] for free drug application.  I spoke with Sarah BEAN and Richa GALLO at [employer] ([phone] Option 4 who created a case # [id number] for follow up on her application.

## 2024-11-01 ENCOUNTER — TELEPHONE (OUTPATIENT)
Dept: SURGICAL ONCOLOGY | Facility: CLINIC | Age: 69
End: 2024-11-01

## 2024-11-01 NOTE — TELEPHONE ENCOUNTER
10/30/24  I recently had an EPIC Patient Calls request to outreach the above referenced patient Saskia Alfaro [birth year] and her Lumakras.  All of the prescreen information was provided to [employer] to process with the exception of a copy of the Prior Authorization.  An Approval letter was in the patient's chart but was not acceptable with [employer]. I spoke with the rep Sarah S [phone] at   [employer] who stated the prior auth was needed with the insurance letterhead on the auth to be accepted and not the letter from the Clinical Services Team.  I would also call the clinical services team and speak to the  [employer] LOZANO [phone] who could not provide me with the auth letter with their letterhead because   I am considered 3rd party. The official hard copy goes out in the mail to the Doctor and to the patient and only they can request another according to [employer].  I checked with the nurse for Doctor Price and the patient to see if they had it and the patient stated she has not checked her mail in some time and the nurse could not find it.  According to [employer] the nurse can request a duplicate letter and may have to do so. That is the only setback in processing the patient's application.

## 2024-11-01 NOTE — TELEPHONE ENCOUNTER
I called the patient Today and was informed that the patient checked her mail and was able to find the Authorization letter. She will come to the office and give it to me to forward to [employer].

## 2024-11-04 DIAGNOSIS — Z79.899 HIGH RISK MEDICATION USE: Primary | ICD-10-CM

## 2024-11-04 DIAGNOSIS — C34.91 ADENOCARCINOMA OF RIGHT LUNG (HCC): ICD-10-CM

## 2024-11-04 RX ORDER — CYANOCOBALAMIN 1000 UG/ML
1000 INJECTION, SOLUTION INTRAMUSCULAR; SUBCUTANEOUS ONCE
Status: CANCELLED | OUTPATIENT
Start: 2024-11-06

## 2024-11-06 ENCOUNTER — HOSPITAL ENCOUNTER (OUTPATIENT)
Dept: INFUSION CENTER | Facility: CLINIC | Age: 69
Discharge: HOME | End: 2024-11-06
Payer: COMMERCIAL

## 2024-11-06 DIAGNOSIS — C34.91 ADENOCARCINOMA OF RIGHT LUNG (HCC): Primary | ICD-10-CM

## 2024-11-06 DIAGNOSIS — Z79.899 HIGH RISK MEDICATION USE: ICD-10-CM

## 2024-11-06 PROCEDURE — 96372 THER/PROPH/DIAG INJ SC/IM: CPT

## 2024-11-06 RX ORDER — CYANOCOBALAMIN 1000 UG/ML
1000 INJECTION, SOLUTION INTRAMUSCULAR; SUBCUTANEOUS ONCE
Status: COMPLETED | OUTPATIENT
Start: 2024-11-06 | End: 2024-11-06

## 2024-11-06 RX ORDER — CYANOCOBALAMIN 1000 UG/ML
1000 INJECTION, SOLUTION INTRAMUSCULAR; SUBCUTANEOUS ONCE
OUTPATIENT
Start: 2024-12-04

## 2024-11-06 RX ADMIN — CYANOCOBALAMIN 1000 MCG: 1000 INJECTION, SOLUTION INTRAMUSCULAR; SUBCUTANEOUS at 13:20

## 2024-11-06 NOTE — PROGRESS NOTES
Pt here for b12 injection.  Offers no complaints.  Tolerated injection in the Left deltoid without incident.   AVS declined.  Next appt is 12/4 @ 2 pm.  Walked out in stable condition.

## 2024-11-12 ENCOUNTER — TELEPHONE (OUTPATIENT)
Dept: SURGICAL ONCOLOGY | Facility: CLINIC | Age: 69
End: 2024-11-12

## 2024-11-12 ENCOUNTER — OFFICE VISIT (OUTPATIENT)
Dept: DERMATOLOGY | Facility: CLINIC | Age: 69
End: 2024-11-12
Payer: COMMERCIAL

## 2024-11-12 VITALS — TEMPERATURE: 98.2 F | WEIGHT: 163 LBS | HEIGHT: 63 IN | BODY MASS INDEX: 28.88 KG/M2

## 2024-11-12 DIAGNOSIS — L40.9 PSORIASIS: Primary | ICD-10-CM

## 2024-11-12 PROCEDURE — 99214 OFFICE O/P EST MOD 30 MIN: CPT | Performed by: STUDENT IN AN ORGANIZED HEALTH CARE EDUCATION/TRAINING PROGRAM

## 2024-11-12 RX ORDER — HALOBETASOL PROPIONATE 0.05 %
OINTMENT (GRAM) TOPICAL 2 TIMES DAILY
Qty: 50 G | Refills: 3 | Status: SHIPPED | OUTPATIENT
Start: 2024-11-12

## 2024-11-12 NOTE — PATIENT INSTRUCTIONS
PSORIASIS    Plan:  Discussed chronic nature of disease. Psoriasis tends to persist lifelong and fluctuates in extent and severity. To help manage the disease, decrease factors that aggravate psoriasis. This includes treating streptococcal infections, minimizing skin injuries, avoiding sun exposure if it exacerbates psoriasis, avoiding smoking and alcohol usage, decreasing stress, and maintaining an optimal body weight. Certain medications such as lithium, beta blockers, antimalarials, and NSAIDs have also been implicated. Suddenly stopping oral steroids or strong topical steroids can cause rebound disease.   Dry skin is more susceptible to outbreaks of psoriasis. Practice moisturizing throughout the day and after bathing or showering to reduce itching, dryness and scaling.  Topical Therapy: continue halobetasol ointment 0.05% ointment twice a day for 2 weeks at a time with 1 week breaks inbetween  We are considering putting patient on Otezla, patient previously had PA approved for medication, but Co-Pay was unrealistic for patient to obtain. Discussed submitting another prior authorization for Otezla to see if co-pay amount will change. If it does not, we will consider putting patient on Tremfya through [facility]   Given patients complex medical history of stage 3 lung cancer and psoriasis, will discuss with patient's oncologist best course of action

## 2024-11-12 NOTE — PROGRESS NOTES
"[facility]atology Clinic Note     Patient Name: Saskia Alfaro  Encounter Date: 11/12/24     Have you been cared for by a [facility] Dermatologist in the last 3 years and, if so, which description applies to you?    Yes.  I have been here within the last 3 years, and my medical history has NOT changed since that time.  I am FEMALE/of child-bearing potential.    REVIEW OF SYSTEMS:  Have you recently had or currently have any of the following? No changes in my recent health.   PAST MEDICAL HISTORY:  Have you personally ever had or currently have any of the following?  If \"YES,\" then please provide more detail. No changes in my medical history.   HISTORY OF IMMUNOSUPPRESSION: Do you have a history of any of the following:  Systemic Immunosuppression such as Diabetes, Biologic or Immunotherapy, Chemotherapy, Organ Transplantation, Bone Marrow Transplantation or Prednisone?  Yes, chemotherapy, immunotherapy, radiation: Lung Cancer Stage 3     Answering \"YES\" requires the addition of the dotphrase \"IMMUNOSUPPRESSED\" as the first diagnosis of the patient's visit.   FAMILY HISTORY:  Any \"first degree relatives\" (parent, brother, sister, or child) with the following?    No changes in my family's known health.   PATIENT EXPERIENCE:    Do you want the Dermatologist to perform a COMPLETE skin exam today including a clinical examination under the \"bra and underwear\" areas?  NO  If necessary, do we have your permission to call and leave a detailed message on your Preferred Phone number that includes your specific medical information?  Yes      Allergies   Allergen Reactions    Prednisone Palpitations      Current Outpatient Medications:     amitriptyline (ELAVIL) 25 mg tablet, , Disp: , Rfl:     Ascorbic Acid (vitamin C) 100 MG tablet, Take 100 mg by mouth daily, Disp: , Rfl:     atorvastatin (LIPITOR) 20 mg tablet, Take 20 mg by mouth daily, Disp: , Rfl:     calcipotriene (DOVONEX) 0.005 % cream, Apply topically 2 (two) times " a day To affected areas on Saturday and Sunday only, Disp: 120 g, Rfl: 2    cholecalciferol (VITAMIN D3) 400 units tablet, Take 400 Units by mouth daily, Disp: , Rfl:     DULoxetine (CYMBALTA) 30 mg delayed release capsule, , Disp: , Rfl:     Fluticasone-Salmeterol (Advair) 100-50 mcg/dose inhaler, Inhale 1 puff 2 (two) times a day Rinse mouth after use., Disp: , Rfl:     folic acid (KP Folic Acid) 1 mg tablet, Take 1 tablet (1 mg total) by mouth daily, Disp: 90 tablet, Rfl: 2    halobetasol (ULTRAVATE) 0.05 % ointment, Apply topically 2 (two) times a day, Disp: 50 g, Rfl: 1    ondansetron (ZOFRAN) 4 mg tablet, Take 2 tablets (8 mg total) by mouth every 8 (eight) hours as needed for nausea or vomiting, Disp: 20 tablet, Rfl: 0    propranolol (INDERAL) 20 mg/5 mL solution, , Disp: , Rfl:     triamcinolone (KENALOG) 0.1 % cream, Apply topically 2 (two) times a day Affected areas Monday through Friday only, Disp: 453.6 g, Rfl: 2    amoxicillin (AMOXIL) 500 mg capsule, Prn dental procedure (Patient not taking: Reported on 10/21/2024), Disp: , Rfl:     b complex vitamins capsule, Take 1 capsule by mouth daily (Patient not taking: Reported on 10/21/2024), Disp: , Rfl:     bismuth subsalicylate (PEPTO BISMOL) 262 MG chewable tablet, Take 1 tablet by mouth every 6 hours for 14 days (Patient not taking: Reported on 5/30/2024), Disp: 56 tablet, Rfl: 0    loratadine (CLARITIN) 10 mg tablet, Take 10 mg by mouth daily (Patient not taking: Reported on 7/29/2024), Disp: , Rfl:     omeprazole (PriLOSEC) 20 mg delayed release capsule, Take 1 capsule (20 mg total) by mouth 2 (two) times a day Every 12 hours for 14 days (Patient not taking: Reported on 12/26/2023), Disp: 28 capsule, Rfl: 0    Sotorasib (Lumakras) 320 MG TABS, Take 960 mg by mouth daily (Patient not taking: Reported on 11/12/2024), Disp: 90 tablet, Rfl: 5          Whom besides the patient is providing clinical information about today's encounter?   NO ADDITIONAL  HISTORIAN (patient alone provided history)    Physical Exam and Assessment/Plan by Diagnosis:      PSORIASIS    Physical Exam:  Anatomic Location: scalp, elbows, knees, buttocks, trunk, back, palms, and soles of feel  Morphologic Description:  Pink patches with silvery scales   Pustules present? No  Severity: moderate to severe  Body Percent Affected: 60%  Pertinent Positives:  Itching? YES  Nail dystrophy (pitting, onycholysis, and/or hyperkeratosis)? YES  Pertinent Negatives:    Additional History of Present Condition:  Patient here for follow up of psoriasis flare following initiation of Imfinzi in July 2024. Patient saw Dr. Harper who prescribed halobetasol ointment which patient used until it ran out, about a month ago. Patient does not feel like it helped her psoriasis. Patient is using , psoriasin, and OTC moisturizer with minimal relief. Patient previously was approved for Otezla with a very high co-pay which patient is unable to afford.     Plan:  Discussed chronic nature of disease. Psoriasis tends to persist lifelong and fluctuates in extent and severity. To help manage the disease, decrease factors that aggravate psoriasis. This includes treating streptococcal infections, minimizing skin injuries, avoiding sun exposure if it exacerbates psoriasis, avoiding smoking and alcohol usage, decreasing stress, and maintaining an optimal body weight. Certain medications such as lithium, beta blockers, antimalarials, and NSAIDs have also been implicated. Suddenly stopping oral steroids or strong topical steroids can cause rebound disease.   Dry skin is more susceptible to outbreaks of psoriasis. Practice moisturizing throughout the day and after bathing or showering to reduce itching, dryness and scaling.  Topical Therapy: continue halobetasol ointment 0.05% ointment twice a day for 2 weeks at a time with 1 week breaks inbetween  We are considering putting patient on Otezla, patient previously had PA approved  for medication, but Co-Pay was unrealistic for patient to obtain. Discussed submitting another prior authorization for Otezla to see if co-pay amount will change. If it does not, we will consider putting patient on Tremfya through [facility]   Given patients complex medical history of stage 3 lung cancer and psoriasis, will discuss with patient's oncologist best course of action.      PROCEDURES PERFORMED TODAY ASSOCIATED WITH THIS CONDITION:          NONE     Medical Complexity:    CHRONIC ILLNESS (expected duration of >1 year):  EXACERBATION, PROGRESSION, OR SIDE EFFECTS OF TREATMENT.  Acutely worsening, poorly controlled, or progressing.  Intent is to control progression and requires additional supportive care or attention to treatment for side effects but not at level of consideration of hospital level of care.           Scribe Attestation      I,:  Calos Baldwin am acting as a scribe while in the presence of the attending physician.:       I,:  Robert Cullen DO personally performed the services described in this documentation    as scribed in my presence.:

## 2024-11-12 NOTE — TELEPHONE ENCOUNTER
Patient was approved for Lumakras as per conversation with Neha COTTO [phone] Options 2,2.    Start date 11/12/2024  End Date: 12/31/2024    Pharmacy [facility]    Approval and reverification form being faxed to me today.

## 2024-11-13 ENCOUNTER — TELEPHONE (OUTPATIENT)
Dept: DERMATOLOGY | Facility: CLINIC | Age: 69
End: 2024-11-13

## 2024-11-13 NOTE — TELEPHONE ENCOUNTER
Received fax from [employer]. Prior auth is needed for Halobetasol Propionate 0.05% Ointment.     Please initiate prior auth    Key:  [id number]

## 2024-11-14 ENCOUNTER — HOSPITAL ENCOUNTER (OUTPATIENT)
Dept: MRI IMAGING | Facility: CLINIC | Age: 69
Discharge: HOME | End: 2024-11-14
Payer: COMMERCIAL

## 2024-11-14 DIAGNOSIS — C34.2 ADENOCARCINOMA OF MIDDLE LOBE OF RIGHT LUNG (HCC): ICD-10-CM

## 2024-11-14 PROCEDURE — 70553 MRI BRAIN STEM W/O & W/DYE: CPT

## 2024-11-14 PROCEDURE — A9585 GADOBUTROL INJECTION: HCPCS | Performed by: RADIOLOGY

## 2024-11-14 RX ORDER — GADOBUTROL 604.72 MG/ML
7 INJECTION INTRAVENOUS
Status: COMPLETED | OUTPATIENT
Start: 2024-11-14 | End: 2024-11-14

## 2024-11-14 RX ADMIN — GADOBUTROL 7 ML: 604.72 INJECTION INTRAVENOUS at 13:16

## 2024-11-15 ENCOUNTER — TELEPHONE (OUTPATIENT)
Age: 69
End: 2024-11-15

## 2024-11-15 ENCOUNTER — OFFICE VISIT (OUTPATIENT)
Dept: GASTROENTEROLOGY | Facility: MEDICAL CENTER | Age: 69
End: 2024-11-15
Payer: COMMERCIAL

## 2024-11-15 ENCOUNTER — PATIENT OUTREACH (OUTPATIENT)
Dept: CASE MANAGEMENT | Facility: HOSPITAL | Age: 69
End: 2024-11-15

## 2024-11-15 VITALS
TEMPERATURE: 97.2 F | SYSTOLIC BLOOD PRESSURE: 119 MMHG | HEART RATE: 92 BPM | WEIGHT: 160.2 LBS | BODY MASS INDEX: 28.38 KG/M2 | DIASTOLIC BLOOD PRESSURE: 76 MMHG

## 2024-11-15 DIAGNOSIS — K86.89 PANCREATIC DUCT STONES: ICD-10-CM

## 2024-11-15 DIAGNOSIS — C34.91 ADENOCARCINOMA OF RIGHT LUNG (HCC): ICD-10-CM

## 2024-11-15 DIAGNOSIS — B96.81 HELICOBACTER POSITIVE GASTRITIS: ICD-10-CM

## 2024-11-15 DIAGNOSIS — K86.3 PANCREATIC PSEUDOCYST/CYST: ICD-10-CM

## 2024-11-15 DIAGNOSIS — C34.91 ADENOCARCINOMA OF RIGHT LUNG (HCC): Primary | ICD-10-CM

## 2024-11-15 DIAGNOSIS — K29.70 HELICOBACTER POSITIVE GASTRITIS: ICD-10-CM

## 2024-11-15 DIAGNOSIS — K86.2 PANCREATIC PSEUDOCYST/CYST: ICD-10-CM

## 2024-11-15 DIAGNOSIS — K86.89 DILATION OF PANCREATIC DUCT: ICD-10-CM

## 2024-11-15 PROCEDURE — 99214 OFFICE O/P EST MOD 30 MIN: CPT | Performed by: INTERNAL MEDICINE

## 2024-11-15 RX ORDER — SOTORASIB 320 MG/1
960 TABLET, COATED ORAL DAILY
Qty: 90 TABLET | Refills: 5 | Status: SHIPPED | OUTPATIENT
Start: 2024-11-15

## 2024-11-15 NOTE — PROGRESS NOTES
Name: Saskia Alfaro      : 1955      MRN: [id number]  Encounter Provider: Randolph Rodriguez MD  Encounter Date: 11/15/2024   Encounter department: [facility]  :  Assessment & Plan  Adenocarcinoma of right lung (HCC)  She is concerned about her treatment plan at this time.  I have asked her to follow up with oncology          Pancreatic pseudocyst/cyst  Follow up imaging.   We discussed regarding doing repeat endoscopy for evaluation and management of the pancreatic duct stones and stricture.  However since she is asymptomatic at this time and is going through treatment for her lung cancer we will hold off on this at this time.    Orders:    CT abdomen pelvis w contrast; Future    Pancreatic duct stones  As above.  Follow-up imaging.  Orders:    CT abdomen pelvis w contrast; Future    Helicobacter positive gastritis  Resolved          Dilation of pancreatic duct    Orders:    CT abdomen pelvis w contrast; Future        History of Present Illness     Abdominal Pain  The problem occurs rarely. The pain is located in the periumbilical region. The pain is at a severity of 2/10. The quality of the pain is sharp. The abdominal pain does not radiate. Associated symptoms include anorexia and arthralgias. Nothing aggravates the pain. The pain is relieved by Nothing.     Saskia Alfaro is a 69 y.o. female who presents pancreatic duct stones and cyst.  History obtained from: patient    She was immunotherapy for her lung cancer and developed psoriasis.  She follows up with rheumatology.  She is not having any pain.   She saw Dr. Hunter - no intervention recommended at this time.   She has stopped drinking.   She is smoking.   Blood sugars are good.   No diarrhea (except occasionally).       Review of Systems   Constitutional: Negative.    HENT: Negative.     Eyes: Negative.    Respiratory: Negative.     Cardiovascular: Negative.    Gastrointestinal:  Positive for anorexia.        See HPI    Endocrine: Negative.    Genitourinary: Negative.    Musculoskeletal:  Positive for arthralgias.   Skin: Negative.    Allergic/Immunologic: Negative.    Neurological: Negative.    Hematological: Negative.    Psychiatric/Behavioral: Negative.     All other systems reviewed and are negative.         Objective   /76   Pulse 92   Temp (!) 97.2 °F (36.2 °C)   Wt 72.7 kg (160 lb 3.2 oz)   BMI 28.38 kg/m²      Physical Exam  Constitutional:       Appearance: Normal appearance. She is well-developed.   HENT:      Head: Normocephalic and atraumatic.   Eyes:      General: No scleral icterus.     Conjunctiva/sclera: Conjunctivae normal.      Pupils: Pupils are equal, round, and reactive to light.   Cardiovascular:      Rate and Rhythm: Normal rate and regular rhythm.      Heart sounds: Normal heart sounds.   Pulmonary:      Effort: Pulmonary effort is normal. No respiratory distress.      Breath sounds: Normal breath sounds.   Abdominal:      General: Bowel sounds are normal. There is no distension.      Palpations: Abdomen is soft. There is no mass.      Tenderness: There is no abdominal tenderness.      Hernia: No hernia is present.   Musculoskeletal:         General: Normal range of motion.      Cervical back: Normal range of motion.   Lymphadenopathy:      Cervical: No cervical adenopathy.   Skin:     General: Skin is warm.   Neurological:      Mental Status: She is alert and oriented to person, place, and time.   Psychiatric:         Behavior: Behavior normal.         Thought Content: Thought content normal.

## 2024-11-15 NOTE — PROGRESS NOTES
Pt r/o to me today and let me know that she's been sick with a cold for about 2 weeks and is just now starting to feel better.  She was coughing throughout most of our conversation.  She says that she did see the dermatologist but he wanted to check with oncology before starting anything for her psoriasis, which she says is worse than before.  She says that she feels like she's just stuck waiting and has no idea who needs to be making decisions or who to even ask her questions to at this point.  She has been off of treatment now for months and is very worried about disease progression because of that.  She notes that she was supposed to start a new treatment but is waiting to hear back if she was approved for FA for this, reviewing her chart I do see a note from YUMIKO Santana that she was approved for free drug.  I did share that with her today.  She says that she applied for the \[facility]\"" FA program back in May and it still shows as pending for her in [facility].  I will email their team about an update for her, as she notes that she did submit updated bank statements in September so she's not sure what else they would need from her.    At this point I was able to have pt s/w RN Roxana to answer some of her questions about treatment etc.  I will follow up with her in the near future to see how things are going.  She has my direct number to reach out as needed as well.  No other needs noted at this time.

## 2024-11-15 NOTE — ASSESSMENT & PLAN NOTE
Follow up imaging.   We discussed regarding doing repeat endoscopy for evaluation and management of the pancreatic duct stones and stricture.  However since she is asymptomatic at this time and is going through treatment for her lung cancer we will hold off on this at this time.    Orders:    CT abdomen pelvis w contrast; Future

## 2024-11-15 NOTE — TELEPHONE ENCOUNTER
Called and spoke with patient at length over her situation. She was very upset that months have gone by and she was off of treatment and now her CT is altered. She had questions as to why the provider didn't give her treatment and is just worried that the cancer is spreading.  She was reminded that the dermatologic reaction she had to the immunotherapy was severe and while it was flaring up we wouldn't attempt to use another medication because if she reacted to that we would know. She did have CT ordered so she was also reminded things were left in place and now that approvals for oral medication are going through she will be able to start soon. She seemed more relaxed at end of our 25min conversation. She had been speaking to our  Dia prior to my call and I told her I would follow up with her Monday. She was agreeable to this and felt more at ease.

## 2024-11-15 NOTE — ASSESSMENT & PLAN NOTE
She is concerned about her treatment plan at this time.  I have asked her to follow up with oncology

## 2024-11-19 ENCOUNTER — TELEPHONE (OUTPATIENT)
Age: 69
End: 2024-11-19

## 2024-11-19 ENCOUNTER — HOSPITAL ENCOUNTER (OUTPATIENT)
Dept: CT IMAGING | Facility: HOSPITAL | Age: 69
Discharge: HOME | End: 2024-11-19
Attending: INTERNAL MEDICINE
Payer: COMMERCIAL

## 2024-11-19 ENCOUNTER — TELEPHONE (OUTPATIENT)
Dept: HEMATOLOGY ONCOLOGY | Facility: CLINIC | Age: 69
End: 2024-11-19

## 2024-11-19 DIAGNOSIS — R91.8 MULTIPLE LUNG NODULES ON CT: ICD-10-CM

## 2024-11-19 DIAGNOSIS — C34.91 ADENOCARCINOMA OF RIGHT LUNG (HCC): ICD-10-CM

## 2024-11-19 PROCEDURE — 71260 CT THORAX DX C+: CPT

## 2024-11-19 RX ADMIN — IOHEXOL 100 ML: 350 INJECTION, SOLUTION INTRAVENOUS at 13:18

## 2024-11-19 NOTE — TELEPHONE ENCOUNTER
Returned call to patient to go over medication and how often labs would be. Explained that she would have to take 3 tabs daily with or without food. Labs will be needed every 3 weeks and the ones she had were in Oct and were stable. As we are speaking she starts coughing and states she has a had a cold for over 2 weeks and has been taking Mucinex to control the cough. When asked if she had fevers she states she doesn't check it but does have chills. She is told to hold off on starting until I speak to provider further and see if she wants an xray of chest and updated labs done. She just had a ct of chest done today as well. Made her aware that I would get back to her tomorrow with recommendation from Dr Orellana.

## 2024-11-19 NOTE — TELEPHONE ENCOUNTER
Per [employer] pt has not responded to outreach attempts to schedule Lumakras delivery. Provided number to call [phone].

## 2024-11-19 NOTE — TELEPHONE ENCOUNTER
Call from Saskia, letting us know that the Lumakros is to be delivered tomorrow, 11/20 and would like a call back as to how she is to take it. She is also asking if she needs to have blood work done prior to starting the medication.

## 2024-11-20 ENCOUNTER — TELEPHONE (OUTPATIENT)
Dept: RADIATION ONCOLOGY | Facility: CLINIC | Age: 69
End: 2024-11-20

## 2024-11-20 ENCOUNTER — TELEPHONE (OUTPATIENT)
Age: 69
End: 2024-11-20

## 2024-11-20 ENCOUNTER — TELEPHONE (OUTPATIENT)
Dept: SURGICAL ONCOLOGY | Facility: CLINIC | Age: 69
End: 2024-11-20

## 2024-11-20 NOTE — TELEPHONE ENCOUNTER
QUICKNOTE Telephone  Called patient to review brain MRI for dizziness.     11/14/2024 MRI brain  No acute process or enhancing lesion seen.  Mild chronic microvascular ischemia.    The patient notes dizziness continues, but has improved since her last visit with us 10/21/2024.  She denies headaches, nausea, or vomiting.    She complains of about 3 weeks of severe fatigue, chills, and dry cough.  No shortness of breath or fevers.  She feels congested and ill and taking Mucinex.      11/19/2024 CT chest   Worsening right lung parenchymal changes involving most of the right lung with some sparing in the right lung base.  Known right apical mass mostly unchanged.  New/resolving nodules in the left lung may be infectious or inflammatory. Continued monitoring on follow-up imaging.    I reviewed imaging personally with Radiology.  Unclear etiology, but scarring post radiation with likely component of infection suspected.      Clinically will try Levaquin to see if there is improvement.  Hold medrol dose moy given allergy to prednisone.  Instructed to follow-up with Pulmonary.  Reviewed that if symptoms worsen, including fatigue, she should proceed to ED for evaluation and management.  She expressed understanding and agreed.  Follow-up in about 1 week.

## 2024-11-20 NOTE — TELEPHONE ENCOUNTER
Pt called questioning apt 11/20/24 @ 330pm she said on her paperwork was a written in note that provider to call pt. Pt wants to know if she needs to come in today. Please call pt back thank you

## 2024-11-20 NOTE — TELEPHONE ENCOUNTER
I called the patient prior to her appointment with Dr. Treviño today to see if she can complete the reverification form for her Lumakras.  She wasn't sure if she had to come into the office or if she was speaking to the doctor over the phone. She will call me back to let me know which will work for her. Another possibility would be that she would be here Tuesday 11/26/24 at the infusion center to sign the verification form.

## 2024-11-21 ENCOUNTER — OFFICE VISIT (OUTPATIENT)
Age: 69
End: 2024-11-21
Payer: COMMERCIAL

## 2024-11-21 VITALS
TEMPERATURE: 98 F | DIASTOLIC BLOOD PRESSURE: 82 MMHG | HEIGHT: 63 IN | WEIGHT: 162 LBS | HEART RATE: 112 BPM | SYSTOLIC BLOOD PRESSURE: 130 MMHG | BODY MASS INDEX: 28.7 KG/M2 | OXYGEN SATURATION: 93 % | RESPIRATION RATE: 16 BRPM

## 2024-11-21 DIAGNOSIS — R91.8 MULTIPLE LUNG NODULES ON CT: ICD-10-CM

## 2024-11-21 DIAGNOSIS — R93.89 ABNORMAL CT OF THE CHEST: ICD-10-CM

## 2024-11-21 DIAGNOSIS — C34.91 ADENOCARCINOMA OF RIGHT LUNG (HCC): Primary | ICD-10-CM

## 2024-11-21 PROCEDURE — 99214 OFFICE O/P EST MOD 30 MIN: CPT | Performed by: INTERNAL MEDICINE

## 2024-11-21 NOTE — PROGRESS NOTES
"Assessment/Plan:   Diagnoses and all orders for this visit:    Adenocarcinoma of right lung (HCC)    Multiple lung nodules on CT    Abnormal CT of the chest  -     CT chest without contrast; Future    Given the recent chills and the cough with yellow productive sputum no hemoptysis and the fatigue and the CT of the chest with groundglass changes in the right upper lobe infectious versus inflammatory  She was sent in Levaquin yesterday with radiation oncologist she states she is yet to pick it up discussed to complete the course of Levaquin  Repeat CT of the chest in 4 weeks for follow-up of the PARENCHYMAL groundglass changes  I reviewed the CT of the chest report and images with the patient with right apical pleural-based mass has been unchanged and those diffuse parenchymal groundglass changes and interstitial prominence worsening from the prior CAT scan done in October 2024  Multiple other small lung nodules in reference to likely metastasis  Prior PFTs with mild obstructive airflow limitation currently not on any bronchodilators  Smoking cessation discussed currently not ready to quit  No follow-ups on file.  All questions are answered to the patient's satisfaction and understanding.  She verbalizes understanding.  She is encouraged to call with any further questions or concerns.    Portions of the record may have been created with voice recognition software.  Occasional wrong word or \"sound a like\" substitutions may have occurred due to the inherent limitations of voice recognition software.  Read the chart carefully and recognize, using context, where substitutions have occurred.    Electronically Signed by Sanjana Rodríguez MD    ______________________________________________________________________    Chief Complaint:   Chief Complaint   Patient presents with    Follow-up       Patient ID: Saskia is a 69 y.o. y.o. female has a past medical history of Fibromyalgia, Heart murmur, Lumbosacral disc herniation, Lung " cancer (HCC), Osteopenia, Psoriasis, Psoriasis, and RSD (reflex sympathetic dystrophy).    11/21/2024  Patient presents today for follow-up visit.  Patient diagnosed with right lung adenocarcinoma status post chemoradiation and could not tolerate the immunotherapy she states she had only 2 doses,  3 weeks ago it started with a head cold she has been having occasional chills although no documented fever, she had a CT of the chest done yesterday and found to have some groundglass opacities as well along with the likely worsening of her malignancy  She is here for evaluation          Review of Systems   Constitutional:  Positive for chills and fatigue.   HENT: Negative.     Eyes: Negative.    Respiratory:  Positive for cough.    Cardiovascular: Negative.    Gastrointestinal: Negative.    Endocrine: Negative.    Genitourinary: Negative.    Musculoskeletal: Negative.    Allergic/Immunologic: Negative.    Neurological: Negative.    Psychiatric/Behavioral: Negative.         Smoking history: She reports that she has been smoking cigarettes. She started smoking about 48 years ago. She has a 24.4 pack-year smoking history. She has never used smokeless tobacco.    The following portions of the patient's history were reviewed and updated as appropriate: allergies, current medications, past family history, past medical history, past social history, past surgical history, and problem list.    Immunization History   Administered Date(s) Administered    COVID-19 PFIZER VACCINE 0.3 ML IM 04/27/2021, 05/18/2021    Zoster Vaccine Recombinant 10/09/2020     Current Outpatient Medications   Medication Sig Dispense Refill    amitriptyline (ELAVIL) 25 mg tablet       Ascorbic Acid (vitamin C) 100 MG tablet Take 100 mg by mouth daily      atorvastatin (LIPITOR) 20 mg tablet Take 20 mg by mouth daily      calcipotriene (DOVONEX) 0.005 % cream Apply topically 2 (two) times a day To affected areas on Saturday and Sunday only 120 g 2     cholecalciferol (VITAMIN D3) 400 units tablet Take 400 Units by mouth daily      DULoxetine (CYMBALTA) 30 mg delayed release capsule       Fluticasone-Salmeterol (Advair) 100-50 mcg/dose inhaler Inhale 1 puff 2 (two) times a day Rinse mouth after use.      folic acid (KP Folic Acid) 1 mg tablet Take 1 tablet (1 mg total) by mouth daily 90 tablet 2    halobetasol (ULTRAVATE) 0.05 % ointment Apply topically 2 (two) times a day 50 g 3    ondansetron (ZOFRAN) 4 mg tablet Take 2 tablets (8 mg total) by mouth every 8 (eight) hours as needed for nausea or vomiting 20 tablet 0    propranolol (INDERAL) 20 mg/5 mL solution       Sotorasib (Lumakras) 320 MG TABS Take 960 mg by mouth daily 90 tablet 5    triamcinolone (KENALOG) 0.1 % cream Apply topically 2 (two) times a day Affected areas Monday through Friday only 453.6 g 2    amoxicillin (AMOXIL) 500 mg capsule Prn dental procedure (Patient not taking: Reported on 10/21/2024)      b complex vitamins capsule Take 1 capsule by mouth daily (Patient not taking: Reported on 10/21/2024)      bismuth subsalicylate (PEPTO BISMOL) 262 MG chewable tablet Take 1 tablet by mouth every 6 hours for 14 days (Patient not taking: Reported on 5/30/2024) 56 tablet 0    levofloxacin (LEVAQUIN) 750 mg tablet Take 1 tablet (750 mg total) by mouth every 24 hours for 5 days 5 tablet 0    loratadine (CLARITIN) 10 mg tablet Take 10 mg by mouth daily (Patient not taking: Reported on 7/29/2024)      omeprazole (PriLOSEC) 20 mg delayed release capsule Take 1 capsule (20 mg total) by mouth 2 (two) times a day Every 12 hours for 14 days (Patient not taking: Reported on 12/26/2023) 28 capsule 0     No current facility-administered medications for this visit.     Allergies: Prednisone    Objective:  Vitals:    11/21/24 1204 11/21/24 1207   BP: 130/82    BP Location: Right arm    Patient Position: Sitting    Cuff Size: Large    Pulse: (!) 112    Resp: 16    Temp: 98 °F (36.7 °C)    TempSrc: Oral    SpO2: 93%  "93%   Weight: 73.5 kg (162 lb)    Height: 5' 3\" (1.6 m)    Oxygen Therapy  SpO2: 93 %  Oxygen Therapy: None (Room air)  .  Wt Readings from Last 3 Encounters:   11/21/24 73.5 kg (162 lb)   11/15/24 72.7 kg (160 lb 3.2 oz)   11/12/24 73.9 kg (163 lb)     Body mass index is 28.7 kg/m².    Physical Exam  Vitals and nursing note reviewed.   Constitutional:       Appearance: She is well-developed.   HENT:      Head: Normocephalic and atraumatic.   Eyes:      Conjunctiva/sclera: Conjunctivae normal.      Pupils: Pupils are equal, round, and reactive to light.   Neck:      Thyroid: No thyromegaly.      Vascular: No JVD.   Cardiovascular:      Rate and Rhythm: Normal rate and regular rhythm.      Heart sounds: Normal heart sounds. No murmur heard.     No friction rub. No gallop.   Pulmonary:      Effort: Pulmonary effort is normal. No respiratory distress.      Breath sounds: Normal breath sounds. No wheezing or rales.   Chest:      Chest wall: No tenderness.   Musculoskeletal:         General: No tenderness or deformity. Normal range of motion.      Cervical back: Normal range of motion and neck supple.   Lymphadenopathy:      Cervical: No cervical adenopathy.   Skin:     General: Skin is warm and dry.   Neurological:      Mental Status: She is alert and oriented to person, place, and time.         Lab Review:   Hospital Outpatient Visit on 10/09/2024   Component Date Value    WBC 10/09/2024 8.11     RBC 10/09/2024 3.63 (L)     Hemoglobin 10/09/2024 11.7     Hematocrit 10/09/2024 36.2     MCV 10/09/2024 100 (H)     MCH 10/09/2024 32.2     MCHC 10/09/2024 32.3     RDW 10/09/2024 12.3     MPV 10/09/2024 9.4     Platelets 10/09/2024 265     nRBC 10/09/2024 0     Segmented % 10/09/2024 73     Immature Grans % 10/09/2024 1     Lymphocytes % 10/09/2024 14     Monocytes % 10/09/2024 9     Eosinophils Relative 10/09/2024 2     Basophils Relative 10/09/2024 1     Absolute Neutrophils 10/09/2024 6.06     Absolute Immature Grans " 10/09/2024 0.05     Absolute Lymphocytes 10/09/2024 1.11     Absolute Monocytes 10/09/2024 0.71     Eosinophils Absolute 10/09/2024 0.14     Basophils Absolute 10/09/2024 0.04     Sodium 10/09/2024 137     Potassium 10/09/2024 3.7     Chloride 10/09/2024 102     CO2 10/09/2024 27     ANION GAP 10/09/2024 8     BUN 10/09/2024 8     Creatinine 10/09/2024 0.57 (L)     Glucose 10/09/2024 91     Calcium 10/09/2024 9.5     AST 10/09/2024 21     ALT 10/09/2024 30     Alkaline Phosphatase 10/09/2024 66     Total Protein 10/09/2024 7.1     Albumin 10/09/2024 3.8     Total Bilirubin 10/09/2024 0.40     eGFR 10/09/2024 95     TSH 3RD GENERATON 10/09/2024 1.496    Hospital Outpatient Visit on 07/23/2024   Component Date Value    WBC 07/23/2024 7.31     RBC 07/23/2024 3.75 (L)     Hemoglobin 07/23/2024 12.5     Hematocrit 07/23/2024 37.7     MCV 07/23/2024 101 (H)     MCH 07/23/2024 33.3     MCHC 07/23/2024 33.2     RDW 07/23/2024 11.6     MPV 07/23/2024 9.9     Platelets 07/23/2024 232     nRBC 07/23/2024 0     Segmented % 07/23/2024 72     Immature Grans % 07/23/2024 1     Lymphocytes % 07/23/2024 15     Monocytes % 07/23/2024 9     Eosinophils Relative 07/23/2024 3     Basophils Relative 07/23/2024 0     Absolute Neutrophils 07/23/2024 5.30     Absolute Immature Grans 07/23/2024 0.05     Absolute Lymphocytes 07/23/2024 1.12     Absolute Monocytes 07/23/2024 0.62     Eosinophils Absolute 07/23/2024 0.19     Basophils Absolute 07/23/2024 0.03    Hospital Outpatient Visit on 07/22/2024   Component Date Value    Sodium 07/22/2024 136     Potassium 07/22/2024 4.1     Chloride 07/22/2024 101     CO2 07/22/2024 27     ANION GAP 07/22/2024 8     BUN 07/22/2024 16     Creatinine 07/22/2024 0.73     Glucose 07/22/2024 114     Calcium 07/22/2024 9.2     AST 07/22/2024 26     ALT 07/22/2024 37     Alkaline Phosphatase 07/22/2024 70     Total Protein 07/22/2024 7.2     Albumin 07/22/2024 3.8     Total Bilirubin 07/22/2024 0.57     eGFR  07/22/2024 84     TSH 3RD GENERATON 07/22/2024 1.989     T3, Free 07/22/2024 3.61    Hospital Outpatient Visit on 06/24/2024   Component Date Value    WBC 06/24/2024 7.54     RBC 06/24/2024 3.72 (L)     Hemoglobin 06/24/2024 12.6     Hematocrit 06/24/2024 38.6     MCV 06/24/2024 104 (H)     MCH 06/24/2024 33.9     MCHC 06/24/2024 32.6     RDW 06/24/2024 11.2 (L)     MPV 06/24/2024 10.5     Platelets 06/24/2024 225     nRBC 06/24/2024 0     Segmented % 06/24/2024 78 (H)     Immature Grans % 06/24/2024 0     Lymphocytes % 06/24/2024 11 (L)     Monocytes % 06/24/2024 9     Eosinophils Relative 06/24/2024 2     Basophils Relative 06/24/2024 0     Absolute Neutrophils 06/24/2024 5.85     Absolute Immature Grans 06/24/2024 0.03     Absolute Lymphocytes 06/24/2024 0.81     Absolute Monocytes 06/24/2024 0.70     Eosinophils Absolute 06/24/2024 0.12     Basophils Absolute 06/24/2024 0.03     Sodium 06/24/2024 136     Potassium 06/24/2024 3.9     Chloride 06/24/2024 101     CO2 06/24/2024 29     ANION GAP 06/24/2024 6     BUN 06/24/2024 9     Creatinine 06/24/2024 0.70     Glucose 06/24/2024 102     Calcium 06/24/2024 9.4     AST 06/24/2024 32     ALT 06/24/2024 44     Alkaline Phosphatase 06/24/2024 68     Total Protein 06/24/2024 7.3     Albumin 06/24/2024 4.0     Total Bilirubin 06/24/2024 0.36     eGFR 06/24/2024 89     TSH 3RD GENERATON 06/24/2024 1.727     T3, Free 06/24/2024 3.50    Hospital Outpatient Visit on 05/24/2024   Component Date Value    WBC 05/24/2024 5.81     RBC 05/24/2024 3.29 (L)     Hemoglobin 05/24/2024 11.7     Hematocrit 05/24/2024 35.6     MCV 05/24/2024 108 (H)     MCH 05/24/2024 35.6 (H)     MCHC 05/24/2024 32.9     RDW 05/24/2024 11.8     MPV 05/24/2024 9.8     Platelets 05/24/2024 234     nRBC 05/24/2024 0     Segmented % 05/24/2024 70     Immature Grans % 05/24/2024 1     Lymphocytes % 05/24/2024 15     Monocytes % 05/24/2024 11     Eosinophils Relative 05/24/2024 2     Basophils Relative  05/24/2024 1     Absolute Neutrophils 05/24/2024 4.14     Absolute Immature Grans 05/24/2024 0.04     Absolute Lymphocytes 05/24/2024 0.88     Absolute Monocytes 05/24/2024 0.61     Eosinophils Absolute 05/24/2024 0.10     Basophils Absolute 05/24/2024 0.04     Sodium 05/24/2024 139     Potassium 05/24/2024 4.2     Chloride 05/24/2024 105     CO2 05/24/2024 28     ANION GAP 05/24/2024 6     BUN 05/24/2024 12     Creatinine 05/24/2024 0.72     Glucose 05/24/2024 87     Calcium 05/24/2024 9.0     AST 05/24/2024 25     ALT 05/24/2024 28     Alkaline Phosphatase 05/24/2024 64     Total Protein 05/24/2024 7.2     Albumin 05/24/2024 3.9     Total Bilirubin 05/24/2024 0.40     eGFR 05/24/2024 86        Diagnostics:  Results Review Statement: I personally reviewed the following image studies in PACS and associated radiology reports: CT chest. My interpretation of the radiology images/reports is: 11/19/2024.  CT of chest performed on 11/19/2024 with contrast revealed right upper lobe diffuse parenchymal groundglass opacities worsening from the prior CAT scan in October 2024.     CT chest w contrast  Result Date: 11/20/2024  Narrative: CT CHEST WITH IV CONTRAST INDICATION: C34.91: Malignant neoplasm of unspecified part of right bronchus or lung R91.8: Other nonspecific abnormal finding of lung field. COMPARISON: Compared with 10/7/2024 and CT abdomen of 10/7/2024 TECHNIQUE: CT examination of the chest was performed. Multiplanar 2D reformatted images were created from the source data. This examination, like all CT scans performed in the [facility], was performed utilizing techniques to minimize radiation dose exposure, including the use of iterative reconstruction and automated exposure control. Radiation dose length product (DLP) for this visit: 259 mGy-cm IV Contrast: 100 mL of iohexol (OMNIPAQUE) FINDINGS: LUNGS: Decreased right lung volume. Right apical pleural-based mass unchanged measuring 3.0 x 2.7 cm  given differences of technique. Adjacent patchy Panacryl infiltrate unchanged. Worsening diffuse parenchymal groundglass changes with interstitial prominence involving the right upper lobe, middle lobe and lower lobe compared to previous study. Sparing of the posterior right lower lobe. Left apical mediastinal pleural-based nodule seen previously is smaller in series 3 image 86. Left apical 2.7 mm nodule in series 3 image 36 unchanged New 2.6 mm nodule in the left upper lobe image 64. More inferior nodule seen previously is resolving. 3.6 mm nodule anterior to the major fissure in series 3 image 91 is larger New 4.7 mm nodule in the lingula on image 100 Subtle scattered patchy groundglass changes in the left lower lung in image 107. PLEURA: Biapical pleural thickening seen again. HEART/GREAT VESSELS: Heart is unremarkable for patient's age. No thoracic aortic aneurysm. No pulmonary embolus. MEDIASTINUM AND ARDEN: Multiple subcentimeter AP window, right paratracheal and subcarinal lymph nodes are unchanged. CHEST WALL AND LOWER NECK: Right chest port catheter in the SVC. VISUALIZED STRUCTURES IN THE UPPER ABDOMEN: Identified is a pancreatic cystic mass in the head with pancreatic ductal dilatation similar to prior study. Focal inferior calcification in the pancreatic head unchanged. Fatty infiltration of the liver. OSSEOUS STRUCTURES: No acute fracture or destructive osseous lesion.     Impression: Worsening right lung parenchymal changes involving most of the right lung with some sparing in the right lung base. Known right apical mass mostly unchanged. New/resolving nodules in the left lung may be infectious or inflammatory. Continued monitoring on follow-up imaging. Workstation performed: [id number]     MRI brain w wo contrast  Result Date: 11/15/2024  Narrative: MRI BRAIN WITH AND WITHOUT CONTRAST INDICATION: C34.2: Malignant neoplasm of middle lobe, bronchus or lung.   Headache and dizziness COMPARISON: July 26,  2023 TECHNIQUE: Multiplanar, multisequence imaging of the brain was performed before and after gadolinium administration. IV Contrast:  7 mL of Gadobutrol injection (SINGLE-DOSE) IMAGE QUALITY:   Diagnostic. FINDINGS: BRAIN PARENCHYMA:  There is no discrete mass, mass effect or midline shift. There is no intracranial hemorrhage.  Normal posterior fossa.  Diffusion imaging is unremarkable. Small scattered hyperintensities on T2/FLAIR imaging are noted in the periventricular and subcortical white matter demonstrating an appearance that is statistically most likely to represent mild microangiopathic change. Postcontrast imaging of the brain demonstrates no abnormal enhancement. VENTRICLES:  Normal for the patient's age. SELLA AND PITUITARY GLAND:  Normal. ORBITS:  Normal. PARANASAL SINUSES:  Normal. VASCULATURE:  Evaluation of the major intracranial vasculature demonstrates appropriate flow voids. CALVARIUM AND SKULL BASE:  Normal. EXTRACRANIAL SOFT TISSUES:  Normal.     Impression: 1. No acute process or enhancing lesion seen. 2. Mild chronic microvascular ischemia. Workstation performed: [id number]

## 2024-11-22 NOTE — TELEPHONE ENCOUNTER
PA for Halobetasol 0.05% ointment SUBMITTED to [facility]    via    [x]M-KEY: [id number]   []Surescripts-Case ID #   []Availity-Auth ID # NDC #   []Faxed to plan   []Other website   []Phone call Case ID #     [x]PA sent as URGENT    All office notes, labs and other pertaining documents and studies sent. Clinical questions answered. Awaiting determination from insurance company.     Turnaround time for your insurance to make a decision on your Prior Authorization can take 7-21 business days.

## 2024-11-25 ENCOUNTER — TELEPHONE (OUTPATIENT)
Dept: HEMATOLOGY ONCOLOGY | Facility: CLINIC | Age: 69
End: 2024-11-25

## 2024-11-25 DIAGNOSIS — C34.91 ADENOCARCINOMA OF RIGHT LUNG (HCC): Primary | ICD-10-CM

## 2024-11-25 DIAGNOSIS — Z79.899 OTHER LONG TERM (CURRENT) DRUG THERAPY: ICD-10-CM

## 2024-11-26 ENCOUNTER — APPOINTMENT (OUTPATIENT)
Dept: LAB | Facility: HOSPITAL | Age: 69
End: 2024-11-26
Payer: COMMERCIAL

## 2024-11-26 ENCOUNTER — OFFICE VISIT (OUTPATIENT)
Dept: HEMATOLOGY ONCOLOGY | Facility: CLINIC | Age: 69
End: 2024-11-26
Payer: COMMERCIAL

## 2024-11-26 ENCOUNTER — OFFICE VISIT (OUTPATIENT)
Dept: RADIATION ONCOLOGY | Facility: CLINIC | Age: 69
End: 2024-11-26
Attending: RADIOLOGY
Payer: COMMERCIAL

## 2024-11-26 VITALS
BODY MASS INDEX: 28.7 KG/M2 | HEART RATE: 100 BPM | SYSTOLIC BLOOD PRESSURE: 110 MMHG | OXYGEN SATURATION: 95 % | TEMPERATURE: 97.9 F | DIASTOLIC BLOOD PRESSURE: 60 MMHG | RESPIRATION RATE: 14 BRPM | WEIGHT: 162 LBS

## 2024-11-26 VITALS
HEART RATE: 90 BPM | DIASTOLIC BLOOD PRESSURE: 80 MMHG | SYSTOLIC BLOOD PRESSURE: 116 MMHG | TEMPERATURE: 97.6 F | BODY MASS INDEX: 28.79 KG/M2 | OXYGEN SATURATION: 95 % | RESPIRATION RATE: 15 BRPM | WEIGHT: 162.5 LBS | HEIGHT: 63 IN

## 2024-11-26 DIAGNOSIS — C34.91 ADENOCARCINOMA OF RIGHT LUNG (HCC): ICD-10-CM

## 2024-11-26 DIAGNOSIS — C34.91 ADENOCARCINOMA OF RIGHT LUNG (HCC): Primary | ICD-10-CM

## 2024-11-26 DIAGNOSIS — L40.9 PSORIASIS: ICD-10-CM

## 2024-11-26 DIAGNOSIS — Z79.899 OTHER LONG TERM (CURRENT) DRUG THERAPY: ICD-10-CM

## 2024-11-26 DIAGNOSIS — R59.0 MEDIASTINAL ADENOPATHY: ICD-10-CM

## 2024-11-26 LAB
ALBUMIN SERPL BCG-MCNC: 3.8 G/DL (ref 3.5–5)
ALP SERPL-CCNC: 68 U/L (ref 34–104)
ALT SERPL W P-5'-P-CCNC: 25 U/L (ref 7–52)
ANION GAP SERPL CALCULATED.3IONS-SCNC: 9 MMOL/L (ref 4–13)
AST SERPL W P-5'-P-CCNC: 21 U/L (ref 13–39)
BASOPHILS # BLD AUTO: 0.03 THOUSANDS/ΜL (ref 0–0.1)
BASOPHILS NFR BLD AUTO: 0 % (ref 0–1)
BILIRUB SERPL-MCNC: 0.37 MG/DL (ref 0.2–1)
BUN SERPL-MCNC: 14 MG/DL (ref 5–25)
CALCIUM SERPL-MCNC: 9.5 MG/DL (ref 8.4–10.2)
CHLORIDE SERPL-SCNC: 102 MMOL/L (ref 96–108)
CHOLEST SERPL-MCNC: 117 MG/DL (ref ?–200)
CO2 SERPL-SCNC: 28 MMOL/L (ref 21–32)
CREAT SERPL-MCNC: 0.73 MG/DL (ref 0.6–1.3)
EOSINOPHIL # BLD AUTO: 0.19 THOUSAND/ΜL (ref 0–0.61)
EOSINOPHIL NFR BLD AUTO: 2 % (ref 0–6)
ERYTHROCYTE [DISTWIDTH] IN BLOOD BY AUTOMATED COUNT: 12.1 % (ref 11.6–15.1)
GFR SERPL CREATININE-BSD FRML MDRD: 84 ML/MIN/1.73SQ M
GLUCOSE P FAST SERPL-MCNC: 116 MG/DL (ref 65–99)
HCT VFR BLD AUTO: 38.2 % (ref 34.8–46.1)
HDLC SERPL-MCNC: 32 MG/DL
HGB BLD-MCNC: 12 G/DL (ref 11.5–15.4)
IMM GRANULOCYTES # BLD AUTO: 0.06 THOUSAND/UL (ref 0–0.2)
IMM GRANULOCYTES NFR BLD AUTO: 1 % (ref 0–2)
LDLC SERPL CALC-MCNC: 59 MG/DL (ref 0–100)
LYMPHOCYTES # BLD AUTO: 0.83 THOUSANDS/ΜL (ref 0.6–4.47)
LYMPHOCYTES NFR BLD AUTO: 9 % (ref 14–44)
MCH RBC QN AUTO: 31.3 PG (ref 26.8–34.3)
MCHC RBC AUTO-ENTMCNC: 31.4 G/DL (ref 31.4–37.4)
MCV RBC AUTO: 100 FL (ref 82–98)
MONOCYTES # BLD AUTO: 0.92 THOUSAND/ΜL (ref 0.17–1.22)
MONOCYTES NFR BLD AUTO: 10 % (ref 4–12)
NEUTROPHILS # BLD AUTO: 7.61 THOUSANDS/ΜL (ref 1.85–7.62)
NEUTS SEG NFR BLD AUTO: 78 % (ref 43–75)
NONHDLC SERPL-MCNC: 85 MG/DL
NRBC BLD AUTO-RTO: 0 /100 WBCS
PLATELET # BLD AUTO: 397 THOUSANDS/UL (ref 149–390)
PMV BLD AUTO: 9.1 FL (ref 8.9–12.7)
POTASSIUM SERPL-SCNC: 4.6 MMOL/L (ref 3.5–5.3)
PROT SERPL-MCNC: 7.2 G/DL (ref 6.4–8.4)
RBC # BLD AUTO: 3.83 MILLION/UL (ref 3.81–5.12)
SODIUM SERPL-SCNC: 139 MMOL/L (ref 135–147)
TRIGL SERPL-MCNC: 131 MG/DL (ref ?–150)
WBC # BLD AUTO: 9.64 THOUSAND/UL (ref 4.31–10.16)

## 2024-11-26 PROCEDURE — 99215 OFFICE O/P EST HI 40 MIN: CPT | Performed by: INTERNAL MEDICINE

## 2024-11-26 PROCEDURE — G2211 COMPLEX E/M VISIT ADD ON: HCPCS | Performed by: INTERNAL MEDICINE

## 2024-11-26 PROCEDURE — 80053 COMPREHEN METABOLIC PANEL: CPT

## 2024-11-26 PROCEDURE — 80061 LIPID PANEL: CPT

## 2024-11-26 PROCEDURE — 85025 COMPLETE CBC W/AUTO DIFF WBC: CPT

## 2024-11-26 PROCEDURE — 99212 OFFICE O/P EST SF 10 MIN: CPT | Performed by: RADIOLOGY

## 2024-11-26 PROCEDURE — G2211 COMPLEX E/M VISIT ADD ON: HCPCS | Performed by: RADIOLOGY

## 2024-11-26 PROCEDURE — 36415 COLL VENOUS BLD VENIPUNCTURE: CPT

## 2024-11-26 PROCEDURE — 99211 OFF/OP EST MAY X REQ PHY/QHP: CPT | Performed by: RADIOLOGY

## 2024-11-26 PROCEDURE — G0463 HOSPITAL OUTPT CLINIC VISIT: HCPCS | Performed by: RADIOLOGY

## 2024-11-26 NOTE — PROGRESS NOTES
Hematology/Oncology Outpatient Follow- up Note  Saskia Alfaro 69 y.o. female MRN: @ Encounter: [id number]        Date:  11/26/2024        Assessment / Plan:      1. Adenocarcinoma of right lung (HCC)  2. Mediastinal adenopathy  3. Psoriasis     -Start sotorasib.  -Patient follows with pulmonology and dermatology.  Pulmonology has ordered a repeat CAT scan in 1 month.  -RTC in 2 weeks with labs.      HPI:    69-year-old female patient with history of stage IIIb lung cancer.,  PD-L1 TPS 98%, K-mnina G 12C mutation.  Patient received neoadjuvant chemotherapy however was not a surgical candidate.  Neoadjuvant chemotherapy was followed by a course of definitive radiation which she completed on 3/1/2024.  Patient was started on immunotherapy however her psoriasis flared up and has immunotherapy was discontinued.    Interval History:    Patient presents today as a transfer of care from Dr. Thrasher and for follow-up for non-small cell lung cancer.  She states recently she had an upper respiratory tract infection and was treated with Levaquin.  She completed her Levaquin and is feeling much better now.  She held off on starting the sotorasib until the antibiotic was completed.  Denies any chest pain or shortness of breath.  Endorses mild fatigue.  Denies any headaches, lightheadedness or dizziness.  Continues to have significant rash on her hands, elbows, back and the abdomen.      Cancer Staging:  Cancer Staging   Adenocarcinoma of right lung (HCC)  Staging form: Lung, AJCC 8th Edition  - Clinical stage from 7/24/2023: Stage IIIB (cT4, cN2, cM0) - Signed by Celina Treviño MD on 12/26/2023  Stage prefix: Initial diagnosis      Molecular Testing:     Previous Hematologic/ Oncologic History:    Oncology History   Adenocarcinoma of right lung (HCC)   6/23/2023 Initial Diagnosis    Adenocarcinoma of right lung (HCC)     7/24/2023 Biopsy    Navigational bronchoscopy with EBUS    A.  Lung, right middle lobe, biopsy:  Non-small cell  carcinoma consistent with a pulmonary adenocarcinoma.     A.-B.  Lung, Right Middle Lobe Bronchial Brushing (Thin-prep and smear preparations):   Conclusive evidence of malignancy.  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     Satisfactory for evaluation.     C.-D.  Lung, Right Middle Lobe Bronchoalveolar Lavage (Thin-prep and cell block preparations):   Conclusive evidence of malignancy.  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     Satisfactory for evaluation.      E.-F.  Lymph Node, level 7 (Thin-prep and smear preparations):   Atypical cellular changes seen.  Rare atypical cells.  Lymphocytes.  Few bronchial cells and pulmonary macrophages.        Satisfactory for evaluation.     G.-H.  Lymph Node, 10R (Thin-prep and smear preparations):   Negative for malignancy.  Lymphocytes.  Aggregates of neutrophils.     Satisfactory for evaluation.     7/24/2023 -  Cancer Staged    Staging form: Lung, AJCC 8th Edition  - Clinical stage from 7/24/2023: Stage IIIB (cT4, cN2, cM0) - Signed by Celina Treviño MD on 12/26/2023  Stage prefix: Initial diagnosis       8/31/2023 - 10/12/2023 Chemotherapy    cyanocobalamin, 1,000 mcg, Intramuscular, Once, 1 of 1 cycle  Administration: 1,000 mcg (8/24/2023)  alteplase (CATHFLO), 2 mg, Intracatheter, Every 1 Minute as needed, 3 of 3 cycles  CISplatin (PLATINOL) with mannitol IVPB, 75 mg/m2 = 129.8 mg (100 % of original dose 75 mg/m2), Intravenous, Once, 3 of 3 cycles  Dose modification: 50 mg/m2 (original dose 75 mg/m2, Cycle 1, Reason: Anticipated Tolerance), 75 mg/m2 (original dose 75 mg/m2, Cycle 1, Reason: Dose modified as per discussion with consulting physician)  Administration: 129.8 mg (8/31/2023), 129.8 mg (9/21/2023), 129.8 mg (10/12/2023)  fosaprepitant (EMEND) IVPB, 150 mg, Intravenous, Once, 3 of 3 cycles  Administration: 150 mg (8/31/2023), 150 mg (9/21/2023), 150 mg (10/12/2023)  nivolumab (OPDIVO) IVPB, 360 mg,  Intravenous, Once, 3 of 3 cycles  Administration: 360 mg (8/31/2023), 360 mg (9/21/2023), 360 mg (10/12/2023)  pemetrexed (ALIMTA) chemo infusion, 500 mg/m2 = 865 mg, Intravenous, Once, 3 of 3 cycles  Administration: 865 mg (8/31/2023), 865 mg (9/21/2023), 865 mg (10/12/2023)     1/18/2024 - 3/1/2024 Radiation    Treatments:  Course: C1  Plan ID Energy Fractions Dose per Fraction (cGy) Dose Correction (cGy) Total Dose Delivered (cGy) Elapsed Days   R LUNG MED 6X 30 / 30 200 0 6,000 43    Treatment Dates:  1/18/2024 - 3/1/2024.      1/19/2024 - 3/1/2024 Chemotherapy    alteplase (CATHFLO), 2 mg, Intracatheter, Every 1 Minute as needed, 6 of 6 cycles  CARBOplatin (PARAPLATIN) IVPB (GO AUC DOSING), 185 mg, Intravenous, Once, 6 of 6 cycles  Administration: 185 mg (1/19/2024), 183.2 mg (1/26/2024), 183.2 mg (2/2/2024), 183.2 mg (2/9/2024), 183.2 mg (2/16/2024), 196.6 mg (3/1/2024)  PACLItaxel (TAXOL) chemo IVPB, 50 mg/m2 = 85.8 mg, Intravenous, Once, 1 of 1 cycle  Administration: 85.8 mg (1/19/2024)  pemetrexed (ALIMTA) chemo infusion, 500 mg/m2 = 860 mg (100 % of original dose 500 mg/m2), Intravenous, Once, 2 of 2 cycles  Dose modification: 500 mg/m2 (original dose 500 mg/m2, Cycle 4)  Administration: 860 mg (2/9/2024), 860 mg (3/1/2024)     4/2/2024 - 6/25/2024 Chemotherapy    alteplase (CATHFLO), 2 mg, Intracatheter, Every 1 Minute as needed, 5 of 6 cycles  durvalumab (IMFINZI) IVPB, 1,500 mg (original dose 10 mg/kg), Intravenous, Once, 5 of 6 cycles  Dose modification: 1,500 mg (original dose 10 mg/kg, Cycle 1, Reason: Other (Must fill in a comment), Comment: Will give every 28 days.)  Administration: 1,500 mg (4/2/2024), 1,500 mg (5/28/2024), 1,500 mg (6/25/2024), 1,500 mg (4/30/2024)         Current Hematologic/ Oncologic Treatment:    To start Sotorasib        Test Results:    Imaging: CT chest w contrast  Result Date: 11/20/2024  Narrative: CT CHEST WITH IV CONTRAST INDICATION: C34.91: Malignant neoplasm of  unspecified part of right bronchus or lung R91.8: Other nonspecific abnormal finding of lung field. COMPARISON: Compared with 10/7/2024 and CT abdomen of 10/7/2024 TECHNIQUE: CT examination of the chest was performed. Multiplanar 2D reformatted images were created from the source data. This examination, like all CT scans performed in the [facility], was performed utilizing techniques to minimize radiation dose exposure, including the use of iterative reconstruction and automated exposure control. Radiation dose length product (DLP) for this visit: 259 mGy-cm IV Contrast: 100 mL of iohexol (OMNIPAQUE) FINDINGS: LUNGS: Decreased right lung volume. Right apical pleural-based mass unchanged measuring 3.0 x 2.7 cm given differences of technique. Adjacent patchy Panacryl infiltrate unchanged. Worsening diffuse parenchymal groundglass changes with interstitial prominence involving the right upper lobe, middle lobe and lower lobe compared to previous study. Sparing of the posterior right lower lobe. Left apical mediastinal pleural-based nodule seen previously is smaller in series 3 image 86. Left apical 2.7 mm nodule in series 3 image 36 unchanged New 2.6 mm nodule in the left upper lobe image 64. More inferior nodule seen previously is resolving. 3.6 mm nodule anterior to the major fissure in series 3 image 91 is larger New 4.7 mm nodule in the lingula on image 100 Subtle scattered patchy groundglass changes in the left lower lung in image 107. PLEURA: Biapical pleural thickening seen again. HEART/GREAT VESSELS: Heart is unremarkable for patient's age. No thoracic aortic aneurysm. No pulmonary embolus. MEDIASTINUM AND ARDEN: Multiple subcentimeter AP window, right paratracheal and subcarinal lymph nodes are unchanged. CHEST WALL AND LOWER NECK: Right chest port catheter in the SVC. VISUALIZED STRUCTURES IN THE UPPER ABDOMEN: Identified is a pancreatic cystic mass in the head with pancreatic ductal  dilatation similar to prior study. Focal inferior calcification in the pancreatic head unchanged. Fatty infiltration of the liver. OSSEOUS STRUCTURES: No acute fracture or destructive osseous lesion.     Impression: Worsening right lung parenchymal changes involving most of the right lung with some sparing in the right lung base. Known right apical mass mostly unchanged. New/resolving nodules in the left lung may be infectious or inflammatory. Continued monitoring on follow-up imaging. Workstation performed: [id number]     MRI brain w wo contrast  Result Date: 11/15/2024  Narrative: MRI BRAIN WITH AND WITHOUT CONTRAST INDICATION: C34.2: Malignant neoplasm of middle lobe, bronchus or lung.   Headache and dizziness COMPARISON: July 26, 2023 TECHNIQUE: Multiplanar, multisequence imaging of the brain was performed before and after gadolinium administration. IV Contrast:  7 mL of Gadobutrol injection (SINGLE-DOSE) IMAGE QUALITY:   Diagnostic. FINDINGS: BRAIN PARENCHYMA:  There is no discrete mass, mass effect or midline shift. There is no intracranial hemorrhage.  Normal posterior fossa.  Diffusion imaging is unremarkable. Small scattered hyperintensities on T2/FLAIR imaging are noted in the periventricular and subcortical white matter demonstrating an appearance that is statistically most likely to represent mild microangiopathic change. Postcontrast imaging of the brain demonstrates no abnormal enhancement. VENTRICLES:  Normal for the patient's age. SELLA AND PITUITARY GLAND:  Normal. ORBITS:  Normal. PARANASAL SINUSES:  Normal. VASCULATURE:  Evaluation of the major intracranial vasculature demonstrates appropriate flow voids. CALVARIUM AND SKULL BASE:  Normal. EXTRACRANIAL SOFT TISSUES:  Normal.     Impression: 1. No acute process or enhancing lesion seen. 2. Mild chronic microvascular ischemia. Workstation performed: [id number]             Labs:   Lab Results   Component Value Date    WBC 9.64 11/26/2024    HGB 12.0  "11/26/2024    HCT 38.2 11/26/2024     (H) 11/26/2024     (H) 11/26/2024     Lab Results   Component Value Date    K 4.6 11/26/2024     11/26/2024    CO2 28 11/26/2024    BUN 14 11/26/2024    CREATININE 0.73 11/26/2024    GLUF 116 (H) 11/26/2024    CALCIUM 9.5 11/26/2024    AST 21 11/26/2024    ALT 25 11/26/2024    ALKPHOS 68 11/26/2024    EGFR 84 11/26/2024         No results found for: \"SPEP\", \"UPEP\"    No results found for: \"PSA\"    No results found for: \"CEA\"    No results found for: \"\"    No results found for: \"AFP\"    No results found for: \"IRON\", \"TIBC\", \"FERRITIN\"    Lab Results   Component Value Date    [id number] 05/15/2018         ROS: Review of Systems   Constitutional:  Positive for fatigue. Negative for activity change, appetite change, fever and unexpected weight change.   HENT:  Negative for mouth sores, nosebleeds and sore throat.    Eyes:  Negative for redness and visual disturbance.   Respiratory:  Negative for cough, chest tightness, shortness of breath and wheezing.    Cardiovascular:  Negative for chest pain, palpitations and leg swelling.   Gastrointestinal:  Negative for abdominal pain, blood in stool, constipation, diarrhea, nausea and vomiting.   Genitourinary:  Negative for dysuria, flank pain and hematuria.   Musculoskeletal:  Positive for arthralgias. Negative for back pain, gait problem and myalgias.   Skin:  Positive for rash. Negative for pallor and wound.   Neurological:  Negative for speech difficulty, weakness, numbness and headaches.   Hematological:  Negative for adenopathy. Does not bruise/bleed easily.   Psychiatric/Behavioral:  Negative for behavioral problems and confusion.          Current Medications: Reviewed  Allergies: Reviewed  PMH/FH/SH:  Reviewed      Physical Exam:    Body surface area is 1.77 meters squared.    Wt Readings from Last 3 Encounters:   11/26/24 73.5 kg (162 lb)   11/26/24 73.7 kg (162 lb 8 oz)   11/21/24 73.5 kg (162 lb)      "   Temp Readings from Last 3 Encounters:   11/26/24 97.9 °F (36.6 °C)   11/26/24 97.6 °F (36.4 °C) (Temporal)   11/21/24 98 °F (36.7 °C) (Oral)        BP Readings from Last 3 Encounters:   11/26/24 110/60   11/26/24 116/80   11/21/24 130/82         Pulse Readings from Last 3 Encounters:   11/26/24 100   11/26/24 90   11/21/24 (!) 112         Physical Exam  Vitals and nursing note reviewed.   Constitutional:       General: She is not in acute distress.     Appearance: Normal appearance.   HENT:      Head: Normocephalic and atraumatic.      Mouth/Throat:      Mouth: Mucous membranes are moist.      Pharynx: Oropharynx is clear.   Eyes:      General: No scleral icterus.     Extraocular Movements: Extraocular movements intact.      Conjunctiva/sclera: Conjunctivae normal.   Cardiovascular:      Rate and Rhythm: Normal rate and regular rhythm.      Pulses: Normal pulses.      Heart sounds: No murmur heard.  Pulmonary:      Effort: Pulmonary effort is normal.      Breath sounds: Normal breath sounds. No wheezing, rhonchi or rales.   Abdominal:      General: Bowel sounds are normal.      Palpations: Abdomen is soft.      Tenderness: There is no abdominal tenderness.   Musculoskeletal:         General: No swelling or tenderness. Normal range of motion.      Cervical back: Neck supple.   Lymphadenopathy:      Cervical: No cervical adenopathy.   Skin:     General: Skin is warm and dry.      Coloration: Skin is not pale.      Findings: Rash (psoriatic) present. No bruising or erythema.   Neurological:      General: No focal deficit present.      Mental Status: She is alert and oriented to person, place, and time.      Motor: No weakness.   Psychiatric:         Mood and Affect: Mood normal.         Behavior: Behavior normal.           Goals and Barriers:  Current Goal: Prolong Survival from Cancer.   Barriers: None.      Patient's Capacity to Self Care:  Patient is able to self care.    Disclaimer: This document was prepared  using [employer] technology. If a word or phrase is confusing, or does not make sense, this is likely due to recognition error which was not discovered during this clinician's review. If you believe an error has occurred, please contact me through [facility] service line for [email or web address]?cation.      Follow Up    All questions were answered to the patient's satisfaction during this encounter. The patient knows the contact information for our office and knows to reach out for any relevant concerns related to this encounter. They are to call for any temperature 100.4 or higher, new symptoms including but not restricted to shaking chills, decreased appetite, nausea, vomiting, diarrhea, increased fatigue, shortness of breath or chest pain, confusion, and not feeling the strength to come to the clinic. For all other listed problems and medical diagnosis in their chart - they are managed by PCP and/or other specialists, which the patient acknowledges.     I spent 51 minutes reviewing the records (labs, clinician notes, outside records, medical history, ordering medicine/tests/procedures, monitoring of anti-neoplastic toxicities, interpreting the imaging/labs previously done) and coordination of care as well as direct time with the patient today, of which greater than 50% of the time was spent in counseling and coordination of care with the patient/family.

## 2024-11-26 NOTE — PROGRESS NOTES
Saskia Alfaro 1955 is a 69 y.o. female with a history of with a history of stage IIIB NSCLC status post neoadjuvant chemotherapy, but was not a surgical candidate post treatment, who completed a course of definitive radiation on 3/1/24. She was last seen 10/21/24 and presents today for follow up.       10/22/24 Dr. Thrasher  1 right lung adenocarcinoma T4 N2 M0 TBS score 98 K-minna G12 C+ mutation  2 resolved allergic reaction to Taxol  3 pseudocyst of pancreas stable on most recent scans  4 new lung nodules on her CAT scan possible metastatic disease  5 psoriasis affecting arms hands upper thighs back increased and remains off Imfinzi  Plan: The scans were reviewed she does have what looks like new no lung nodules which could be metastatic.  Discussion was elected to repeat those in 1 month to see if the scan showing worsening disease.  In the meantime we will look into getting her sotorasib.  She will get a CBC and CMP every 3 weeks follow-up in 5 weeks.  She will seek dermatologic follow-up for her psoriasis which needs to be better treated at this time.  If it can be under fair control she could be considered again for immunosuppressive therapy in the future.  She would do sotorasib at 960 mg daily with CBC and CMP at the start and every 3 weeks.      11/14/24 MRI brain w wo contrast  1. No acute process or enhancing lesion seen.  2. Mild chronic microvascular ischemia.      11/19/24 CT chest w contrast  Worsening right lung parenchymal changes involving most of the right lung with some sparing in the right lung base.  Known right apical mass mostly unchanged.  New/resolving nodules in the left lung may be infectious or inflammatory. Continued monitoring on follow-up imaging.      11/21/24 Dr. Rodríguez  Given the recent chills and the cough with yellow productive sputum no hemoptysis and the fatigue and the CT of the chest with groundglass changes in the right upper lobe infectious versus inflammatory  She was sent  in Levaquin yesterday with radiation oncologist she states she is yet to pick it up discussed to complete the course of Levaquin  Repeat CT of the chest in 4 weeks for follow-up of the PARENCHYMAL groundglass changes   reviewed the CT of the chest report and images with the patient with right apical pleural-based mass has been unchanged and those diffuse parenchymal groundglass changes and interstitial prominence worsening from the prior CAT scan done in 2024  Multiple other small lung nodules in reference to likely metastasis        Upcomin24 Dr. Orellana  24 Infusion    Follow up visit     Oncology History Overview Note   with a history of with a history of stage IIIB NSCLC status post neoadjuvant chemotherapy, but was not a surgical candidate post treatment, who completed a course of definitive radiation on 3/1/24. She was last seen 10/21/24 and presents today for follow up.       10/22/24 Dr. Thrasher  1 right lung adenocarcinoma T4 N2 M0 TBS score 98 K-minna G12 C+ mutation  2 resolved allergic reaction to Taxol  3 pseudocyst of pancreas stable on most recent scans  4 new lung nodules on her CAT scan possible metastatic disease  5 psoriasis affecting arms hands upper thighs back increased and remains off Imfinzi  Plan: The scans were reviewed she does have what looks like new no lung nodules which could be metastatic.  Discussion was elected to repeat those in 1 month to see if the scan showing worsening disease.  In the meantime we will look into getting her sotorasib.  She will get a CBC and CMP every 3 weeks follow-up in 5 weeks.  She will seek dermatologic follow-up for her psoriasis which needs to be better treated at this time.  If it can be under fair control she could be considered again for immunosuppressive therapy in the future.  She would do sotorasib at 960 mg daily with CBC and CMP at the start and every 3 weeks.      24 MRI brain w wo contrast  1. No acute process or  enhancing lesion seen.  2. Mild chronic microvascular ischemia.      24 CT chest w contrast  Worsening right lung parenchymal changes involving most of the right lung with some sparing in the right lung base.  Known right apical mass mostly unchanged.  New/resolving nodules in the left lung may be infectious or inflammatory. Continued monitoring on follow-up imaging.      24 Dr. Rodríguez  Given the recent chills and the cough with yellow productive sputum no hemoptysis and the fatigue and the CT of the chest with groundglass changes in the right upper lobe infectious versus inflammatory  She was sent in Levaquin yesterday with radiation oncologist she states she is yet to pick it up discussed to complete the course of Levaquin  Repeat CT of the chest in 4 weeks for follow-up of the PARENCHYMAL groundglass changes   reviewed the CT of the chest report and images with the patient with right apical pleural-based mass has been unchanged and those diffuse parenchymal groundglass changes and interstitial prominence worsening from the prior CAT scan done in 2024  Multiple other small lung nodules in reference to likely metastasis        Upcomin24 Dr. Orellana  24 Infusion     Adenocarcinoma of right lung (HCC)   2023 Initial Diagnosis    Adenocarcinoma of right lung (HCC)     2023 Biopsy    Navigational bronchoscopy with EBUS    A.  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     A.-B.  Lung, Right Middle Lobe Bronchial Brushing (Thin-prep and smear preparations):   Conclusive evidence of malignancy.  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     Satisfactory for evaluation.     C.-D.  Lung, Right Middle Lobe Bronchoalveolar Lavage (Thin-prep and cell block preparations):   Conclusive evidence of malignancy.  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     Satisfactory for  evaluation.      E.-F.  Lymph Node, level 7 (Thin-prep and smear preparations):   Atypical cellular changes seen.  Rare atypical cells.  Lymphocytes.  Few bronchial cells and pulmonary macrophages.        Satisfactory for evaluation.     G.-H.  Lymph Node, 10R (Thin-prep and smear preparations):   Negative for malignancy.  Lymphocytes.  Aggregates of neutrophils.     Satisfactory for evaluation.     7/24/2023 -  Cancer Staged    Staging form: Lung, AJCC 8th Edition  - Clinical stage from 7/24/2023: Stage IIIB (cT4, cN2, cM0) - Signed by Celina Treviño MD on 12/26/2023  Stage prefix: Initial diagnosis       8/31/2023 - 10/12/2023 Chemotherapy    cyanocobalamin, 1,000 mcg, Intramuscular, Once, 1 of 1 cycle  Administration: 1,000 mcg (8/24/2023)  alteplase (CATHFLO), 2 mg, Intracatheter, Every 1 Minute as needed, 3 of 3 cycles  CISplatin (PLATINOL) with mannitol IVPB, 75 mg/m2 = 129.8 mg (100 % of original dose 75 mg/m2), Intravenous, Once, 3 of 3 cycles  Dose modification: 50 mg/m2 (original dose 75 mg/m2, Cycle 1, Reason: Anticipated Tolerance), 75 mg/m2 (original dose 75 mg/m2, Cycle 1, Reason: Dose modified as per discussion with consulting physician)  Administration: 129.8 mg (8/31/2023), 129.8 mg (9/21/2023), 129.8 mg (10/12/2023)  fosaprepitant (EMEND) IVPB, 150 mg, Intravenous, Once, 3 of 3 cycles  Administration: 150 mg (8/31/2023), 150 mg (9/21/2023), 150 mg (10/12/2023)  nivolumab (OPDIVO) IVPB, 360 mg, Intravenous, Once, 3 of 3 cycles  Administration: 360 mg (8/31/2023), 360 mg (9/21/2023), 360 mg (10/12/2023)  pemetrexed (ALIMTA) chemo infusion, 500 mg/m2 = 865 mg, Intravenous, Once, 3 of 3 cycles  Administration: 865 mg (8/31/2023), 865 mg (9/21/2023), 865 mg (10/12/2023)     1/18/2024 - 3/1/2024 Radiation    Treatments:  Course: C1  Plan ID Energy Fractions Dose per Fraction (cGy) Dose Correction (cGy) Total Dose Delivered (cGy) Elapsed Days   R LUNG MED 6X 30 / 30 200 0 6,000 43    Treatment Dates:   1/18/2024 - 3/1/2024.      1/19/2024 - 3/1/2024 Chemotherapy    alteplase (CATHFLO), 2 mg, Intracatheter, Every 1 Minute as needed, 6 of 6 cycles  CARBOplatin (PARAPLATIN) IVPB (GOG AUC DOSING), 185 mg, Intravenous, Once, 6 of 6 cycles  Administration: 185 mg (1/19/2024), 183.2 mg (1/26/2024), 183.2 mg (2/2/2024), 183.2 mg (2/9/2024), 183.2 mg (2/16/2024), 196.6 mg (3/1/2024)  PACLItaxel (TAXOL) chemo IVPB, 50 mg/m2 = 85.8 mg, Intravenous, Once, 1 of 1 cycle  Administration: 85.8 mg (1/19/2024)  pemetrexed (ALIMTA) chemo infusion, 500 mg/m2 = 860 mg (100 % of original dose 500 mg/m2), Intravenous, Once, 2 of 2 cycles  Dose modification: 500 mg/m2 (original dose 500 mg/m2, Cycle 4)  Administration: 860 mg (2/9/2024), 860 mg (3/1/2024)     4/2/2024 - 6/25/2024 Chemotherapy    alteplase (CATHFLO), 2 mg, Intracatheter, Every 1 Minute as needed, 5 of 6 cycles  durvalumab (IMFINZI) IVPB, 1,500 mg (original dose 10 mg/kg), Intravenous, Once, 5 of 6 cycles  Dose modification: 1,500 mg (original dose 10 mg/kg, Cycle 1, Reason: Other (Must fill in a comment), Comment: Will give every 28 days.)  Administration: 1,500 mg (4/2/2024), 1,500 mg (5/28/2024), 1,500 mg (6/25/2024), 1,500 mg (4/30/2024)         Review of Systems:  Review of Systems   Constitutional:  Positive for activity change and fatigue (exhausted with no energy).        Plan to start Lumakras now that Levoquin completed. Reports feeling better but not 100%   HENT:  Positive for congestion, rhinorrhea and sore throat.    Respiratory:  Positive for cough (Using Mucinex) and shortness of breath.         Has felt sick past month.    Gastrointestinal: Negative.    Skin:  Positive for rash (psoriasis).   Neurological:  Positive for light-headedness (while sick, has resolved).   Hematological: Negative.    Psychiatric/Behavioral: Negative.         Clinical Trial: no    Pregnancy test needed:  no    Teaching    Health Maintenance   Topic Date Due    Hepatitis C Screening   Never done    Medicare Annual Wellness Visit (AWV)  Never done    Pneumococcal Vaccine: 65+ Years (1 of 2 - PCV) Never done    Depression Follow-up Plan  Never done    BMI: Followup Plan  Never done    Osteoporosis Screening  Never done    RSV Vaccine Age 60+ Years (1 - Risk 60-74 years 1-dose series) Never done    Zoster Vaccine (2 of 2) 12/04/2020    Fall Risk  Never done    Urinary Incontinence Screening  Never done    Breast Cancer Screening: Mammogram  10/21/2022    Influenza Vaccine (1) 09/01/2024    COVID-19 Vaccine (4 - 2024-25 season) 09/01/2024    Colorectal Cancer Screening  08/10/2025    Depression Screening  10/21/2025    BMI: Adult  11/21/2025    RSV Vaccine age 0-20 Months  Aged Out    HIB Vaccine  Aged Out    IPV Vaccine  Aged Out    Hepatitis A Vaccine  Aged Out    Meningococcal ACWY Vaccine  Aged Out    HPV Vaccine  Aged Out    Lung Cancer Screening  Discontinued     Patient Active Problem List   Diagnosis    Pulmonary emphysema, unspecified emphysema type (HCC)    Adenocarcinoma of right lung (HCC)    Mediastinal adenopathy    Port-A-Cath in place    Hypomagnesemia    Pancreatic pseudocyst/cyst    Allergic reaction caused by a drug    High risk medication use    Dilation of pancreatic duct    Pancreatic duct stones    Helicobacter positive gastritis    Psoriasis    Multiple lung nodules on CT     Past Medical History:   Diagnosis Date    Fibromyalgia     Heart murmur     Lumbosacral disc herniation     Lung cancer (HCC)     Osteopenia     Psoriasis     Psoriasis     RSD (reflex sympathetic dystrophy)      Past Surgical History:   Procedure Laterality Date    IR PORT PLACEMENT  08/04/2023    LUNG BIOPSY      x2    PARTIAL HYSTERECTOMY       Family History   Problem Relation Age of Onset    Sarcoidosis Mother     Cancer Sister      Social History     Socioeconomic History    Marital status:      Spouse name: Not on file    Number of children: Not on file    Years of education: Not on file     Highest education level: Not on file   Occupational History    Not on file   Tobacco Use    Smoking status: Every Day     Current packs/day: 0.50     Average packs/day: 0.5 packs/day for 48.9 years (24.5 ttl pk-yrs)     Types: Cigarettes     Start date: 1976    Smokeless tobacco: Never   Vaping Use    Vaping status: Never Used   Substance and Sexual Activity    Alcohol use: Yes     Alcohol/week: 14.0 standard drinks of alcohol     Types: 14 Glasses of wine per week     Comment: 2 drinks daily    Drug use: No    Sexual activity: Not on file   Other Topics Concern    Not on file   Social History Narrative    Not on file     Social Drivers of Health     Financial Resource Strain: Not on file   Food Insecurity: Not on file   Transportation Needs: Not on file   Physical Activity: Not on file   Stress: Not on file   Social Connections: Not on file   Intimate Partner Violence: Not on file   Housing Stability: Not on file       Current Outpatient Medications:     amitriptyline (ELAVIL) 25 mg tablet, , Disp: , Rfl:     Ascorbic Acid (vitamin C) 100 MG tablet, Take 100 mg by mouth daily, Disp: , Rfl:     atorvastatin (LIPITOR) 20 mg tablet, Take 20 mg by mouth daily, Disp: , Rfl:     calcipotriene (DOVONEX) 0.005 % cream, Apply topically 2 (two) times a day To affected areas on Saturday and Sunday only, Disp: 120 g, Rfl: 2    cholecalciferol (VITAMIN D3) 400 units tablet, Take 400 Units by mouth daily, Disp: , Rfl:     DULoxetine (CYMBALTA) 30 mg delayed release capsule, , Disp: , Rfl:     Fluticasone-Salmeterol (Advair) 100-50 mcg/dose inhaler, Inhale 1 puff 2 (two) times a day Rinse mouth after use., Disp: , Rfl:     folic acid (KP Folic Acid) 1 mg tablet, Take 1 tablet (1 mg total) by mouth daily, Disp: 90 tablet, Rfl: 2    halobetasol (ULTRAVATE) 0.05 % ointment, Apply topically 2 (two) times a day, Disp: 50 g, Rfl: 3    ondansetron (ZOFRAN) 4 mg tablet, Take 2 tablets (8 mg total) by mouth every 8 (eight) hours as  needed for nausea or vomiting, Disp: 20 tablet, Rfl: 0    propranolol (INDERAL) 20 mg/5 mL solution, , Disp: , Rfl:     Sotorasib (Lumakras) 320 MG TABS, Take 960 mg by mouth daily, Disp: 90 tablet, Rfl: 5    triamcinolone (KENALOG) 0.1 % cream, Apply topically 2 (two) times a day Affected areas Monday through Friday only, Disp: 453.6 g, Rfl: 2    amoxicillin (AMOXIL) 500 mg capsule, Prn dental procedure (Patient not taking: Reported on 10/21/2024), Disp: , Rfl:     b complex vitamins capsule, Take 1 capsule by mouth daily (Patient not taking: Reported on 10/21/2024), Disp: , Rfl:     bismuth subsalicylate (PEPTO BISMOL) 262 MG chewable tablet, Take 1 tablet by mouth every 6 hours for 14 days (Patient not taking: Reported on 5/30/2024), Disp: 56 tablet, Rfl: 0    loratadine (CLARITIN) 10 mg tablet, Take 10 mg by mouth daily (Patient not taking: Reported on 7/29/2024), Disp: , Rfl:     omeprazole (PriLOSEC) 20 mg delayed release capsule, Take 1 capsule (20 mg total) by mouth 2 (two) times a day Every 12 hours for 14 days (Patient not taking: Reported on 12/26/2023), Disp: 28 capsule, Rfl: 0  Allergies   Allergen Reactions    Prednisone Palpitations     Vitals:    11/26/24 1329   BP: 110/60   Pulse: 100   Resp: 14   Temp: 97.9 °F (36.6 °C)   SpO2: 95%   Weight: 73.5 kg (162 lb)      Pain Score: 0-No pain

## 2024-11-29 ENCOUNTER — TELEPHONE (OUTPATIENT)
Dept: SURGICAL ONCOLOGY | Facility: CLINIC | Age: 69
End: 2024-11-29

## 2024-11-29 NOTE — TELEPHONE ENCOUNTER
Right Fax received 11/27/24 [employer] missing insurance information.  Resent application with correct box checked.

## 2024-12-01 NOTE — PROGRESS NOTES
Follow-up - Radiation Oncology   Saskia Alfaro 1955 69 y.o. female [id number]      History of Present Illness   Cancer Staging   Adenocarcinoma of right lung (HCC)  Staging form: Lung, AJCC 8th Edition  - Clinical stage from 7/24/2023: Stage IIIB (cT4, cN2, cM0) - Signed by Celina Treviño MD on 12/26/2023  Stage prefix: Initial diagnosis      Saskia Alfaro is a 69 y.o. woman with a history of with a history of stage IIIB NSCLC status post neoadjuvant chemotherapy, but was not a surgical candidate post treatment, who completed a course of definitive radiation on 3/1/24. She was last seen 10/21/24 and presents today for follow up.      10/22/24 Dr. Thrasher  1 right lung adenocarcinoma T4 N2 M0 TBS score 98 K-minna G12 C+ mutation  2 resolved allergic reaction to Taxol  3 pseudocyst of pancreas stable on most recent scans  4 new lung nodules on her CAT scan possible metastatic disease  5 psoriasis affecting arms hands upper thighs back increased and remains off Imfinzi  Plan: The scans were reviewed she does have what looks like new no lung nodules which could be metastatic.  Discussion was elected to repeat those in 1 month to see if the scan showing worsening disease.  In the meantime we will look into getting her sotorasib.  She will get a CBC and CMP every 3 weeks follow-up in 5 weeks.  She will seek dermatologic follow-up for her psoriasis which needs to be better treated at this time.  If it can be under fair control she could be considered again for immunosuppressive therapy in the future.  She would do sotorasib at 960 mg daily with CBC and CMP at the start and every 3 weeks.     11/14/24 MRI brain w wo contrast  1. No acute process or enhancing lesion seen.  2. Mild chronic microvascular ischemia.     11/19/24 CT chest w contrast  Worsening right lung parenchymal changes involving most of the right lung with some sparing in the right lung base.  Known right apical mass mostly unchanged.  New/resolving nodules in the  left lung may be infectious or inflammatory. Continued monitoring on follow-up imaging.     24 Dr. Rodríguez  Given the recent chills and the cough with yellow productive sputum no hemoptysis and the fatigue and the CT of the chest with groundglass changes in the right upper lobe infectious versus inflammatory  She was sent in Levaquin yesterday with radiation oncologist she states she is yet to pick it up discussed to complete the course of Levaquin  Repeat CT of the chest in 4 weeks for follow-up of the PARENCHYMAL groundglass changes   reviewed the CT of the chest report and images with the patient with right apical pleural-based mass has been unchanged and those diffuse parenchymal groundglass changes and interstitial prominence worsening from the prior CAT scan done in 2024  Multiple other small lung nodules in reference to likely metastasis     The patient returns for follow-up today after phone call follow-up during which we reviewed chest CT imaging combined with URI symptoms.  She was prescribed Levaquin and referred back to Pulmonary for formal evaluation.  She returns today for follow-up to ensure she is improving.    The patient continues with congestion and fatigue.  Cough continues, but improved and controlled with Mucinex.  Sputum is clearing.  She denies hemoptysis.  She denies shortness of breath at rest, but has dyspnea on exertion.  This also has improved, but did not resolve.  She denies fevers and prior chills have largely resolved.  She has not quit smoking, but cut back.     Upcomin24 Dr. Orellana  24 Infusion        Historical Information   Oncology History   Adenocarcinoma of right lung (HCC)   2023 Initial Diagnosis    Adenocarcinoma of right lung (HCC)     2023 Biopsy    Navigational bronchoscopy with EBUS    A.  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     A.-B.  Lung, Right Middle Lobe  Bronchial Brushing (Thin-prep and smear preparations):   Conclusive evidence of malignancy.  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     Satisfactory for evaluation.     C.-D.  Lung, Right Middle Lobe Bronchoalveolar Lavage (Thin-prep and cell block preparations):   Conclusive evidence of malignancy.  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     Satisfactory for evaluation.      E.-F.  Lymph Node, level 7 (Thin-prep and smear preparations):   Atypical cellular changes seen.  Rare atypical cells.  Lymphocytes.  Few bronchial cells and pulmonary macrophages.        Satisfactory for evaluation.     G.-H.  Lymph Node, 10R (Thin-prep and smear preparations):   Negative for malignancy.  Lymphocytes.  Aggregates of neutrophils.     Satisfactory for evaluation.     7/24/2023 -  Cancer Staged    Staging form: Lung, AJCC 8th Edition  - Clinical stage from 7/24/2023: Stage IIIB (cT4, cN2, cM0) - Signed by Celina Treviño MD on 12/26/2023  Stage prefix: Initial diagnosis       8/31/2023 - 10/12/2023 Chemotherapy    cyanocobalamin, 1,000 mcg, Intramuscular, Once, 1 of 1 cycle  Administration: 1,000 mcg (8/24/2023)  alteplase (CATHFLO), 2 mg, Intracatheter, Every 1 Minute as needed, 3 of 3 cycles  CISplatin (PLATINOL) with mannitol IVPB, 75 mg/m2 = 129.8 mg (100 % of original dose 75 mg/m2), Intravenous, Once, 3 of 3 cycles  Dose modification: 50 mg/m2 (original dose 75 mg/m2, Cycle 1, Reason: Anticipated Tolerance), 75 mg/m2 (original dose 75 mg/m2, Cycle 1, Reason: Dose modified as per discussion with consulting physician)  Administration: 129.8 mg (8/31/2023), 129.8 mg (9/21/2023), 129.8 mg (10/12/2023)  fosaprepitant (EMEND) IVPB, 150 mg, Intravenous, Once, 3 of 3 cycles  Administration: 150 mg (8/31/2023), 150 mg (9/21/2023), 150 mg (10/12/2023)  nivolumab (OPDIVO) IVPB, 360 mg, Intravenous, Once, 3 of 3 cycles  Administration: 360 mg (8/31/2023), 360 mg (9/21/2023), 360 mg  (10/12/2023)  pemetrexed (ALIMTA) chemo infusion, 500 mg/m2 = 865 mg, Intravenous, Once, 3 of 3 cycles  Administration: 865 mg (8/31/2023), 865 mg (9/21/2023), 865 mg (10/12/2023)     1/18/2024 - 3/1/2024 Radiation    Treatments:  Course: C1  Plan ID Energy Fractions Dose per Fraction (cGy) Dose Correction (cGy) Total Dose Delivered (cGy) Elapsed Days   R LUNG MED 6X 30 / 30 200 0 6,000 43    Treatment Dates:  1/18/2024 - 3/1/2024.      1/19/2024 - 3/1/2024 Chemotherapy    alteplase (CATHFLO), 2 mg, Intracatheter, Every 1 Minute as needed, 6 of 6 cycles  CARBOplatin (PARAPLATIN) IVPB (GOG AUC DOSING), 185 mg, Intravenous, Once, 6 of 6 cycles  Administration: 185 mg (1/19/2024), 183.2 mg (1/26/2024), 183.2 mg (2/2/2024), 183.2 mg (2/9/2024), 183.2 mg (2/16/2024), 196.6 mg (3/1/2024)  PACLItaxel (TAXOL) chemo IVPB, 50 mg/m2 = 85.8 mg, Intravenous, Once, 1 of 1 cycle  Administration: 85.8 mg (1/19/2024)  pemetrexed (ALIMTA) chemo infusion, 500 mg/m2 = 860 mg (100 % of original dose 500 mg/m2), Intravenous, Once, 2 of 2 cycles  Dose modification: 500 mg/m2 (original dose 500 mg/m2, Cycle 4)  Administration: 860 mg (2/9/2024), 860 mg (3/1/2024)     4/2/2024 - 6/25/2024 Chemotherapy    alteplase (CATHFLO), 2 mg, Intracatheter, Every 1 Minute as needed, 5 of 6 cycles  durvalumab (IMFINZI) IVPB, 1,500 mg (original dose 10 mg/kg), Intravenous, Once, 5 of 6 cycles  Dose modification: 1,500 mg (original dose 10 mg/kg, Cycle 1, Reason: Other (Must fill in a comment), Comment: Will give every 28 days.)  Administration: 1,500 mg (4/2/2024), 1,500 mg (5/28/2024), 1,500 mg (6/25/2024), 1,500 mg (4/30/2024)         Past Medical History:   Diagnosis Date    Fibromyalgia     Heart murmur     Lumbosacral disc herniation     Lung cancer (HCC)     Osteopenia     Psoriasis     Psoriasis     RSD (reflex sympathetic dystrophy)      Past Surgical History:   Procedure Laterality Date    IR PORT PLACEMENT  08/04/2023    LUNG BIOPSY      x2     PARTIAL HYSTERECTOMY         Social History   Social History     Substance and Sexual Activity   Alcohol Use Yes    Alcohol/week: 14.0 standard drinks of alcohol    Types: 14 Glasses of wine per week    Comment: 2 drinks daily     Social History     Substance and Sexual Activity   Drug Use No     Social History     Tobacco Use   Smoking Status Every Day    Current packs/day: 0.50    Average packs/day: 0.5 packs/day for 48.9 years (24.5 ttl pk-yrs)    Types: Cigarettes    Start date: 1976   Smokeless Tobacco Never         Meds/Allergies     Current Outpatient Medications:     amitriptyline (ELAVIL) 25 mg tablet, , Disp: , Rfl:     Ascorbic Acid (vitamin C) 100 MG tablet, Take 100 mg by mouth daily, Disp: , Rfl:     atorvastatin (LIPITOR) 20 mg tablet, Take 20 mg by mouth daily, Disp: , Rfl:     calcipotriene (DOVONEX) 0.005 % cream, Apply topically 2 (two) times a day To affected areas on Saturday and Sunday only, Disp: 120 g, Rfl: 2    cholecalciferol (VITAMIN D3) 400 units tablet, Take 400 Units by mouth daily, Disp: , Rfl:     DULoxetine (CYMBALTA) 30 mg delayed release capsule, , Disp: , Rfl:     Fluticasone-Salmeterol (Advair) 100-50 mcg/dose inhaler, Inhale 1 puff 2 (two) times a day Rinse mouth after use., Disp: , Rfl:     folic acid (KP Folic Acid) 1 mg tablet, Take 1 tablet (1 mg total) by mouth daily, Disp: 90 tablet, Rfl: 2    halobetasol (ULTRAVATE) 0.05 % ointment, Apply topically 2 (two) times a day, Disp: 50 g, Rfl: 3    ondansetron (ZOFRAN) 4 mg tablet, Take 2 tablets (8 mg total) by mouth every 8 (eight) hours as needed for nausea or vomiting, Disp: 20 tablet, Rfl: 0    propranolol (INDERAL) 20 mg/5 mL solution, , Disp: , Rfl:     Sotorasib (Lumakras) 320 MG TABS, Take 960 mg by mouth daily, Disp: 90 tablet, Rfl: 5    triamcinolone (KENALOG) 0.1 % cream, Apply topically 2 (two) times a day Affected areas Monday through Friday only, Disp: 453.6 g, Rfl: 2    amoxicillin (AMOXIL) 500 mg capsule, Prn  dental procedure (Patient not taking: Reported on 10/21/2024), Disp: , Rfl:     b complex vitamins capsule, Take 1 capsule by mouth daily (Patient not taking: Reported on 10/21/2024), Disp: , Rfl:     bismuth subsalicylate (PEPTO BISMOL) 262 MG chewable tablet, Take 1 tablet by mouth every 6 hours for 14 days (Patient not taking: Reported on 5/30/2024), Disp: 56 tablet, Rfl: 0    loratadine (CLARITIN) 10 mg tablet, Take 10 mg by mouth daily (Patient not taking: Reported on 7/29/2024), Disp: , Rfl:     omeprazole (PriLOSEC) 20 mg delayed release capsule, Take 1 capsule (20 mg total) by mouth 2 (two) times a day Every 12 hours for 14 days (Patient not taking: Reported on 12/26/2023), Disp: 28 capsule, Rfl: 0  Allergies   Allergen Reactions    Prednisone Palpitations         Review of Systems   Constitutional:  Positive for activity change and fatigue (exhausted with no energy).        Plan to start Lumakras now that Levoquin completed. Reports feeling better but not 100%   HENT:  Positive for congestion, rhinorrhea and sore throat.    Respiratory:  Positive for cough (Using Mucinex) and shortness of breath.         Has felt sick past month.    Gastrointestinal: Negative.    Skin:  Positive for rash (psoriasis).   Neurological:  Positive for light-headedness (while sick, has resolved).   Hematological: Negative.    Psychiatric/Behavioral: Negative.            OBJECTIVE:   /60   Pulse 100   Temp 97.9 °F (36.6 °C)   Resp 14   Wt 73.5 kg (162 lb)   SpO2 95%   BMI 28.70 kg/m²   Karnofsky: 80 - Normal activity with effort; some signs or symptoms of disease    Physical Exam  Vitals and nursing note reviewed.   Constitutional:       General: She is not in acute distress.     Appearance: She is well-developed.   Cardiovascular:      Rate and Rhythm: Normal rate.   Pulmonary:      Breath sounds: No wheezing, rhonchi or rales.   Lymphadenopathy:      Upper Body:      Right upper body: No supraclavicular adenopathy.       Left upper body: No supraclavicular adenopathy.   Neurological:      Mental Status: She is alert and oriented to person, place, and time.            RESULTS  Imaging Studies:CT chest w contrast  Result Date: 11/20/2024  Narrative: CT CHEST WITH IV CONTRAST INDICATION: C34.91: Malignant neoplasm of unspecified part of right bronchus or lung R91.8: Other nonspecific abnormal finding of lung field. COMPARISON: Compared with 10/7/2024 and CT abdomen of 10/7/2024 TECHNIQUE: CT examination of the chest was performed. Multiplanar 2D reformatted images were created from the source data. This examination, like all CT scans performed in the [facility], was performed utilizing techniques to minimize radiation dose exposure, including the use of iterative reconstruction and automated exposure control. Radiation dose length product (DLP) for this visit: 259 mGy-cm IV Contrast: 100 mL of iohexol (OMNIPAQUE) FINDINGS: LUNGS: Decreased right lung volume. Right apical pleural-based mass unchanged measuring 3.0 x 2.7 cm given differences of technique. Adjacent patchy Panacryl infiltrate unchanged. Worsening diffuse parenchymal groundglass changes with interstitial prominence involving the right upper lobe, middle lobe and lower lobe compared to previous study. Sparing of the posterior right lower lobe. Left apical mediastinal pleural-based nodule seen previously is smaller in series 3 image 86. Left apical 2.7 mm nodule in series 3 image 36 unchanged New 2.6 mm nodule in the left upper lobe image 64. More inferior nodule seen previously is resolving. 3.6 mm nodule anterior to the major fissure in series 3 image 91 is larger New 4.7 mm nodule in the lingula on image 100 Subtle scattered patchy groundglass changes in the left lower lung in image 107. PLEURA: Biapical pleural thickening seen again. HEART/GREAT VESSELS: Heart is unremarkable for patient's age. No thoracic aortic aneurysm. No pulmonary embolus.  MEDIASTINUM AND ARDEN: Multiple subcentimeter AP window, right paratracheal and subcarinal lymph nodes are unchanged. CHEST WALL AND LOWER NECK: Right chest port catheter in the SVC. VISUALIZED STRUCTURES IN THE UPPER ABDOMEN: Identified is a pancreatic cystic mass in the head with pancreatic ductal dilatation similar to prior study. Focal inferior calcification in the pancreatic head unchanged. Fatty infiltration of the liver. OSSEOUS STRUCTURES: No acute fracture or destructive osseous lesion.     Impression: Worsening right lung parenchymal changes involving most of the right lung with some sparing in the right lung base. Known right apical mass mostly unchanged. New/resolving nodules in the left lung may be infectious or inflammatory. Continued monitoring on follow-up imaging. Workstation performed: [id number]     MRI brain w wo contrast  Result Date: 11/15/2024  Narrative: MRI BRAIN WITH AND WITHOUT CONTRAST INDICATION: C34.2: Malignant neoplasm of middle lobe, bronchus or lung.   Headache and dizziness COMPARISON: July 26, 2023 TECHNIQUE: Multiplanar, multisequence imaging of the brain was performed before and after gadolinium administration. IV Contrast:  7 mL of Gadobutrol injection (SINGLE-DOSE) IMAGE QUALITY:   Diagnostic. FINDINGS: BRAIN PARENCHYMA:  There is no discrete mass, mass effect or midline shift. There is no intracranial hemorrhage.  Normal posterior fossa.  Diffusion imaging is unremarkable. Small scattered hyperintensities on T2/FLAIR imaging are noted in the periventricular and subcortical white matter demonstrating an appearance that is statistically most likely to represent mild microangiopathic change. Postcontrast imaging of the brain demonstrates no abnormal enhancement. VENTRICLES:  Normal for the patient's age. SELLA AND PITUITARY GLAND:  Normal. ORBITS:  Normal. PARANASAL SINUSES:  Normal. VASCULATURE:  Evaluation of the major intracranial vasculature demonstrates appropriate flow  "voids. CALVARIUM AND SKULL BASE:  Normal. EXTRACRANIAL SOFT TISSUES:  Normal.     Impression: 1. No acute process or enhancing lesion seen. 2. Mild chronic microvascular ischemia. Workstation performed: [id number]           Assessment/Plan:  Saskia Alfaro is a 69 y.o. woman with a history of stage IIIB NSCLC status post neoadjuvant chemothearpy followed by chemoradiation completed 3/1/24.  She was placed on immunotherapy, but with severe psoriasis outbreak and medication discontinued.      Brain MRI to evaluate headaches and episodes of dizziness was benign.  These symptoms have improved spontaneously, but not resolved completely.  We discussed referral to Neurology if symptoms continue or worsen.  Her headaches may also be related partially to significant social stressors and recent illness.    Chest CT imaging is concerning for progression of disease, scarring from treatment, and infection.  Some of her symptoms have improved with antibiotic therapy, but have not returned to baseline.      She has been given inhalers and will follow-up with pulmonary for optimization of remaining lung function.  Chest CT planned in 1 month by Pulmonary with evaluation after.  I agree with this plan.     Medical Oncology follow-up is upcoming.  She will likely require consideration of restarting systemic therapy at some point.  Smoking cessation counseled.    Follow-up in 1 month after next chest CT.    Celina Treviño MD  1126/2024; 4:06PM    Portions of the record may have been created with voice recognition software.  Occasional wrong word or \"sound a like\" substitutions may have occurred due to the inherent limitations of voice recognition software.  Read the chart carefully and recognize, using context, where substitutions have occurred.        "

## 2024-12-03 ENCOUNTER — TELEPHONE (OUTPATIENT)
Age: 69
End: 2024-12-03

## 2024-12-03 ENCOUNTER — PATIENT OUTREACH (OUTPATIENT)
Dept: CASE MANAGEMENT | Facility: HOSPITAL | Age: 69
End: 2024-12-03

## 2024-12-03 NOTE — TELEPHONE ENCOUNTER
Call from Saskia, stating she was updating her medications on MyChart and had a question about the sotorasib ordered by Dr. Orellana. She states that she received 120 mg tablets and is taking 8/day to make the dose of the 960 mg that was ordered. She was afraid she didn't have enough for 30 days. Explained that if she is taking 8 tablets/day, she should have received 240 tablets to last her the 30 days. She looked on the bottle and did receive 240 tablets. She just wanted to make sure she was taking them correctly since the pill dosage that was ordered was different that she received. Explained that as long as she was taking 960 mg daily, she was getting the correct dose, albeit, she needs to take more pills. She had no other questions and was appreciative for the clarification.

## 2024-12-03 NOTE — PROGRESS NOTES
CHANA s/w pt by phone this morning to check in since our last conversation.  She tells me that her Thanksgiving was nice, her brother showed up unplanned but she was happy to have him there.  She spent much of her weekend helping him pick a secondary plan as he is newly eligible for Medicare.  She says that they did not talk much about her sister and needing more help with her, and noted that it's awkward to have those conversations because she's there in the house with them.  She says that her sister's anxiety has been increasing lately and she finds that even asking her to do one simple task, like quickly mop up an area of the floor, she needs to go lay down afterward.  She says that she does not feel that her sister would be capable of living alone in an apartment somewhere.  She does note that her brother has told her that if something happens to her, he will come up here and deal with their sister and get things taken care of.  She would still prefer a more solid plan but has not taken any further action on it at this point.    Pt has been working on her FA application still and shared her frustration with the process, saying that she feels like they are overanalyzing every since transaction that she has on her accounts.  She is still hopeful that it will be approved but says that in general, she is exhausted from the application process itself.    Pt has started her new medication and says that she started taking it at night, thinking that if she had any side effects or problems then she would sleep through much of it and that would keep her more functional throughout the day.  Unfortunately she's finding that it's keeping her awake most of the night, so she's slowly adjusting the time that she's taking it to hopefully start sleeping better again.  She was happy with her visit with Dr. Orellana, which was her first time meeting her.  She felt that she was friendly and listening to all of her questions and  concerns.    Pt shared some of her plans for the upcoming holidays, including getting some decorations put up at home and what she may buy as gifts for her family members.  She is hoping to see some out of town grandchildren as well in the near future.  She is otherwise doing well and was thankful for the time spent talking today.  She denies any other needs at this time.  She has my direct number and I will remain available to her as needed moving forward.

## 2024-12-04 ENCOUNTER — TELEPHONE (OUTPATIENT)
Dept: HEMATOLOGY ONCOLOGY | Facility: CLINIC | Age: 69
End: 2024-12-04

## 2024-12-04 ENCOUNTER — HOSPITAL ENCOUNTER (OUTPATIENT)
Dept: INFUSION CENTER | Facility: CLINIC | Age: 69
Discharge: HOME | End: 2024-12-04
Payer: COMMERCIAL

## 2024-12-04 DIAGNOSIS — Z95.828 PORT-A-CATH IN PLACE: Primary | ICD-10-CM

## 2024-12-04 DIAGNOSIS — Z79.899 HIGH RISK MEDICATION USE: ICD-10-CM

## 2024-12-04 DIAGNOSIS — C34.91 ADENOCARCINOMA OF RIGHT LUNG (HCC): ICD-10-CM

## 2024-12-04 DIAGNOSIS — C34.2 ADENOCARCINOMA OF MIDDLE LOBE OF RIGHT LUNG (HCC): ICD-10-CM

## 2024-12-04 LAB
ALBUMIN SERPL BCG-MCNC: 3.7 G/DL (ref 3.5–5)
ALP SERPL-CCNC: 64 U/L (ref 34–104)
ALT SERPL W P-5'-P-CCNC: 19 U/L (ref 7–52)
ANION GAP SERPL CALCULATED.3IONS-SCNC: 8 MMOL/L (ref 4–13)
AST SERPL W P-5'-P-CCNC: 21 U/L (ref 13–39)
BILIRUB SERPL-MCNC: 0.36 MG/DL (ref 0.2–1)
BUN SERPL-MCNC: 9 MG/DL (ref 5–25)
CALCIUM SERPL-MCNC: 9.2 MG/DL (ref 8.4–10.2)
CHLORIDE SERPL-SCNC: 100 MMOL/L (ref 96–108)
CO2 SERPL-SCNC: 28 MMOL/L (ref 21–32)
CREAT SERPL-MCNC: 0.7 MG/DL (ref 0.6–1.3)
GFR SERPL CREATININE-BSD FRML MDRD: 89 ML/MIN/1.73SQ M
GLUCOSE SERPL-MCNC: 89 MG/DL (ref 65–140)
POTASSIUM SERPL-SCNC: 4.3 MMOL/L (ref 3.5–5.3)
PROT SERPL-MCNC: 7.3 G/DL (ref 6.4–8.4)
SODIUM SERPL-SCNC: 136 MMOL/L (ref 135–147)

## 2024-12-04 PROCEDURE — 80053 COMPREHEN METABOLIC PANEL: CPT | Performed by: RADIOLOGY

## 2024-12-04 NOTE — PROGRESS NOTES
Pt presents for lab specimen collection via port a cath. Reached out to Roxana Reid RN, Pt is not to continue with Vitamin B12 IM injections. Port accessed, labs drawn, saline locked, and de accessed without difficulty. Declined AVS, next appointment on 12/17/24 at 12:30pm.

## 2024-12-11 ENCOUNTER — OFFICE VISIT (OUTPATIENT)
Dept: HEMATOLOGY ONCOLOGY | Facility: CLINIC | Age: 69
End: 2024-12-11
Payer: COMMERCIAL

## 2024-12-11 VITALS
OXYGEN SATURATION: 91 % | RESPIRATION RATE: 18 BRPM | DIASTOLIC BLOOD PRESSURE: 74 MMHG | HEART RATE: 131 BPM | TEMPERATURE: 97.2 F | BODY MASS INDEX: 28.62 KG/M2 | WEIGHT: 161.5 LBS | SYSTOLIC BLOOD PRESSURE: 120 MMHG | HEIGHT: 63 IN

## 2024-12-11 DIAGNOSIS — C34.91 ADENOCARCINOMA OF RIGHT LUNG (HCC): Primary | ICD-10-CM

## 2024-12-11 DIAGNOSIS — L40.9 PSORIASIS: ICD-10-CM

## 2024-12-11 DIAGNOSIS — F17.200 SMOKING: ICD-10-CM

## 2024-12-11 PROBLEM — IMO0001 SMOKING: Status: ACTIVE | Noted: 2024-12-11

## 2024-12-11 PROCEDURE — 99406 BEHAV CHNG SMOKING 3-10 MIN: CPT | Performed by: INTERNAL MEDICINE

## 2024-12-11 PROCEDURE — 99215 OFFICE O/P EST HI 40 MIN: CPT | Performed by: INTERNAL MEDICINE

## 2024-12-11 NOTE — ASSESSMENT & PLAN NOTE
Patient continues to smoke.  Counseled in detail regarding smoking cessation.  At least 5 minutes were spent counseling the patient.  Patient not interested at this time.

## 2024-12-11 NOTE — PROGRESS NOTES
Hematology/Oncology Outpatient Follow- up Note  Saskia Alfaro 69 y.o. female MRN: @ Encounter: [id number]        Date:  12/11/2024        Assessment / Plan:      1. Adenocarcinoma of right lung (HCC)  Assessment & Plan:  Continue sotorasib.  Continue to monitor labs.  CT chest scheduled for 12/16/2024.  RTC in 1 month or sooner as needed.  2. Psoriasis  Assessment & Plan:  Management as per dermatology.   3. Smoking  Assessment & Plan:  Patient continues to smoke.  Counseled in detail regarding smoking cessation.  At least 5 minutes were spent counseling the patient.  Patient not interested at this time.         HPI:    69-year-old female patient with history of stage IIIb lung cancer., PD-L1 TPS 98%, K-minna G 12C mutation. Patient received neoadjuvant chemotherapy however was not a surgical candidate. Neoadjuvant chemotherapy was followed by a course of definitive radiation which she completed on 3/1/2024. Patient was started on immunotherapy however her psoriasis flared up and has immunotherapy was discontinued.     Interval History:    Presents today for follow-up.  She reports fatigue and states she has been experiencing occasional headaches after taking sotorasib.  Headaches feel like migraine headaches.  Relieved with rest.  Also if she takes a sotorasib on an empty stomach she experiences an upset stomach.  Has been craving more for sweets/sugary stuff.  Rash is much better.  Continues to smoke.  States she gave up on drinking however lately she has started drinking wine again.      Cancer Staging:  Cancer Staging   Adenocarcinoma of right lung (HCC)  Staging form: Lung, AJCC 8th Edition  - Clinical stage from 7/24/2023: Stage IIIB (cT4, cN2, cM0) - Signed by Celina Treviño MD on 12/26/2023  Stage prefix: Initial diagnosis      Molecular Testing:     Previous Hematologic/ Oncologic History:    Oncology History   Adenocarcinoma of right lung (HCC)   6/23/2023 Initial Diagnosis    Adenocarcinoma of right lung  (HCC)     7/24/2023 Biopsy    Navigational bronchoscopy with EBUS    A.  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     A.-B.  Lung, Right Middle Lobe Bronchial Brushing (Thin-prep and smear preparations):   Conclusive evidence of malignancy.  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     Satisfactory for evaluation.     C.-D.  Lung, Right Middle Lobe Bronchoalveolar Lavage (Thin-prep and cell block preparations):   Conclusive evidence of malignancy.  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     Satisfactory for evaluation.      E.-F.  Lymph Node, level 7 (Thin-prep and smear preparations):   Atypical cellular changes seen.  Rare atypical cells.  Lymphocytes.  Few bronchial cells and pulmonary macrophages.        Satisfactory for evaluation.     G.-H.  Lymph Node, 10R (Thin-prep and smear preparations):   Negative for malignancy.  Lymphocytes.  Aggregates of neutrophils.     Satisfactory for evaluation.     7/24/2023 -  Cancer Staged    Staging form: Lung, AJCC 8th Edition  - Clinical stage from 7/24/2023: Stage IIIB (cT4, cN2, cM0) - Signed by Celina Treviño MD on 12/26/2023  Stage prefix: Initial diagnosis       8/31/2023 - 10/12/2023 Chemotherapy    cyanocobalamin, 1,000 mcg, Intramuscular, Once, 1 of 1 cycle  Administration: 1,000 mcg (8/24/2023)  alteplase (CATHFLO), 2 mg, Intracatheter, Every 1 Minute as needed, 3 of 3 cycles  CISplatin (PLATINOL) with mannitol IVPB, 75 mg/m2 = 129.8 mg (100 % of original dose 75 mg/m2), Intravenous, Once, 3 of 3 cycles  Dose modification: 50 mg/m2 (original dose 75 mg/m2, Cycle 1, Reason: Anticipated Tolerance), 75 mg/m2 (original dose 75 mg/m2, Cycle 1, Reason: Dose modified as per discussion with consulting physician)  Administration: 129.8 mg (8/31/2023), 129.8 mg (9/21/2023), 129.8 mg (10/12/2023)  fosaprepitant (EMEND) IVPB, 150 mg, Intravenous, Once, 3 of 3  cycles  Administration: 150 mg (8/31/2023), 150 mg (9/21/2023), 150 mg (10/12/2023)  nivolumab (OPDIVO) IVPB, 360 mg, Intravenous, Once, 3 of 3 cycles  Administration: 360 mg (8/31/2023), 360 mg (9/21/2023), 360 mg (10/12/2023)  pemetrexed (ALIMTA) chemo infusion, 500 mg/m2 = 865 mg, Intravenous, Once, 3 of 3 cycles  Administration: 865 mg (8/31/2023), 865 mg (9/21/2023), 865 mg (10/12/2023)     1/18/2024 - 3/1/2024 Radiation    Treatments:  Course: C1  Plan ID Energy Fractions Dose per Fraction (cGy) Dose Correction (cGy) Total Dose Delivered (cGy) Elapsed Days   R LUNG MED 6X 30 / 30 200 0 6,000 43    Treatment Dates:  1/18/2024 - 3/1/2024.      1/19/2024 - 3/1/2024 Chemotherapy    alteplase (CATHFLO), 2 mg, Intracatheter, Every 1 Minute as needed, 6 of 6 cycles  CARBOplatin (PARAPLATIN) IVPB (GOG AUC DOSING), 185 mg, Intravenous, Once, 6 of 6 cycles  Administration: 185 mg (1/19/2024), 183.2 mg (1/26/2024), 183.2 mg (2/2/2024), 183.2 mg (2/9/2024), 183.2 mg (2/16/2024), 196.6 mg (3/1/2024)  PACLItaxel (TAXOL) chemo IVPB, 50 mg/m2 = 85.8 mg, Intravenous, Once, 1 of 1 cycle  Administration: 85.8 mg (1/19/2024)  pemetrexed (ALIMTA) chemo infusion, 500 mg/m2 = 860 mg (100 % of original dose 500 mg/m2), Intravenous, Once, 2 of 2 cycles  Dose modification: 500 mg/m2 (original dose 500 mg/m2, Cycle 4)  Administration: 860 mg (2/9/2024), 860 mg (3/1/2024)     4/2/2024 - 6/25/2024 Chemotherapy    alteplase (CATHFLO), 2 mg, Intracatheter, Every 1 Minute as needed, 5 of 6 cycles  durvalumab (IMFINZI) IVPB, 1,500 mg (original dose 10 mg/kg), Intravenous, Once, 5 of 6 cycles  Dose modification: 1,500 mg (original dose 10 mg/kg, Cycle 1, Reason: Other (Must fill in a comment), Comment: Will give every 28 days.)  Administration: 1,500 mg (4/2/2024), 1,500 mg (5/28/2024), 1,500 mg (6/25/2024), 1,500 mg (4/30/2024)         Current Hematologic/ Oncologic Treatment:      Sotorasib        Test Results:    Imaging: CT chest w  contrast  Result Date: 11/20/2024  Narrative: CT CHEST WITH IV CONTRAST INDICATION: C34.91: Malignant neoplasm of unspecified part of right bronchus or lung R91.8: Other nonspecific abnormal finding of lung field. COMPARISON: Compared with 10/7/2024 and CT abdomen of 10/7/2024 TECHNIQUE: CT examination of the chest was performed. Multiplanar 2D reformatted images were created from the source data. This examination, like all CT scans performed in the [facility], was performed utilizing techniques to minimize radiation dose exposure, including the use of iterative reconstruction and automated exposure control. Radiation dose length product (DLP) for this visit: 259 mGy-cm IV Contrast: 100 mL of iohexol (OMNIPAQUE) FINDINGS: LUNGS: Decreased right lung volume. Right apical pleural-based mass unchanged measuring 3.0 x 2.7 cm given differences of technique. Adjacent patchy Panacryl infiltrate unchanged. Worsening diffuse parenchymal groundglass changes with interstitial prominence involving the right upper lobe, middle lobe and lower lobe compared to previous study. Sparing of the posterior right lower lobe. Left apical mediastinal pleural-based nodule seen previously is smaller in series 3 image 86. Left apical 2.7 mm nodule in series 3 image 36 unchanged New 2.6 mm nodule in the left upper lobe image 64. More inferior nodule seen previously is resolving. 3.6 mm nodule anterior to the major fissure in series 3 image 91 is larger New 4.7 mm nodule in the lingula on image 100 Subtle scattered patchy groundglass changes in the left lower lung in image 107. PLEURA: Biapical pleural thickening seen again. HEART/GREAT VESSELS: Heart is unremarkable for patient's age. No thoracic aortic aneurysm. No pulmonary embolus. MEDIASTINUM AND ARDEN: Multiple subcentimeter AP window, right paratracheal and subcarinal lymph nodes are unchanged. CHEST WALL AND LOWER NECK: Right chest port catheter in the SVC. VISUALIZED  STRUCTURES IN THE UPPER ABDOMEN: Identified is a pancreatic cystic mass in the head with pancreatic ductal dilatation similar to prior study. Focal inferior calcification in the pancreatic head unchanged. Fatty infiltration of the liver. OSSEOUS STRUCTURES: No acute fracture or destructive osseous lesion.     Impression: Worsening right lung parenchymal changes involving most of the right lung with some sparing in the right lung base. Known right apical mass mostly unchanged. New/resolving nodules in the left lung may be infectious or inflammatory. Continued monitoring on follow-up imaging. Workstation performed: [id number]     MRI brain w wo contrast  Result Date: 11/15/2024  Narrative: MRI BRAIN WITH AND WITHOUT CONTRAST INDICATION: C34.2: Malignant neoplasm of middle lobe, bronchus or lung.   Headache and dizziness COMPARISON: July 26, 2023 TECHNIQUE: Multiplanar, multisequence imaging of the brain was performed before and after gadolinium administration. IV Contrast:  7 mL of Gadobutrol injection (SINGLE-DOSE) IMAGE QUALITY:   Diagnostic. FINDINGS: BRAIN PARENCHYMA:  There is no discrete mass, mass effect or midline shift. There is no intracranial hemorrhage.  Normal posterior fossa.  Diffusion imaging is unremarkable. Small scattered hyperintensities on T2/FLAIR imaging are noted in the periventricular and subcortical white matter demonstrating an appearance that is statistically most likely to represent mild microangiopathic change. Postcontrast imaging of the brain demonstrates no abnormal enhancement. VENTRICLES:  Normal for the patient's age. SELLA AND PITUITARY GLAND:  Normal. ORBITS:  Normal. PARANASAL SINUSES:  Normal. VASCULATURE:  Evaluation of the major intracranial vasculature demonstrates appropriate flow voids. CALVARIUM AND SKULL BASE:  Normal. EXTRACRANIAL SOFT TISSUES:  Normal.     Impression: 1. No acute process or enhancing lesion seen. 2. Mild chronic microvascular ischemia. Workstation  "performed: [id number]             Labs:   Lab Results   Component Value Date    WBC 9.64 11/26/2024    HGB 12.0 11/26/2024    HCT 38.2 11/26/2024     (H) 11/26/2024     (H) 11/26/2024     Lab Results   Component Value Date    K 4.3 12/04/2024     12/04/2024    CO2 28 12/04/2024    BUN 9 12/04/2024    CREATININE 0.70 12/04/2024    GLUF 116 (H) 11/26/2024    CALCIUM 9.2 12/04/2024    AST 21 12/04/2024    ALT 19 12/04/2024    ALKPHOS 64 12/04/2024    EGFR 89 12/04/2024         No results found for: \"SPEP\", \"UPEP\"    No results found for: \"PSA\"    No results found for: \"CEA\"    No results found for: \"\"    No results found for: \"AFP\"    No results found for: \"IRON\", \"TIBC\", \"FERRITIN\"    Lab Results   Component Value Date    [id number] 938 05/15/2018         ROS: 14 point review of systems is negative except that mentioned in HPI.      Current Medications: Reviewed  Allergies: Reviewed  PMH/FH/SH:  Reviewed      Physical Exam:    Body surface area is 1.77 meters squared.    Wt Readings from Last 3 Encounters:   12/11/24 73.3 kg (161 lb 8 oz)   11/26/24 73.5 kg (162 lb)   11/26/24 73.7 kg (162 lb 8 oz)        Temp Readings from Last 3 Encounters:   12/11/24 (!) 97.2 °F (36.2 °C) (Temporal)   11/26/24 97.9 °F (36.6 °C)   11/26/24 97.6 °F (36.4 °C) (Temporal)        BP Readings from Last 3 Encounters:   12/11/24 120/74   11/26/24 110/60   11/26/24 116/80         Pulse Readings from Last 3 Encounters:   12/11/24 (!) 131   11/26/24 100   11/26/24 90         Physical Exam  Vitals and nursing note reviewed.   Constitutional:       General: She is not in acute distress.     Appearance: Normal appearance.   HENT:      Head: Normocephalic and atraumatic.      Mouth/Throat:      Mouth: Mucous membranes are moist.      Pharynx: Oropharynx is clear.   Eyes:      General: No scleral icterus.     Extraocular Movements: Extraocular movements intact.      Conjunctiva/sclera: Conjunctivae normal.   Cardiovascular: "      Rate and Rhythm: Normal rate and regular rhythm.      Pulses: Normal pulses.      Heart sounds: No murmur heard.  Pulmonary:      Effort: Pulmonary effort is normal.      Breath sounds: Normal breath sounds. No wheezing, rhonchi or rales.   Abdominal:      General: Bowel sounds are normal.      Palpations: Abdomen is soft.      Tenderness: There is no abdominal tenderness.   Musculoskeletal:         General: No swelling or tenderness. Normal range of motion.      Cervical back: Neck supple.   Lymphadenopathy:      Cervical: No cervical adenopathy.   Skin:     General: Skin is warm and dry.      Coloration: Skin is not pale.      Findings: Rash (improving) present. No bruising or erythema.   Neurological:      General: No focal deficit present.      Mental Status: She is alert and oriented to person, place, and time.      Motor: No weakness.   Psychiatric:         Mood and Affect: Mood normal.         Behavior: Behavior normal.           Goals and Barriers:  Current Goal: Prolong Survival from Cancer.   Barriers: None.      Patient's Capacity to Self Care:  Patient is able to self care.    Disclaimer: This document was prepared using [employer] technology. If a word or phrase is confusing, or does not make sense, this is likely due to recognition error which was not discovered during this clinician's review. If you believe an error has occurred, please contact me through [facility] line for jamie?cation.      Follow Up    All questions were answered to the patient's satisfaction during this encounter. The patient knows the contact information for our office and knows to reach out for any relevant concerns related to this encounter. They are to call for any temperature 100.4 or higher, new symptoms including but not restricted to shaking chills, decreased appetite, nausea, vomiting, diarrhea, increased fatigue, shortness of breath or chest pain, confusion, and not feeling the strength to come to the  clinic. For all other listed problems and medical diagnosis in their chart - they are managed by PCP and/or other specialists, which the patient acknowledges.     I spent 40 minutes reviewing the records (labs, clinician notes, outside records, medical history, ordering medicine/tests/procedures, monitoring of anti-neoplastic toxicities, interpreting the imaging/labs previously done) and coordination of care as well as direct time with the patient today, of which greater than 50% of the time was spent in counseling and coordination of care with the patient/family.

## 2024-12-16 ENCOUNTER — HOSPITAL ENCOUNTER (OUTPATIENT)
Dept: INFUSION CENTER | Facility: CLINIC | Age: 69
Discharge: HOME | End: 2024-12-16
Payer: COMMERCIAL

## 2024-12-16 DIAGNOSIS — R91.8 MULTIPLE LUNG NODULES ON CT: ICD-10-CM

## 2024-12-16 DIAGNOSIS — Z95.828 PORT-A-CATH IN PLACE: Primary | ICD-10-CM

## 2024-12-16 DIAGNOSIS — C34.91 ADENOCARCINOMA OF RIGHT LUNG (HCC): ICD-10-CM

## 2024-12-16 DIAGNOSIS — C34.91 ADENOCARCINOMA OF RIGHT LUNG (HCC): Primary | ICD-10-CM

## 2024-12-16 LAB
ALBUMIN SERPL BCG-MCNC: 3.8 G/DL (ref 3.5–5)
ALP SERPL-CCNC: 56 U/L (ref 34–104)
ALT SERPL W P-5'-P-CCNC: 25 U/L (ref 7–52)
ANION GAP SERPL CALCULATED.3IONS-SCNC: 8 MMOL/L (ref 4–13)
AST SERPL W P-5'-P-CCNC: 21 U/L (ref 13–39)
BASOPHILS # BLD AUTO: 0.05 THOUSANDS/ÂΜL (ref 0–0.1)
BASOPHILS NFR BLD AUTO: 1 % (ref 0–1)
BILIRUB SERPL-MCNC: 0.37 MG/DL (ref 0.2–1)
BUN SERPL-MCNC: 13 MG/DL (ref 5–25)
CALCIUM SERPL-MCNC: 9.3 MG/DL (ref 8.4–10.2)
CHLORIDE SERPL-SCNC: 103 MMOL/L (ref 96–108)
CO2 SERPL-SCNC: 26 MMOL/L (ref 21–32)
CREAT SERPL-MCNC: 0.72 MG/DL (ref 0.6–1.3)
EOSINOPHIL # BLD AUTO: 0.26 THOUSAND/ÂΜL (ref 0–0.61)
EOSINOPHIL NFR BLD AUTO: 3 % (ref 0–6)
ERYTHROCYTE [DISTWIDTH] IN BLOOD BY AUTOMATED COUNT: 12.3 % (ref 11.6–15.1)
GFR SERPL CREATININE-BSD FRML MDRD: 86 ML/MIN/1.73SQ M
GLUCOSE SERPL-MCNC: 126 MG/DL (ref 65–140)
HCT VFR BLD AUTO: 36.6 % (ref 34.8–46.1)
HGB BLD-MCNC: 11.4 G/DL (ref 11.5–15.4)
IMM GRANULOCYTES # BLD AUTO: 0.06 THOUSAND/UL (ref 0–0.2)
IMM GRANULOCYTES NFR BLD AUTO: 1 % (ref 0–2)
LYMPHOCYTES # BLD AUTO: 1.23 THOUSANDS/ÂΜL (ref 0.6–4.47)
LYMPHOCYTES NFR BLD AUTO: 12 % (ref 14–44)
MCH RBC QN AUTO: 30.8 PG (ref 26.8–34.3)
MCHC RBC AUTO-ENTMCNC: 31.1 G/DL (ref 31.4–37.4)
MCV RBC AUTO: 99 FL (ref 82–98)
MONOCYTES # BLD AUTO: 0.92 THOUSAND/ÂΜL (ref 0.17–1.22)
MONOCYTES NFR BLD AUTO: 9 % (ref 4–12)
NEUTROPHILS # BLD AUTO: 7.47 THOUSANDS/ÂΜL (ref 1.85–7.62)
NEUTS SEG NFR BLD AUTO: 74 % (ref 43–75)
NRBC BLD AUTO-RTO: 0 /100 WBCS
PLATELET # BLD AUTO: 300 THOUSANDS/UL (ref 149–390)
PMV BLD AUTO: 9.3 FL (ref 8.9–12.7)
POTASSIUM SERPL-SCNC: 4.3 MMOL/L (ref 3.5–5.3)
PROT SERPL-MCNC: 7.5 G/DL (ref 6.4–8.4)
RBC # BLD AUTO: 3.7 MILLION/UL (ref 3.81–5.12)
SODIUM SERPL-SCNC: 137 MMOL/L (ref 135–147)
WBC # BLD AUTO: 9.99 THOUSAND/UL (ref 4.31–10.16)

## 2024-12-16 PROCEDURE — 85025 COMPLETE CBC W/AUTO DIFF WBC: CPT | Performed by: INTERNAL MEDICINE

## 2024-12-16 PROCEDURE — 80053 COMPREHEN METABOLIC PANEL: CPT

## 2024-12-16 NOTE — PROGRESS NOTES
Pt to clinic for labs only offering no complaints. Pt port accessed, flushed, and labs drawn without complication. Pt port de-accessed. Pt aware of next appointment on 1/6/25 at 1630. AVS declined.

## 2024-12-17 ENCOUNTER — HOSPITAL ENCOUNTER (OUTPATIENT)
Dept: CT IMAGING | Facility: CLINIC | Age: 69
Discharge: HOME | End: 2024-12-17
Payer: COMMERCIAL

## 2024-12-17 DIAGNOSIS — R93.89 ABNORMAL CT OF THE CHEST: ICD-10-CM

## 2024-12-17 PROCEDURE — 71250 CT THORAX DX C-: CPT

## 2024-12-23 ENCOUNTER — TELEPHONE (OUTPATIENT)
Dept: RADIATION ONCOLOGY | Facility: CLINIC | Age: 69
End: 2024-12-23

## 2024-12-23 NOTE — TELEPHONE ENCOUNTER
QUICKNOTE - Telephone  Telephone call scheduled today for review of chest CT, but official read not completed at time of call. Requested official report today and currently waiting for results.    I called patient who notes that her acute illness symptoms have resolved, but she continues to have dyspnea on exertion and periodic cough.  She fatigues early.  She has re-established care with Pulmonary.    Will call her back tomorrow when official report is ready to review.  Clinically, not acutely ill at this time.  She agreed with this plan.

## 2024-12-26 ENCOUNTER — RESULTS FOLLOW-UP (OUTPATIENT)
Age: 69
End: 2024-12-26

## 2024-12-26 DIAGNOSIS — C34.90 MALIGNANT NEOPLASM OF LUNG, UNSPECIFIED LATERALITY, UNSPECIFIED PART OF LUNG (HCC): Primary | ICD-10-CM

## 2024-12-26 NOTE — PROGRESS NOTES
I called and spoke to the patient discussed the CT chest results and ordered repeat PFTs and discussed regarding pulmonary rehab sessions for which she has agreed admit placing an order for that

## 2024-12-27 ENCOUNTER — TELEPHONE (OUTPATIENT)
Dept: RADIATION ONCOLOGY | Facility: CLINIC | Age: 69
End: 2024-12-27

## 2024-12-27 DIAGNOSIS — C34.91 ADENOCARCINOMA OF RIGHT LUNG (HCC): Primary | ICD-10-CM

## 2024-12-27 NOTE — TELEPHONE ENCOUNTER
QUICKNOTE - Telephone  Called patient and reviewed Chest CT results at length.    Patient had discussed with Dr. Rodríguez yesterday and PFTs were ordered.    Consideration for pulmonary rehab depending on results.  Most likely scar progression v tumor progression.  Follow-up imaging planned in 3-4 months with either CT or PET scan. Plan to review with Tumor Board discussion.  Follow-up after imaging completed.    She was allowed to ask questions and answered to at length.  She agreed with plan.

## 2024-12-27 NOTE — TELEPHONE ENCOUNTER
Patient received a phone call possibly from Dr. Treviño for results. Patient is still waiting and unable to retrieve voice mail and not able to leave voice mail. Patient will be near her phone at [phone]

## 2025-01-07 ENCOUNTER — PATIENT OUTREACH (OUTPATIENT)
Dept: CASE MANAGEMENT | Facility: HOSPITAL | Age: 70
End: 2025-01-07

## 2025-01-07 NOTE — PROGRESS NOTES
Pt r/o to me by phone yesterday to ask for clarification on the no show policy for the infusion center, she has had her first late cancellation and was worried if she had too many she would be d/c from [facility] entirely.  I clarified for her that this was not the case and explained the policy and why it exists.  She says that this made her feel much better.  She shared her frustration in trying to reach the offices and I explained about project access and the new work flows to reach the offices if needed.    F/u call with pt today at her request to talk about something at length.  She notes that she had a really good Ivette and says that for the first time in a long time she felt really positive and happy.  Later Ivette night her sister mentioned something along the lines of missing pt and being worried that she's not able to do things like she used to.  This was upsetting to her and she is wondering if she overreacted.  We discussed that her sister may have her own worries about pt and her health, what will happen to her when pt is no longer able to help her or passes away, etc.  She agreed and says that they have not spoken about that directly as her sister is too anxious and she does not feel that the conversation went well.  She says that her sister in [place] will just change the conversation and refuses to help or even address the situation.  Her brother has told her that he will handle things but he lives in [place] so that is not feasible long term.  I have suggested that she write out a list of her sister's important info, like doctors and pharmacy contact info, banking, etc so that in a crisis, her family will have somewhere to start.  I have also suggested that she try to force some decision making out of her brother and [place] sister and let them know that she wants to have a plan in place to alleviate the stress on both her and her sister.  She agrees that both options are good places to start.  I asked her  to consider her sister's capacity based on her mental health diagnosis but acknowledged her frustration in taking care of her for all of these years with little support from her siblings.  She was thankful for the time spent talking and denies any other needs at this time.

## 2025-01-09 ENCOUNTER — HOSPITAL ENCOUNTER (OUTPATIENT)
Dept: INFUSION CENTER | Facility: CLINIC | Age: 70
Discharge: HOME | End: 2025-01-09
Payer: COMMERCIAL

## 2025-01-09 DIAGNOSIS — Z95.828 PORT-A-CATH IN PLACE: Primary | ICD-10-CM

## 2025-01-09 DIAGNOSIS — C34.91 ADENOCARCINOMA OF RIGHT LUNG (HCC): ICD-10-CM

## 2025-01-09 LAB
ALBUMIN SERPL BCG-MCNC: 4 G/DL (ref 3.5–5)
ALP SERPL-CCNC: 61 U/L (ref 34–104)
ALT SERPL W P-5'-P-CCNC: 19 U/L (ref 7–52)
ANION GAP SERPL CALCULATED.3IONS-SCNC: 6 MMOL/L (ref 4–13)
AST SERPL W P-5'-P-CCNC: 14 U/L (ref 13–39)
BASOPHILS # BLD AUTO: 0.04 THOUSANDS/ΜL (ref 0–0.1)
BASOPHILS NFR BLD AUTO: 0 % (ref 0–1)
BILIRUB SERPL-MCNC: 0.33 MG/DL (ref 0.2–1)
BUN SERPL-MCNC: 10 MG/DL (ref 5–25)
CALCIUM SERPL-MCNC: 9.6 MG/DL (ref 8.4–10.2)
CHLORIDE SERPL-SCNC: 103 MMOL/L (ref 96–108)
CO2 SERPL-SCNC: 29 MMOL/L (ref 21–32)
CREAT SERPL-MCNC: 0.62 MG/DL (ref 0.6–1.3)
EOSINOPHIL # BLD AUTO: 0.19 THOUSAND/ΜL (ref 0–0.61)
EOSINOPHIL NFR BLD AUTO: 2 % (ref 0–6)
ERYTHROCYTE [DISTWIDTH] IN BLOOD BY AUTOMATED COUNT: 12.5 % (ref 11.6–15.1)
GFR SERPL CREATININE-BSD FRML MDRD: 92 ML/MIN/1.73SQ M
GLUCOSE SERPL-MCNC: 100 MG/DL (ref 65–140)
HCT VFR BLD AUTO: 36.9 % (ref 34.8–46.1)
HGB BLD-MCNC: 11.4 G/DL (ref 11.5–15.4)
IMM GRANULOCYTES # BLD AUTO: 0.04 THOUSAND/UL (ref 0–0.2)
IMM GRANULOCYTES NFR BLD AUTO: 0 % (ref 0–2)
LYMPHOCYTES # BLD AUTO: 1.09 THOUSANDS/ΜL (ref 0.6–4.47)
LYMPHOCYTES NFR BLD AUTO: 11 % (ref 14–44)
MCH RBC QN AUTO: 30.5 PG (ref 26.8–34.3)
MCHC RBC AUTO-ENTMCNC: 30.9 G/DL (ref 31.4–37.4)
MCV RBC AUTO: 99 FL (ref 82–98)
MONOCYTES # BLD AUTO: 0.83 THOUSAND/ΜL (ref 0.17–1.22)
MONOCYTES NFR BLD AUTO: 9 % (ref 4–12)
NEUTROPHILS # BLD AUTO: 7.42 THOUSANDS/ΜL (ref 1.85–7.62)
NEUTS SEG NFR BLD AUTO: 78 % (ref 43–75)
NRBC BLD AUTO-RTO: 0 /100 WBCS
PLATELET # BLD AUTO: 284 THOUSANDS/UL (ref 149–390)
PMV BLD AUTO: 10.5 FL (ref 8.9–12.7)
POTASSIUM SERPL-SCNC: 4 MMOL/L (ref 3.5–5.3)
PROT SERPL-MCNC: 7 G/DL (ref 6.4–8.4)
RBC # BLD AUTO: 3.74 MILLION/UL (ref 3.81–5.12)
SODIUM SERPL-SCNC: 138 MMOL/L (ref 135–147)
WBC # BLD AUTO: 9.61 THOUSAND/UL (ref 4.31–10.16)

## 2025-01-09 PROCEDURE — 85025 COMPLETE CBC W/AUTO DIFF WBC: CPT | Performed by: INTERNAL MEDICINE

## 2025-01-09 PROCEDURE — 80053 COMPREHEN METABOLIC PANEL: CPT | Performed by: INTERNAL MEDICINE

## 2025-01-09 NOTE — PROGRESS NOTES
Patient here for port labs, and offers no complaints at this time. Port accessed flushed, and 10ml syringe discard, labs collected, flushed and de accessed, band-aid placed. Patient tolerated well.   AVS declined.   Reviewed next appointment for 1/30/2025 at 1130.   Walked out of clinic with no incident.

## 2025-01-12 NOTE — PROGRESS NOTES
Name: Saskia Alfaro      : 1955      MRN: [id number]  Encounter Provider: Maday Orellana MD  Encounter Date: 2025   Encounter department: [facility] [place]  :  Assessment & Plan  Adenocarcinoma of right lung (HCC)  CT scan from 2024 reviewed.  Results discussed with the patient.  Findings consistent with Mild continued progression of large consolidation in the right midlung which could be postradiation changes versus local tumor progression.  Continue sotorasib.  Continue to monitor labs.  PET CT scan has been ordered.    RTC in 4 weeks or sooner prn.          Smoking  Patient continues to smoke.  Counseled in detail regarding smoking cessation.  At least 3 minutes were spent counseling the patient.         Psoriasis  Management as per dermatology.              History of Present Illness   No chief complaint on file.  69-year-old female patient with history of stage IIIb lung cancer., PD-L1 TPS 98%, K-minna G 12C mutation. Patient received neoadjuvant chemotherapy however was not a surgical candidate. Neoadjuvant chemotherapy was followed by a course of definitive radiation which she completed on 3/1/2024. Patient was started on immunotherapy however her psoriasis flared up and has immunotherapy was discontinued.     Interval History:  Presents today for follow-up.  She reports continued fatigue.  She states she is not able to do as much stuff as she used to.  She naps in the afternoon however has trouble falling asleep at night.  Occasional cough with baseline shortness of breath.  Occasional back pain  Tolerating sotorasib well.  No abdominal pain.  No diarrhea or constipation.NO new onset headaches. The frequency and severity of preexisting headaches improving.     Oncology History   Cancer Staging   Adenocarcinoma of right lung (HCC)  Staging form: Lung, AJCC 8th Edition  - Clinical stage from 2023: Stage IIIB (cT4, cN2, cM0) - Signed by Celina Treviño MD  on 12/26/2023  Stage prefix: Initial diagnosis  Oncology History   Adenocarcinoma of right lung (HCC)   6/23/2023 Initial Diagnosis    Adenocarcinoma of right lung (HCC)     7/24/2023 Biopsy    Navigational bronchoscopy with EBUS    A.  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     A.-B.  Lung, Right Middle Lobe Bronchial Brushing (Thin-prep and smear preparations):   Conclusive evidence of malignancy.  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     Satisfactory for evaluation.     C.-D.  Lung, Right Middle Lobe Bronchoalveolar Lavage (Thin-prep and cell block preparations):   Conclusive evidence of malignancy.  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     Satisfactory for evaluation.      E.-F.  Lymph Node, level 7 (Thin-prep and smear preparations):   Atypical cellular changes seen.  Rare atypical cells.  Lymphocytes.  Few bronchial cells and pulmonary macrophages.        Satisfactory for evaluation.     G.-H.  Lymph Node, 10R (Thin-prep and smear preparations):   Negative for malignancy.  Lymphocytes.  Aggregates of neutrophils.     Satisfactory for evaluation.     7/24/2023 -  Cancer Staged    Staging form: Lung, AJCC 8th Edition  - Clinical stage from 7/24/2023: Stage IIIB (cT4, cN2, cM0) - Signed by Celina Treviño MD on 12/26/2023  Stage prefix: Initial diagnosis       8/31/2023 - 10/12/2023 Chemotherapy    cyanocobalamin, 1,000 mcg, Intramuscular, Once, 1 of 1 cycle  Administration: 1,000 mcg (8/24/2023)  alteplase (CATHFLO), 2 mg, Intracatheter, Every 1 Minute as needed, 3 of 3 cycles  CISplatin (PLATINOL) with mannitol IVPB, 75 mg/m2 = 129.8 mg (100 % of original dose 75 mg/m2), Intravenous, Once, 3 of 3 cycles  Dose modification: 50 mg/m2 (original dose 75 mg/m2, Cycle 1, Reason: Anticipated Tolerance), 75 mg/m2 (original dose 75 mg/m2, Cycle 1, Reason: Dose modified as per discussion with  consulting physician)  Administration: 129.8 mg (8/31/2023), 129.8 mg (9/21/2023), 129.8 mg (10/12/2023)  fosaprepitant (EMEND) IVPB, 150 mg, Intravenous, Once, 3 of 3 cycles  Administration: 150 mg (8/31/2023), 150 mg (9/21/2023), 150 mg (10/12/2023)  nivolumab (OPDIVO) IVPB, 360 mg, Intravenous, Once, 3 of 3 cycles  Administration: 360 mg (8/31/2023), 360 mg (9/21/2023), 360 mg (10/12/2023)  pemetrexed (ALIMTA) chemo infusion, 500 mg/m2 = 865 mg, Intravenous, Once, 3 of 3 cycles  Administration: 865 mg (8/31/2023), 865 mg (9/21/2023), 865 mg (10/12/2023)     1/18/2024 - 3/1/2024 Radiation    Treatments:  Course: C1  Plan ID Energy Fractions Dose per Fraction (cGy) Dose Correction (cGy) Total Dose Delivered (cGy) Elapsed Days   R LUNG MED 6X 30 / 30 200 0 6,000 43    Treatment Dates:  1/18/2024 - 3/1/2024.      1/19/2024 - 3/1/2024 Chemotherapy    alteplase (CATHFLO), 2 mg, Intracatheter, Every 1 Minute as needed, 6 of 6 cycles  CARBOplatin (PARAPLATIN) IVPB (GOG AUC DOSING), 185 mg, Intravenous, Once, 6 of 6 cycles  Administration: 185 mg (1/19/2024), 183.2 mg (1/26/2024), 183.2 mg (2/2/2024), 183.2 mg (2/9/2024), 183.2 mg (2/16/2024), 196.6 mg (3/1/2024)  PACLItaxel (TAXOL) chemo IVPB, 50 mg/m2 = 85.8 mg, Intravenous, Once, 1 of 1 cycle  Administration: 85.8 mg (1/19/2024)  pemetrexed (ALIMTA) chemo infusion, 500 mg/m2 = 860 mg (100 % of original dose 500 mg/m2), Intravenous, Once, 2 of 2 cycles  Dose modification: 500 mg/m2 (original dose 500 mg/m2, Cycle 4)  Administration: 860 mg (2/9/2024), 860 mg (3/1/2024)     4/2/2024 - 6/25/2024 Chemotherapy    alteplase (CATHFLO), 2 mg, Intracatheter, Every 1 Minute as needed, 5 of 6 cycles  durvalumab (IMFINZI) IVPB, 1,500 mg (original dose 10 mg/kg), Intravenous, Once, 5 of 6 cycles  Dose modification: 1,500 mg (original dose 10 mg/kg, Cycle 1, Reason: Other (Must fill in a comment), Comment: Will give every 28 days.)  Administration: 1,500 mg (4/2/2024), 1,500 mg  (5/28/2024), 1,500 mg (6/25/2024), 1,500 mg (4/30/2024)        Pertinent Medical History   01/12/25: reviewed.     Review of Systems  14 point review of systems was negative except that mentioned in HPI.         Objective   There were no vitals taken for this visit.    Pain Screening:     ECOG   2  Physical Exam  Vitals and nursing note reviewed.   Constitutional:       General: She is not in acute distress.     Appearance: Normal appearance.   HENT:      Head: Normocephalic and atraumatic.      Mouth/Throat:      Mouth: Mucous membranes are moist.      Pharynx: Oropharynx is clear.   Eyes:      General: No scleral icterus.     Extraocular Movements: Extraocular movements intact.      Conjunctiva/sclera: Conjunctivae normal.   Cardiovascular:      Rate and Rhythm: Normal rate and regular rhythm.      Pulses: Normal pulses.      Heart sounds: No murmur heard.  Pulmonary:      Breath sounds: Normal breath sounds. No wheezing, rhonchi or rales.   Abdominal:      General: Bowel sounds are normal.      Palpations: Abdomen is soft.      Tenderness: There is no abdominal tenderness.   Musculoskeletal:         General: No swelling or tenderness. Normal range of motion.      Cervical back: Neck supple.   Lymphadenopathy:      Cervical: No cervical adenopathy.   Skin:     General: Skin is warm and dry.      Coloration: Skin is not pale.      Findings: Rash (Psoriatic skin rash getting much better.) present. No bruising or erythema.   Neurological:      General: No focal deficit present.      Mental Status: She is alert and oriented to person, place, and time.      Motor: No weakness.   Psychiatric:         Mood and Affect: Mood normal.         Behavior: Behavior normal.         Labs: I have reviewed the following labs:  Lab Results   Component Value Date/Time    WBC 9.61 01/09/2025 10:38 AM    RBC 3.74 (L) 01/09/2025 10:38 AM    Hemoglobin 11.4 (L) 01/09/2025 10:38 AM    Hematocrit 36.9 01/09/2025 10:38 AM    MCV 99 (H)  01/09/2025 10:38 AM    MCH 30.5 01/09/2025 10:38 AM    RDW 12.5 01/09/2025 10:38 AM    Platelets 284 01/09/2025 10:38 AM    Segmented % 78 (H) 01/09/2025 10:38 AM    Lymphocytes % 11 (L) 01/09/2025 10:38 AM    Monocytes % 9 01/09/2025 10:38 AM    Eosinophils Relative 2 01/09/2025 10:38 AM    Basophils Relative 0 01/09/2025 10:38 AM    Immature Grans % 0 01/09/2025 10:38 AM    Absolute Neutrophils 7.42 01/09/2025 10:38 AM     Lab Results   Component Value Date/Time    Potassium 4.0 01/09/2025 10:38 AM    Chloride 103 01/09/2025 10:38 AM    CO2 29 01/09/2025 10:38 AM    BUN 10 01/09/2025 10:38 AM    Creatinine 0.62 01/09/2025 10:38 AM    Glucose, Fasting 116 (H) 11/26/2024 10:42 AM    Calcium 9.6 01/09/2025 10:38 AM    AST 14 01/09/2025 10:38 AM    ALT 19 01/09/2025 10:38 AM    Alkaline Phosphatase 61 01/09/2025 10:38 AM    Total Protein 7.0 01/09/2025 10:38 AM    Albumin 4.0 01/09/2025 10:38 AM    Total Bilirubin 0.33 01/09/2025 10:38 AM    eGFR 92 01/09/2025 10:38 AM               Disclaimer: This document was prepared using [employer] technology. If a word or phrase is confusing, or does not make sense, this is likely due to recognition error which was not discovered during this clinician's review. If you believe an error has occurred, please contact me through [facility] service line for [email or web address]?cation.      Follow Up    All questions were answered to the patient's satisfaction during this encounter. The patient knows the contact information for our office and knows to reach out for any relevant concerns related to this encounter. They are to call for any temperature 100.4 or higher, new symptoms including but not restricted to shaking chills, decreased appetite, nausea, vomiting, diarrhea, increased fatigue, shortness of breath or chest pain, confusion, and not feeling the strength to come to the clinic. For all other listed problems and medical diagnosis in their chart - they are managed by PCP and/or other  specialists, which the patient acknowledges.     I spent 41 minutes reviewing the records (labs, clinician notes, outside records, medical history, ordering medicine/tests/procedures, monitoring of anti-neoplastic toxicities, interpreting the imaging/labs previously done) and coordination of care as well as direct time with the patient today, of which greater than 50% of the time was spent in counseling and coordination of care with the patient/family.

## 2025-01-12 NOTE — ASSESSMENT & PLAN NOTE
CT scan from 12/17/2024 reviewed.  Results discussed with the patient.  Findings consistent with Mild continued progression of large consolidation in the right midlung which could be postradiation changes versus local tumor progression.  Continue sotorasib.  Continue to monitor labs.  PET CT scan has been ordered.    RTC in 4 weeks or sooner prn.

## 2025-01-12 NOTE — ASSESSMENT & PLAN NOTE
Patient continues to smoke.  Counseled in detail regarding smoking cessation.  At least 3 minutes were spent counseling the patient.

## 2025-01-13 ENCOUNTER — OFFICE VISIT (OUTPATIENT)
Dept: HEMATOLOGY ONCOLOGY | Facility: CLINIC | Age: 70
End: 2025-01-13
Payer: COMMERCIAL

## 2025-01-13 VITALS
HEART RATE: 117 BPM | HEIGHT: 63 IN | OXYGEN SATURATION: 100 % | RESPIRATION RATE: 18 BRPM | BODY MASS INDEX: 28.53 KG/M2 | WEIGHT: 161 LBS | DIASTOLIC BLOOD PRESSURE: 70 MMHG | TEMPERATURE: 97.3 F | SYSTOLIC BLOOD PRESSURE: 117 MMHG

## 2025-01-13 DIAGNOSIS — L40.9 PSORIASIS: ICD-10-CM

## 2025-01-13 DIAGNOSIS — F17.200 SMOKING: ICD-10-CM

## 2025-01-13 DIAGNOSIS — C34.91 ADENOCARCINOMA OF RIGHT LUNG (HCC): Primary | ICD-10-CM

## 2025-01-13 PROCEDURE — 99215 OFFICE O/P EST HI 40 MIN: CPT | Performed by: INTERNAL MEDICINE

## 2025-01-13 PROCEDURE — 99406 BEHAV CHNG SMOKING 3-10 MIN: CPT | Performed by: INTERNAL MEDICINE

## 2025-01-17 ENCOUNTER — TELEPHONE (OUTPATIENT)
Dept: HEMATOLOGY ONCOLOGY | Facility: CLINIC | Age: 70
End: 2025-01-17

## 2025-01-17 ENCOUNTER — PATIENT OUTREACH (OUTPATIENT)
Dept: CASE MANAGEMENT | Facility: HOSPITAL | Age: 70
End: 2025-01-17

## 2025-01-17 NOTE — PROGRESS NOTES
Pt r/o to me this afternoon to say that she tried to call for a refill of her Lumakras but was told that she needs to call the [employer] [facility] instead.  She has not heard of this and is worried that it's a scam.  She tells me that she did work with BRITNEY Torres around November of last year for funding for her medications, but she has tried to reach him today and didn't get an answer.  I told her it appears that he is out of the office but I will send a message to his team and the office RN to see if anyone has more information about this.  She thinks that she has 5 days of medication left so she's worried it will not be refilled in time.  She agreed to call me on Monday if she hasn't heard back from anyone.  She was grateful to talk this through and denies any other needs.

## 2025-01-17 NOTE — TELEPHONE ENCOUNTER
Received message from [facility] that patient was unable to request a refill for her Lumakras due to foundation money being available. This writer applied patient for a cullen through the [employer]. Website indicated that the request is under review and to check back in 1-2 days. Placed call to patient to update her and let her know that this writer will continue to follow the case.

## 2025-01-19 ENCOUNTER — TELEPHONE (OUTPATIENT)
Dept: SURGICAL ONCOLOGY | Facility: CLINIC | Age: 70
End: 2025-01-19

## 2025-01-20 ENCOUNTER — TELEPHONE (OUTPATIENT)
Dept: SURGICAL ONCOLOGY | Facility: CLINIC | Age: 70
End: 2025-01-20

## 2025-01-20 DIAGNOSIS — C34.91 ADENOCARCINOMA OF RIGHT LUNG (HCC): ICD-10-CM

## 2025-01-20 RX ORDER — SOTORASIB 320 MG/1
960 TABLET, COATED ORAL DAILY
Qty: 90 TABLET | Refills: 5 | Status: SHIPPED | OUTPATIENT
Start: 2025-01-20

## 2025-01-20 NOTE — TELEPHONE ENCOUNTER
Placed call to the patient to inform them that they were enrolled with Good Days Assistance Programfor her Lumakras.    Start Date: 01/17/25  End Date: 12/31/25  Amount: $2,000.00    I stated that she should be contacted soon from the pharmacy regarding delivery.  The patient was very relieved and appreciative of all of our help.

## 2025-01-27 ENCOUNTER — TELEPHONE (OUTPATIENT)
Age: 70
End: 2025-01-27

## 2025-01-27 NOTE — TELEPHONE ENCOUNTER
Pt calling in requesting a PET/CT order to be placed in the system for her. Pt will call to schedule herself, provided central scheduling's phone number.     Thank you.

## 2025-01-28 DIAGNOSIS — R91.8 OTHER NONSPECIFIC ABNORMAL FINDING OF LUNG FIELD: ICD-10-CM

## 2025-01-28 DIAGNOSIS — C34.91 ADENOCARCINOMA OF RIGHT LUNG (HCC): Primary | ICD-10-CM

## 2025-01-30 ENCOUNTER — HOSPITAL ENCOUNTER (OUTPATIENT)
Dept: INFUSION CENTER | Facility: CLINIC | Age: 70
Discharge: HOME | End: 2025-01-30

## 2025-02-03 ENCOUNTER — CLINICAL SUPPORT (OUTPATIENT)
Facility: HOSPITAL | Age: 70
End: 2025-02-03
Attending: INTERNAL MEDICINE
Payer: COMMERCIAL

## 2025-02-03 DIAGNOSIS — C34.90 MALIGNANT NEOPLASM OF LUNG, UNSPECIFIED LATERALITY, UNSPECIFIED PART OF LUNG (HCC): Primary | ICD-10-CM

## 2025-02-03 PROCEDURE — G0239 OTH RESP PROC, GROUP: HCPCS

## 2025-02-03 NOTE — PROGRESS NOTES
Pulmonary Rehabilitation   Assessment and Individualized Treatment Plan  INITIAL       Today's date: February 3, 2025  # of Exercise Sessions Completed: 1- initial eval  Patient name: Saskia Alfaro     : 1955       MRN: [id number]  Referring Physician: Sanjana Rodríguez MD  Pulmonologist: Sanjana Rodríguez MD  Provider: Freddy  Clinician: Ivana Puckett MS     Dx:    Encounter Diagnosis   Name Primary?    Malignant neoplasm of lung, unspecified laterality, unspecified part of lung (HCC)      Date of onset: 2023      Treatment is tailored to this patient's individual needs.  The ITP was reviewed with the patient and all questions were answered to their satisfaction.  Additional ITP documentation can be found electronically including daily and monthly exercise summaries, daily session notes with ECG summaries, education notes, daily medication reconciliation, and daily physician supervision.      COMMENTS:  Chemo 7d/wk, she has a lot of responsibilities at home and is excited to start daily exercise.        ASSESSMENT      Medical History:   Past Medical History:   Diagnosis Date    Fibromyalgia     Heart murmur     Lumbosacral disc herniation     Lung cancer (HCC)     Osteopenia     Psoriasis     Psoriasis     RSD (reflex sympathetic dystrophy)        Family History:  Family History   Problem Relation Age of Onset    Sarcoidosis Mother     Cancer Sister        Allergies:   Prednisone    Current Medications:   Current Outpatient Medications   Medication Sig Dispense Refill    amitriptyline (ELAVIL) 25 mg tablet       amoxicillin (AMOXIL) 500 mg capsule Prn dental procedure (Patient not taking: Reported on 10/21/2024)      Ascorbic Acid (vitamin C) 100 MG tablet Take 100 mg by mouth daily      atorvastatin (LIPITOR) 20 mg tablet Take 20 mg by mouth daily      b complex vitamins capsule Take 1 capsule by mouth daily (Patient not taking: Reported on 10/21/2024)      bismuth subsalicylate (PEPTO BISMOL) 262 MG  chewable tablet Take 1 tablet by mouth every 6 hours for 14 days (Patient not taking: Reported on 5/30/2024) 56 tablet 0    calcipotriene (DOVONEX) 0.005 % cream Apply topically 2 (two) times a day To affected areas on Saturday and Sunday only 120 g 2    cholecalciferol (VITAMIN D3) 400 units tablet Take 400 Units by mouth daily      DULoxetine (CYMBALTA) 30 mg delayed release capsule       Fluticasone-Salmeterol (Advair) 100-50 mcg/dose inhaler Inhale 1 puff 2 (two) times a day Rinse mouth after use.      folic acid (KP Folic Acid) 1 mg tablet Take 1 tablet (1 mg total) by mouth daily 90 tablet 2    halobetasol (ULTRAVATE) 0.05 % ointment Apply topically 2 (two) times a day 50 g 3    loratadine (CLARITIN) 10 mg tablet Take 10 mg by mouth daily (Patient not taking: Reported on 7/29/2024)      omeprazole (PriLOSEC) 20 mg delayed release capsule Take 1 capsule (20 mg total) by mouth 2 (two) times a day Every 12 hours for 14 days (Patient not taking: Reported on 12/26/2023) 28 capsule 0    ondansetron (ZOFRAN) 4 mg tablet Take 2 tablets (8 mg total) by mouth every 8 (eight) hours as needed for nausea or vomiting 20 tablet 0    propranolol (INDERAL) 20 mg/5 mL solution       Sotorasib (Lumakras) 320 MG TABS Take 960 mg by mouth daily 90 tablet 5    triamcinolone (KENALOG) 0.1 % cream Apply topically 2 (two) times a day Affected areas Monday through Friday only 453.6 g 2     No current facility-administered medications for this visit.       Physical Limitations: none    Fall Risk: Low   Comments: Ambulates with a steady gait with no assist device and Denies a fall in the past 6 months    Cultural needs: none    PFT:  Yes     FEV1/FVC ratio of 66.35%   FEV1 of 71% predicted  mildobstruction.    Medication compliance: Yes  Comments: Pt reports to be compliant with medications      Pulmonary Disease Risk Factors:  smoking  Mold in her house, that has been fixed    Sleep Disorders:   Has trouble falling asleep occasionally        EXERCISE ASSESSMENT:      Initial Fitness Assessment: 6MWT:  Resting:    Resting:  BP: 122/70  HR: 98  SpO2: 94  Dyspnea: 0  O2 Use: RA l/min, Exercise:  BP: 132/70  HR: 120  SpO2: 93%  Dyspnea: 3  O2 use: RA l/min, METs:  2.68, and ECG Summary: Sinus tachy, PVCs and bigeminy noted      Current Functional Status:  Occupation: retired  Recreation/Physical activity: none  ADL’s:resumed all ADLs able to perform self-care  [place]: resumed all ADLs able to perform self-care  Exercise:  none  Home exercise equipment: none  Other Comments: none    SMART Exercise Goals:   reduced score on CAT, improved 6MWT distance, reduced dyspnea during exercise, improved exercise tolerance based on peak METs tolerated in pulmonary rehab exercise session, SpO2 >88% during exercise, and reduced RPD at rest    Patient Specific EXERCISE GOALS:     attend pulmonary rehab regularly, decrease sitting time at home, start a walking program, begin a consistent exercise regimen , increased muscular strength, and increased energy    PSYCHOSOCIAL ASSESSMENT:    Date of last assessment: 2/3/2025  Depression screening:  PHQ-9 = 11    Interpretation:  10-14 = Moderate Depression  Anxiety screening:  EMILIANO-7 = 10    Interpretation: 10-14 = Moderate anxiety    Pt self-report of depression and anxiety   Patient reports feelings of depression   Patient reports feelings of anxiety  Reports sufficient emotional support     Self-reported stress level:  7  Stress Management: practice Relaxation Techniques, exercise, and spend time with family    Quality of Life Screen:  (Higher score indicates disease impact on QOL)  [facility] COOP score: 28/40      CAT: 18/40      Shortness of breath questionnaire: 12/120    Social Support:   children  Community/Social Activities: none    SMART Goals:    Feelings in [facility] Score < 3, Social Support in [facility] Score < 3, Overall Health in [facility] Score < 3, Quality of Life in [facility] Score < 3 , and improved  "sleeping habits    Patient Specific PSYCHOCOSOCIAL GOALS:    spend time with family     Psychosocial Assessment as it relates to rehabilitation: Patient denies issues with his/her family or home life that may affect their rehabilitation efforts.       NUTRITION ASSESSMENT:    Initial Weight:  165.4 lbs   Current Weight:     Height:   Ht Readings from Last 1 Encounters:   01/13/25 5' 3\" (1.6 m)       Rate Your Plate Score: 62/81    Self-reported Current Dietary Habits:  B: doesn't eat breakfast, 2 cups of coffee  Toast with PB when she takes chemo pills  D: chicken breast, squash raviolis, salad    ETOH:   Social History     Substance and Sexual Activity   Alcohol Use Yes    Alcohol/week: 14.0 standard drinks of alcohol    Types: 14 Glasses of wine per week    Comment: 2 drinks daily       SMART Goals:   eat 6 or more servings of grain products per day, eat 5 or more servings of fruits and vegetables a day, rarely eat processed meats or eat low fat processed meats, and choose 1% or skim milk    Patient Specific NUTRITION GOALS:    increased muscle mass      OTHER CORE COMPONENT ASSESSMENT:    Hospitalizations in the past year: None, the last year    Oxygen needs:   Rest:  room air  Exercise/physical activity:  room air  Sleep:   room air    Does Pt monitor home SpO2? no   Has a pulse ox, but does use it      Use of Rescue Inhaler: No    Use of Maintenance Inhaler: Yes:  2 times per day    Use of Nebulizer Treatments:  No    Patient practices breathing techniques at home:  No and Reviewed with patient on:  2/3/2025    CAD Risk Factors:  Cholesterol: Yes  HTN: No  DM: No  Obesity: No     Smoking: Current user:  average ppd:  1/2 ppd    SMART Core Component Goals:   consistent, controlled resting BP < 130/80, medication compliance, and reduced number of cigarettes per day    Patient Specific CORE COMPONENT GOALS:    reduced dietary sodium <2000mg, medication compliance, and set a target quit date      Blood " Pressure:    Restin/70  Exercise: 132/70  Recovery: 118/60      INDIVIDUALIZED TREATMENT PLAN    The patient is agreeable to attend 24 pulmonary rehab exercise sessions.      EXERCISE GOALS AND PLAN    PHYSCIAN PRESCRIBED EXERCISE    Current Aerobic Exercise Prescription       Frequency: 2 days/week   Supplement with home exercise 2+ days/wk as tolerated        Minutes: 20-40         METS: 1.8-2.5              SpO2: >88%              RPD: 4-6                      HR: RHR +30-40bpm   RPE: 4-5         Modalities: Treadmill, UBE, NuStep, and Recumbent bike      Aerobic Exercise Prescription Plan for Progression:    Frequency: 2 days/week    Supplement with home exercise 2+ days/wk as tolerated       Minutes: 30-40        METS: 2.5-3.5              SpO2: >88%              RPD: 4-6                      HR:RHR +30-40bpm   RPE: 4-5        Modalities: Treadmill, UBE, and Lifecycle        Supplemental Oxygen Needs with Exercise:  room air.    Strength trainin-3 days / week  12-15 repetitions  1-2 sets per modality   Will be added following 2-3 weeks of monitored exercise sessions   Modalities: Leg Press, Chest Press, Pull Downs, and Arm Curl    Education: pursed lipped breathing, diaphragmatic breathing, relaxation breathing, home exercise guidelines, benefits of exercise for pulmonary disease, RPE scale, RPD scale, O2 saturation monitoring, and appropriate O2 response to exercise    Progress toward Exercise Goals:    Reviewed Pt goals and determined plan of care, Will continue to educate and progress as tolerated.    Exercise Plan:  Titrate supplemental oxygen as needed to maintain SpO2>88% with exercise, learn to conserve energy with ADLs , practice diaphragmatic breathing, reduce time sitting at home, increased strength of respiratory muscles, utilize PLB with physical activity, and begin a home exercise program     Readiness to change: Preparation:  (Getting ready to change)       NUTRITION GOALS AND  PLAN    Progress toward Nutritional goals:    Reviewed Pt goals and determined plan of care, Will continue to educate and progress as tolerated.    Nutrition Plan: increase intake of whole grains, increase daily intake of fruits and vegetables, increase daily intake of low fat dairy, eat poultry without the skin, and drink/use 1%  low fat or skim  milk    SMART goals are based Rate Your Plate Dietary Self-Assessment. These are the areas in which the patient could score higher on the assessment.  Goals include recommendations for a heart healthy diet based on American Heart Association.    Nutrition Education: low sodium diet  maintaining hydration    Readiness to change: Preparation:  (Getting ready to change)       PSYCHOSOCIAL GOALS AND PLAN    Information to begin using [employer] was provided as well as contact information for counseling through  [facility].    Progress toward Psychosocial goals:    Reviewed Pt goals and determined plan of care, Will continue to educate and progress as tolerated.    Psychosocial Plan: Practice relaxation techniques, Exercise, and Keep a positive mindset    Psychosocial Education: signs/sxs of depression, benefits of a positive support system, and stress management techniques    Readiness to change: Preparation:  (Getting ready to change)       OTHER CORE COMPONENTS GOALS AND PLAN    Tobacco Use Intervention:     Brief counseling by cardiac rehab professional  Date: 2/3/2025    Oxygen Goal: Maintain SpO2>88% during exercise or as advised by pulmonolgist    Progress toward Core Component goals:    Reviewed Pt goals and determined plan of care, Will continue to educate and progress as tolerated.    Core Component Plan: medication compliance, set target quit date for smoking, avoid processed foods, engage in regular exercise, eliminate salt shaker at the table, use salt substitutes, check labels for sodium content, and monitor home BP    Core Component Education:   pathophysiology of pulmonary disease and dangers of smoking    Readiness to change: Preparation:  (Getting ready to change)

## 2025-02-10 ENCOUNTER — CLINICAL SUPPORT (OUTPATIENT)
Facility: HOSPITAL | Age: 70
End: 2025-02-10
Payer: COMMERCIAL

## 2025-02-10 DIAGNOSIS — C34.90 MALIGNANT NEOPLASM OF LUNG, UNSPECIFIED LATERALITY, UNSPECIFIED PART OF LUNG (HCC): Primary | ICD-10-CM

## 2025-02-10 PROCEDURE — G0239 OTH RESP PROC, GROUP: HCPCS

## 2025-02-12 ENCOUNTER — APPOINTMENT (OUTPATIENT)
Facility: HOSPITAL | Age: 70
End: 2025-02-12
Payer: COMMERCIAL

## 2025-02-13 ENCOUNTER — HOSPITAL ENCOUNTER (OUTPATIENT)
Dept: INFUSION CENTER | Facility: CLINIC | Age: 70
Discharge: HOME | End: 2025-02-13
Payer: COMMERCIAL

## 2025-02-13 DIAGNOSIS — Z95.828 PORT-A-CATH IN PLACE: Primary | ICD-10-CM

## 2025-02-13 DIAGNOSIS — C34.91 ADENOCARCINOMA OF RIGHT LUNG (HCC): ICD-10-CM

## 2025-02-13 LAB
ALBUMIN SERPL BCG-MCNC: 3.8 G/DL (ref 3.5–5)
ALP SERPL-CCNC: 64 U/L (ref 34–104)
ALT SERPL W P-5'-P-CCNC: 31 U/L (ref 7–52)
ANION GAP SERPL CALCULATED.3IONS-SCNC: 7 MMOL/L (ref 4–13)
AST SERPL W P-5'-P-CCNC: 23 U/L (ref 13–39)
BASOPHILS # BLD AUTO: 0.04 THOUSANDS/ΜL (ref 0–0.1)
BASOPHILS NFR BLD AUTO: 1 % (ref 0–1)
BILIRUB SERPL-MCNC: 0.43 MG/DL (ref 0.2–1)
BUN SERPL-MCNC: 11 MG/DL (ref 5–25)
CALCIUM SERPL-MCNC: 9 MG/DL (ref 8.4–10.2)
CHLORIDE SERPL-SCNC: 103 MMOL/L (ref 96–108)
CO2 SERPL-SCNC: 26 MMOL/L (ref 21–32)
CREAT SERPL-MCNC: 0.63 MG/DL (ref 0.6–1.3)
EOSINOPHIL # BLD AUTO: 0.1 THOUSAND/ΜL (ref 0–0.61)
EOSINOPHIL NFR BLD AUTO: 1 % (ref 0–6)
ERYTHROCYTE [DISTWIDTH] IN BLOOD BY AUTOMATED COUNT: 12.6 % (ref 11.6–15.1)
GFR SERPL CREATININE-BSD FRML MDRD: 92 ML/MIN/1.73SQ M
GLUCOSE SERPL-MCNC: 97 MG/DL (ref 65–140)
HCT VFR BLD AUTO: 36.9 % (ref 34.8–46.1)
HGB BLD-MCNC: 11.6 G/DL (ref 11.5–15.4)
IMM GRANULOCYTES # BLD AUTO: 0.02 THOUSAND/UL (ref 0–0.2)
IMM GRANULOCYTES NFR BLD AUTO: 0 % (ref 0–2)
LYMPHOCYTES # BLD AUTO: 0.92 THOUSANDS/ΜL (ref 0.6–4.47)
LYMPHOCYTES NFR BLD AUTO: 13 % (ref 14–44)
MCH RBC QN AUTO: 31.4 PG (ref 26.8–34.3)
MCHC RBC AUTO-ENTMCNC: 31.4 G/DL (ref 31.4–37.4)
MCV RBC AUTO: 100 FL (ref 82–98)
MONOCYTES # BLD AUTO: 0.68 THOUSAND/ΜL (ref 0.17–1.22)
MONOCYTES NFR BLD AUTO: 10 % (ref 4–12)
NEUTROPHILS # BLD AUTO: 5.27 THOUSANDS/ΜL (ref 1.85–7.62)
NEUTS SEG NFR BLD AUTO: 75 % (ref 43–75)
NRBC BLD AUTO-RTO: 0 /100 WBCS
PLATELET # BLD AUTO: 240 THOUSANDS/UL (ref 149–390)
PMV BLD AUTO: 10.1 FL (ref 8.9–12.7)
POTASSIUM SERPL-SCNC: 4.1 MMOL/L (ref 3.5–5.3)
PROT SERPL-MCNC: 7.1 G/DL (ref 6.4–8.4)
RBC # BLD AUTO: 3.7 MILLION/UL (ref 3.81–5.12)
SODIUM SERPL-SCNC: 136 MMOL/L (ref 135–147)
WBC # BLD AUTO: 7.03 THOUSAND/UL (ref 4.31–10.16)

## 2025-02-13 PROCEDURE — 80053 COMPREHEN METABOLIC PANEL: CPT

## 2025-02-13 PROCEDURE — 85025 COMPLETE CBC W/AUTO DIFF WBC: CPT

## 2025-02-13 NOTE — PROGRESS NOTES
Pt. To clinic for labs offering no complaints. Pt. Port accessed, flushed and labs drawn without complication. Port de-accessed. Pt. Aware of next appt. On 2/20/25 at 12:30PM

## 2025-02-17 ENCOUNTER — CLINICAL SUPPORT (OUTPATIENT)
Facility: HOSPITAL | Age: 70
End: 2025-02-17
Payer: COMMERCIAL

## 2025-02-17 ENCOUNTER — TELEPHONE (OUTPATIENT)
Dept: HEMATOLOGY ONCOLOGY | Facility: CLINIC | Age: 70
End: 2025-02-17

## 2025-02-17 ENCOUNTER — OFFICE VISIT (OUTPATIENT)
Dept: HEMATOLOGY ONCOLOGY | Facility: CLINIC | Age: 70
End: 2025-02-17
Payer: COMMERCIAL

## 2025-02-17 VITALS
RESPIRATION RATE: 18 BRPM | SYSTOLIC BLOOD PRESSURE: 122 MMHG | HEART RATE: 111 BPM | DIASTOLIC BLOOD PRESSURE: 84 MMHG | OXYGEN SATURATION: 100 % | HEIGHT: 63 IN | BODY MASS INDEX: 29.5 KG/M2 | TEMPERATURE: 98 F | WEIGHT: 166.5 LBS

## 2025-02-17 DIAGNOSIS — J43.9 PULMONARY EMPHYSEMA, UNSPECIFIED EMPHYSEMA TYPE (HCC): ICD-10-CM

## 2025-02-17 DIAGNOSIS — C34.90 MALIGNANT NEOPLASM OF LUNG, UNSPECIFIED LATERALITY, UNSPECIFIED PART OF LUNG (HCC): Primary | ICD-10-CM

## 2025-02-17 DIAGNOSIS — F17.200 SMOKING: ICD-10-CM

## 2025-02-17 DIAGNOSIS — C34.91 ADENOCARCINOMA OF RIGHT LUNG (HCC): Primary | ICD-10-CM

## 2025-02-17 DIAGNOSIS — Z95.828 PORT-A-CATH IN PLACE: ICD-10-CM

## 2025-02-17 PROCEDURE — G0239 OTH RESP PROC, GROUP: HCPCS

## 2025-02-17 PROCEDURE — G2211 COMPLEX E/M VISIT ADD ON: HCPCS | Performed by: INTERNAL MEDICINE

## 2025-02-17 PROCEDURE — 99215 OFFICE O/P EST HI 40 MIN: CPT | Performed by: INTERNAL MEDICINE

## 2025-02-17 PROCEDURE — 99406 BEHAV CHNG SMOKING 3-10 MIN: CPT | Performed by: INTERNAL MEDICINE

## 2025-02-17 NOTE — ASSESSMENT & PLAN NOTE
Patient continues to smoke.  Counseled in detail regarding smoking cessation.  At least 7 minutes were spent counseling the patient.

## 2025-02-17 NOTE — PROGRESS NOTES
"Name: Saskia Alfaro      : 1955      MRN: [id number]  Encounter Provider: Maday Orellana MD  Encounter Date: 2025   Encounter department: [facility] [place]  :  Assessment & Plan  Adenocarcinoma of right lung (HCC)  Tolerating Sotorasib well.   Continue sotorasib.  CT scan from 2024 with Findings consistent with Mild continued progression of large consolidation in the right midlung which could be postradiation changes versus local tumor progression.  Continue to monitor labs.  PET CT scan has been ordered- scheduled 2025  RTC in 8 weeks or sooner prn.     Orders:    CBC and differential; Standing    Comprehensive metabolic panel; Standing      Smoking  Patient continues to smoke.  Counseled in detail regarding smoking cessation.  At least 7 minutes were spent counseling the patient.           Port-A-Cath in place  Continue catheter maintenance.          Pulmonary emphysema, unspecified emphysema type (HCC)  Follows with pulmonology.   Going to pulm rehab currently.              No follow-ups on file.    History of Present Illness   Chief Complaint   Patient presents with    Follow-up     4 week follow-up   69-year-old female patient with history of stage IIIb lung cancer., PD-L1 TPS 98%, K-minna G 12C mutation. Patient received neoadjuvant chemotherapy however was not a surgical candidate. Neoadjuvant chemotherapy was followed by a course of definitive radiation which she completed on 3/1/2024. Patient was started on immunotherapy however her psoriasis flared up and has immunotherapy was discontinued.     Patient presents today for follow-up.  She states she has started pulmonary rehab and is doing great.  Endorses fatigue.  Denies nausea or vomiting.  No chest pains.  Tolerating sotorasib well. States \"right now I am smoking more\" because of issues at home. Rash is much better.      Oncology History   Cancer Staging   Adenocarcinoma of right lung " (HCC)  Staging form: Lung, AJCC 8th Edition  - Clinical stage from 7/24/2023: Stage IIIB (cT4, cN2, cM0) - Signed by Celina Treviño MD on 12/26/2023  Stage prefix: Initial diagnosis  Oncology History   Adenocarcinoma of right lung (HCC)   6/23/2023 Initial Diagnosis    Adenocarcinoma of right lung (HCC)     7/24/2023 Biopsy    Navigational bronchoscopy with EBUS    A.  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     A.-B.  Lung, Right Middle Lobe Bronchial Brushing (Thin-prep and smear preparations):   Conclusive evidence of malignancy.  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     Satisfactory for evaluation.     C.-D.  Lung, Right Middle Lobe Bronchoalveolar Lavage (Thin-prep and cell block preparations):   Conclusive evidence of malignancy.  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     Satisfactory for evaluation.      E.-F.  Lymph Node, level 7 (Thin-prep and smear preparations):   Atypical cellular changes seen.  Rare atypical cells.  Lymphocytes.  Few bronchial cells and pulmonary macrophages.        Satisfactory for evaluation.     G.-H.  Lymph Node, 10R (Thin-prep and smear preparations):   Negative for malignancy.  Lymphocytes.  Aggregates of neutrophils.     Satisfactory for evaluation.     7/24/2023 -  Cancer Staged    Staging form: Lung, AJCC 8th Edition  - Clinical stage from 7/24/2023: Stage IIIB (cT4, cN2, cM0) - Signed by Celina Treviño MD on 12/26/2023  Stage prefix: Initial diagnosis       8/31/2023 - 10/12/2023 Chemotherapy    cyanocobalamin, 1,000 mcg, Intramuscular, Once, 1 of 1 cycle  Administration: 1,000 mcg (8/24/2023)  alteplase (CATHFLO), 2 mg, Intracatheter, Every 1 Minute as needed, 3 of 3 cycles  CISplatin (PLATINOL) with mannitol IVPB, 75 mg/m2 = 129.8 mg (100 % of original dose 75 mg/m2), Intravenous, Once, 3 of 3 cycles  Dose modification: 50 mg/m2 (original dose 75 mg/m2, Cycle  1, Reason: Anticipated Tolerance), 75 mg/m2 (original dose 75 mg/m2, Cycle 1, Reason: Dose modified as per discussion with consulting physician)  Administration: 129.8 mg (8/31/2023), 129.8 mg (9/21/2023), 129.8 mg (10/12/2023)  fosaprepitant (EMEND) IVPB, 150 mg, Intravenous, Once, 3 of 3 cycles  Administration: 150 mg (8/31/2023), 150 mg (9/21/2023), 150 mg (10/12/2023)  nivolumab (OPDIVO) IVPB, 360 mg, Intravenous, Once, 3 of 3 cycles  Administration: 360 mg (8/31/2023), 360 mg (9/21/2023), 360 mg (10/12/2023)  pemetrexed (ALIMTA) chemo infusion, 500 mg/m2 = 865 mg, Intravenous, Once, 3 of 3 cycles  Administration: 865 mg (8/31/2023), 865 mg (9/21/2023), 865 mg (10/12/2023)     1/18/2024 - 3/1/2024 Radiation    Treatments:  Course: C1  Plan ID Energy Fractions Dose per Fraction (cGy) Dose Correction (cGy) Total Dose Delivered (cGy) Elapsed Days   R LUNG MED 6X 30 / 30 200 0 6,000 43    Treatment Dates:  1/18/2024 - 3/1/2024.      1/19/2024 - 3/1/2024 Chemotherapy    alteplase (CATHFLO), 2 mg, Intracatheter, Every 1 Minute as needed, 6 of 6 cycles  CARBOplatin (PARAPLATIN) IVPB (GOG AUC DOSING), 185 mg, Intravenous, Once, 6 of 6 cycles  Administration: 185 mg (1/19/2024), 183.2 mg (1/26/2024), 183.2 mg (2/2/2024), 183.2 mg (2/9/2024), 183.2 mg (2/16/2024), 196.6 mg (3/1/2024)  PACLItaxel (TAXOL) chemo IVPB, 50 mg/m2 = 85.8 mg, Intravenous, Once, 1 of 1 cycle  Administration: 85.8 mg (1/19/2024)  pemetrexed (ALIMTA) chemo infusion, 500 mg/m2 = 860 mg (100 % of original dose 500 mg/m2), Intravenous, Once, 2 of 2 cycles  Dose modification: 500 mg/m2 (original dose 500 mg/m2, Cycle 4)  Administration: 860 mg (2/9/2024), 860 mg (3/1/2024)     4/2/2024 - 6/25/2024 Chemotherapy    alteplase (CATHFLO), 2 mg, Intracatheter, Every 1 Minute as needed, 5 of 6 cycles  durvalumab (IMFINZI) IVPB, 1,500 mg (original dose 10 mg/kg), Intravenous, Once, 5 of 6 cycles  Dose modification: 1,500 mg (original dose 10 mg/kg, Cycle 1,  "Reason: Other (Must fill in a comment), Comment: Will give every 28 days.)  Administration: 1,500 mg (4/2/2024), 1,500 mg (5/28/2024), 1,500 mg (6/25/2024), 1,500 mg (4/30/2024)        Pertinent Medical History     02/17/25: reviewed     Review of Systems  14 point review of systems was negative except that mentioned in HPI.        Objective   /84 (Patient Position: Sitting, Cuff Size: Standard)   Pulse (!) 111   Temp 98 °F (36.7 °C) (Temporal)   Resp 18   Ht 5' 3\" (1.6 m)   Wt 75.5 kg (166 lb 8 oz)   SpO2 100%   BMI 29.49 kg/m²     Pain Screening:  Pain Score: 0-No pain  ECOG   1  Physical Exam  Vitals and nursing note reviewed.   Constitutional:       General: She is not in acute distress.     Appearance: Normal appearance.   HENT:      Head: Normocephalic and atraumatic.      Mouth/Throat:      Mouth: Mucous membranes are moist.      Pharynx: Oropharynx is clear.   Eyes:      General: No scleral icterus.     Extraocular Movements: Extraocular movements intact.      Conjunctiva/sclera: Conjunctivae normal.   Cardiovascular:      Rate and Rhythm: Normal rate and regular rhythm.      Pulses: Normal pulses.      Heart sounds: No murmur heard.  Pulmonary:      Effort: Pulmonary effort is normal.      Breath sounds: Normal breath sounds. No wheezing, rhonchi or rales.   Abdominal:      General: Bowel sounds are normal.      Palpations: Abdomen is soft.      Tenderness: There is no abdominal tenderness.   Musculoskeletal:         General: No swelling or tenderness. Normal range of motion.      Cervical back: Neck supple.   Lymphadenopathy:      Cervical: No cervical adenopathy.   Skin:     General: Skin is warm and dry.      Coloration: Skin is not pale.      Findings: Rash (improved) present. No bruising or erythema.   Neurological:      General: No focal deficit present.      Mental Status: She is alert and oriented to person, place, and time.      Motor: No weakness.   Psychiatric:         Mood and " Affect: Mood normal.         Behavior: Behavior normal.         Labs: I have reviewed the following labs:  Lab Results   Component Value Date/Time    WBC 7.03 02/13/2025 12:47 PM    RBC 3.70 (L) 02/13/2025 12:47 PM    Hemoglobin 11.6 02/13/2025 12:47 PM    Hematocrit 36.9 02/13/2025 12:47 PM     (H) 02/13/2025 12:47 PM    MCH 31.4 02/13/2025 12:47 PM    RDW 12.6 02/13/2025 12:47 PM    Platelets 240 02/13/2025 12:47 PM    Segmented % 75 02/13/2025 12:47 PM    Lymphocytes % 13 (L) 02/13/2025 12:47 PM    Monocytes % 10 02/13/2025 12:47 PM    Eosinophils Relative 1 02/13/2025 12:47 PM    Basophils Relative 1 02/13/2025 12:47 PM    Immature Grans % 0 02/13/2025 12:47 PM    Absolute Neutrophils 5.27 02/13/2025 12:47 PM     Lab Results   Component Value Date/Time    Potassium 4.1 02/13/2025 12:47 PM    Chloride 103 02/13/2025 12:47 PM    CO2 26 02/13/2025 12:47 PM    BUN 11 02/13/2025 12:47 PM    Creatinine 0.63 02/13/2025 12:47 PM    Glucose, Fasting 116 (H) 11/26/2024 10:42 AM    Calcium 9.0 02/13/2025 12:47 PM    AST 23 02/13/2025 12:47 PM    ALT 31 02/13/2025 12:47 PM    Alkaline Phosphatase 64 02/13/2025 12:47 PM    Total Protein 7.1 02/13/2025 12:47 PM    Albumin 3.8 02/13/2025 12:47 PM    Total Bilirubin 0.43 02/13/2025 12:47 PM    eGFR 92 02/13/2025 12:47 PM             Disclaimer: This document was prepared using [employer] technology. If a word or phrase is confusing, or does not make sense, this is likely due to recognition error which was not discovered during this clinician's review. If you believe an error has occurred, please contact me through [employer] service line for jamie?cation.      Follow Up    All questions were answered to the patient's satisfaction during this encounter. The patient knows the contact information for our office and knows to reach out for any relevant concerns related to this encounter. They are to call for any temperature 100.4 or higher, new symptoms including but not  restricted to shaking chills, decreased appetite, nausea, vomiting, diarrhea, increased fatigue, shortness of breath or chest pain, confusion, and not feeling the strength to come to the clinic. For all other listed problems and medical diagnosis in their chart - they are managed by PCP and/or other specialists, which the patient acknowledges.     I spent 44 minutes reviewing the records (labs, clinician notes, outside records, medical history, ordering medicine/tests/procedures, monitoring of anti-neoplastic toxicities, interpreting the imaging/labs previously done) and coordination of care as well as direct time with the patient today, of which greater than 50% of the time was spent in counseling and coordination of care with the patient/family.

## 2025-02-19 ENCOUNTER — CLINICAL SUPPORT (OUTPATIENT)
Facility: HOSPITAL | Age: 70
End: 2025-02-19
Payer: COMMERCIAL

## 2025-02-19 DIAGNOSIS — C34.90 MALIGNANT NEOPLASM OF LUNG, UNSPECIFIED LATERALITY, UNSPECIFIED PART OF LUNG (HCC): Primary | ICD-10-CM

## 2025-02-19 PROCEDURE — G0239 OTH RESP PROC, GROUP: HCPCS

## 2025-02-24 ENCOUNTER — CLINICAL SUPPORT (OUTPATIENT)
Facility: HOSPITAL | Age: 70
End: 2025-02-24
Payer: COMMERCIAL

## 2025-02-24 DIAGNOSIS — C34.90 MALIGNANT NEOPLASM OF LUNG, UNSPECIFIED LATERALITY, UNSPECIFIED PART OF LUNG (HCC): Primary | ICD-10-CM

## 2025-02-24 PROCEDURE — G0239 OTH RESP PROC, GROUP: HCPCS

## 2025-02-26 ENCOUNTER — APPOINTMENT (OUTPATIENT)
Facility: HOSPITAL | Age: 70
End: 2025-02-26
Payer: COMMERCIAL

## 2025-03-03 ENCOUNTER — CLINICAL SUPPORT (OUTPATIENT)
Facility: HOSPITAL | Age: 70
End: 2025-03-03
Payer: COMMERCIAL

## 2025-03-03 DIAGNOSIS — C34.90 MALIGNANT NEOPLASM OF LUNG, UNSPECIFIED LATERALITY, UNSPECIFIED PART OF LUNG (HCC): Primary | ICD-10-CM

## 2025-03-03 PROCEDURE — G0239 OTH RESP PROC, GROUP: HCPCS

## 2025-03-03 NOTE — PROGRESS NOTES
Pulmonary Rehabilitation   Assessment and Individualized Treatment Plan  30 DAY      Today's date: 2025  # of Exercise Sessions Completed: 6  Patient name: Saskia Alfaro     : 1955       MRN: [id number]  Referring Physician: Sanjana Rodríguez MD  Pulmonologist: Sanjana Rodríguez MD  Provider: Freddy  Clinician: Ivana Puckett MS     Dx:    Encounter Diagnosis   Name Primary?    Malignant neoplasm of lung, unspecified laterality, unspecified part of lung (HCC) Yes     Date of onset: 2023      Treatment is tailored to this patient's individual needs.  The ITP was reviewed with the patient and all questions were answered to their satisfaction.  Additional ITP documentation can be found electronically including daily and monthly exercise summaries, daily session notes with ECG summaries, education notes, daily medication reconciliation, and daily physician supervision.      COMMENTS:   SUMMARY 2025    Resting BP  118/70 - 138/80,  HR 90 - 111  Exercise /60- 122/60.  HR 96 - 127  Exercise session details:  30 minutes,  1.9-3.5 METs  LO2:   Rest:  RA L/min, Exercise:  RA L/min  SpO2: Rest:  96-99 %,  Exercise:  88-91 %  Dyspnea:   Rest:  0/10,  Exercise: 0-3/10  Home exercise/ADLs: none, she does a lot of house work around her house and takes care of her sister  Patient's subjective report of progress: She reports an increase in her stamina, strength, and overall energy levels. She has been going through a lot emotionally taking care of her sister which takes up a lot of of her time.   .       ASSESSMENT      Medical History:   Past Medical History:   Diagnosis Date    Fibromyalgia     Heart murmur     Lumbosacral disc herniation     Lung cancer (HCC)     Osteopenia     Psoriasis     Psoriasis     RSD (reflex sympathetic dystrophy)        Family History:  Family History   Problem Relation Age of Onset    Sarcoidosis Mother     Cancer Sister        Allergies:   Prednisone    Current  Medications:   Current Outpatient Medications   Medication Sig Dispense Refill    amitriptyline (ELAVIL) 25 mg tablet       amoxicillin (AMOXIL) 500 mg capsule Prn dental procedure (Patient not taking: Reported on 10/21/2024)      Ascorbic Acid (vitamin C) 100 MG tablet Take 100 mg by mouth daily      atorvastatin (LIPITOR) 20 mg tablet Take 20 mg by mouth daily      b complex vitamins capsule Take 1 capsule by mouth daily (Patient not taking: Reported on 10/21/2024)      bismuth subsalicylate (PEPTO BISMOL) 262 MG chewable tablet Take 1 tablet by mouth every 6 hours for 14 days (Patient not taking: Reported on 5/30/2024) 56 tablet 0    calcipotriene (DOVONEX) 0.005 % cream Apply topically 2 (two) times a day To affected areas on Saturday and Sunday only 120 g 2    cholecalciferol (VITAMIN D3) 400 units tablet Take 400 Units by mouth daily      DULoxetine (CYMBALTA) 30 mg delayed release capsule       Fluticasone-Salmeterol (Advair) 100-50 mcg/dose inhaler Inhale 1 puff 2 (two) times a day Rinse mouth after use.      folic acid (KP Folic Acid) 1 mg tablet Take 1 tablet (1 mg total) by mouth daily 90 tablet 2    halobetasol (ULTRAVATE) 0.05 % ointment Apply topically 2 (two) times a day 50 g 3    loratadine (CLARITIN) 10 mg tablet Take 10 mg by mouth daily (Patient not taking: Reported on 7/29/2024)      omeprazole (PriLOSEC) 20 mg delayed release capsule Take 1 capsule (20 mg total) by mouth 2 (two) times a day Every 12 hours for 14 days (Patient not taking: Reported on 12/26/2023) 28 capsule 0    ondansetron (ZOFRAN) 4 mg tablet Take 2 tablets (8 mg total) by mouth every 8 (eight) hours as needed for nausea or vomiting 20 tablet 0    propranolol (INDERAL) 20 mg/5 mL solution       Sotorasib (Lumakras) 320 MG TABS Take 960 mg by mouth daily 90 tablet 5    triamcinolone (KENALOG) 0.1 % cream Apply topically 2 (two) times a day Affected areas Monday through Friday only 453.6 g 2     No current facility-administered  medications for this visit.       Physical Limitations: none    Fall Risk: Low   Comments: Ambulates with a steady gait with no assist device and Denies a fall in the past 6 months    Cultural needs: none    PFT:  Yes     FEV1/FVC ratio of 66.35%   FEV1 of 71% predicted  mildobstruction.    Medication compliance: Yes  Comments: Pt reports to be compliant with medications      Pulmonary Disease Risk Factors:  smoking  Mold in her house, that has been fixed    Sleep Disorders:   Has trouble falling asleep occasionally       EXERCISE ASSESSMENT:      Initial Fitness Assessment: 6MWT:  Resting:    Resting:  BP: 122/70  HR: 98  SpO2: 94  Dyspnea: 0  O2 Use: RA l/min, Exercise:  BP: 132/70  HR: 120  SpO2: 93%  Dyspnea: 3  O2 use: RA l/min, METs:  2.68, and ECG Summary: Sinus tachy, PVCs and bigeminy noted      Current Functional Status:  Occupation: retired  Recreation/Physical activity: none  ADL’s:resumed all ADLs able to perform self-care  [place]: resumed all ADLs able to perform self-care  Exercise:  none  Home exercise equipment: none  Other Comments: none    SMART Exercise Goals:   reduced score on CAT, improved 6MWT distance, reduced dyspnea during exercise, improved exercise tolerance based on peak METs tolerated in pulmonary rehab exercise session, SpO2 >88% during exercise, and reduced RPD at rest    Patient Specific EXERCISE GOALS:     attend pulmonary rehab regularly, decrease sitting time at home, start a walking program, begin a consistent exercise regimen , increased muscular strength, and increased energy    PSYCHOSOCIAL ASSESSMENT:    Date of last assessment: 2/3/2025  Depression screening:  PHQ-9 = 11    Interpretation:  10-14 = Moderate Depression  Anxiety screening:  EMILIANO-7 = 10    Interpretation: 10-14 = Moderate anxiety    Pt self-report of depression and anxiety   Patient reports feelings of depression   Patient reports feelings of anxiety  Reports sufficient emotional support     Self-reported  "stress level:  7  Stress Management: practice Relaxation Techniques, exercise, and spend time with family    Quality of Life Screen:  (Higher score indicates disease impact on QOL)  [facility] COOP score: 28/40      CAT: 18/40      Shortness of breath questionnaire: 12/120    Social Support:   children  Community/Social Activities: none    SMART Goals:    Feelings in [facility] Score < 3, Social Support in [facility] Score < 3, Overall Health in [facility] Score < 3, Quality of Life in [facility] Score < 3 , and improved sleeping habits    Patient Specific PSYCHOCOSOCIAL GOALS:    spend time with family     Psychosocial Assessment as it relates to rehabilitation: Patient denies issues with his/her family or home life that may affect their rehabilitation efforts.       NUTRITION ASSESSMENT:    Initial Weight:  165.4 lbs   Current Weight: 165.4 lbs    Height:   Ht Readings from Last 1 Encounters:   02/17/25 5' 3\" (1.6 m)       Rate Your Plate Score: 62/81    Self-reported Current Dietary Habits:  B: doesn't eat breakfast, 2 cups of coffee  Toast with PB when she takes chemo pills  D: chicken breast, squash raviolis, salad    ETOH:   Social History     Substance and Sexual Activity   Alcohol Use Yes    Alcohol/week: 14.0 standard drinks of alcohol    Types: 14 Glasses of wine per week    Comment: 2 drinks daily       SMART Goals:   eat 6 or more servings of grain products per day, eat 5 or more servings of fruits and vegetables a day, rarely eat processed meats or eat low fat processed meats, and choose 1% or skim milk    Patient Specific NUTRITION GOALS:    increased muscle mass      OTHER CORE COMPONENT ASSESSMENT:    Hospitalizations in the past year: None, the last year    Oxygen needs:   Rest:  room air  Exercise/physical activity:  room air  Sleep:   room air    Does Pt monitor home SpO2? no   Has a pulse ox, but does use it      Use of Rescue Inhaler: No    Use of Maintenance Inhaler: Yes:  2 times per day    Use of " Nebulizer Treatments:  No    Patient practices breathing techniques at home:  No and Reviewed with patient on:  2/3/2025    CAD Risk Factors:  Cholesterol: Yes  HTN: No  DM: No  Obesity: No     Smoking: Current user:  average ppd:  1/2 ppd    SMART Core Component Goals:   consistent, controlled resting BP < 130/80, medication compliance, and reduced number of cigarettes per day    Patient Specific CORE COMPONENT GOALS:    reduced dietary sodium <2000mg, medication compliance, and set a target quit date      Blood Pressure:    Restin//80  Exercise: 112//60  Recovery: 92//70      INDIVIDUALIZED TREATMENT PLAN    The patient is agreeable to attend 24 pulmonary rehab exercise sessions.      EXERCISE GOALS AND PLAN    PHYSCIAN PRESCRIBED EXERCISE    Current Aerobic Exercise Prescription       Frequency: 2 days/week   Supplement with home exercise 2+ days/wk as tolerated        Minutes: 30         METS: 1.9-3.5              SpO2: 88-91%              RPD: 0-3                      HR:     RPE: 3-5         Modalities: UBE, NuStep, and Recumbent bike      Aerobic Exercise Prescription Plan for Progression:    Frequency: 2 days/week    Supplement with home exercise 2+ days/wk as tolerated       Minutes: 30-40        METS: 2.5-3.5              SpO2: >88%              RPD: 4-6                      HR:RHR +30-40bpm   RPE: 4-5        Modalities: Treadmill, UBE, and Lifecycle        Supplemental Oxygen Needs with Exercise:  room air.    Strength trainin-3 days / week  12-15 repetitions  1-2 sets per modality   Will be added following 2-3 weeks of monitored exercise sessions   Modalities: Leg Press, Chest Press, Pull Downs, and Arm Curl    Education: pursed lipped breathing, diaphragmatic breathing, relaxation breathing, home exercise guidelines, benefits of exercise for pulmonary disease, RPE scale, RPD scale, O2 saturation monitoring, and appropriate O2 response to exercise    Progress toward  Exercise Goals:    Pt is progressing and showing improvement  toward the following goals:  attending rehab regularly and increasing her exercise intensities as tolerated.  , Pt has not made progress toward the following goals: no home exercise. , Will continue to educate and progress as tolerated.    Exercise Plan:  Titrate supplemental oxygen as needed to maintain SpO2>88% with exercise, learn to conserve energy with ADLs , practice diaphragmatic breathing, reduce time sitting at home, increased strength of respiratory muscles, utilize PLB with physical activity, and begin a home exercise program     Readiness to change: Preparation:  (Getting ready to change)       NUTRITION GOALS AND PLAN    Progress toward Nutritional goals:    Pt is progressing and showing improvement  toward the following goals:  has good and bad days with her diet, she loves salads. Kales, chicken and rare red meat, hydrates well throughout the day.  , Pt has not made progress toward the following goals: occ. Chooses easy meals, like mac and cheese. , Will continue to educate and progress as tolerated.    Nutrition Plan: increase intake of whole grains, increase daily intake of fruits and vegetables, increase daily intake of low fat dairy, eat poultry without the skin, and drink/use 1%  low fat or skim  milk    SMART goals are based Rate Your Plate Dietary Self-Assessment. These are the areas in which the patient could score higher on the assessment.  Goals include recommendations for a heart healthy diet based on American Heart Association.    Nutrition Education: low sodium diet  maintaining hydration    Readiness to change: Preparation:  (Getting ready to change)       PSYCHOSOCIAL GOALS AND PLAN    Information to begin using [employer] was provided as well as contact information for counseling through  [facility].    Progress toward Psychosocial goals:    Pt is progressing and showing improvement  toward the following goals:   managing stress the best way she can, takes care of her sister, who recently is not at home and in a facility.  , Will continue to educate and progress as tolerated.    Psychosocial Plan: Practice relaxation techniques, Exercise, and Keep a positive mindset    Psychosocial Education: signs/sxs of depression, benefits of a positive support system, and stress management techniques    Readiness to change: Preparation:  (Getting ready to change)       OTHER CORE COMPONENTS GOALS AND PLAN    Tobacco Use Intervention:     Brief counseling by cardiac rehab professional  Date: 2/3/2025    Oxygen Goal: Maintain SpO2>88% during exercise or as advised by pulmonolgist    Progress toward Core Component goals:    Pt is progressing and showing improvement  toward the following goals:  hydrating well throughout the day, taking meds as prescribed.  , Will continue to educate and progress as tolerated.    Core Component Plan: medication compliance, set target quit date for smoking, avoid processed foods, engage in regular exercise, eliminate salt shaker at the table, use salt substitutes, check labels for sodium content, and monitor home BP    Core Component Education:  pathophysiology of pulmonary disease and dangers of smoking    Readiness to change: Action:  (Changing behavior)

## 2025-03-05 ENCOUNTER — CLINICAL SUPPORT (OUTPATIENT)
Facility: HOSPITAL | Age: 70
End: 2025-03-05
Payer: COMMERCIAL

## 2025-03-05 DIAGNOSIS — C34.90 MALIGNANT NEOPLASM OF LUNG, UNSPECIFIED LATERALITY, UNSPECIFIED PART OF LUNG (HCC): Primary | ICD-10-CM

## 2025-03-05 PROCEDURE — G0239 OTH RESP PROC, GROUP: HCPCS

## 2025-03-10 ENCOUNTER — CLINICAL SUPPORT (OUTPATIENT)
Facility: HOSPITAL | Age: 70
End: 2025-03-10
Payer: COMMERCIAL

## 2025-03-10 ENCOUNTER — APPOINTMENT (OUTPATIENT)
Facility: HOSPITAL | Age: 70
End: 2025-03-10
Payer: COMMERCIAL

## 2025-03-10 DIAGNOSIS — C34.90 MALIGNANT NEOPLASM OF LUNG, UNSPECIFIED LATERALITY, UNSPECIFIED PART OF LUNG (HCC): Primary | ICD-10-CM

## 2025-03-10 PROCEDURE — G0239 OTH RESP PROC, GROUP: HCPCS

## 2025-03-12 ENCOUNTER — CLINICAL SUPPORT (OUTPATIENT)
Facility: HOSPITAL | Age: 70
End: 2025-03-12
Payer: COMMERCIAL

## 2025-03-12 DIAGNOSIS — C34.90 MALIGNANT NEOPLASM OF LUNG, UNSPECIFIED LATERALITY, UNSPECIFIED PART OF LUNG (HCC): Primary | ICD-10-CM

## 2025-03-12 PROCEDURE — G0239 OTH RESP PROC, GROUP: HCPCS

## 2025-03-13 ENCOUNTER — HOSPITAL ENCOUNTER (OUTPATIENT)
Dept: INFUSION CENTER | Facility: CLINIC | Age: 70
Discharge: HOME | End: 2025-03-13
Payer: COMMERCIAL

## 2025-03-13 DIAGNOSIS — Z95.828 PORT-A-CATH IN PLACE: Primary | ICD-10-CM

## 2025-03-13 DIAGNOSIS — C34.91 ADENOCARCINOMA OF RIGHT LUNG (HCC): ICD-10-CM

## 2025-03-13 LAB
ALBUMIN SERPL BCG-MCNC: 4.1 G/DL (ref 3.5–5)
ALP SERPL-CCNC: 67 U/L (ref 34–104)
ALT SERPL W P-5'-P-CCNC: 44 U/L (ref 7–52)
ANION GAP SERPL CALCULATED.3IONS-SCNC: 6 MMOL/L (ref 4–13)
AST SERPL W P-5'-P-CCNC: 30 U/L (ref 13–39)
BASOPHILS # BLD AUTO: 0.03 THOUSANDS/ÂΜL (ref 0–0.1)
BASOPHILS NFR BLD AUTO: 0 % (ref 0–1)
BILIRUB SERPL-MCNC: 0.37 MG/DL (ref 0.2–1)
BUN SERPL-MCNC: 11 MG/DL (ref 5–25)
CALCIUM SERPL-MCNC: 9.5 MG/DL (ref 8.4–10.2)
CHLORIDE SERPL-SCNC: 102 MMOL/L (ref 96–108)
CO2 SERPL-SCNC: 28 MMOL/L (ref 21–32)
CREAT SERPL-MCNC: 0.65 MG/DL (ref 0.6–1.3)
EOSINOPHIL # BLD AUTO: 0.1 THOUSAND/ÂΜL (ref 0–0.61)
EOSINOPHIL NFR BLD AUTO: 1 % (ref 0–6)
ERYTHROCYTE [DISTWIDTH] IN BLOOD BY AUTOMATED COUNT: 12.1 % (ref 11.6–15.1)
GFR SERPL CREATININE-BSD FRML MDRD: 91 ML/MIN/1.73SQ M
GLUCOSE SERPL-MCNC: 112 MG/DL (ref 65–140)
HCT VFR BLD AUTO: 38.1 % (ref 34.8–46.1)
HGB BLD-MCNC: 12.3 G/DL (ref 11.5–15.4)
IMM GRANULOCYTES # BLD AUTO: 0.03 THOUSAND/UL (ref 0–0.2)
IMM GRANULOCYTES NFR BLD AUTO: 0 % (ref 0–2)
LYMPHOCYTES # BLD AUTO: 0.9 THOUSANDS/ÂΜL (ref 0.6–4.47)
LYMPHOCYTES NFR BLD AUTO: 13 % (ref 14–44)
MCH RBC QN AUTO: 31.6 PG (ref 26.8–34.3)
MCHC RBC AUTO-ENTMCNC: 32.3 G/DL (ref 31.4–37.4)
MCV RBC AUTO: 98 FL (ref 82–98)
MONOCYTES # BLD AUTO: 0.68 THOUSAND/ÂΜL (ref 0.17–1.22)
MONOCYTES NFR BLD AUTO: 10 % (ref 4–12)
NEUTROPHILS # BLD AUTO: 5.42 THOUSANDS/ÂΜL (ref 1.85–7.62)
NEUTS SEG NFR BLD AUTO: 76 % (ref 43–75)
NRBC BLD AUTO-RTO: 0 /100 WBCS
PLATELET # BLD AUTO: 216 THOUSANDS/UL (ref 149–390)
PMV BLD AUTO: 10 FL (ref 8.9–12.7)
POTASSIUM SERPL-SCNC: 4.2 MMOL/L (ref 3.5–5.3)
PROT SERPL-MCNC: 7.2 G/DL (ref 6.4–8.4)
RBC # BLD AUTO: 3.89 MILLION/UL (ref 3.81–5.12)
SODIUM SERPL-SCNC: 136 MMOL/L (ref 135–147)
WBC # BLD AUTO: 7.16 THOUSAND/UL (ref 4.31–10.16)

## 2025-03-13 PROCEDURE — 85025 COMPLETE CBC W/AUTO DIFF WBC: CPT

## 2025-03-13 PROCEDURE — 80053 COMPREHEN METABOLIC PANEL: CPT

## 2025-03-13 NOTE — PROGRESS NOTES
Pt to clinic for labs only offering no complaints. Pt port accessed, flushed, and labs drawn without complication. Pt port de-accessed. Pt aware of next appointment on 4/7/25 at 1330. Calendar printed.

## 2025-03-14 ENCOUNTER — APPOINTMENT (OUTPATIENT)
Facility: HOSPITAL | Age: 70
End: 2025-03-14
Payer: COMMERCIAL

## 2025-03-17 ENCOUNTER — CLINICAL SUPPORT (OUTPATIENT)
Facility: HOSPITAL | Age: 70
End: 2025-03-17
Payer: COMMERCIAL

## 2025-03-17 DIAGNOSIS — C34.90 MALIGNANT NEOPLASM OF LUNG, UNSPECIFIED LATERALITY, UNSPECIFIED PART OF LUNG (HCC): Primary | ICD-10-CM

## 2025-03-17 PROCEDURE — G0239 OTH RESP PROC, GROUP: HCPCS

## 2025-03-19 ENCOUNTER — CLINICAL SUPPORT (OUTPATIENT)
Facility: HOSPITAL | Age: 70
End: 2025-03-19
Payer: COMMERCIAL

## 2025-03-19 ENCOUNTER — HOSPITAL ENCOUNTER (OUTPATIENT)
Dept: PULMONOLOGY | Facility: HOSPITAL | Age: 70
Discharge: HOME | End: 2025-03-19
Attending: INTERNAL MEDICINE
Payer: COMMERCIAL

## 2025-03-19 DIAGNOSIS — C34.90 MALIGNANT NEOPLASM OF LUNG, UNSPECIFIED LATERALITY, UNSPECIFIED PART OF LUNG (HCC): ICD-10-CM

## 2025-03-19 DIAGNOSIS — C34.90 MALIGNANT NEOPLASM OF LUNG, UNSPECIFIED LATERALITY, UNSPECIFIED PART OF LUNG (HCC): Primary | ICD-10-CM

## 2025-03-19 PROCEDURE — 94729 DIFFUSING CAPACITY: CPT | Performed by: INTERNAL MEDICINE

## 2025-03-19 PROCEDURE — 94060 EVALUATION OF WHEEZING: CPT

## 2025-03-19 PROCEDURE — 94729 DIFFUSING CAPACITY: CPT

## 2025-03-19 PROCEDURE — 94726 PLETHYSMOGRAPHY LUNG VOLUMES: CPT

## 2025-03-19 PROCEDURE — 94726 PLETHYSMOGRAPHY LUNG VOLUMES: CPT | Performed by: INTERNAL MEDICINE

## 2025-03-19 PROCEDURE — 94060 EVALUATION OF WHEEZING: CPT | Performed by: INTERNAL MEDICINE

## 2025-03-19 PROCEDURE — 94760 N-INVAS EAR/PLS OXIMETRY 1: CPT

## 2025-03-19 PROCEDURE — G0239 OTH RESP PROC, GROUP: HCPCS

## 2025-03-19 RX ORDER — ALBUTEROL SULFATE 0.83 MG/ML
2.5 SOLUTION RESPIRATORY (INHALATION) ONCE
Status: COMPLETED | OUTPATIENT
Start: 2025-03-19 | End: 2025-03-19

## 2025-03-19 RX ADMIN — ALBUTEROL SULFATE 2.5 MG: 2.5 SOLUTION RESPIRATORY (INHALATION) at 10:26

## 2025-03-24 ENCOUNTER — CLINICAL SUPPORT (OUTPATIENT)
Facility: HOSPITAL | Age: 70
End: 2025-03-24
Payer: COMMERCIAL

## 2025-03-24 DIAGNOSIS — C34.90 MALIGNANT NEOPLASM OF LUNG, UNSPECIFIED LATERALITY, UNSPECIFIED PART OF LUNG (HCC): Primary | ICD-10-CM

## 2025-03-24 PROCEDURE — G0239 OTH RESP PROC, GROUP: HCPCS

## 2025-03-26 ENCOUNTER — APPOINTMENT (OUTPATIENT)
Facility: HOSPITAL | Age: 70
End: 2025-03-26
Payer: COMMERCIAL

## 2025-03-27 ENCOUNTER — HOSPITAL ENCOUNTER (OUTPATIENT)
Dept: RADIOLOGY | Age: 70
Discharge: HOME | End: 2025-03-27
Payer: COMMERCIAL

## 2025-03-27 DIAGNOSIS — C34.91 ADENOCARCINOMA OF RIGHT LUNG (HCC): ICD-10-CM

## 2025-03-27 DIAGNOSIS — C34.81 MALIGNANT NEOPLASM OF OVERLAPPING SITES OF RIGHT LUNG (HCC): ICD-10-CM

## 2025-03-27 LAB — GLUCOSE SERPL-MCNC: 124 MG/DL (ref 65–140)

## 2025-03-27 PROCEDURE — 82948 REAGENT STRIP/BLOOD GLUCOSE: CPT

## 2025-03-27 PROCEDURE — A9552 F18 FDG: HCPCS

## 2025-03-27 PROCEDURE — 78815 PET IMAGE W/CT SKULL-THIGH: CPT

## 2025-03-31 ENCOUNTER — APPOINTMENT (OUTPATIENT)
Facility: HOSPITAL | Age: 70
End: 2025-03-31
Payer: COMMERCIAL

## 2025-04-02 ENCOUNTER — CLINICAL SUPPORT (OUTPATIENT)
Facility: HOSPITAL | Age: 70
End: 2025-04-02
Payer: COMMERCIAL

## 2025-04-02 DIAGNOSIS — C34.90 MALIGNANT NEOPLASM OF LUNG, UNSPECIFIED LATERALITY, UNSPECIFIED PART OF LUNG (HCC): Primary | ICD-10-CM

## 2025-04-02 PROCEDURE — G0239 OTH RESP PROC, GROUP: HCPCS

## 2025-04-07 ENCOUNTER — HOSPITAL ENCOUNTER (OUTPATIENT)
Dept: INFUSION CENTER | Facility: CLINIC | Age: 70
Discharge: HOME | End: 2025-04-07
Payer: COMMERCIAL

## 2025-04-07 ENCOUNTER — CLINICAL SUPPORT (OUTPATIENT)
Facility: HOSPITAL | Age: 70
End: 2025-04-07
Payer: COMMERCIAL

## 2025-04-07 DIAGNOSIS — Z95.828 PORT-A-CATH IN PLACE: Primary | ICD-10-CM

## 2025-04-07 DIAGNOSIS — C34.91 ADENOCARCINOMA OF RIGHT LUNG (HCC): ICD-10-CM

## 2025-04-07 DIAGNOSIS — C34.90 MALIGNANT NEOPLASM OF LUNG, UNSPECIFIED LATERALITY, UNSPECIFIED PART OF LUNG (HCC): Primary | ICD-10-CM

## 2025-04-07 LAB
ALBUMIN SERPL BCG-MCNC: 3.9 G/DL (ref 3.5–5)
ALP SERPL-CCNC: 67 U/L (ref 34–104)
ALT SERPL W P-5'-P-CCNC: 51 U/L (ref 7–52)
ANION GAP SERPL CALCULATED.3IONS-SCNC: 7 MMOL/L (ref 4–13)
AST SERPL W P-5'-P-CCNC: 35 U/L (ref 13–39)
BASOPHILS # BLD AUTO: 0.04 THOUSANDS/ÂΜL (ref 0–0.1)
BASOPHILS NFR BLD AUTO: 1 % (ref 0–1)
BILIRUB SERPL-MCNC: 0.36 MG/DL (ref 0.2–1)
BUN SERPL-MCNC: 13 MG/DL (ref 5–25)
CALCIUM SERPL-MCNC: 8.9 MG/DL (ref 8.4–10.2)
CHLORIDE SERPL-SCNC: 102 MMOL/L (ref 96–108)
CO2 SERPL-SCNC: 26 MMOL/L (ref 21–32)
CREAT SERPL-MCNC: 0.66 MG/DL (ref 0.6–1.3)
EOSINOPHIL # BLD AUTO: 0.13 THOUSAND/ÂΜL (ref 0–0.61)
EOSINOPHIL NFR BLD AUTO: 2 % (ref 0–6)
ERYTHROCYTE [DISTWIDTH] IN BLOOD BY AUTOMATED COUNT: 11.8 % (ref 11.6–15.1)
GFR SERPL CREATININE-BSD FRML MDRD: 91 ML/MIN/1.73SQ M
GLUCOSE SERPL-MCNC: 104 MG/DL (ref 65–140)
HCT VFR BLD AUTO: 39.5 % (ref 34.8–46.1)
HGB BLD-MCNC: 12.9 G/DL (ref 11.5–15.4)
IMM GRANULOCYTES # BLD AUTO: 0.03 THOUSAND/UL (ref 0–0.2)
IMM GRANULOCYTES NFR BLD AUTO: 0 % (ref 0–2)
LYMPHOCYTES # BLD AUTO: 1.02 THOUSANDS/ÂΜL (ref 0.6–4.47)
LYMPHOCYTES NFR BLD AUTO: 13 % (ref 14–44)
MCH RBC QN AUTO: 31.7 PG (ref 26.8–34.3)
MCHC RBC AUTO-ENTMCNC: 32.7 G/DL (ref 31.4–37.4)
MCV RBC AUTO: 97 FL (ref 82–98)
MONOCYTES # BLD AUTO: 0.88 THOUSAND/ÂΜL (ref 0.17–1.22)
MONOCYTES NFR BLD AUTO: 12 % (ref 4–12)
NEUTROPHILS # BLD AUTO: 5.55 THOUSANDS/ÂΜL (ref 1.85–7.62)
NEUTS SEG NFR BLD AUTO: 72 % (ref 43–75)
NRBC BLD AUTO-RTO: 0 /100 WBCS
PLATELET # BLD AUTO: 259 THOUSANDS/UL (ref 149–390)
PMV BLD AUTO: 9.7 FL (ref 8.9–12.7)
POTASSIUM SERPL-SCNC: 4 MMOL/L (ref 3.5–5.3)
PROT SERPL-MCNC: 7.2 G/DL (ref 6.4–8.4)
RBC # BLD AUTO: 4.07 MILLION/UL (ref 3.81–5.12)
SODIUM SERPL-SCNC: 135 MMOL/L (ref 135–147)
WBC # BLD AUTO: 7.65 THOUSAND/UL (ref 4.31–10.16)

## 2025-04-07 PROCEDURE — 80053 COMPREHEN METABOLIC PANEL: CPT

## 2025-04-07 PROCEDURE — G0239 OTH RESP PROC, GROUP: HCPCS

## 2025-04-07 PROCEDURE — 85025 COMPLETE CBC W/AUTO DIFF WBC: CPT

## 2025-04-07 NOTE — PROGRESS NOTES
Pt here for port flush and labs, offering no complaints. PAC accessed with positive blood return noted, labs drawn. PAC flushed and de-accessed. AVS declined. Next appt 5/5 1pm. Walked out in stable condition.

## 2025-04-08 NOTE — PROGRESS NOTES
Pulmonary Rehabilitation   Assessment and Individualized Treatment Plan  60 DAY      Today's date: 2025  # of Exercise Sessions Completed: 14  Patient name: Saskia Alfaro     : 1955       MRN: [id number]  Referring Physician: Sanjana Rodríguez MD  Pulmonologist: Sanjana Rodríguez MD  Provider: Freddy  Clinician: Ivana Puckett MS     Dx:    Encounter Diagnosis   Name Primary?    Malignant neoplasm of lung, unspecified laterality, unspecified part of lung (HCC) Yes     Date of onset: 2023      Treatment is tailored to this patient's individual needs.  The ITP was reviewed with the patient and all questions were answered to their satisfaction.  Additional ITP documentation can be found electronically including daily and monthly exercise summaries, daily session notes with ECG summaries, education notes, daily medication reconciliation, and daily physician supervision.      COMMENTS:   SUMMARY 2025    Resting BP  98//80,  HR   Exercise /70  -119  Exercise session details: 40 minutes,  1.9-3.5 METs  LO2:   Rest:  RA L/min, Exercise:  RA L/min  SpO2: Rest:  94-97 %,  Exercise:  92-97 %  Dyspnea:   Rest:  0/10,  Exercise: 0-3/10  Home exercise/ADLs: none, she does a lot of house work around her house and takes care of her sister  Patient's subjective report of progress: She reports an increase in her stamina, strength, and overall energy levels. She has been going through a lot emotionally taking care of her sister which takes up a lot of of her time.   .       ASSESSMENT      Medical History:   Past Medical History:   Diagnosis Date    Fibromyalgia     Heart murmur     Lumbosacral disc herniation     Lung cancer (HCC)     Osteopenia     Psoriasis     Psoriasis     RSD (reflex sympathetic dystrophy)        Family History:  Family History   Problem Relation Age of Onset    Sarcoidosis Mother     Cancer Sister        Allergies:   Prednisone    Current Medications:    Current Outpatient Medications   Medication Sig Dispense Refill    amitriptyline (ELAVIL) 25 mg tablet       amoxicillin (AMOXIL) 500 mg capsule Prn dental procedure (Patient not taking: Reported on 10/21/2024)      Ascorbic Acid (vitamin C) 100 MG tablet Take 100 mg by mouth daily      atorvastatin (LIPITOR) 20 mg tablet Take 20 mg by mouth daily      b complex vitamins capsule Take 1 capsule by mouth daily (Patient not taking: Reported on 10/21/2024)      bismuth subsalicylate (PEPTO BISMOL) 262 MG chewable tablet Take 1 tablet by mouth every 6 hours for 14 days (Patient not taking: Reported on 5/30/2024) 56 tablet 0    calcipotriene (DOVONEX) 0.005 % cream Apply topically 2 (two) times a day To affected areas on Saturday and Sunday only 120 g 2    cholecalciferol (VITAMIN D3) 400 units tablet Take 400 Units by mouth daily      DULoxetine (CYMBALTA) 30 mg delayed release capsule       Fluticasone-Salmeterol (Advair) 100-50 mcg/dose inhaler Inhale 1 puff 2 (two) times a day Rinse mouth after use.      folic acid (KP Folic Acid) 1 mg tablet Take 1 tablet (1 mg total) by mouth daily 90 tablet 2    halobetasol (ULTRAVATE) 0.05 % ointment Apply topically 2 (two) times a day 50 g 3    loratadine (CLARITIN) 10 mg tablet Take 10 mg by mouth daily (Patient not taking: Reported on 7/29/2024)      omeprazole (PriLOSEC) 20 mg delayed release capsule Take 1 capsule (20 mg total) by mouth 2 (two) times a day Every 12 hours for 14 days (Patient not taking: Reported on 12/26/2023) 28 capsule 0    ondansetron (ZOFRAN) 4 mg tablet Take 2 tablets (8 mg total) by mouth every 8 (eight) hours as needed for nausea or vomiting 20 tablet 0    propranolol (INDERAL) 20 mg/5 mL solution       Sotorasib (Lumakras) 320 MG TABS Take 960 mg by mouth daily 90 tablet 5    triamcinolone (KENALOG) 0.1 % cream Apply topically 2 (two) times a day Affected areas Monday through Friday only 453.6 g 2     No current facility-administered medications for  this visit.     Facility-Administered Medications Ordered in Other Visits   Medication Dose Route Frequency Provider Last Rate Last Admin    alteplase (CATHFLO) injection 2 mg  2 mg Intracatheter Q1MIN PRN Maday Orellana MD           Physical Limitations: none    Fall Risk: Low   Comments: Ambulates with a steady gait with no assist device and Denies a fall in the past 6 months    Cultural needs: none    PFT:  Yes     FEV1/FVC ratio of 66.35%   FEV1 of 71% predicted  mildobstruction.    Medication compliance: Yes  Comments: Pt reports to be compliant with medications      Pulmonary Disease Risk Factors:  smoking  Mold in her house, that has been fixed    Sleep Disorders:   Has trouble falling asleep occasionally       EXERCISE ASSESSMENT:      Initial Fitness Assessment: 6MWT:  Resting:    Resting:  BP: 122/70  HR: 98  SpO2: 94  Dyspnea: 0  O2 Use: RA l/min, Exercise:  BP: 132/70  HR: 120  SpO2: 93%  Dyspnea: 3  O2 use: RA l/min, METs:  2.68, and ECG Summary: Sinus tachy, PVCs and bigeminy noted      Current Functional Status:  Occupation: retired  Recreation/Physical activity: none  ADL’s:resumed all ADLs able to perform self-care  [place]: resumed all ADLs able to perform self-care  Exercise:  none  Home exercise equipment: none  Other Comments: none    SMART Exercise Goals:   reduced score on CAT, improved 6MWT distance, reduced dyspnea during exercise, improved exercise tolerance based on peak METs tolerated in pulmonary rehab exercise session, SpO2 >88% during exercise, and reduced RPD at rest    Patient Specific EXERCISE GOALS:     attend pulmonary rehab regularly, decrease sitting time at home, start a walking program, begin a consistent exercise regimen , increased muscular strength, and increased energy    PSYCHOSOCIAL ASSESSMENT:    Date of last assessment: 2/3/2025  Depression screening:  PHQ-9 = 11    Interpretation:  10-14 = Moderate Depression  Anxiety screening:  EMILIANO-7 = 10    Interpretation:  "10-14 = Moderate anxiety    Pt self-report of depression and anxiety   Patient reports feelings of depression   Patient reports feelings of anxiety  Reports sufficient emotional support     Self-reported stress level:  7  Stress Management: practice Relaxation Techniques, exercise, and spend time with family    Quality of Life Screen:  (Higher score indicates disease impact on QOL)  [facility]: 28/40      CAT: 18/40      Shortness of breath questionnaire: 12/120    Social Support:   children  Community/Social Activities: none    SMART Goals:    Feelings in [facility] Score < 3, Social Support in [facility] Score < 3, Overall Health in [facility] Score < 3, Quality of Life in [facility] Score < 3 , and improved sleeping habits    Patient Specific PSYCHOCOSOCIAL GOALS:    spend time with family     Psychosocial Assessment as it relates to rehabilitation: Patient denies issues with his/her family or home life that may affect their rehabilitation efforts.       NUTRITION ASSESSMENT:    Initial Weight:  165.4 lbs   Current Weight: 165.4 lbs    Height:   Ht Readings from Last 1 Encounters:   02/17/25 5' 3\" (1.6 m)       Rate Your Plate Score: 62/81    Self-reported Current Dietary Habits:  B: doesn't eat breakfast, 2 cups of coffee  Toast with PB when she takes chemo pills  D: chicken breast, squash raviolis, salad    ETOH:   Social History     Substance and Sexual Activity   Alcohol Use Yes    Alcohol/week: 14.0 standard drinks of alcohol    Types: 14 Glasses of wine per week    Comment: 2 drinks daily       SMART Goals:   eat 6 or more servings of grain products per day, eat 5 or more servings of fruits and vegetables a day, rarely eat processed meats or eat low fat processed meats, and choose 1% or skim milk    Patient Specific NUTRITION GOALS:    increased muscle mass      OTHER CORE COMPONENT ASSESSMENT:    Hospitalizations in the past year: None, the last year    Oxygen needs:   Rest:  room " air  Exercise/physical activity:  room air  Sleep:   room air    Does Pt monitor home SpO2? no   Has a pulse ox, but does use it      Use of Rescue Inhaler: No    Use of Maintenance Inhaler: Yes:  2 times per day    Use of Nebulizer Treatments:  No    Patient practices breathing techniques at home:  No and Reviewed with patient on:  2/3/2025    CAD Risk Factors:  Cholesterol: Yes  HTN: No  DM: No  Obesity: No     Smoking: Current user:  average ppd:  /2 ppd    SMART Core Component Goals:   consistent, controlled resting BP < 130/80, medication compliance, and reduced number of cigarettes per day    Patient Specific CORE COMPONENT GOALS:    reduced dietary sodium <2000mg, medication compliance, and set a target quit date          INDIVIDUALIZED TREATMENT PLAN    The patient is agreeable to attend 24 pulmonary rehab exercise sessions.      EXERCISE GOALS AND PLAN    PHYSCIAN PRESCRIBED EXERCISE    Current Aerobic Exercise Prescription       Frequency: 2 days/week   Supplement with home exercise 2+ days/wk as tolerated        Minutes: 40         METS: 1.9-3.5              SpO2: 92-97%              RPD: 0-5                      HR:  102-119   RPE: 3-5         Modalities: UBE, NuStep, and Recumbent bike      Aerobic Exercise Prescription Plan for Progression:    Frequency: 2 days/week    Supplement with home exercise 2+ days/wk as tolerated       Minutes: 30-40        METS: 2.5-3.5              SpO2: >88%              RPD: 4-6                      HR:RHR +30-40bpm   RPE: 4-5        Modalities: Treadmill, UBE, and Lifecycle        Supplemental Oxygen Needs with Exercise:  room air.    Strength trainin-3 days / week  12-15 repetitions  1-2 sets per modality   Will be added following 2-3 weeks of monitored exercise sessions   Modalities: Leg Press, Chest Press, Pull Downs, and Arm Curl    Education: pursed lipped breathing, diaphragmatic breathing, relaxation breathing, home exercise guidelines, benefits of exercise  for pulmonary disease, RPE scale, RPD scale, O2 saturation monitoring, and appropriate O2 response to exercise    Progress toward Exercise Goals:    Pt is progressing and showing improvement  toward the following goals:  attending rehab regularly and increasing her exercise intensities as tolerated.  , Pt has not made progress toward the following goals: no home exercise. , Will continue to educate and progress as tolerated.    Exercise Plan:  Titrate supplemental oxygen as needed to maintain SpO2>88% with exercise, learn to conserve energy with ADLs , practice diaphragmatic breathing, reduce time sitting at home, increased strength of respiratory muscles, utilize PLB with physical activity, and begin a home exercise program     Readiness to change: Preparation:  (Getting ready to change)  and Action:  (Changing behavior)      NUTRITION GOALS AND PLAN    Progress toward Nutritional goals:    Pt is progressing and showing improvement  toward the following goals:  has good and bad days with her diet, she loves salads. Kales, chicken and rare red meat, hydrates well throughout the day.  , Pt has not made progress toward the following goals: occ. Chooses easy meals, like mac and cheese. , Will continue to educate and progress as tolerated.    Nutrition Plan: increase intake of whole grains, increase daily intake of fruits and vegetables, increase daily intake of low fat dairy, eat poultry without the skin, and drink/use 1%  low fat or skim  milk    SMART goals are based Rate Your Plate Dietary Self-Assessment. These are the areas in which the patient could score higher on the assessment.  Goals include recommendations for a heart healthy diet based on American Heart Association.    Nutrition Education: low sodium diet  maintaining hydration    Readiness to change: Preparation:  (Getting ready to change)       PSYCHOSOCIAL GOALS AND PLAN    Information to begin using [employer] was provided as well as contact  information for counseling through  [facility].    Progress toward Psychosocial goals:    Pt is progressing and showing improvement  toward the following goals:  managing stress the best way she can, takes care of her sister, who recently is not at home and in a facility.  , Will continue to educate and progress as tolerated.    Psychosocial Plan: Practice relaxation techniques, Exercise, and Keep a positive mindset    Psychosocial Education: signs/sxs of depression, benefits of a positive support system, and stress management techniques    Readiness to change: Preparation:  (Getting ready to change)       OTHER CORE COMPONENTS GOALS AND PLAN    Tobacco Use Intervention:     Brief counseling by cardiac rehab professional  Date: 2/3/2025    Oxygen Goal: Maintain SpO2>88% during exercise or as advised by pulmonolgist    Progress toward Core Component goals:    Pt is progressing and showing improvement  toward the following goals:  hydrating well throughout the day, taking meds as prescribed.  , Will continue to educate and progress as tolerated.    Core Component Plan: medication compliance, set target quit date for smoking, avoid processed foods, engage in regular exercise, eliminate salt shaker at the table, use salt substitutes, check labels for sodium content, and monitor home BP    Core Component Education:  pathophysiology of pulmonary disease and dangers of smoking    Readiness to change: Action:  (Changing behavior)   109

## 2025-04-09 ENCOUNTER — CLINICAL SUPPORT (OUTPATIENT)
Facility: HOSPITAL | Age: 70
End: 2025-04-09
Payer: COMMERCIAL

## 2025-04-09 DIAGNOSIS — C34.90 MALIGNANT NEOPLASM OF LUNG, UNSPECIFIED LATERALITY, UNSPECIFIED PART OF LUNG (HCC): Primary | ICD-10-CM

## 2025-04-09 PROCEDURE — G0239 OTH RESP PROC, GROUP: HCPCS

## 2025-04-10 ENCOUNTER — OFFICE VISIT (OUTPATIENT)
Dept: HEMATOLOGY ONCOLOGY | Facility: CLINIC | Age: 70
End: 2025-04-10
Payer: COMMERCIAL

## 2025-04-10 VITALS
OXYGEN SATURATION: 97 % | TEMPERATURE: 97.7 F | RESPIRATION RATE: 18 BRPM | HEIGHT: 63 IN | WEIGHT: 165 LBS | BODY MASS INDEX: 29.23 KG/M2 | DIASTOLIC BLOOD PRESSURE: 76 MMHG | SYSTOLIC BLOOD PRESSURE: 124 MMHG | HEART RATE: 94 BPM

## 2025-04-10 DIAGNOSIS — F17.210 TOBACCO DEPENDENCE DUE TO CIGARETTES: ICD-10-CM

## 2025-04-10 DIAGNOSIS — C34.91 ADENOCARCINOMA OF RIGHT LUNG (HCC): Primary | ICD-10-CM

## 2025-04-10 PROCEDURE — G2211 COMPLEX E/M VISIT ADD ON: HCPCS | Performed by: INTERNAL MEDICINE

## 2025-04-10 PROCEDURE — 99214 OFFICE O/P EST MOD 30 MIN: CPT | Performed by: INTERNAL MEDICINE

## 2025-04-10 NOTE — ASSESSMENT & PLAN NOTE
Tolerating Sotorasib well.   Continue sotorasib.  Most recent PET/CT on 3/27/2025 with no focal FDG activity characteristic of hypermetabolic malignancy.  Possible inflammatory consolidation.    SmartLink not supported outside of the Encounter Diagnoses SmartSection.

## 2025-04-10 NOTE — ASSESSMENT & PLAN NOTE
Patient tolerating sotorasib well.  Most recent PET/CT with no focal FDG activity characteristic of hypermetabolic malignancy.  Possibly inflammatory consolidation.  Will repeat CT chest in 3 months and follow.  Orders:    CT chest wo contrast; Future

## 2025-04-10 NOTE — PROGRESS NOTES
Name: Saskia Alfaro      : 1955      MRN: [id number]  Encounter Provider: Maday Orellana MD  Encounter Date: 4/10/2025   Encounter department: [facility] [place]  :  Assessment & Plan  Adenocarcinoma of right lung (HCC)  Patient tolerating sotorasib well.  Most recent PET/CT with no focal FDG activity characteristic of hypermetabolic malignancy.  Possibly inflammatory consolidation.  Will repeat CT chest in 3 months and follow.  Orders:    CT chest wo contrast; Future    Tobacco dependence due to cigarettes  Patient continues to smoke.  Counseled in detail again regarding smoking cessation.  Patient is very determined and states that she will definitely work on quitting smoking as she does not want to destroy the progress that she has made by smoking.    At least 4 minutes was spent counseling.               Return in about 3 months (around 7/10/2025) for Office Visit.    History of Present Illness   Chief Complaint   Patient presents with    Follow-up   69-year-old female patient with history of stage IIIb lung cancer., PD-L1 TPS 98%, K-minna G 12C mutation. Patient received neoadjuvant chemotherapy however was not a surgical candidate. Neoadjuvant chemotherapy was followed by a course of definitive radiation which she completed on 3/1/2024. Patient was started on immunotherapy however her psoriasis flared up and has immunotherapy was discontinued.     Patient presents today for follow-up. Continues to have fatigue. She states exercise with rehab is hard. Upper extremity exercises are hard, lower extremity exercises she completes without trouble. Picking up grocery bags easier.  Denies nausea or vomiting.  No chest pains.  Tolerating sotorasib well.  Understands that smoking is injurious for her health and promises to work on quitting smoking.    Oncology History   Cancer Staging   Adenocarcinoma of right lung (HCC)  Staging form: Lung, AJCC 8th Edition  - Clinical stage  from 7/24/2023: Stage IIIB (cT4, cN2, cM0) - Signed by Celina Treviño MD on 12/26/2023  Stage prefix: Initial diagnosis  Oncology History Overview Note   with a history of with a history of stage IIIB NSCLC status post neoadjuvant chemotherapy, but was not a surgical candidate post treatment, who completed a course of definitive radiation on 3/1/24. She was last seen 11/26/24 and presents today for follow up.       Follows with pulmonology.   Going to pulm rehab currently.          12/17/24 CT chest wo contrast  1.  Mild continued progression of large consolidation in the right midlung which entirely obscures the known right middle lobe lesion. This likely reflects post radiation change, but note that any underlying local tumor progression cannot be excluded.  2.  Waxing and waning predominantly left-sided groundglass opacities suggest a superimposed infectious or inflammatory process.  3.  Tiny nodules throughout the left lung. Many of these are stable or decreased in conspicuity from the prior exam, though a few new nodules are noted. Overall pattern in combination with the waxing and waning groundglass opacities is favored to reflect   an infectious/inflammatory process. Recommend continued follow-up.  4.  No suspicious lymphadenopathy within the limitations of a noncontrast study.  5.  Partially imaged pancreatic lesion and ductal dilation better evaluated on prior dedicated abdominal exams.      3/27/25 PET CT  1.  No focal FDG activity characteristic of hypermetabolic malignancy.  2.  Patient's known increasing consolidation of the right lung which obscures patient's known right middle lobe lung mass is associated with mild nonspecific heterogeneous activity, possibly inflammatory. Continued attention to this region is recommended   to ensure stability.  3.  Patient's known pancreatic cystic mass is poorly characterized on low-dose unenhanced CT and was better characterized on prior contrast-enhanced CT/MRI  imaging. Continued follow-up is recommended as per pancreatic protocol.  4.  Please see above for details and additional findings.      2/17/25 Dr. Orellana  Tolerating Sotorasib well.   Continue sotorasib.  CT scan from 12/17/2024 with Findings consistent with Mild continued progression of large consolidation in the right midlung which could be postradiation changes versus local tumor progression.  Continue to monitor labs.  PET CT scan has been ordered- scheduled 03/27/2025  RTC in 8 weeks or sooner prn.       4/10/25 Dr. Orellana      Upcoming:       Adenocarcinoma of right lung (HCC)   6/23/2023 Initial Diagnosis    Adenocarcinoma of right lung (HCC)     7/24/2023 Biopsy    Navigational bronchoscopy with EBUS    A.  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     A.-B.  Lung, Right Middle Lobe Bronchial Brushing (Thin-prep and smear preparations):   Conclusive evidence of malignancy.  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     Satisfactory for evaluation.     C.-D.  Lung, Right Middle Lobe Bronchoalveolar Lavage (Thin-prep and cell block preparations):   Conclusive evidence of malignancy.  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     Satisfactory for evaluation.      E.-F.  Lymph Node, level 7 (Thin-prep and smear preparations):   Atypical cellular changes seen.  Rare atypical cells.  Lymphocytes.  Few bronchial cells and pulmonary macrophages.        Satisfactory for evaluation.     G.-H.  Lymph Node, 10R (Thin-prep and smear preparations):   Negative for malignancy.  Lymphocytes.  Aggregates of neutrophils.     Satisfactory for evaluation.     7/24/2023 -  Cancer Staged    Staging form: Lung, AJCC 8th Edition  - Clinical stage from 7/24/2023: Stage IIIB (cT4, cN2, cM0) - Signed by Celina Treviño MD on 12/26/2023  Stage prefix: Initial diagnosis       8/31/2023 - 10/12/2023 Chemotherapy     cyanocobalamin, 1,000 mcg, Intramuscular, Once, 1 of 1 cycle  Administration: 1,000 mcg (8/24/2023)  alteplase (CATHFLO), 2 mg, Intracatheter, Every 1 Minute as needed, 3 of 3 cycles  CISplatin (PLATINOL) with mannitol IVPB, 75 mg/m2 = 129.8 mg (100 % of original dose 75 mg/m2), Intravenous, Once, 3 of 3 cycles  Dose modification: 50 mg/m2 (original dose 75 mg/m2, Cycle 1, Reason: Anticipated Tolerance), 75 mg/m2 (original dose 75 mg/m2, Cycle 1, Reason: Dose modified as per discussion with consulting physician)  Administration: 129.8 mg (8/31/2023), 129.8 mg (9/21/2023), 129.8 mg (10/12/2023)  fosaprepitant (EMEND) IVPB, 150 mg, Intravenous, Once, 3 of 3 cycles  Administration: 150 mg (8/31/2023), 150 mg (9/21/2023), 150 mg (10/12/2023)  nivolumab (OPDIVO) IVPB, 360 mg, Intravenous, Once, 3 of 3 cycles  Administration: 360 mg (8/31/2023), 360 mg (9/21/2023), 360 mg (10/12/2023)  pemetrexed (ALIMTA) chemo infusion, 500 mg/m2 = 865 mg, Intravenous, Once, 3 of 3 cycles  Administration: 865 mg (8/31/2023), 865 mg (9/21/2023), 865 mg (10/12/2023)     1/18/2024 - 3/1/2024 Radiation    Treatments:  Course: C1  Plan ID Energy Fractions Dose per Fraction (cGy) Dose Correction (cGy) Total Dose Delivered (cGy) Elapsed Days   R LUNG MED 6X 30 / 30 200 0 6,000 43    Treatment Dates:  1/18/2024 - 3/1/2024.      1/19/2024 - 3/1/2024 Chemotherapy    alteplase (CATHFLO), 2 mg, Intracatheter, Every 1 Minute as needed, 6 of 6 cycles  CARBOplatin (PARAPLATIN) IVPB (GOG AUC DOSING), 185 mg, Intravenous, Once, 6 of 6 cycles  Administration: 185 mg (1/19/2024), 183.2 mg (1/26/2024), 183.2 mg (2/2/2024), 183.2 mg (2/9/2024), 183.2 mg (2/16/2024), 196.6 mg (3/1/2024)  PACLItaxel (TAXOL) chemo IVPB, 50 mg/m2 = 85.8 mg, Intravenous, Once, 1 of 1 cycle  Administration: 85.8 mg (1/19/2024)  pemetrexed (ALIMTA) chemo infusion, 500 mg/m2 = 860 mg (100 % of original dose 500 mg/m2), Intravenous, Once, 2 of 2 cycles  Dose modification: 500 mg/m2  "(original dose 500 mg/m2, Cycle 4)  Administration: 860 mg (2/9/2024), 860 mg (3/1/2024)     4/2/2024 - 6/25/2024 Chemotherapy    alteplase (CATHFLO), 2 mg, Intracatheter, Every 1 Minute as needed, 5 of 6 cycles  durvalumab (IMFINZI) IVPB, 1,500 mg (original dose 10 mg/kg), Intravenous, Once, 5 of 6 cycles  Dose modification: 1,500 mg (original dose 10 mg/kg, Cycle 1, Reason: Other (Must fill in a comment), Comment: Will give every 28 days.)  Administration: 1,500 mg (4/2/2024), 1,500 mg (5/28/2024), 1,500 mg (6/25/2024), 1,500 mg (4/30/2024)        Pertinent Medical History     04/10/25: reviewed     Review of Systems  14 point review of systems was negative except that mentioned in HPI.        Objective   /76 (BP Location: Left arm, Patient Position: Sitting, Cuff Size: Adult)   Pulse 94   Temp 97.7 °F (36.5 °C) (Temporal)   Resp 18   Ht 5' 3\" (1.6 m)   Wt 74.8 kg (165 lb)   SpO2 97%   BMI 29.23 kg/m²     Pain Screening:  Pain Score: 0-No pain  ECOG   1  Physical Exam  Vitals and nursing note reviewed.   Constitutional:       General: She is not in acute distress.     Appearance: Normal appearance.   HENT:      Head: Normocephalic and atraumatic.      Mouth/Throat:      Mouth: Mucous membranes are moist.      Pharynx: Oropharynx is clear.   Eyes:      General: No scleral icterus.     Extraocular Movements: Extraocular movements intact.      Conjunctiva/sclera: Conjunctivae normal.   Cardiovascular:      Rate and Rhythm: Normal rate and regular rhythm.      Pulses: Normal pulses.      Heart sounds: No murmur heard.  Pulmonary:      Effort: Pulmonary effort is normal.      Breath sounds: Normal breath sounds. No wheezing, rhonchi or rales.   Abdominal:      General: Bowel sounds are normal.      Palpations: Abdomen is soft.      Tenderness: There is no abdominal tenderness.   Musculoskeletal:         General: No swelling or tenderness. Normal range of motion.      Cervical back: Neck supple. "   Lymphadenopathy:      Cervical: No cervical adenopathy.   Skin:     General: Skin is warm and dry.      Coloration: Skin is not pale.      Findings: Rash (improved) present. No bruising or erythema.   Neurological:      General: No focal deficit present.      Mental Status: She is alert and oriented to person, place, and time.      Motor: No weakness.   Psychiatric:         Mood and Affect: Mood normal.         Behavior: Behavior normal.         Labs: I have reviewed the following labs:  Lab Results   Component Value Date/Time    WBC 7.65 04/07/2025 01:44 PM    RBC 4.07 04/07/2025 01:44 PM    Hemoglobin 12.9 04/07/2025 01:44 PM    Hematocrit 39.5 04/07/2025 01:44 PM    MCV 97 04/07/2025 01:44 PM    MCH 31.7 04/07/2025 01:44 PM    RDW 11.8 04/07/2025 01:44 PM    Platelets 259 04/07/2025 01:44 PM    Segmented % 72 04/07/2025 01:44 PM    Lymphocytes % 13 (L) 04/07/2025 01:44 PM    Monocytes % 12 04/07/2025 01:44 PM    Eosinophils Relative 2 04/07/2025 01:44 PM    Basophils Relative 1 04/07/2025 01:44 PM    Immature Grans % 0 04/07/2025 01:44 PM    Absolute Neutrophils 5.55 04/07/2025 01:44 PM     Lab Results   Component Value Date/Time    Potassium 4.0 04/07/2025 01:44 PM    Chloride 102 04/07/2025 01:44 PM    CO2 26 04/07/2025 01:44 PM    BUN 13 04/07/2025 01:44 PM    Creatinine 0.66 04/07/2025 01:44 PM    Glucose, Fasting 116 (H) 11/26/2024 10:42 AM    Calcium 8.9 04/07/2025 01:44 PM    AST 35 04/07/2025 01:44 PM    ALT 51 04/07/2025 01:44 PM    Alkaline Phosphatase 67 04/07/2025 01:44 PM    Total Protein 7.2 04/07/2025 01:44 PM    Albumin 3.9 04/07/2025 01:44 PM    Total Bilirubin 0.36 04/07/2025 01:44 PM    eGFR 91 04/07/2025 01:44 PM             Disclaimer: This document was prepared using [employer] technology. If a word or phrase is confusing, or does not make sense, this is likely due to recognition error which was not discovered during this clinician's review. If you believe an error has occurred,  please contact me through [employer] service line for [email or web address].      Follow Up    All questions were answered to the patient's satisfaction during this encounter. The patient knows the contact information for our office and knows to reach out for any relevant concerns related to this encounter. They are to call for any temperature 100.4 or higher, new symptoms including but not restricted to shaking chills, decreased appetite, nausea, vomiting, diarrhea, increased fatigue, shortness of breath or chest pain, confusion, and not feeling the strength to come to the clinic. For all other listed problems and medical diagnosis in their chart - they are managed by PCP and/or other specialists, which the patient acknowledges.     I spent 36 minutes reviewing the records (labs, clinician notes, outside records, medical history, ordering medicine/tests/procedures, monitoring of anti-neoplastic toxicities, interpreting the imaging/labs previously done) and coordination of care as well as direct time with the patient today, of which greater than 50% of the time was spent in counseling and coordination of care with the patient/family.

## 2025-04-11 NOTE — ASSESSMENT & PLAN NOTE
Patient continues to smoke.  Counseled in detail again regarding smoking cessation.  Patient is very determined and states that she will definitely work on quitting smoking as she does not want to destroy the progress that she has made by smoking.    At least 4 minutes was spent counseling.

## 2025-04-14 ENCOUNTER — CLINICAL SUPPORT (OUTPATIENT)
Facility: HOSPITAL | Age: 70
End: 2025-04-14
Payer: COMMERCIAL

## 2025-04-14 DIAGNOSIS — C34.90 MALIGNANT NEOPLASM OF LUNG, UNSPECIFIED LATERALITY, UNSPECIFIED PART OF LUNG (HCC): Primary | ICD-10-CM

## 2025-04-14 PROCEDURE — G0239 OTH RESP PROC, GROUP: HCPCS

## 2025-04-16 ENCOUNTER — CLINICAL SUPPORT (OUTPATIENT)
Facility: HOSPITAL | Age: 70
End: 2025-04-16
Payer: COMMERCIAL

## 2025-04-16 DIAGNOSIS — C34.90 MALIGNANT NEOPLASM OF LUNG, UNSPECIFIED LATERALITY, UNSPECIFIED PART OF LUNG (HCC): Primary | ICD-10-CM

## 2025-04-16 PROCEDURE — G0239 OTH RESP PROC, GROUP: HCPCS

## 2025-04-21 ENCOUNTER — OFFICE VISIT (OUTPATIENT)
Dept: RADIATION ONCOLOGY | Facility: CLINIC | Age: 70
End: 2025-04-21
Attending: RADIOLOGY
Payer: COMMERCIAL

## 2025-04-21 VITALS
RESPIRATION RATE: 14 BRPM | DIASTOLIC BLOOD PRESSURE: 70 MMHG | BODY MASS INDEX: 29.23 KG/M2 | WEIGHT: 165 LBS | HEART RATE: 86 BPM | TEMPERATURE: 98.4 F | OXYGEN SATURATION: 96 % | SYSTOLIC BLOOD PRESSURE: 118 MMHG

## 2025-04-21 DIAGNOSIS — C34.91 ADENOCARCINOMA OF RIGHT LUNG (HCC): Primary | ICD-10-CM

## 2025-04-21 PROCEDURE — G0463 HOSPITAL OUTPT CLINIC VISIT: HCPCS | Performed by: RADIOLOGY

## 2025-04-21 PROCEDURE — 99213 OFFICE O/P EST LOW 20 MIN: CPT | Performed by: RADIOLOGY

## 2025-04-21 PROCEDURE — 99211 OFF/OP EST MAY X REQ PHY/QHP: CPT | Performed by: RADIOLOGY

## 2025-04-21 PROCEDURE — G2211 COMPLEX E/M VISIT ADD ON: HCPCS | Performed by: RADIOLOGY

## 2025-04-21 NOTE — PROGRESS NOTES
Saskia Alfaro 1955 is a 69 y.o. female with a history of with a history of stage IIIB NSCLC status post neoadjuvant chemotherapy, but was not a surgical candidate post treatment, who completed a course of definitive radiation on 3/1/24. She was last seen 11/26/24 and presents today for follow up.       Follows with pulmonology.   Going to pulm rehab currently.          12/17/24 CT chest wo contrast  1.  Mild continued progression of large consolidation in the right midlung which entirely obscures the known right middle lobe lesion. This likely reflects post radiation change, but note that any underlying local tumor progression cannot be excluded.  2.  Waxing and waning predominantly left-sided groundglass opacities suggest a superimposed infectious or inflammatory process.  3.  Tiny nodules throughout the left lung. Many of these are stable or decreased in conspicuity from the prior exam, though a few new nodules are noted. Overall pattern in combination with the waxing and waning groundglass opacities is favored to reflect   an infectious/inflammatory process. Recommend continued follow-up.  4.  No suspicious lymphadenopathy within the limitations of a noncontrast study.  5.  Partially imaged pancreatic lesion and ductal dilation better evaluated on prior dedicated abdominal exams.      3/27/25 PET CT  1.  No focal FDG activity characteristic of hypermetabolic malignancy.  2.  Patient's known increasing consolidation of the right lung which obscures patient's known right middle lobe lung mass is associated with mild nonspecific heterogeneous activity, possibly inflammatory. Continued attention to this region is recommended   to ensure stability.  3.  Patient's known pancreatic cystic mass is poorly characterized on low-dose unenhanced CT and was better characterized on prior contrast-enhanced CT/MRI imaging. Continued follow-up is recommended as per pancreatic protocol.  4.  Please see above for details and  additional findings.      2/17/25 Dr. Orellana  Tolerating Sotorasib well.   Continue sotorasib.  CT scan from 12/17/2024 with Findings consistent with Mild continued progression of large consolidation in the right midlung which could be postradiation changes versus local tumor progression.  Continue to monitor labs.  PET CT scan has been ordered- scheduled 03/27/2025  RTC in 8 weeks or sooner prn.       4/10/25 Dr. Orellana repeat CT in 3 months. Continues to smoke.       Upcoming:      Follow up visit     Oncology History Overview Note   with a history of with a history of stage IIIB NSCLC status post neoadjuvant chemotherapy, but was not a surgical candidate post treatment, who completed a course of definitive radiation on 3/1/24. She was last seen 11/26/24 and presents today for follow up.       Follows with pulmonology.   Going to pulm rehab currently.          12/17/24 CT chest wo contrast  1.  Mild continued progression of large consolidation in the right midlung which entirely obscures the known right middle lobe lesion. This likely reflects post radiation change, but note that any underlying local tumor progression cannot be excluded.  2.  Waxing and waning predominantly left-sided groundglass opacities suggest a superimposed infectious or inflammatory process.  3.  Tiny nodules throughout the left lung. Many of these are stable or decreased in conspicuity from the prior exam, though a few new nodules are noted. Overall pattern in combination with the waxing and waning groundglass opacities is favored to reflect   an infectious/inflammatory process. Recommend continued follow-up.  4.  No suspicious lymphadenopathy within the limitations of a noncontrast study.  5.  Partially imaged pancreatic lesion and ductal dilation better evaluated on prior dedicated abdominal exams.      3/27/25 PET CT  1.  No focal FDG activity characteristic of hypermetabolic malignancy.  2.  Patient's known increasing consolidation of  the right lung which obscures patient's known right middle lobe lung mass is associated with mild nonspecific heterogeneous activity, possibly inflammatory. Continued attention to this region is recommended   to ensure stability.  3.  Patient's known pancreatic cystic mass is poorly characterized on low-dose unenhanced CT and was better characterized on prior contrast-enhanced CT/MRI imaging. Continued follow-up is recommended as per pancreatic protocol.  4.  Please see above for details and additional findings.      2/17/25 Dr. Orellana  Tolerating Sotorasib well.   Continue sotorasib.  CT scan from 12/17/2024 with Findings consistent with Mild continued progression of large consolidation in the right midlung which could be postradiation changes versus local tumor progression.  Continue to monitor labs.  PET CT scan has been ordered- scheduled 03/27/2025  RTC in 8 weeks or sooner prn.       4/10/25 Dr. Orellana repeat CT in 3 months. Continues to smoke.       Upcoming:       Adenocarcinoma of right lung (HCC)   6/23/2023 Initial Diagnosis    Adenocarcinoma of right lung (HCC)     7/24/2023 Biopsy    Navigational bronchoscopy with EBUS    A.  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     A.-B.  Lung, Right Middle Lobe Bronchial Brushing (Thin-prep and smear preparations):   Conclusive evidence of malignancy.  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     Satisfactory for evaluation.     C.-D.  Lung, Right Middle Lobe Bronchoalveolar Lavage (Thin-prep and cell block preparations):   Conclusive evidence of malignancy.  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     Satisfactory for evaluation.      E.-F.  Lymph Node, level 7 (Thin-prep and smear preparations):   Atypical cellular changes seen.  Rare atypical cells.  Lymphocytes.  Few bronchial cells and pulmonary macrophages.        Satisfactory for  evaluation.     G.-H.  Lymph Node, 10R (Thin-prep and smear preparations):   Negative for malignancy.  Lymphocytes.  Aggregates of neutrophils.     Satisfactory for evaluation.     7/24/2023 -  Cancer Staged    Staging form: Lung, AJCC 8th Edition  - Clinical stage from 7/24/2023: Stage IIIB (cT4, cN2, cM0) - Signed by Celina Treviño MD on 12/26/2023  Stage prefix: Initial diagnosis       8/31/2023 - 10/12/2023 Chemotherapy    cyanocobalamin, 1,000 mcg, Intramuscular, Once, 1 of 1 cycle  Administration: 1,000 mcg (8/24/2023)  alteplase (CATHFLO), 2 mg, Intracatheter, Every 1 Minute as needed, 3 of 3 cycles  CISplatin (PLATINOL) with mannitol IVPB, 75 mg/m2 = 129.8 mg (100 % of original dose 75 mg/m2), Intravenous, Once, 3 of 3 cycles  Dose modification: 50 mg/m2 (original dose 75 mg/m2, Cycle 1, Reason: Anticipated Tolerance), 75 mg/m2 (original dose 75 mg/m2, Cycle 1, Reason: Dose modified as per discussion with consulting physician)  Administration: 129.8 mg (8/31/2023), 129.8 mg (9/21/2023), 129.8 mg (10/12/2023)  fosaprepitant (EMEND) IVPB, 150 mg, Intravenous, Once, 3 of 3 cycles  Administration: 150 mg (8/31/2023), 150 mg (9/21/2023), 150 mg (10/12/2023)  nivolumab (OPDIVO) IVPB, 360 mg, Intravenous, Once, 3 of 3 cycles  Administration: 360 mg (8/31/2023), 360 mg (9/21/2023), 360 mg (10/12/2023)  pemetrexed (ALIMTA) chemo infusion, 500 mg/m2 = 865 mg, Intravenous, Once, 3 of 3 cycles  Administration: 865 mg (8/31/2023), 865 mg (9/21/2023), 865 mg (10/12/2023)     1/18/2024 - 3/1/2024 Radiation    Treatments:  Course: C1  Plan ID Energy Fractions Dose per Fraction (cGy) Dose Correction (cGy) Total Dose Delivered (cGy) Elapsed Days   R LUNG MED 6X 30 / 30 200 0 6,000 43    Treatment Dates:  1/18/2024 - 3/1/2024.      1/19/2024 - 3/1/2024 Chemotherapy    alteplase (CATHFLO), 2 mg, Intracatheter, Every 1 Minute as needed, 6 of 6 cycles  CARBOplatin (PARAPLATIN) IVPB (GOG AUC DOSING), 185 mg, Intravenous, Once, 6 of 6  cycles  Administration: 185 mg (1/19/2024), 183.2 mg (1/26/2024), 183.2 mg (2/2/2024), 183.2 mg (2/9/2024), 183.2 mg (2/16/2024), 196.6 mg (3/1/2024)  PACLItaxel (TAXOL) chemo IVPB, 50 mg/m2 = 85.8 mg, Intravenous, Once, 1 of 1 cycle  Administration: 85.8 mg (1/19/2024)  pemetrexed (ALIMTA) chemo infusion, 500 mg/m2 = 860 mg (100 % of original dose 500 mg/m2), Intravenous, Once, 2 of 2 cycles  Dose modification: 500 mg/m2 (original dose 500 mg/m2, Cycle 4)  Administration: 860 mg (2/9/2024), 860 mg (3/1/2024)     4/2/2024 - 6/25/2024 Chemotherapy    alteplase (CATHFLO), 2 mg, Intracatheter, Every 1 Minute as needed, 5 of 6 cycles  durvalumab (IMFINZI) IVPB, 1,500 mg (original dose 10 mg/kg), Intravenous, Once, 5 of 6 cycles  Dose modification: 1,500 mg (original dose 10 mg/kg, Cycle 1, Reason: Other (Must fill in a comment), Comment: Will give every 28 days.)  Administration: 1,500 mg (4/2/2024), 1,500 mg (5/28/2024), 1,500 mg (6/25/2024), 1,500 mg (4/30/2024)         Review of Systems:  Review of Systems   Constitutional:  Positive for fatigue.   HENT:  Positive for rhinorrhea.    Respiratory:  Positive for cough (occasional) and shortness of breath (at baseline).         Doing PT/pulmonary therapy 2 days per week.  Smoking 1 pack per day.    Cardiovascular: Negative.    Gastrointestinal: Negative.         Occasional upset stomach   Endocrine: Negative.    Genitourinary: Negative.    Musculoskeletal:  Positive for arthralgias.   Skin: Negative.    Allergic/Immunologic: Negative.    Neurological: Negative.    Hematological: Negative.    Psychiatric/Behavioral: Negative.          Caregiver for sister - stress       Clinical Trial: no    Pregnancy test needed:  not applicable    Teaching    Health Maintenance   Topic Date Due    Hepatitis C Screening  Never done    Medicare Annual Wellness Visit (AWV)  Never done    Depression Follow-up Plan  Never done    BMI: Followup Plan  Never done    Pneumococcal Vaccine: 65+  Years (1 of 2 - PCV) 07/13/1975    Osteoporosis Screening  Never done    RSV Vaccine for Pregnant Patients and Patients Age 60+ Years (1 - Risk 60-74 years 1-dose series) Never done    Zoster Vaccine (2 of 2) 12/04/2020    Fall Risk  Never done    Urinary Incontinence Screening  Never done    Breast Cancer Screening: Mammogram  10/21/2022    Influenza Vaccine (1) 09/01/2024    COVID-19 Vaccine (4 - 2024-25 season) 09/01/2024    Depression Screening  10/21/2025    Colorectal Cancer Screening  08/10/2025    BMI: Adult  04/10/2026    Meningococcal B Vaccine  Aged Out    RSV Vaccine age 0-20 Months  Aged Out    HIB Vaccine  Aged Out    IPV Vaccine  Aged Out    Hepatitis A Vaccine  Aged Out    Meningococcal ACWY Vaccine  Aged Out    HPV Vaccine  Aged Out    Lung Cancer Screening  Discontinued     Patient Active Problem List   Diagnosis    Pulmonary emphysema, unspecified emphysema type (HCC)    Adenocarcinoma of right lung (HCC)    Mediastinal adenopathy    Port-A-Cath in place    Hypomagnesemia    Pancreatic pseudocyst/cyst    Allergic reaction caused by a drug    High risk medication use    Dilation of pancreatic duct    Pancreatic duct stones    Helicobacter positive gastritis    Psoriasis    Multiple lung nodules on CT    Tobacco dependence due to cigarettes    Malignant neoplasm of lung (HCC)     Past Medical History:   Diagnosis Date    Fibromyalgia     Heart murmur     Lumbosacral disc herniation     Lung cancer (HCC)     Osteopenia     Psoriasis     Psoriasis     RSD (reflex sympathetic dystrophy)      Past Surgical History:   Procedure Laterality Date    IR PORT PLACEMENT  08/04/2023    LUNG BIOPSY      x2    PARTIAL HYSTERECTOMY       Family History   Problem Relation Age of Onset    Sarcoidosis Mother     Cancer Sister      Social History     Socioeconomic History    Marital status:      Spouse name: Not on file    Number of children: Not on file    Years of education: Not on file    Highest education  level: Not on file   Occupational History    Not on file   Tobacco Use    Smoking status: Every Day     Current packs/day: 0.50     Average packs/day: 0.5 packs/day for 49.3 years (24.7 ttl pk-yrs)     Types: Cigarettes     Start date: 1976    Smokeless tobacco: Never   Vaping Use    Vaping status: Never Used   Substance and Sexual Activity    Alcohol use: Yes     Alcohol/week: 14.0 standard drinks of alcohol     Types: 14 Glasses of wine per week     Comment: 2 drinks daily    Drug use: No    Sexual activity: Not on file   Other Topics Concern    Not on file   Social History Narrative    Not on file     Social Drivers of Health     Financial Resource Strain: Not on file   Food Insecurity: Not on file   Transportation Needs: Not on file   Physical Activity: Not on file   Stress: Not on file   Social Connections: Not on file   Intimate Partner Violence: Not on file   Housing Stability: Not on file       Current Outpatient Medications:     amitriptyline (ELAVIL) 25 mg tablet, , Disp: , Rfl:     amoxicillin (AMOXIL) 500 mg capsule, Prn dental procedure (Patient not taking: Reported on 10/21/2024), Disp: , Rfl:     Ascorbic Acid (vitamin C) 100 MG tablet, Take 100 mg by mouth daily, Disp: , Rfl:     atorvastatin (LIPITOR) 20 mg tablet, Take 20 mg by mouth daily, Disp: , Rfl:     b complex vitamins capsule, Take 1 capsule by mouth daily (Patient not taking: Reported on 10/21/2024), Disp: , Rfl:     bismuth subsalicylate (PEPTO BISMOL) 262 MG chewable tablet, Take 1 tablet by mouth every 6 hours for 14 days (Patient not taking: Reported on 5/30/2024), Disp: 56 tablet, Rfl: 0    calcipotriene (DOVONEX) 0.005 % cream, Apply topically 2 (two) times a day To affected areas on Saturday and Sunday only, Disp: 120 g, Rfl: 2    cholecalciferol (VITAMIN D3) 400 units tablet, Take 400 Units by mouth daily, Disp: , Rfl:     DULoxetine (CYMBALTA) 30 mg delayed release capsule, , Disp: , Rfl:     Fluticasone-Salmeterol (Advair) 100-50  mcg/dose inhaler, Inhale 1 puff 2 (two) times a day Rinse mouth after use., Disp: , Rfl:     folic acid (KP Folic Acid) 1 mg tablet, Take 1 tablet (1 mg total) by mouth daily, Disp: 90 tablet, Rfl: 2    halobetasol (ULTRAVATE) 0.05 % ointment, Apply topically 2 (two) times a day, Disp: 50 g, Rfl: 3    loratadine (CLARITIN) 10 mg tablet, Take 10 mg by mouth daily (Patient not taking: Reported on 7/29/2024), Disp: , Rfl:     omeprazole (PriLOSEC) 20 mg delayed release capsule, Take 1 capsule (20 mg total) by mouth 2 (two) times a day Every 12 hours for 14 days (Patient not taking: Reported on 12/26/2023), Disp: 28 capsule, Rfl: 0    ondansetron (ZOFRAN) 4 mg tablet, Take 2 tablets (8 mg total) by mouth every 8 (eight) hours as needed for nausea or vomiting, Disp: 20 tablet, Rfl: 0    propranolol (INDERAL) 20 mg/5 mL solution, , Disp: , Rfl:     Sotorasib (Lumakras) 320 MG TABS, Take 960 mg by mouth daily, Disp: 90 tablet, Rfl: 5    triamcinolone (KENALOG) 0.1 % cream, Apply topically 2 (two) times a day Affected areas Monday through Friday only, Disp: 453.6 g, Rfl: 2  Allergies   Allergen Reactions    Prednisone Palpitations     Vitals:    04/21/25 1133   BP: 118/70   Pulse: 86   Resp: 14   Temp: 98.4 °F (36.9 °C)   SpO2: 96%   Weight: 74.8 kg (165 lb)      Pain Score: 0-No pain

## 2025-04-22 ENCOUNTER — PATIENT OUTREACH (OUTPATIENT)
Dept: CASE MANAGEMENT | Facility: HOSPITAL | Age: 70
End: 2025-04-22

## 2025-04-22 NOTE — PROGRESS NOTES
Pt r/o to me by phone this afternoon, I have not heard from her in awhile but she was happy to share that she received good news at her doctor's appt yesterday, that there is no measurable cancer on her recent scan.  She is very happy about this and reflected back on her treatments and the psoriasis side effect that has still not fully cleared up.    Pt tells me that she's doing pulmonary rehab and is finding it to be a struggle, she says that she knew she was weak but is surprised at how hard the rehab is for her.  She notes that she's not having the best communication with one of the therapists and I suggested to her that she ask to work with the other two, who she feels she has a better relationship with.  She says that she was not sure that she could do something like that but I reassured her that these things happen and asking to not work with someone is really not a big deal.    Pt gave me some updates on her situation with her sister, last I had seen her her sister was in a [facility] and was not doing well at all.  She has recently moved her into a [facility] and says that so far, she is happy with the care there.  She feels that her sister is having a hard time adjusting and so she calls every other day and visits once a week.  She feels that this is the best plan for them both at this point and says that there is no way her sister could come home at this point.  She is on a memory care, secure unit.  She is working out what to do with the house, since they bought it together and shared the bills.  She is not sure that she can keep the house and says that she has no idea if they would even make any money on the sale.  I have encouraged her to reach out to a realtor to get a better idea of what it could sell for before making any plans.  She is not sure where she would live afterward, either.  She plans to give it a few months to see how the bills adjust and how she can afford living there and then make some  decisions.    At this time pt has no other needs and was appreciative of the time spent talking.  She has my direct number and is welcome to reach out as needed moving forward.

## 2025-04-23 ENCOUNTER — APPOINTMENT (OUTPATIENT)
Facility: HOSPITAL | Age: 70
End: 2025-04-23
Payer: COMMERCIAL

## 2025-04-27 NOTE — ASSESSMENT & PLAN NOTE
Saskia Alfaro 1955 is a 69 y.o. female with a history of with a history of stage IIIB NSCLC status post neoadjuvant chemotherapy, but was not a surgical candidate post treatment, who completed a course of definitive radiation on 3/1/24.     Clinically ASHVIN  -Remains on Sotorasib and tolerates well.  Following Medical Oncology.  -Imaging with continued waxing and waning inflammatory changes and nodules and post radiation changes.  Repeat imaging with CT chest, abdomen, and pelvis have been scheduled by Medical Oncology.  Pancreatic cyst also continues surveillance with follow-up imaging.  -Follow-up in 3 months    Smoking  -Cessation counseling provided at least 3 minutes.  She is working on cutting back and wishes to quit.    Pulmonary function  -attending pulmonary rehab, but having some difficulties at this time.  If she is unable to participate, then she can defer for a later time as her breathing has overall improved.  Continues to follow with Pulmonary.

## 2025-04-27 NOTE — PROGRESS NOTES
Follow-up Visit   Name: Saskia Alfaro      : 1955      MRN: [id number]  Encounter Provider: Celina Treviño MD  Encounter Date: 2025   Encounter department: [facility]  :  Assessment & Plan  Adenocarcinoma of right lung (HCC)  Saskia Alfaro 1955 is a 69 y.o. female with a history of with a history of stage IIIB NSCLC status post neoadjuvant chemotherapy, but was not a surgical candidate post treatment, who completed a course of definitive radiation on 3/1/24.     Clinically ASHVIN  -Remains on Sotorasib and tolerates well.  Following Medical Oncology.  -Imaging with continued waxing and waning inflammatory changes and nodules and post radiation changes.  Repeat imaging with CT chest, abdomen, and pelvis have been scheduled by Medical Oncology.  Pancreatic cyst also continues surveillance with follow-up imaging.  -Follow-up in 3 months    Smoking  -Cessation counseling provided at least 3 minutes.  She is working on [employer] back and wishes to quit.    Pulmonary function  -attending pulmonary rehab, but having some difficulties at this time.  If she is unable to participate, then she can defer for a later time as her breathing has overall improved.  Continues to follow with Pulmonary.             History of Present Illness   Chief Complaint   Patient presents with    Follow-up    Lung Cancer   Pertinent Medical History   Saskia Alfaro 1955 is a 69 y.o. female with a history of with a history of stage IIIB NSCLC status post neoadjuvant chemotherapy, but was not a surgical candidate post treatment, who completed a course of definitive radiation on 3/1/24. She was last seen 24 and presents today for follow up.      Follows with pulmonology.  Going to pulm rehab currently.      24 CT chest wo contrast  1.  Mild continued progression of large consolidation in the right midlung which entirely obscures the known right middle lobe lesion. This likely reflects post  radiation change, but note that any underlying local tumor progression cannot be excluded.  2.  Waxing and waning predominantly left-sided groundglass opacities suggest a superimposed infectious or inflammatory process.  3.  Tiny nodules throughout the left lung. Many of these are stable or decreased in conspicuity from the prior exam, though a few new nodules are noted. Overall pattern in combination with the waxing and waning groundglass opacities is favored to reflect an infectious/inflammatory process. Recommend continued follow-up.  4.  No suspicious lymphadenopathy within the limitations of a noncontrast study.  5.  Partially imaged pancreatic lesion and ductal dilation better evaluated on prior dedicated abdominal exams.     3/27/25 PET CT  1.  No focal FDG activity characteristic of hypermetabolic malignancy.  2.  Patient's known increasing consolidation of the right lung which obscures patient's known right middle lobe lung mass is associated with mild nonspecific heterogeneous activity, possibly inflammatory. Continued attention to this region is recommended to ensure stability.  3.  Patient's known pancreatic cystic mass is poorly characterized on low-dose unenhanced CT and was better characterized on prior contrast-enhanced CT/MRI imaging. Continued follow-up is recommended as per pancreatic protocol.  4.  Please see above for details and additional findings.     2/17/25 Dr. Orellana  Tolerating Sotorasib well.   Continue sotorasib.  CT scan from 12/17/2024 with Findings consistent with Mild continued progression of large consolidation in the right midlung which could be postradiation changes versus local tumor progression.  Continue to monitor labs.  PET CT scan has been ordered- scheduled 03/27/2025  RTC in 8 weeks or sooner prn.      4/10/25 Dr. Orellana repeat CT in 3 months. Continues to smoke.     The patient notes that her breathing has improved from her last visit in November 2024.  She has less  dyspnea on exertion and shortness of breath at rest.  The patient was started Pulmonary Rehab, but is considering stopping as she notes some difficulty with the exercises due to chronic joint issues and ongoing stress from recently placing her sister in assisted living.  She has occasional cough.  She denies fever or hemoptysis.  She denies chest pain or palpitations.    She is actively smoking about 1/2 ppd.        Oncology History   Cancer Staging   Adenocarcinoma of right lung (HCC)  Staging form: Lung, AJCC 8th Edition  - Clinical stage from 7/24/2023: Stage IIIB (cT4, cN2, cM0) - Signed by Celina Treviño MD on 12/26/2023  Stage prefix: Initial diagnosis  Oncology History   Adenocarcinoma of right lung (HCC)   6/23/2023 Initial Diagnosis    Adenocarcinoma of right lung (HCC)     7/24/2023 Biopsy    Navigational bronchoscopy with EBUS    A.  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     A.-B.  Lung, Right Middle Lobe Bronchial Brushing (Thin-prep and smear preparations):   Conclusive evidence of malignancy.  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     Satisfactory for evaluation.     C.-D.  Lung, Right Middle Lobe Bronchoalveolar Lavage (Thin-prep and cell block preparations):   Conclusive evidence of malignancy.  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     Satisfactory for evaluation.      E.-F.  Lymph Node, level 7 (Thin-prep and smear preparations):   Atypical cellular changes seen.  Rare atypical cells.  Lymphocytes.  Few bronchial cells and pulmonary macrophages.        Satisfactory for evaluation.     G.-H.  Lymph Node, 10R (Thin-prep and smear preparations):   Negative for malignancy.  Lymphocytes.  Aggregates of neutrophils.     Satisfactory for evaluation.     7/24/2023 -  Cancer Staged    Staging form: Lung, AJCC 8th Edition  - Clinical stage from 7/24/2023: Stage IIIB (cT4, cN2, cM0) -  Signed by Celina Treviño MD on 12/26/2023  Stage prefix: Initial diagnosis       8/31/2023 - 10/12/2023 Chemotherapy    cyanocobalamin, 1,000 mcg, Intramuscular, Once, 1 of 1 cycle  Administration: 1,000 mcg (8/24/2023)  alteplase (CATHFLO), 2 mg, Intracatheter, Every 1 Minute as needed, 3 of 3 cycles  CISplatin (PLATINOL) with mannitol IVPB, 75 mg/m2 = 129.8 mg (100 % of original dose 75 mg/m2), Intravenous, Once, 3 of 3 cycles  Dose modification: 50 mg/m2 (original dose 75 mg/m2, Cycle 1, Reason: Anticipated Tolerance), 75 mg/m2 (original dose 75 mg/m2, Cycle 1, Reason: Dose modified as per discussion with consulting physician)  Administration: 129.8 mg (8/31/2023), 129.8 mg (9/21/2023), 129.8 mg (10/12/2023)  fosaprepitant (EMEND) IVPB, 150 mg, Intravenous, Once, 3 of 3 cycles  Administration: 150 mg (8/31/2023), 150 mg (9/21/2023), 150 mg (10/12/2023)  nivolumab (OPDIVO) IVPB, 360 mg, Intravenous, Once, 3 of 3 cycles  Administration: 360 mg (8/31/2023), 360 mg (9/21/2023), 360 mg (10/12/2023)  pemetrexed (ALIMTA) chemo infusion, 500 mg/m2 = 865 mg, Intravenous, Once, 3 of 3 cycles  Administration: 865 mg (8/31/2023), 865 mg (9/21/2023), 865 mg (10/12/2023)     1/18/2024 - 3/1/2024 Radiation    Treatments:  Course: C1  Plan ID Energy Fractions Dose per Fraction (cGy) Dose Correction (cGy) Total Dose Delivered (cGy) Elapsed Days   R LUNG MED 6X 30 / 30 200 0 6,000 43    Treatment Dates:  1/18/2024 - 3/1/2024.      1/19/2024 - 3/1/2024 Chemotherapy    alteplase (CATHFLO), 2 mg, Intracatheter, Every 1 Minute as needed, 6 of 6 cycles  CARBOplatin (PARAPLATIN) IVPB ([facility] [place] AUC DOSING), 185 mg, Intravenous, Once, 6 of 6 cycles  Administration: 185 mg (1/19/2024), 183.2 mg (1/26/2024), 183.2 mg (2/2/2024), 183.2 mg (2/9/2024), 183.2 mg (2/16/2024), 196.6 mg (3/1/2024)  PACLItaxel (TAXOL) chemo IVPB, 50 mg/m2 = 85.8 mg, Intravenous, Once, 1 of 1 cycle  Administration: 85.8 mg (1/19/2024)  pemetrexed (ALIMTA) chemo infusion, 500  mg/m2 = 860 mg (100 % of original dose 500 mg/m2), Intravenous, Once, 2 of 2 cycles  Dose modification: 500 mg/m2 (original dose 500 mg/m2, Cycle 4)  Administration: 860 mg (2/9/2024), 860 mg (3/1/2024)     4/2/2024 - 6/25/2024 Chemotherapy    alteplase (CATHFLO), 2 mg, Intracatheter, Every 1 Minute as needed, 5 of 6 cycles  durvalumab (IMFINZI) IVPB, 1,500 mg (original dose 10 mg/kg), Intravenous, Once, 5 of 6 cycles  Dose modification: 1,500 mg (original dose 10 mg/kg, Cycle 1, Reason: Other (Must fill in a comment), Comment: Will give every 28 days.)  Administration: 1,500 mg (4/2/2024), 1,500 mg (5/28/2024), 1,500 mg (6/25/2024), 1,500 mg (4/30/2024)     Malignant neoplasm of lung (HCC)   3/19/2025 Initial Diagnosis    Malignant neoplasm of lung (HCC)        Review of Systems Refer to nursing note.    Current Outpatient Medications on File Prior to Visit   Medication Sig Dispense Refill    amitriptyline (ELAVIL) 25 mg tablet       Ascorbic Acid (vitamin C) 100 MG tablet Take 100 mg by mouth daily      atorvastatin (LIPITOR) 20 mg tablet Take 20 mg by mouth daily      calcipotriene (DOVONEX) 0.005 % cream Apply topically 2 (two) times a day To affected areas on Saturday and Sunday only 120 g 2    cholecalciferol (VITAMIN D3) 400 units tablet Take 400 Units by mouth daily      DULoxetine (CYMBALTA) 30 mg delayed release capsule       folic acid (KP Folic Acid) 1 mg tablet Take 1 tablet (1 mg total) by mouth daily 90 tablet 2    halobetasol (ULTRAVATE) 0.05 % ointment Apply topically 2 (two) times a day 50 g 3    ondansetron (ZOFRAN) 4 mg tablet Take 2 tablets (8 mg total) by mouth every 8 (eight) hours as needed for nausea or vomiting 20 tablet 0    propranolol (INDERAL) 20 mg/5 mL solution       Sotorasib (Lumakras) 320 MG TABS Take 960 mg by mouth daily 90 tablet 5    triamcinolone (KENALOG) 0.1 % cream Apply topically 2 (two) times a day Affected areas Monday through Friday only 453.6 g 2    amoxicillin  (AMOXIL) 500 mg capsule Prn dental procedure (Patient not taking: Reported on 10/21/2024)      b complex vitamins capsule Take 1 capsule by mouth daily (Patient not taking: Reported on 10/21/2024)      bismuth subsalicylate (PEPTO BISMOL) 262 MG chewable tablet Take 1 tablet by mouth every 6 hours for 14 days (Patient not taking: Reported on 5/30/2024) 56 tablet 0    Fluticasone-Salmeterol (Advair) 100-50 mcg/dose inhaler Inhale 1 puff 2 (two) times a day Rinse mouth after use.      loratadine (CLARITIN) 10 mg tablet Take 10 mg by mouth daily (Patient not taking: Reported on 7/29/2024)      omeprazole (PriLOSEC) 20 mg delayed release capsule Take 1 capsule (20 mg total) by mouth 2 (two) times a day Every 12 hours for 14 days (Patient not taking: Reported on 12/26/2023) 28 capsule 0     No current facility-administered medications on file prior to visit.      Social History     Tobacco Use    Smoking status: Every Day     Current packs/day: 0.50     Average packs/day: 0.5 packs/day for 49.3 years (24.7 ttl pk-yrs)     Types: Cigarettes     Start date: 1976    Smokeless tobacco: Never   Vaping Use    Vaping status: Never Used   Substance and Sexual Activity    Alcohol use: Yes     Alcohol/week: 14.0 standard drinks of alcohol     Types: 14 Glasses of wine per week     Comment: 2 drinks daily    Drug use: No    Sexual activity: Not on file         Objective   /70   Pulse 86   Temp 98.4 °F (36.9 °C)   Resp 14   Wt 74.8 kg (165 lb)   SpO2 96%   BMI 29.23 kg/m²     Pain Screening:  Pain Score: 0-No pain  ECOG  1  Physical Exam  Vitals and nursing note reviewed.   Constitutional:       General: She is not in acute distress.     Appearance: She is well-developed.   Cardiovascular:      Rate and Rhythm: Normal rate.   Pulmonary:      Breath sounds: No wheezing, rhonchi or rales.      Comments: Decreased breath sounds  Abdominal:      Palpations: Abdomen is soft.      Tenderness: There is no abdominal tenderness.  "  Musculoskeletal:      Right lower leg: No edema.      Left lower leg: No edema.   Lymphadenopathy:      Cervical: No cervical adenopathy.      Upper Body:      Right upper body: No supraclavicular adenopathy.      Left upper body: No supraclavicular adenopathy.   Neurological:      Mental Status: She is alert and oriented to person, place, and time.      Gait: Gait normal.            Administrative Statements   I have spent a total time of 26 minutes in caring for this patient on the day of the visit/encounter .  Portions of the record may have been created with voice recognition software.  Occasional wrong word or \"sound a like\" substitutions may have occurred due to the inherent limitations of voice recognition software.  Read the chart carefully and recognize, using context, where substitutions have occurred.  "

## 2025-04-28 ENCOUNTER — CLINICAL SUPPORT (OUTPATIENT)
Facility: HOSPITAL | Age: 70
End: 2025-04-28
Payer: COMMERCIAL

## 2025-04-28 DIAGNOSIS — C34.90 MALIGNANT NEOPLASM OF LUNG, UNSPECIFIED LATERALITY, UNSPECIFIED PART OF LUNG (HCC): Primary | ICD-10-CM

## 2025-04-28 PROCEDURE — G0239 OTH RESP PROC, GROUP: HCPCS

## 2025-04-30 ENCOUNTER — CLINICAL SUPPORT (OUTPATIENT)
Facility: HOSPITAL | Age: 70
End: 2025-04-30
Payer: COMMERCIAL

## 2025-04-30 DIAGNOSIS — C34.90 MALIGNANT NEOPLASM OF LUNG, UNSPECIFIED LATERALITY, UNSPECIFIED PART OF LUNG (HCC): Primary | ICD-10-CM

## 2025-04-30 PROCEDURE — G0239 OTH RESP PROC, GROUP: HCPCS

## 2025-05-05 ENCOUNTER — CLINICAL SUPPORT (OUTPATIENT)
Facility: HOSPITAL | Age: 70
End: 2025-05-05
Payer: COMMERCIAL

## 2025-05-05 ENCOUNTER — HOSPITAL ENCOUNTER (OUTPATIENT)
Dept: INFUSION CENTER | Facility: CLINIC | Age: 70
Discharge: HOME | End: 2025-05-05
Payer: COMMERCIAL

## 2025-05-05 DIAGNOSIS — C34.91 ADENOCARCINOMA OF RIGHT LUNG (HCC): ICD-10-CM

## 2025-05-05 DIAGNOSIS — C34.90 MALIGNANT NEOPLASM OF LUNG, UNSPECIFIED LATERALITY, UNSPECIFIED PART OF LUNG (HCC): Primary | ICD-10-CM

## 2025-05-05 DIAGNOSIS — Z95.828 PORT-A-CATH IN PLACE: Primary | ICD-10-CM

## 2025-05-05 LAB
ALBUMIN SERPL BCG-MCNC: 3.8 G/DL (ref 3.5–5)
ALP SERPL-CCNC: 58 U/L (ref 34–104)
ALT SERPL W P-5'-P-CCNC: 37 U/L (ref 7–52)
ANION GAP SERPL CALCULATED.3IONS-SCNC: 6 MMOL/L (ref 4–13)
AST SERPL W P-5'-P-CCNC: 24 U/L (ref 13–39)
BASOPHILS # BLD AUTO: 0.04 THOUSANDS/ÂΜL (ref 0–0.1)
BASOPHILS NFR BLD AUTO: 1 % (ref 0–1)
BILIRUB SERPL-MCNC: 0.5 MG/DL (ref 0.2–1)
BUN SERPL-MCNC: 11 MG/DL (ref 5–25)
CALCIUM SERPL-MCNC: 8.8 MG/DL (ref 8.4–10.2)
CHLORIDE SERPL-SCNC: 104 MMOL/L (ref 96–108)
CO2 SERPL-SCNC: 26 MMOL/L (ref 21–32)
CREAT SERPL-MCNC: 0.71 MG/DL (ref 0.6–1.3)
EOSINOPHIL # BLD AUTO: 0.1 THOUSAND/ÂΜL (ref 0–0.61)
EOSINOPHIL NFR BLD AUTO: 2 % (ref 0–6)
ERYTHROCYTE [DISTWIDTH] IN BLOOD BY AUTOMATED COUNT: 12 % (ref 11.6–15.1)
GFR SERPL CREATININE-BSD FRML MDRD: 87 ML/MIN/1.73SQ M
GLUCOSE SERPL-MCNC: 121 MG/DL (ref 65–140)
HCT VFR BLD AUTO: 38.2 % (ref 34.8–46.1)
HGB BLD-MCNC: 12.7 G/DL (ref 11.5–15.4)
IMM GRANULOCYTES # BLD AUTO: 0.03 THOUSAND/UL (ref 0–0.2)
IMM GRANULOCYTES NFR BLD AUTO: 0 % (ref 0–2)
LYMPHOCYTES # BLD AUTO: 0.96 THOUSANDS/ÂΜL (ref 0.6–4.47)
LYMPHOCYTES NFR BLD AUTO: 14 % (ref 14–44)
MCH RBC QN AUTO: 32.7 PG (ref 26.8–34.3)
MCHC RBC AUTO-ENTMCNC: 33.2 G/DL (ref 31.4–37.4)
MCV RBC AUTO: 99 FL (ref 82–98)
MONOCYTES # BLD AUTO: 0.55 THOUSAND/ÂΜL (ref 0.17–1.22)
MONOCYTES NFR BLD AUTO: 8 % (ref 4–12)
NEUTROPHILS # BLD AUTO: 5.18 THOUSANDS/ÂΜL (ref 1.85–7.62)
NEUTS SEG NFR BLD AUTO: 75 % (ref 43–75)
NRBC BLD AUTO-RTO: 0 /100 WBCS
PLATELET # BLD AUTO: 216 THOUSANDS/UL (ref 149–390)
PMV BLD AUTO: 9.4 FL (ref 8.9–12.7)
POTASSIUM SERPL-SCNC: 4 MMOL/L (ref 3.5–5.3)
PROT SERPL-MCNC: 7 G/DL (ref 6.4–8.4)
RBC # BLD AUTO: 3.88 MILLION/UL (ref 3.81–5.12)
SODIUM SERPL-SCNC: 136 MMOL/L (ref 135–147)
WBC # BLD AUTO: 6.86 THOUSAND/UL (ref 4.31–10.16)

## 2025-05-05 PROCEDURE — 85025 COMPLETE CBC W/AUTO DIFF WBC: CPT

## 2025-05-05 PROCEDURE — 80053 COMPREHEN METABOLIC PANEL: CPT

## 2025-05-05 PROCEDURE — G0239 OTH RESP PROC, GROUP: HCPCS

## 2025-05-05 NOTE — PROGRESS NOTES
Pt to clinic for labs only offering no complaints. Pt port accessed, flushed, and labs drawn without complication. Pt port de-accessed. Pt aware of next appointment on 6/2/25 at 1400. AVS declined.

## 2025-05-06 NOTE — PROGRESS NOTES
Pulmonary Rehabilitation   Assessment and Individualized Treatment Plan  90 DAY      Today's date: May 6, 2025  # of Exercise Sessions Completed: 20  Patient name: Saskia Alfaro     : 1955       MRN: [id number]  Referring Physician: Sanjana Rodríguez MD  Pulmonologist: Sanjana Rodríguez MD  Provider: Freddy  Clinician: Ivana Puckett MS     Dx:    Encounter Diagnosis   Name Primary?    Malignant neoplasm of lung, unspecified laterality, unspecified part of lung (HCC) Yes     Date of onset: 2023      Treatment is tailored to this patient's individual needs.  The ITP was reviewed with the patient and all questions were answered to their satisfaction.  Additional ITP documentation can be found electronically including daily and monthly exercise summaries, daily session notes with ECG summaries, education notes, daily medication reconciliation, and daily physician supervision.      COMMENTS:   SUMMARY May 6, 2025    Resting BP  98//64,  HR   Exercise /60 -122  Exercise session details: 40 minutes,  1.9-3.5 METs  LO2:   Rest:  RA L/min, Exercise:  RA L/min  SpO2: Rest:  94-97 %,  Exercise:  91-98 %  Dyspnea:   Rest:  0-3/10,  Exercise: 0-4/10  Home exercise/ADLs: none, she does a lot of house work around her house and takes care of her sister. Once she finishes the program she is considering joining a gym to continue daily exercise.  Patient's subjective report of progress: She reports an increase in her stamina, strength, and overall energy levels. She has been going through a lot emotionally taking care of her sister which takes up a lot of of her time.   .       ASSESSMENT      Medical History:   Past Medical History:   Diagnosis Date    Fibromyalgia     Heart murmur     Lumbosacral disc herniation     Lung cancer (HCC)     Osteopenia     Psoriasis     Psoriasis     RSD (reflex sympathetic dystrophy)        Family History:  Family History   Problem Relation Age of Onset    Sarcoidosis  Mother     Cancer Sister        Allergies:   Prednisone    Current Medications:   Current Outpatient Medications   Medication Sig Dispense Refill    amitriptyline (ELAVIL) 25 mg tablet       amoxicillin (AMOXIL) 500 mg capsule Prn dental procedure (Patient not taking: Reported on 10/21/2024)      Ascorbic Acid (vitamin C) 100 MG tablet Take 100 mg by mouth daily      atorvastatin (LIPITOR) 20 mg tablet Take 20 mg by mouth daily      b complex vitamins capsule Take 1 capsule by mouth daily (Patient not taking: Reported on 10/21/2024)      bismuth subsalicylate (PEPTO BISMOL) 262 MG chewable tablet Take 1 tablet by mouth every 6 hours for 14 days (Patient not taking: Reported on 5/30/2024) 56 tablet 0    calcipotriene (DOVONEX) 0.005 % cream Apply topically 2 (two) times a day To affected areas on Saturday and Sunday only 120 g 2    cholecalciferol (VITAMIN D3) 400 units tablet Take 400 Units by mouth daily      DULoxetine (CYMBALTA) 30 mg delayed release capsule       Fluticasone-Salmeterol (Advair) 100-50 mcg/dose inhaler Inhale 1 puff 2 (two) times a day Rinse mouth after use.      folic acid (KP Folic Acid) 1 mg tablet Take 1 tablet (1 mg total) by mouth daily 90 tablet 2    halobetasol (ULTRAVATE) 0.05 % ointment Apply topically 2 (two) times a day 50 g 3    loratadine (CLARITIN) 10 mg tablet Take 10 mg by mouth daily (Patient not taking: Reported on 7/29/2024)      omeprazole (PriLOSEC) 20 mg delayed release capsule Take 1 capsule (20 mg total) by mouth 2 (two) times a day Every 12 hours for 14 days (Patient not taking: Reported on 12/26/2023) 28 capsule 0    ondansetron (ZOFRAN) 4 mg tablet Take 2 tablets (8 mg total) by mouth every 8 (eight) hours as needed for nausea or vomiting 20 tablet 0    propranolol (INDERAL) 20 mg/5 mL solution       Sotorasib (Lumakras) 320 MG TABS Take 960 mg by mouth daily 90 tablet 5    triamcinolone (KENALOG) 0.1 % cream Apply topically 2 (two) times a day Affected areas Monday  through Friday only 453.6 g 2     No current facility-administered medications for this visit.     Facility-Administered Medications Ordered in Other Visits   Medication Dose Route Frequency Provider Last Rate Last Admin    alteplase (CATHFLO) injection 2 mg  2 mg Intracatheter Q1MIN PRN Maday Orellana MD           Physical Limitations: none    Fall Risk: Low   Comments: Ambulates with a steady gait with no assist device and Denies a fall in the past 6 months    Cultural needs: none    PFT:  Yes     FEV1/FVC ratio of 66.35%   FEV1 of 71% predicted  mildobstruction.    Medication compliance: Yes  Comments: Pt reports to be compliant with medications      Pulmonary Disease Risk Factors:  smoking  Mold in her house, that has been fixed    Sleep Disorders:   Has trouble falling asleep occasionally       EXERCISE ASSESSMENT:      Initial Fitness Assessment: 6MWT:  Resting:    Resting:  BP: 122/70  HR: 98  SpO2: 94  Dyspnea: 0  O2 Use: RA l/min, Exercise:  BP: 132/70  HR: 120  SpO2: 93%  Dyspnea: 3  O2 use: RA l/min, METs:  2.68, and ECG Summary: Sinus tachy, PVCs and bigeminy noted      Current Functional Status:  Occupation: retired  Recreation/Physical activity: none  ADL’s:resumed all ADLs able to perform self-care  [place]: resumed all ADLs able to perform self-care  Exercise:  none  Home exercise equipment: none  Other Comments: none    SMART Exercise Goals:   reduced score on CAT, improved 6MWT distance, reduced dyspnea during exercise, improved exercise tolerance based on peak METs tolerated in pulmonary rehab exercise session, SpO2 >88% during exercise, and reduced RPD at rest    Patient Specific EXERCISE GOALS:     attend pulmonary rehab regularly, decrease sitting time at home, start a walking program, begin a consistent exercise regimen , increased muscular strength, and increased energy    PSYCHOSOCIAL ASSESSMENT:    Date of last assessment: 2/3/2025  Depression screening:  PHQ-9 = 11   "  Interpretation:  10-14 = Moderate Depression  Anxiety screening:  EMILIANO-7 = 10    Interpretation: 10-14 = Moderate anxiety    Pt self-report of depression and anxiety   Patient reports feelings of depression   Patient reports feelings of anxiety  Reports sufficient emotional support     Self-reported stress level:  7  Stress Management: practice Relaxation Techniques, exercise, and spend time with family    Quality of Life Screen:  (Higher score indicates disease impact on QOL)  [facility] score: 28/40      CAT: 18/40      Shortness of breath questionnaire: 12/120    Social Support:   children  Community/Social Activities: none    SMART Goals:    Feelings in [facility] Score < 3, Social Support in [facility] Score < 3, Overall Health in [facility] Score < 3, Quality of Life in [facility] Score < 3 , and improved sleeping habits    Patient Specific PSYCHOCOSOCIAL GOALS:    spend time with family     Psychosocial Assessment as it relates to rehabilitation: Patient denies issues with his/her family or home life that may affect their rehabilitation efforts.       NUTRITION ASSESSMENT:    Initial Weight:  165.4 lbs   Current Weight: 165.4 lbs    Height:   Ht Readings from Last 1 Encounters:   04/10/25 5' 3\" (1.6 m)       Rate Your Plate Score: 62/81    Self-reported Current Dietary Habits:  B: doesn't eat breakfast, 2 cups of coffee  Toast with PB when she takes chemo pills  D: chicken breast, squash raviolis, salad    ETOH:   Social History     Substance and Sexual Activity   Alcohol Use Yes    Alcohol/week: 14.0 standard drinks of alcohol    Types: 14 Glasses of wine per week    Comment: 2 drinks daily       SMART Goals:   eat 6 or more servings of grain products per day, eat 5 or more servings of fruits and vegetables a day, rarely eat processed meats or eat low fat processed meats, and choose 1% or skim milk    Patient Specific NUTRITION GOALS:    increased muscle mass      OTHER CORE COMPONENT " ASSESSMENT:    Hospitalizations in the past year: None, the last year    Oxygen needs:   Rest:  room air  Exercise/physical activity:  room air  Sleep:   room air    Does Pt monitor home SpO2? no   Has a pulse ox, but does use it      Use of Rescue Inhaler: No    Use of Maintenance Inhaler: Yes:  2 times per day    Use of Nebulizer Treatments:  No    Patient practices breathing techniques at home:  No and Reviewed with patient on:  2/3/2025    CAD Risk Factors:  Cholesterol: Yes  HTN: No  DM: No  Obesity: No     Smoking: Current user:  average ppd:   ppd    SMART Core Component Goals:   consistent, controlled resting BP < 130/80, medication compliance, and reduced number of cigarettes per day    Patient Specific CORE COMPONENT GOALS:    reduced dietary sodium <2000mg, medication compliance, and set a target quit date          INDIVIDUALIZED TREATMENT PLAN    The patient is agreeable to attend 24 pulmonary rehab exercise sessions.      EXERCISE GOALS AND PLAN    PHYSCIAN PRESCRIBED EXERCISE    Current Aerobic Exercise Prescription       Frequency: 2 days/week   Supplement with home exercise 2+ days/wk as tolerated        Minutes: 40         METS: 1.8-2.5              SpO2: 91-98%              RPD: 0-4                      HR:  105-122   RPE: 3-5         Modalities: Treadmill, UBE, NuStep, and Recumbent bike      Aerobic Exercise Prescription Plan for Progression:    Frequency: 2 days/week    Supplement with home exercise 2+ days/wk as tolerated       Minutes: 30-40        METS: 2.5-3.5              SpO2: >88%              RPD: 4-6                      HR:RHR +30-40bpm   RPE: 4-5        Modalities: Treadmill, UBE, and Lifecycle        Supplemental Oxygen Needs with Exercise:  room air.    Strength trainin-3 days / week  12-15 repetitions  1-2 sets per modality   Will be added following 2-3 weeks of monitored exercise sessions   Modalities: Leg Press, Chest Press, Pull Downs, and Arm Curl    Education: pursed  lipped breathing, diaphragmatic breathing, relaxation breathing, home exercise guidelines, benefits of exercise for pulmonary disease, RPE scale, RPD scale, O2 saturation monitoring, and appropriate O2 response to exercise    Progress toward Exercise Goals:    Pt is progressing and showing improvement  toward the following goals:  attending rehab regularly and increasing her exercise intensities as tolerated.  , Pt has not made progress toward the following goals: no home exercise. , Will continue to educate and progress as tolerated.    Exercise Plan:  Titrate supplemental oxygen as needed to maintain SpO2>88% with exercise, learn to conserve energy with ADLs , practice diaphragmatic breathing, reduce time sitting at home, increased strength of respiratory muscles, utilize PLB with physical activity, and begin a home exercise program     Readiness to change: Preparation:  (Getting ready to change)  and Action:  (Changing behavior)      NUTRITION GOALS AND PLAN    Progress toward Nutritional goals:    Pt is progressing and showing improvement  toward the following goals:  has good and bad days with her diet, she loves salads. Kales, chicken and rare red meat, hydrates well throughout the day, reading food labels, eating more whole foods.  , Pt has not made progress toward the following goals: occ. Chooses easy meals, like mac and cheese. , Will continue to educate and progress as tolerated.    Nutrition Plan: increase intake of whole grains, increase daily intake of fruits and vegetables, increase daily intake of low fat dairy, eat poultry without the skin, and drink/use 1%  low fat or skim  milk    SMART goals are based Rate Your Plate Dietary Self-Assessment. These are the areas in which the patient could score higher on the assessment.  Goals include recommendations for a heart healthy diet based on American Heart Association.    Nutrition Education: low sodium diet  maintaining hydration    Readiness to change:  Preparation:  (Getting ready to change)  and Action:  (Changing behavior)      PSYCHOSOCIAL GOALS AND PLAN    Information to begin using [employer] was provided as well as contact information for counseling through  [facility].    Progress toward Psychosocial goals:    Pt is progressing and showing improvement  toward the following goals:  managing stress the best way she can, takes care of her sister, who recently is not at home and in a facility.  , Will continue to educate and progress as tolerated.    Psychosocial Plan: Practice relaxation techniques, Exercise, and Keep a positive mindset    Psychosocial Education: signs/sxs of depression, benefits of a positive support system, and stress management techniques    Readiness to change: Preparation:  (Getting ready to change)  and Action:  (Changing behavior)      OTHER CORE COMPONENTS GOALS AND PLAN    Tobacco Use Intervention:     Brief counseling by cardiac rehab professional  Date: 2/3/2025    Oxygen Goal: Maintain SpO2>88% during exercise or as advised by pulmonolgist    Progress toward Core Component goals:    Pt is progressing and showing improvement  toward the following goals:  hydrating well throughout the day, taking meds as prescribed.  , Will continue to educate and progress as tolerated.    Core Component Plan: medication compliance, set target quit date for smoking, avoid processed foods, engage in regular exercise, eliminate salt shaker at the table, use salt substitutes, check labels for sodium content, and monitor home BP    Core Component Education:  pathophysiology of pulmonary disease and dangers of smoking    Readiness to change: Action:  (Changing behavior)

## 2025-05-07 ENCOUNTER — CLINICAL SUPPORT (OUTPATIENT)
Facility: HOSPITAL | Age: 70
End: 2025-05-07
Payer: COMMERCIAL

## 2025-05-07 DIAGNOSIS — C34.90 MALIGNANT NEOPLASM OF LUNG, UNSPECIFIED LATERALITY, UNSPECIFIED PART OF LUNG (HCC): Primary | ICD-10-CM

## 2025-05-07 PROCEDURE — G0239 OTH RESP PROC, GROUP: HCPCS

## 2025-05-12 ENCOUNTER — CLINICAL SUPPORT (OUTPATIENT)
Facility: HOSPITAL | Age: 70
End: 2025-05-12
Payer: COMMERCIAL

## 2025-05-12 DIAGNOSIS — C34.90 MALIGNANT NEOPLASM OF LUNG, UNSPECIFIED LATERALITY, UNSPECIFIED PART OF LUNG (HCC): Primary | ICD-10-CM

## 2025-05-12 PROCEDURE — G0239 OTH RESP PROC, GROUP: HCPCS

## 2025-05-14 ENCOUNTER — CLINICAL SUPPORT (OUTPATIENT)
Facility: HOSPITAL | Age: 70
End: 2025-05-14
Payer: COMMERCIAL

## 2025-05-14 DIAGNOSIS — C34.90 MALIGNANT NEOPLASM OF LUNG, UNSPECIFIED LATERALITY, UNSPECIFIED PART OF LUNG (HCC): Primary | ICD-10-CM

## 2025-05-14 PROCEDURE — G0239 OTH RESP PROC, GROUP: HCPCS

## 2025-05-15 ENCOUNTER — HOSPITAL ENCOUNTER (OUTPATIENT)
Dept: CT IMAGING | Facility: HOSPITAL | Age: 70
End: 2025-05-15
Attending: INTERNAL MEDICINE
Payer: COMMERCIAL

## 2025-05-15 DIAGNOSIS — K86.89 PANCREATIC DUCT STONES: ICD-10-CM

## 2025-05-15 DIAGNOSIS — K86.2 PANCREATIC PSEUDOCYST/CYST: ICD-10-CM

## 2025-05-15 DIAGNOSIS — K86.3 PANCREATIC PSEUDOCYST/CYST: ICD-10-CM

## 2025-05-15 DIAGNOSIS — K86.89 DILATION OF PANCREATIC DUCT: ICD-10-CM

## 2025-05-15 PROCEDURE — 74177 CT ABD & PELVIS W/CONTRAST: CPT

## 2025-05-15 RX ADMIN — IOHEXOL 100 ML: 350 INJECTION, SOLUTION INTRAVENOUS at 11:10

## 2025-05-19 ENCOUNTER — CLINICAL SUPPORT (OUTPATIENT)
Facility: HOSPITAL | Age: 70
End: 2025-05-19
Payer: COMMERCIAL

## 2025-05-19 DIAGNOSIS — C34.90 MALIGNANT NEOPLASM OF LUNG, UNSPECIFIED LATERALITY, UNSPECIFIED PART OF LUNG (HCC): Primary | ICD-10-CM

## 2025-05-19 PROCEDURE — G0239 OTH RESP PROC, GROUP: HCPCS

## 2025-05-21 ENCOUNTER — CLINICAL SUPPORT (OUTPATIENT)
Facility: HOSPITAL | Age: 70
End: 2025-05-21
Payer: COMMERCIAL

## 2025-05-21 DIAGNOSIS — C34.90 MALIGNANT NEOPLASM OF LUNG, UNSPECIFIED LATERALITY, UNSPECIFIED PART OF LUNG (HCC): Primary | ICD-10-CM

## 2025-05-21 PROCEDURE — G0239 OTH RESP PROC, GROUP: HCPCS

## 2025-05-27 ENCOUNTER — CLINICAL SUPPORT (OUTPATIENT)
Facility: HOSPITAL | Age: 70
End: 2025-05-27
Payer: COMMERCIAL

## 2025-05-27 DIAGNOSIS — C34.90 MALIGNANT NEOPLASM OF LUNG, UNSPECIFIED LATERALITY, UNSPECIFIED PART OF LUNG (HCC): Primary | ICD-10-CM

## 2025-05-27 PROCEDURE — G0239 OTH RESP PROC, GROUP: HCPCS

## 2025-05-28 ENCOUNTER — CLINICAL SUPPORT (OUTPATIENT)
Facility: HOSPITAL | Age: 70
End: 2025-05-28
Payer: COMMERCIAL

## 2025-05-28 DIAGNOSIS — C34.90 MALIGNANT NEOPLASM OF LUNG, UNSPECIFIED LATERALITY, UNSPECIFIED PART OF LUNG (HCC): Primary | ICD-10-CM

## 2025-05-28 PROCEDURE — G0239 OTH RESP PROC, GROUP: HCPCS

## 2025-05-30 ENCOUNTER — RESULTS FOLLOW-UP (OUTPATIENT)
Dept: GASTROENTEROLOGY | Facility: MEDICAL CENTER | Age: 70
End: 2025-05-30

## 2025-05-30 NOTE — RESULT ENCOUNTER NOTE
Reminder for CT in 6 months please. Thank you.       Inform patient via [email or web address].  Please review the pathology/lab result of further discussion.    Copied from [email or web address] message :       Shai Kruger,     Your CT showed the same cyst at the same size. We can follow up in 6 months.     Best regards,     Randolph Rodriguez MD

## 2025-05-30 NOTE — TELEPHONE ENCOUNTER
Waiting for pt to return my call for nonurgent ct results recalled placed     ----- Message from Randolph Rodriguez MD sent at 5/30/2025  2:30 PM EDT -----  Reminder for CT in 6 months please. Thank you.       Inform patient via [employer].  Please review the pathology/lab result of further discussion.    Copied from [employer] message :       Sylviaole Saskia,     Your CT showed the same cyst at the same size. We can follow up in 6 months.     Best regards,     Randolph Rodriguez MD      ----- Message -----  From: Interface, Radiology Results In  Sent: 5/21/2025  12:21 PM EDT  To: Randolph Rodriguez MD

## 2025-06-02 ENCOUNTER — HOSPITAL ENCOUNTER (OUTPATIENT)
Dept: INFUSION CENTER | Facility: CLINIC | Age: 70
Discharge: HOME | End: 2025-06-02
Payer: COMMERCIAL

## 2025-06-02 ENCOUNTER — CLINICAL SUPPORT (OUTPATIENT)
Facility: HOSPITAL | Age: 70
End: 2025-06-02
Payer: COMMERCIAL

## 2025-06-02 DIAGNOSIS — Z95.828 PORT-A-CATH IN PLACE: Primary | ICD-10-CM

## 2025-06-02 DIAGNOSIS — C34.91 ADENOCARCINOMA OF RIGHT LUNG (HCC): ICD-10-CM

## 2025-06-02 DIAGNOSIS — C34.90 MALIGNANT NEOPLASM OF LUNG, UNSPECIFIED LATERALITY, UNSPECIFIED PART OF LUNG (HCC): Primary | ICD-10-CM

## 2025-06-02 LAB
ALBUMIN SERPL BCG-MCNC: 4 G/DL (ref 3.5–5)
ALP SERPL-CCNC: 57 U/L (ref 34–104)
ALT SERPL W P-5'-P-CCNC: 41 U/L (ref 7–52)
ANION GAP SERPL CALCULATED.3IONS-SCNC: 8 MMOL/L (ref 4–13)
AST SERPL W P-5'-P-CCNC: 26 U/L (ref 13–39)
BASOPHILS # BLD AUTO: 0.03 THOUSANDS/ÂΜL (ref 0–0.1)
BASOPHILS NFR BLD AUTO: 0 % (ref 0–1)
BILIRUB SERPL-MCNC: 0.4 MG/DL (ref 0.2–1)
BUN SERPL-MCNC: 12 MG/DL (ref 5–25)
CALCIUM SERPL-MCNC: 9.1 MG/DL (ref 8.4–10.2)
CHLORIDE SERPL-SCNC: 103 MMOL/L (ref 96–108)
CO2 SERPL-SCNC: 25 MMOL/L (ref 21–32)
CREAT SERPL-MCNC: 0.65 MG/DL (ref 0.6–1.3)
EOSINOPHIL # BLD AUTO: 0.09 THOUSAND/ÂΜL (ref 0–0.61)
EOSINOPHIL NFR BLD AUTO: 1 % (ref 0–6)
ERYTHROCYTE [DISTWIDTH] IN BLOOD BY AUTOMATED COUNT: 11.9 % (ref 11.6–15.1)
GFR SERPL CREATININE-BSD FRML MDRD: 91 ML/MIN/1.73SQ M
GLUCOSE SERPL-MCNC: 100 MG/DL (ref 65–140)
HCT VFR BLD AUTO: 40.1 % (ref 34.8–46.1)
HGB BLD-MCNC: 13 G/DL (ref 11.5–15.4)
IMM GRANULOCYTES # BLD AUTO: 0.02 THOUSAND/UL (ref 0–0.2)
IMM GRANULOCYTES NFR BLD AUTO: 0 % (ref 0–2)
LYMPHOCYTES # BLD AUTO: 1.11 THOUSANDS/ÂΜL (ref 0.6–4.47)
LYMPHOCYTES NFR BLD AUTO: 16 % (ref 14–44)
MCH RBC QN AUTO: 32.3 PG (ref 26.8–34.3)
MCHC RBC AUTO-ENTMCNC: 32.4 G/DL (ref 31.4–37.4)
MCV RBC AUTO: 100 FL (ref 82–98)
MONOCYTES # BLD AUTO: 0.66 THOUSAND/ÂΜL (ref 0.17–1.22)
MONOCYTES NFR BLD AUTO: 10 % (ref 4–12)
NEUTROPHILS # BLD AUTO: 5.07 THOUSANDS/ÂΜL (ref 1.85–7.62)
NEUTS SEG NFR BLD AUTO: 73 % (ref 43–75)
NRBC BLD AUTO-RTO: 0 /100 WBCS
PLATELET # BLD AUTO: 202 THOUSANDS/UL (ref 149–390)
PMV BLD AUTO: 10.4 FL (ref 8.9–12.7)
POTASSIUM SERPL-SCNC: 3.7 MMOL/L (ref 3.5–5.3)
PROT SERPL-MCNC: 7.1 G/DL (ref 6.4–8.4)
RBC # BLD AUTO: 4.03 MILLION/UL (ref 3.81–5.12)
SODIUM SERPL-SCNC: 136 MMOL/L (ref 135–147)
WBC # BLD AUTO: 6.98 THOUSAND/UL (ref 4.31–10.16)

## 2025-06-02 PROCEDURE — 80053 COMPREHEN METABOLIC PANEL: CPT

## 2025-06-02 PROCEDURE — 85025 COMPLETE CBC W/AUTO DIFF WBC: CPT

## 2025-06-02 PROCEDURE — G0239 OTH RESP PROC, GROUP: HCPCS

## 2025-06-02 NOTE — PROGRESS NOTES
Pt into clinic for port flush and lab draw via port. Pt offers no complaints.    Port accessed, blood return noted, flushed, and de-accessed. Tolerated procedure.     Pt aware of next appointment on 6/30/25 at 4:30pm. AVS declined.

## 2025-06-03 NOTE — PROGRESS NOTES
Pulmonary Rehabilitation   Assessment and Individualized Treatment Plan  120 DAY      Today's date: Sarah 3, 2025  # of Exercise Sessions Completed: 28  Patient name: Saskia Alfaro     : 1955       MRN: [id number]  Referring Physician: Sanjana Rodríguez MD  Pulmonologist: Sanjana Rodríguez MD  Provider: Freddy  Clinician: Ivana Puckett MS     Dx:    Encounter Diagnosis   Name Primary?    Malignant neoplasm of lung, unspecified laterality, unspecified part of lung (HCC) Yes     Date of onset: 2023      Treatment is tailored to this patient's individual needs.  The ITP was reviewed with the patient and all questions were answered to their satisfaction.  Additional ITP documentation can be found electronically including daily and monthly exercise summaries, daily session notes with ECG summaries, education notes, daily medication reconciliation, and daily physician supervision.      COMMENTS:   SUMMARY 6/3/2025    Resting BP  96//70,  HR   Exercise //66 -124  Exercise session details: 40 minutes,  1.9-3.5 METs  LO2:   Rest:  RA L/min, Exercise:  RA L/min  SpO2: Rest:  94-98 %,  Exercise:  93-98 %  Dyspnea:   Rest:  0/10,  Exercise: 0-3/10  Home exercise/ADLs: none, she does a lot of house work around her house and takes care of her sister. Once she finishes the program she is considering joining a gym to continue daily exercise.  Patient's subjective report of progress: She reports an increase in her stamina, strength, and overall energy levels. She has been going through a lot emotionally taking care of her sister which takes up a lot of of her time.   .       ASSESSMENT      Medical History:   Past Medical History:   Diagnosis Date    Fibromyalgia     Heart murmur     Lumbosacral disc herniation     Lung cancer (HCC)     Osteopenia     Psoriasis     Psoriasis     RSD (reflex sympathetic dystrophy)        Family History:  Family History   Problem Relation Name Age of Onset     Sarcoidosis Mother      Cancer Sister         Allergies:   Prednisone    Current Medications:   Current Outpatient Medications   Medication Sig Dispense Refill    amitriptyline (ELAVIL) 25 mg tablet       amoxicillin (AMOXIL) 500 mg capsule Prn dental procedure (Patient not taking: Reported on 10/21/2024)      Ascorbic Acid (vitamin C) 100 MG tablet Take 100 mg by mouth daily      atorvastatin (LIPITOR) 20 mg tablet Take 20 mg by mouth daily      b complex vitamins capsule Take 1 capsule by mouth daily (Patient not taking: Reported on 10/21/2024)      bismuth subsalicylate (PEPTO BISMOL) 262 MG chewable tablet Take 1 tablet by mouth every 6 hours for 14 days (Patient not taking: Reported on 5/30/2024) 56 tablet 0    calcipotriene (DOVONEX) 0.005 % cream Apply topically 2 (two) times a day To affected areas on Saturday and Sunday only 120 g 2    cholecalciferol (VITAMIN D3) 400 units tablet Take 400 Units by mouth daily      DULoxetine (CYMBALTA) 30 mg delayed release capsule       Fluticasone-Salmeterol (Advair) 100-50 mcg/dose inhaler Inhale 1 puff 2 (two) times a day Rinse mouth after use.      folic acid (KP Folic Acid) 1 mg tablet Take 1 tablet (1 mg total) by mouth daily 90 tablet 2    halobetasol (ULTRAVATE) 0.05 % ointment Apply topically 2 (two) times a day 50 g 3    loratadine (CLARITIN) 10 mg tablet Take 10 mg by mouth daily (Patient not taking: Reported on 7/29/2024)      omeprazole (PriLOSEC) 20 mg delayed release capsule Take 1 capsule (20 mg total) by mouth 2 (two) times a day Every 12 hours for 14 days (Patient not taking: Reported on 12/26/2023) 28 capsule 0    ondansetron (ZOFRAN) 4 mg tablet Take 2 tablets (8 mg total) by mouth every 8 (eight) hours as needed for nausea or vomiting 20 tablet 0    propranolol (INDERAL) 20 mg/5 mL solution       Sotorasib (Lumakras) 320 MG TABS Take 960 mg by mouth daily 90 tablet 5    triamcinolone (KENALOG) 0.1 % cream Apply topically 2 (two) times a day Affected  areas Monday through Friday only 453.6 g 2     No current facility-administered medications for this visit.     Facility-Administered Medications Ordered in Other Visits   Medication Dose Route Frequency Provider Last Rate Last Admin    alteplase (CATHFLO) injection 2 mg  2 mg Intracatheter Q1MIN PRN Maday Orellana MD           Physical Limitations: none    Fall Risk: Low   Comments: Ambulates with a steady gait with no assist device and Denies a fall in the past 6 months    Cultural needs: none    PFT:  Yes     FEV1/FVC ratio of 66.35%   FEV1 of 71% predicted  mildobstruction.    Medication compliance: Yes  Comments: Pt reports to be compliant with medications      Pulmonary Disease Risk Factors:  smoking  Mold in her house, that has been fixed    Sleep Disorders:   Has trouble falling asleep occasionally       EXERCISE ASSESSMENT:      Initial Fitness Assessment: 6MWT:  Resting:    Resting:  BP: 122/70  HR: 98  SpO2: 94  Dyspnea: 0  O2 Use: RA l/min, Exercise:  BP: 132/70  HR: 120  SpO2: 93%  Dyspnea: 3  O2 use: RA l/min, METs:  2.68, and ECG Summary: Sinus tachy, PVCs and bigeminy noted      Current Functional Status:  Occupation: retired  Recreation/Physical activity: none  ADL’s:resumed all ADLs able to perform self-care  Clinch: resumed all ADLs able to perform self-care  Exercise:  none  Home exercise equipment: none  Other Comments: none    SMART Exercise Goals:   reduced score on CAT, improved 6MWT distance, reduced dyspnea during exercise, improved exercise tolerance based on peak METs tolerated in pulmonary rehab exercise session, SpO2 >88% during exercise, and reduced RPD at rest    Patient Specific EXERCISE GOALS:     attend pulmonary rehab regularly, decrease sitting time at home, start a walking program, begin a consistent exercise regimen , increased muscular strength, and increased energy    PSYCHOSOCIAL ASSESSMENT:    Date of last assessment: 2/3/2025  Depression screening:  PHQ-9 = 11   "  Interpretation:  10-14 = Moderate Depression  Anxiety screening:  EMILIANO-7 = 10    Interpretation: 10-14 = Moderate anxiety    Pt self-report of depression and anxiety   Patient reports feelings of depression   Patient reports feelings of anxiety  Reports sufficient emotional support     Self-reported stress level:  7  Stress Management: practice Relaxation Techniques, exercise, and spend time with family    Quality of Life Screen:  (Higher score indicates disease impact on QOL)  [facility]: 28/40      CAT: 18/40      Shortness of breath questionnaire: 12/120    Social Support:   children  Community/Social Activities: none    SMART Goals:    Feelings in [facility] Score < 3, Social Support in [facility] Score < 3, Overall Health in [facility] Score < 3, Quality of Life in [facility] Score < 3 , and improved sleeping habits    Patient Specific PSYCHOCOSOCIAL GOALS:    spend time with family     Psychosocial Assessment as it relates to rehabilitation: Patient denies issues with his/her family or home life that may affect their rehabilitation efforts.       NUTRITION ASSESSMENT:    Initial Weight:  165.4 lbs   Current Weight: 165.4 lbs    Height:   Ht Readings from Last 1 Encounters:   04/10/25 5' 3\" (1.6 m)       Rate Your Plate Score: 62/81    Self-reported Current Dietary Habits:  B: doesn't eat breakfast, 2 cups of coffee  Toast with PB when she takes chemo pills  D: chicken breast, squash raviolis, salad    ETOH:   Social History     Substance and Sexual Activity   Alcohol Use Yes    Alcohol/week: 14.0 standard drinks of alcohol    Types: 14 Glasses of wine per week    Comment: 2 drinks daily       SMART Goals:   eat 6 or more servings of grain products per day, eat 5 or more servings of fruits and vegetables a day, rarely eat processed meats or eat low fat processed meats, and choose 1% or skim milk    Patient Specific NUTRITION GOALS:    increased muscle mass      OTHER CORE COMPONENT " ASSESSMENT:    Hospitalizations in the past year: None, the last year    Oxygen needs:   Rest:  room air  Exercise/physical activity:  room air  Sleep:   room air    Does Pt monitor home SpO2? no   Has a pulse ox, but does use it      Use of Rescue Inhaler: No    Use of Maintenance Inhaler: Yes:  2 times per day    Use of Nebulizer Treatments:  No    Patient practices breathing techniques at home:  No and Reviewed with patient on:  2/3/2025    CAD Risk Factors:  Cholesterol: Yes  HTN: No  DM: No  Obesity: No     Smoking: Current user:  average ppd:   ppd    SMART Core Component Goals:   consistent, controlled resting BP < 130/80, medication compliance, and reduced number of cigarettes per day    Patient Specific CORE COMPONENT GOALS:    reduced dietary sodium <2000mg, medication compliance, and set a target quit date          INDIVIDUALIZED TREATMENT PLAN    The patient is agreeable to attend 24 pulmonary rehab exercise sessions.      EXERCISE GOALS AND PLAN    PHYSCIAN PRESCRIBED EXERCISE    Current Aerobic Exercise Prescription       Frequency: 2 days/week   Supplement with home exercise 2+ days/wk as tolerated        Minutes: 40         METS: 1.9-3.5              SpO2: 93-98%              RPD: 0-3                      HR: 112-124   RPE: 3-5         Modalities: Treadmill, UBE, NuStep, and Recumbent bike      Aerobic Exercise Prescription Plan for Progression:    Frequency: 2 days/week    Supplement with home exercise 2+ days/wk as tolerated       Minutes: 30-40        METS: 2.5-3.5              SpO2: >88%              RPD: 4-6                      HR:RHR +30-40bpm   RPE: 4-5        Modalities: Treadmill, UBE, and Lifecycle        Supplemental Oxygen Needs with Exercise:  room air.    Strength trainin-3 days / week  12-15 repetitions  1-2 sets per modality   Will be added following 2-3 weeks of monitored exercise sessions   Modalities: Leg Press, Chest Press, Pull Downs, and Arm Curl    Education: pursed  lipped breathing, diaphragmatic breathing, relaxation breathing, home exercise guidelines, benefits of exercise for pulmonary disease, RPE scale, RPD scale, O2 saturation monitoring, and appropriate O2 response to exercise    Progress toward Exercise Goals:    Pt is progressing and showing improvement  toward the following goals:  attending rehab regularly and increasing her exercise intensities as tolerated.  , Pt has not made progress toward the following goals: no home exercise. , Will continue to educate and progress as tolerated.    Exercise Plan:  Titrate supplemental oxygen as needed to maintain SpO2>88% with exercise, learn to conserve energy with ADLs , practice diaphragmatic breathing, reduce time sitting at home, increased strength of respiratory muscles, utilize PLB with physical activity, and begin a home exercise program     Readiness to change: Preparation:  (Getting ready to change)  and Action:  (Changing behavior)      NUTRITION GOALS AND PLAN    Progress toward Nutritional goals:    Pt is progressing and showing improvement  toward the following goals:  has good and bad days with her diet, she loves salads. Kales, chicken and rare red meat, hydrates well throughout the day, reading food labels, eating more whole foods.  , Pt has not made progress toward the following goals: occ. Chooses easy meals, like mac and cheese. , Will continue to educate and progress as tolerated.    Nutrition Plan: increase intake of whole grains, increase daily intake of fruits and vegetables, increase daily intake of low fat dairy, eat poultry without the skin, and drink/use 1%  low fat or skim  milk    SMART goals are based Rate Your Plate Dietary Self-Assessment. These are the areas in which the patient could score higher on the assessment.  Goals include recommendations for a heart healthy diet based on American Heart Association.    Nutrition Education: low sodium diet  maintaining hydration    Readiness to change:  Preparation:  (Getting ready to change)  and Action:  (Changing behavior)      PSYCHOSOCIAL GOALS AND PLAN    Information to begin using [employer] was provided as well as contact information for counseling through  [facility].    Progress toward Psychosocial goals:    Pt is progressing and showing improvement  toward the following goals:  managing stress the best way she can, takes care of her sister, who recently is not at home and in a facility.  , Will continue to educate and progress as tolerated.    Psychosocial Plan: Practice relaxation techniques, Exercise, and Keep a positive mindset    Psychosocial Education: signs/sxs of depression, benefits of a positive support system, and stress management techniques    Readiness to change: Preparation:  (Getting ready to change)  and Action:  (Changing behavior)      OTHER CORE COMPONENTS GOALS AND PLAN    Tobacco Use Intervention:     Brief counseling by cardiac rehab professional  Date: 2/3/2025    Oxygen Goal: Maintain SpO2>88% during exercise or as advised by pulmonolgist    Progress toward Core Component goals:    Pt is progressing and showing improvement  toward the following goals:  hydrating well throughout the day, taking meds as prescribed.  , Will continue to educate and progress as tolerated.    Core Component Plan: medication compliance, set target quit date for smoking, avoid processed foods, engage in regular exercise, eliminate salt shaker at the table, use salt substitutes, check labels for sodium content, and monitor home BP    Core Component Education:  pathophysiology of pulmonary disease and dangers of smoking    Readiness to change: Action:  (Changing behavior)

## 2025-06-04 ENCOUNTER — CLINICAL SUPPORT (OUTPATIENT)
Facility: HOSPITAL | Age: 70
End: 2025-06-04
Payer: COMMERCIAL

## 2025-06-04 DIAGNOSIS — C34.90 MALIGNANT NEOPLASM OF LUNG, UNSPECIFIED LATERALITY, UNSPECIFIED PART OF LUNG (HCC): Primary | ICD-10-CM

## 2025-06-04 PROCEDURE — G0239 OTH RESP PROC, GROUP: HCPCS

## 2025-06-09 ENCOUNTER — APPOINTMENT (OUTPATIENT)
Facility: HOSPITAL | Age: 70
End: 2025-06-09
Payer: COMMERCIAL

## 2025-06-11 ENCOUNTER — APPOINTMENT (OUTPATIENT)
Facility: HOSPITAL | Age: 70
End: 2025-06-11
Payer: COMMERCIAL

## 2025-06-16 ENCOUNTER — CLINICAL SUPPORT (OUTPATIENT)
Facility: HOSPITAL | Age: 70
End: 2025-06-16
Payer: COMMERCIAL

## 2025-06-16 DIAGNOSIS — C34.90 MALIGNANT NEOPLASM OF LUNG, UNSPECIFIED LATERALITY, UNSPECIFIED PART OF LUNG (HCC): Primary | ICD-10-CM

## 2025-06-16 PROCEDURE — G0239 OTH RESP PROC, GROUP: HCPCS

## 2025-06-18 ENCOUNTER — APPOINTMENT (OUTPATIENT)
Facility: HOSPITAL | Age: 70
End: 2025-06-18
Payer: COMMERCIAL

## 2025-06-18 DIAGNOSIS — C34.91 ADENOCARCINOMA OF RIGHT LUNG (HCC): ICD-10-CM

## 2025-06-18 RX ORDER — SOTORASIB 320 MG/1
3 TABLET, COATED ORAL DAILY
Qty: 90 TABLET | Refills: 10 | Status: SHIPPED | OUTPATIENT
Start: 2025-06-18

## 2025-06-23 ENCOUNTER — APPOINTMENT (OUTPATIENT)
Facility: HOSPITAL | Age: 70
End: 2025-06-23
Payer: COMMERCIAL

## 2025-06-25 ENCOUNTER — APPOINTMENT (OUTPATIENT)
Facility: HOSPITAL | Age: 70
End: 2025-06-25
Payer: COMMERCIAL

## 2025-06-30 ENCOUNTER — HOSPITAL ENCOUNTER (OUTPATIENT)
Dept: INFUSION CENTER | Facility: CLINIC | Age: 70
Discharge: HOME | End: 2025-06-30
Payer: COMMERCIAL

## 2025-06-30 DIAGNOSIS — Z95.828 PORT-A-CATH IN PLACE: Primary | ICD-10-CM

## 2025-06-30 DIAGNOSIS — C34.91 ADENOCARCINOMA OF RIGHT LUNG (HCC): ICD-10-CM

## 2025-06-30 LAB
ALBUMIN SERPL BCG-MCNC: 4.1 G/DL (ref 3.5–5)
ALP SERPL-CCNC: 59 U/L (ref 34–104)
ALT SERPL W P-5'-P-CCNC: 53 U/L (ref 7–52)
ANION GAP SERPL CALCULATED.3IONS-SCNC: 8 MMOL/L (ref 4–13)
AST SERPL W P-5'-P-CCNC: 30 U/L (ref 13–39)
BASOPHILS # BLD AUTO: 0.04 THOUSANDS/ÂΜL (ref 0–0.1)
BASOPHILS NFR BLD AUTO: 1 % (ref 0–1)
BILIRUB SERPL-MCNC: 0.59 MG/DL (ref 0.2–1)
BUN SERPL-MCNC: 17 MG/DL (ref 5–25)
CALCIUM SERPL-MCNC: 9.5 MG/DL (ref 8.4–10.2)
CHLORIDE SERPL-SCNC: 105 MMOL/L (ref 96–108)
CO2 SERPL-SCNC: 25 MMOL/L (ref 21–32)
CREAT SERPL-MCNC: 0.66 MG/DL (ref 0.6–1.3)
EOSINOPHIL # BLD AUTO: 0.11 THOUSAND/ÂΜL (ref 0–0.61)
EOSINOPHIL NFR BLD AUTO: 2 % (ref 0–6)
ERYTHROCYTE [DISTWIDTH] IN BLOOD BY AUTOMATED COUNT: 12.1 % (ref 11.6–15.1)
GFR SERPL CREATININE-BSD FRML MDRD: 91 ML/MIN/1.73SQ M
GLUCOSE SERPL-MCNC: 88 MG/DL (ref 65–140)
HCT VFR BLD AUTO: 38 % (ref 34.8–46.1)
HGB BLD-MCNC: 12.7 G/DL (ref 11.5–15.4)
IMM GRANULOCYTES # BLD AUTO: 0.12 THOUSAND/UL (ref 0–0.2)
IMM GRANULOCYTES NFR BLD AUTO: 2 % (ref 0–2)
LYMPHOCYTES # BLD AUTO: 0.91 THOUSANDS/ÂΜL (ref 0.6–4.47)
LYMPHOCYTES NFR BLD AUTO: 13 % (ref 14–44)
MCH RBC QN AUTO: 32.8 PG (ref 26.8–34.3)
MCHC RBC AUTO-ENTMCNC: 33.4 G/DL (ref 31.4–37.4)
MCV RBC AUTO: 98 FL (ref 82–98)
MONOCYTES # BLD AUTO: 0.68 THOUSAND/ÂΜL (ref 0.17–1.22)
MONOCYTES NFR BLD AUTO: 10 % (ref 4–12)
NEUTROPHILS # BLD AUTO: 5.1 THOUSANDS/ÂΜL (ref 1.85–7.62)
NEUTS SEG NFR BLD AUTO: 72 % (ref 43–75)
NRBC BLD AUTO-RTO: 0 /100 WBCS
PLATELET # BLD AUTO: 181 THOUSANDS/UL (ref 149–390)
PMV BLD AUTO: 10.2 FL (ref 8.9–12.7)
POTASSIUM SERPL-SCNC: 3.9 MMOL/L (ref 3.5–5.3)
PROT SERPL-MCNC: 7.2 G/DL (ref 6.4–8.4)
RBC # BLD AUTO: 3.87 MILLION/UL (ref 3.81–5.12)
SODIUM SERPL-SCNC: 138 MMOL/L (ref 135–147)
WBC # BLD AUTO: 6.96 THOUSAND/UL (ref 4.31–10.16)

## 2025-06-30 PROCEDURE — 80053 COMPREHEN METABOLIC PANEL: CPT

## 2025-06-30 PROCEDURE — 85025 COMPLETE CBC W/AUTO DIFF WBC: CPT

## 2025-06-30 NOTE — PROGRESS NOTES
Patient presents for central venous labs and port flush. Patient offers no complaints. Port accessed, flushed, saline locked, labs drawn, port flushed and de-accessed. Band-aid placed on site. AVS declined.  Next appointment 7/28/25 @1030   Walked out of clinic with no incident.

## 2025-07-10 ENCOUNTER — HOSPITAL ENCOUNTER (OUTPATIENT)
Dept: CT IMAGING | Facility: HOSPITAL | Age: 70
End: 2025-07-10
Attending: INTERNAL MEDICINE
Payer: COMMERCIAL

## 2025-07-10 DIAGNOSIS — C34.91 ADENOCARCINOMA OF RIGHT LUNG (HCC): ICD-10-CM

## 2025-07-10 PROCEDURE — 71250 CT THORAX DX C-: CPT

## 2025-07-17 ENCOUNTER — OFFICE VISIT (OUTPATIENT)
Dept: HEMATOLOGY ONCOLOGY | Facility: CLINIC | Age: 70
End: 2025-07-17
Payer: COMMERCIAL

## 2025-07-17 VITALS
WEIGHT: 167 LBS | TEMPERATURE: 97.4 F | SYSTOLIC BLOOD PRESSURE: 118 MMHG | HEIGHT: 63 IN | HEART RATE: 94 BPM | BODY MASS INDEX: 29.59 KG/M2 | DIASTOLIC BLOOD PRESSURE: 72 MMHG | OXYGEN SATURATION: 99 % | RESPIRATION RATE: 16 BRPM

## 2025-07-17 DIAGNOSIS — M54.50 ACUTE LOW BACK PAIN, UNSPECIFIED BACK PAIN LATERALITY, UNSPECIFIED WHETHER SCIATICA PRESENT: ICD-10-CM

## 2025-07-17 DIAGNOSIS — C34.80 MALIGNANT NEOPLASM OF OVERLAPPING SITES OF LUNG, UNSPECIFIED LATERALITY (HCC): Primary | ICD-10-CM

## 2025-07-17 DIAGNOSIS — C34.91 ADENOCARCINOMA OF RIGHT LUNG (HCC): ICD-10-CM

## 2025-07-17 DIAGNOSIS — F17.210 TOBACCO DEPENDENCE DUE TO CIGARETTES: ICD-10-CM

## 2025-07-17 DIAGNOSIS — J43.9 PULMONARY EMPHYSEMA, UNSPECIFIED EMPHYSEMA TYPE (HCC): ICD-10-CM

## 2025-07-17 DIAGNOSIS — L40.9 PSORIASIS: ICD-10-CM

## 2025-07-17 PROCEDURE — 99406 BEHAV CHNG SMOKING 3-10 MIN: CPT | Performed by: INTERNAL MEDICINE

## 2025-07-17 PROCEDURE — 99215 OFFICE O/P EST HI 40 MIN: CPT | Performed by: INTERNAL MEDICINE

## 2025-07-18 ENCOUNTER — TELEPHONE (OUTPATIENT)
Dept: HEMATOLOGY ONCOLOGY | Facility: CLINIC | Age: 70
End: 2025-07-18

## 2025-07-28 ENCOUNTER — HOSPITAL ENCOUNTER (OUTPATIENT)
Dept: INFUSION CENTER | Facility: CLINIC | Age: 70
Discharge: HOME | End: 2025-07-28
Payer: COMMERCIAL

## 2025-07-28 DIAGNOSIS — C34.91 ADENOCARCINOMA OF RIGHT LUNG (HCC): ICD-10-CM

## 2025-07-28 DIAGNOSIS — C34.80 MALIGNANT NEOPLASM OF OVERLAPPING SITES OF LUNG, UNSPECIFIED LATERALITY (HCC): ICD-10-CM

## 2025-07-28 DIAGNOSIS — Z95.828 PORT-A-CATH IN PLACE: Primary | ICD-10-CM

## 2025-07-28 LAB
ALBUMIN SERPL BCG-MCNC: 4.2 G/DL (ref 3.5–5)
ALP SERPL-CCNC: 64 U/L (ref 34–104)
ALT SERPL W P-5'-P-CCNC: 73 U/L (ref 7–52)
ANION GAP SERPL CALCULATED.3IONS-SCNC: 6 MMOL/L (ref 4–13)
AST SERPL W P-5'-P-CCNC: 37 U/L (ref 13–39)
BASOPHILS # BLD AUTO: 0.04 THOUSANDS/ÂΜL (ref 0–0.1)
BASOPHILS NFR BLD AUTO: 1 % (ref 0–1)
BILIRUB SERPL-MCNC: 0.53 MG/DL (ref 0.2–1)
BUN SERPL-MCNC: 17 MG/DL (ref 5–25)
CALCIUM SERPL-MCNC: 9.5 MG/DL (ref 8.4–10.2)
CHLORIDE SERPL-SCNC: 103 MMOL/L (ref 96–108)
CK SERPL-CCNC: 77 U/L (ref 26–192)
CO2 SERPL-SCNC: 26 MMOL/L (ref 21–32)
CREAT SERPL-MCNC: 0.75 MG/DL (ref 0.6–1.3)
EOSINOPHIL # BLD AUTO: 0.14 THOUSAND/ÂΜL (ref 0–0.61)
EOSINOPHIL NFR BLD AUTO: 2 % (ref 0–6)
ERYTHROCYTE [DISTWIDTH] IN BLOOD BY AUTOMATED COUNT: 11.9 % (ref 11.6–15.1)
GFR SERPL CREATININE-BSD FRML MDRD: 81 ML/MIN/1.73SQ M
GLUCOSE SERPL-MCNC: 116 MG/DL (ref 65–140)
HCT VFR BLD AUTO: 39.6 % (ref 34.8–46.1)
HGB BLD-MCNC: 13.7 G/DL (ref 11.5–15.4)
IMM GRANULOCYTES # BLD AUTO: 0.04 THOUSAND/UL (ref 0–0.2)
IMM GRANULOCYTES NFR BLD AUTO: 1 % (ref 0–2)
LYMPHOCYTES # BLD AUTO: 0.75 THOUSANDS/ÂΜL (ref 0.6–4.47)
LYMPHOCYTES NFR BLD AUTO: 11 % (ref 14–44)
MCH RBC QN AUTO: 33.7 PG (ref 26.8–34.3)
MCHC RBC AUTO-ENTMCNC: 34.6 G/DL (ref 31.4–37.4)
MCV RBC AUTO: 97 FL (ref 82–98)
MONOCYTES # BLD AUTO: 0.66 THOUSAND/ÂΜL (ref 0.17–1.22)
MONOCYTES NFR BLD AUTO: 9 % (ref 4–12)
NEUTROPHILS # BLD AUTO: 5.44 THOUSANDS/ÂΜL (ref 1.85–7.62)
NEUTS SEG NFR BLD AUTO: 76 % (ref 43–75)
NRBC BLD AUTO-RTO: 0 /100 WBCS
PLATELET # BLD AUTO: 189 THOUSANDS/UL (ref 149–390)
PMV BLD AUTO: 9.8 FL (ref 8.9–12.7)
POTASSIUM SERPL-SCNC: 4 MMOL/L (ref 3.5–5.3)
PROT SERPL-MCNC: 7.2 G/DL (ref 6.4–8.4)
RBC # BLD AUTO: 4.07 MILLION/UL (ref 3.81–5.12)
SODIUM SERPL-SCNC: 135 MMOL/L (ref 135–147)
WBC # BLD AUTO: 7.07 THOUSAND/UL (ref 4.31–10.16)

## 2025-07-28 PROCEDURE — 85025 COMPLETE CBC W/AUTO DIFF WBC: CPT

## 2025-07-28 PROCEDURE — 80053 COMPREHEN METABOLIC PANEL: CPT

## 2025-07-28 PROCEDURE — 82550 ASSAY OF CK (CPK): CPT

## 2025-07-29 ENCOUNTER — OFFICE VISIT (OUTPATIENT)
Dept: RADIATION ONCOLOGY | Facility: CLINIC | Age: 70
End: 2025-07-29
Attending: RADIOLOGY
Payer: COMMERCIAL

## 2025-07-29 VITALS
BODY MASS INDEX: 29.41 KG/M2 | SYSTOLIC BLOOD PRESSURE: 120 MMHG | HEART RATE: 104 BPM | WEIGHT: 166 LBS | OXYGEN SATURATION: 94 % | RESPIRATION RATE: 16 BRPM | DIASTOLIC BLOOD PRESSURE: 74 MMHG | TEMPERATURE: 97 F

## 2025-07-29 DIAGNOSIS — C34.91 ADENOCARCINOMA OF RIGHT LUNG (HCC): Primary | ICD-10-CM

## 2025-07-29 PROCEDURE — 99213 OFFICE O/P EST LOW 20 MIN: CPT | Performed by: RADIOLOGY

## 2025-08-08 ENCOUNTER — HOSPITAL ENCOUNTER (OUTPATIENT)
Dept: MRI IMAGING | Facility: HOSPITAL | Age: 70
End: 2025-08-08
Attending: INTERNAL MEDICINE
Payer: COMMERCIAL

## 2025-08-13 ENCOUNTER — OFFICE VISIT (OUTPATIENT)
Dept: GASTROENTEROLOGY | Facility: MEDICAL CENTER | Age: 70
End: 2025-08-13
Payer: COMMERCIAL

## 2025-08-13 PROBLEM — B96.81 HELICOBACTER POSITIVE GASTRITIS: Status: RESOLVED | Noted: 2024-04-05 | Resolved: 2025-08-13

## 2025-08-13 PROBLEM — Z87.891 SMOKING HISTORY: Status: ACTIVE | Noted: 2025-08-13

## 2025-08-13 PROBLEM — Z12.11 SCREENING FOR COLON CANCER: Status: ACTIVE | Noted: 2025-08-13

## 2025-08-13 PROBLEM — K29.70 HELICOBACTER POSITIVE GASTRITIS: Status: RESOLVED | Noted: 2024-04-05 | Resolved: 2025-08-13

## 2025-08-13 PROBLEM — K76.0 HEPATIC STEATOSIS: Status: ACTIVE | Noted: 2025-08-13

## 2025-08-18 ENCOUNTER — TELEPHONE (OUTPATIENT)
Age: 70
End: 2025-08-18